# Patient Record
Sex: FEMALE | Race: WHITE | NOT HISPANIC OR LATINO | Employment: OTHER | URBAN - METROPOLITAN AREA
[De-identification: names, ages, dates, MRNs, and addresses within clinical notes are randomized per-mention and may not be internally consistent; named-entity substitution may affect disease eponyms.]

---

## 2018-02-07 ENCOUNTER — HOSPITAL ENCOUNTER (INPATIENT)
Facility: OTHER | Age: 68
LOS: 22 days | Discharge: NURSING, REHAB OR LONG-TERM CARE | DRG: 314 | End: 2018-03-01
Attending: HOSPITALIST | Admitting: INTERNAL MEDICINE
Payer: COMMERCIAL

## 2018-02-07 DIAGNOSIS — I48.20 CHRONIC ATRIAL FIBRILLATION: ICD-10-CM

## 2018-02-07 DIAGNOSIS — E11.22 TYPE 2 DIABETES MELLITUS WITH CHRONIC KIDNEY DISEASE ON CHRONIC DIALYSIS, WITHOUT LONG-TERM CURRENT USE OF INSULIN: ICD-10-CM

## 2018-02-07 DIAGNOSIS — I82.412 DVT OF DEEP FEMORAL VEIN, LEFT: ICD-10-CM

## 2018-02-07 DIAGNOSIS — B95.62 BACTEREMIA DUE TO METHICILLIN RESISTANT STAPHYLOCOCCUS AUREUS: Primary | ICD-10-CM

## 2018-02-07 DIAGNOSIS — R74.8 ABNORMAL LIVER ENZYMES: ICD-10-CM

## 2018-02-07 DIAGNOSIS — I38 ENDOCARDITIS: ICD-10-CM

## 2018-02-07 DIAGNOSIS — N18.6 ESRD (END STAGE RENAL DISEASE): ICD-10-CM

## 2018-02-07 DIAGNOSIS — R78.81 MRSA BACTEREMIA: ICD-10-CM

## 2018-02-07 DIAGNOSIS — B95.62 MRSA BACTEREMIA: ICD-10-CM

## 2018-02-07 DIAGNOSIS — N18.6 TYPE 2 DIABETES MELLITUS WITH CHRONIC KIDNEY DISEASE ON CHRONIC DIALYSIS, WITHOUT LONG-TERM CURRENT USE OF INSULIN: ICD-10-CM

## 2018-02-07 DIAGNOSIS — Z22.322 MRSA (METHICILLIN RESISTANT STAPH AUREUS) CULTURE POSITIVE: ICD-10-CM

## 2018-02-07 DIAGNOSIS — Z99.2 TYPE 2 DIABETES MELLITUS WITH CHRONIC KIDNEY DISEASE ON CHRONIC DIALYSIS, WITHOUT LONG-TERM CURRENT USE OF INSULIN: ICD-10-CM

## 2018-02-07 DIAGNOSIS — R78.81 BACTEREMIA DUE TO METHICILLIN RESISTANT STAPHYLOCOCCUS AUREUS: Primary | ICD-10-CM

## 2018-02-07 PROBLEM — E11.9 TYPE 2 DIABETES MELLITUS: Status: ACTIVE | Noted: 2018-02-07

## 2018-02-07 PROBLEM — I48.91 ATRIAL FIBRILLATION: Status: ACTIVE | Noted: 2018-02-07

## 2018-02-07 LAB — POCT GLUCOSE: 111 MG/DL (ref 70–110)

## 2018-02-07 PROCEDURE — 63600175 PHARM REV CODE 636 W HCPCS: Performed by: NURSE PRACTITIONER

## 2018-02-07 PROCEDURE — 11000001 HC ACUTE MED/SURG PRIVATE ROOM

## 2018-02-07 RX ORDER — ONDANSETRON 2 MG/ML
4 INJECTION INTRAMUSCULAR; INTRAVENOUS EVERY 8 HOURS PRN
Status: DISCONTINUED | OUTPATIENT
Start: 2018-02-07 | End: 2018-03-01 | Stop reason: HOSPADM

## 2018-02-07 RX ORDER — IBUPROFEN 200 MG
24 TABLET ORAL
Status: DISCONTINUED | OUTPATIENT
Start: 2018-02-07 | End: 2018-03-01 | Stop reason: HOSPADM

## 2018-02-07 RX ORDER — CIPROFLOXACIN 2 MG/ML
400 INJECTION, SOLUTION INTRAVENOUS
Status: DISCONTINUED | OUTPATIENT
Start: 2018-02-08 | End: 2018-02-09

## 2018-02-07 RX ORDER — AMIODARONE HYDROCHLORIDE 200 MG/1
200 TABLET ORAL DAILY
Status: DISCONTINUED | OUTPATIENT
Start: 2018-02-08 | End: 2018-02-13

## 2018-02-07 RX ORDER — INSULIN ASPART 100 [IU]/ML
1-10 INJECTION, SOLUTION INTRAVENOUS; SUBCUTANEOUS
Status: DISCONTINUED | OUTPATIENT
Start: 2018-02-08 | End: 2018-03-01 | Stop reason: HOSPADM

## 2018-02-07 RX ORDER — CEFEPIME HYDROCHLORIDE 1 G/50ML
1 INJECTION, SOLUTION INTRAVENOUS
Status: DISCONTINUED | OUTPATIENT
Start: 2018-02-08 | End: 2018-02-09

## 2018-02-07 RX ORDER — ACETAMINOPHEN 325 MG/1
650 TABLET ORAL EVERY 4 HOURS PRN
Status: DISCONTINUED | OUTPATIENT
Start: 2018-02-07 | End: 2018-03-01 | Stop reason: HOSPADM

## 2018-02-07 RX ORDER — IBUPROFEN 200 MG
16 TABLET ORAL
Status: DISCONTINUED | OUTPATIENT
Start: 2018-02-07 | End: 2018-03-01 | Stop reason: HOSPADM

## 2018-02-07 RX ORDER — CARVEDILOL 12.5 MG/1
25 TABLET ORAL 2 TIMES DAILY
Status: DISCONTINUED | OUTPATIENT
Start: 2018-02-08 | End: 2018-03-01 | Stop reason: HOSPADM

## 2018-02-07 RX ORDER — GLUCAGON 1 MG
1 KIT INJECTION
Status: DISCONTINUED | OUTPATIENT
Start: 2018-02-07 | End: 2018-03-01 | Stop reason: HOSPADM

## 2018-02-07 RX ORDER — SODIUM CHLORIDE 0.9 % (FLUSH) 0.9 %
5 SYRINGE (ML) INJECTION
Status: DISCONTINUED | OUTPATIENT
Start: 2018-02-07 | End: 2018-02-15

## 2018-02-07 RX ORDER — HEPARIN SODIUM 5000 [USP'U]/ML
5000 INJECTION, SOLUTION INTRAVENOUS; SUBCUTANEOUS EVERY 8 HOURS
Status: DISCONTINUED | OUTPATIENT
Start: 2018-02-08 | End: 2018-02-08

## 2018-02-07 RX ORDER — OXYCODONE AND ACETAMINOPHEN 5; 325 MG/1; MG/1
1 TABLET ORAL EVERY 4 HOURS PRN
Status: DISCONTINUED | OUTPATIENT
Start: 2018-02-08 | End: 2018-03-01 | Stop reason: HOSPADM

## 2018-02-07 RX ADMIN — ONDANSETRON 4 MG: 2 INJECTION INTRAMUSCULAR; INTRAVENOUS at 11:02

## 2018-02-08 PROBLEM — I48.91 ATRIAL FIBRILLATION: Status: ACTIVE | Noted: 2018-02-08

## 2018-02-08 PROBLEM — E11.9 TYPE 2 DIABETES MELLITUS: Status: ACTIVE | Noted: 2018-02-08

## 2018-02-08 PROBLEM — N18.6 ESRD (END STAGE RENAL DISEASE): Status: ACTIVE | Noted: 2018-02-08

## 2018-02-08 LAB
25(OH)D3+25(OH)D2 SERPL-MCNC: 14 NG/ML
ALBUMIN SERPL BCP-MCNC: 1.9 G/DL
ALBUMIN SERPL BCP-MCNC: 2 G/DL
ALP SERPL-CCNC: 70 U/L
ALP SERPL-CCNC: 76 U/L
ALT SERPL W/O P-5'-P-CCNC: 8 U/L
ALT SERPL W/O P-5'-P-CCNC: 9 U/L
ANION GAP SERPL CALC-SCNC: 11 MMOL/L
ANION GAP SERPL CALC-SCNC: 12 MMOL/L
AST SERPL-CCNC: 15 U/L
AST SERPL-CCNC: 15 U/L
BASOPHILS # BLD AUTO: 0.01 K/UL
BASOPHILS NFR BLD: 0.1 %
BILIRUB SERPL-MCNC: 0.3 MG/DL
BILIRUB SERPL-MCNC: 0.3 MG/DL
BUN SERPL-MCNC: 17 MG/DL
BUN SERPL-MCNC: 20 MG/DL
CALCIUM SERPL-MCNC: 8.3 MG/DL
CALCIUM SERPL-MCNC: 8.5 MG/DL
CHLORIDE SERPL-SCNC: 99 MMOL/L
CHLORIDE SERPL-SCNC: 99 MMOL/L
CO2 SERPL-SCNC: 22 MMOL/L
CO2 SERPL-SCNC: 22 MMOL/L
CREAT SERPL-MCNC: 4.6 MG/DL
CREAT SERPL-MCNC: 4.8 MG/DL
DIFFERENTIAL METHOD: ABNORMAL
EOSINOPHIL # BLD AUTO: 0.1 K/UL
EOSINOPHIL NFR BLD: 1.3 %
ERYTHROCYTE [DISTWIDTH] IN BLOOD BY AUTOMATED COUNT: 15.8 %
EST. GFR  (AFRICAN AMERICAN): 10 ML/MIN/1.73 M^2
EST. GFR  (AFRICAN AMERICAN): 11 ML/MIN/1.73 M^2
EST. GFR  (NON AFRICAN AMERICAN): 9 ML/MIN/1.73 M^2
EST. GFR  (NON AFRICAN AMERICAN): 9 ML/MIN/1.73 M^2
ESTIMATED AVG GLUCOSE: 140 MG/DL
FERRITIN SERPL-MCNC: 987 NG/ML
FOLATE SERPL-MCNC: 8.7 NG/ML
GLUCOSE SERPL-MCNC: 90 MG/DL
GLUCOSE SERPL-MCNC: 98 MG/DL
HBA1C MFR BLD HPLC: 6.5 %
HCT VFR BLD AUTO: 31.3 %
HGB BLD-MCNC: 10.3 G/DL
IRON SERPL-MCNC: 25 UG/DL
LACTATE SERPL-SCNC: 0.9 MMOL/L
LYMPHOCYTES # BLD AUTO: 1.4 K/UL
LYMPHOCYTES NFR BLD: 19.2 %
MAGNESIUM SERPL-MCNC: 1.7 MG/DL
MAGNESIUM SERPL-MCNC: 1.8 MG/DL
MCH RBC QN AUTO: 28.5 PG
MCHC RBC AUTO-ENTMCNC: 32.9 G/DL
MCV RBC AUTO: 87 FL
MONOCYTES # BLD AUTO: 0.7 K/UL
MONOCYTES NFR BLD: 9.6 %
NEUTROPHILS # BLD AUTO: 5.1 K/UL
NEUTROPHILS NFR BLD: 68.5 %
PHOSPHATE SERPL-MCNC: 4.7 MG/DL
PHOSPHATE SERPL-MCNC: 4.7 MG/DL
PLATELET # BLD AUTO: 269 K/UL
PMV BLD AUTO: 10.5 FL
POCT GLUCOSE: 100 MG/DL (ref 70–110)
POCT GLUCOSE: 113 MG/DL (ref 70–110)
POCT GLUCOSE: 123 MG/DL (ref 70–110)
POTASSIUM SERPL-SCNC: 4.1 MMOL/L
POTASSIUM SERPL-SCNC: 4.4 MMOL/L
PROT SERPL-MCNC: 6.8 G/DL
PROT SERPL-MCNC: 7.2 G/DL
PTH-INTACT SERPL-MCNC: 149.8 PG/ML
RBC # BLD AUTO: 3.62 M/UL
SATURATED IRON: 17 %
SODIUM SERPL-SCNC: 132 MMOL/L
SODIUM SERPL-SCNC: 133 MMOL/L
T4 FREE SERPL-MCNC: 0.82 NG/DL
TOTAL IRON BINDING CAPACITY: 149 UG/DL
TRANSFERRIN SERPL-MCNC: 101 MG/DL
VIT B12 SERPL-MCNC: 1341 PG/ML
WBC # BLD AUTO: 7.43 K/UL

## 2018-02-08 PROCEDURE — 82746 ASSAY OF FOLIC ACID SERUM: CPT

## 2018-02-08 PROCEDURE — 85025 COMPLETE CBC W/AUTO DIFF WBC: CPT

## 2018-02-08 PROCEDURE — 25000003 PHARM REV CODE 250: Performed by: NURSE PRACTITIONER

## 2018-02-08 PROCEDURE — 63600175 PHARM REV CODE 636 W HCPCS: Performed by: INTERNAL MEDICINE

## 2018-02-08 PROCEDURE — 80053 COMPREHEN METABOLIC PANEL: CPT | Mod: 91

## 2018-02-08 PROCEDURE — 84100 ASSAY OF PHOSPHORUS: CPT | Mod: 91

## 2018-02-08 PROCEDURE — 63600175 PHARM REV CODE 636 W HCPCS: Performed by: PHYSICIAN ASSISTANT

## 2018-02-08 PROCEDURE — 80053 COMPREHEN METABOLIC PANEL: CPT

## 2018-02-08 PROCEDURE — 82306 VITAMIN D 25 HYDROXY: CPT

## 2018-02-08 PROCEDURE — 82728 ASSAY OF FERRITIN: CPT

## 2018-02-08 PROCEDURE — 87040 BLOOD CULTURE FOR BACTERIA: CPT | Mod: 59

## 2018-02-08 PROCEDURE — 83970 ASSAY OF PARATHORMONE: CPT

## 2018-02-08 PROCEDURE — 83540 ASSAY OF IRON: CPT

## 2018-02-08 PROCEDURE — 87077 CULTURE AEROBIC IDENTIFY: CPT

## 2018-02-08 PROCEDURE — 84439 ASSAY OF FREE THYROXINE: CPT

## 2018-02-08 PROCEDURE — 99223 1ST HOSP IP/OBS HIGH 75: CPT | Mod: ,,, | Performed by: NURSE PRACTITIONER

## 2018-02-08 PROCEDURE — 87186 SC STD MICRODIL/AGAR DIL: CPT

## 2018-02-08 PROCEDURE — 83735 ASSAY OF MAGNESIUM: CPT | Mod: 91

## 2018-02-08 PROCEDURE — 63600175 PHARM REV CODE 636 W HCPCS: Performed by: NURSE PRACTITIONER

## 2018-02-08 PROCEDURE — 11000001 HC ACUTE MED/SURG PRIVATE ROOM

## 2018-02-08 PROCEDURE — 83605 ASSAY OF LACTIC ACID: CPT

## 2018-02-08 PROCEDURE — 83036 HEMOGLOBIN GLYCOSYLATED A1C: CPT

## 2018-02-08 PROCEDURE — 82607 VITAMIN B-12: CPT

## 2018-02-08 PROCEDURE — 83735 ASSAY OF MAGNESIUM: CPT

## 2018-02-08 PROCEDURE — 84100 ASSAY OF PHOSPHORUS: CPT

## 2018-02-08 PROCEDURE — 36415 COLL VENOUS BLD VENIPUNCTURE: CPT

## 2018-02-08 PROCEDURE — 97802 MEDICAL NUTRITION INDIV IN: CPT

## 2018-02-08 RX ORDER — AMIODARONE HYDROCHLORIDE 100 MG/1
200 TABLET ORAL DAILY
Status: ON HOLD | COMMUNITY
End: 2018-05-02 | Stop reason: HOSPADM

## 2018-02-08 RX ORDER — HEPARIN SODIUM 5000 [USP'U]/ML
5000 INJECTION, SOLUTION INTRAVENOUS; SUBCUTANEOUS EVERY 12 HOURS
Status: DISCONTINUED | OUTPATIENT
Start: 2018-02-08 | End: 2018-02-21

## 2018-02-08 RX ORDER — CARVEDILOL 25 MG/1
25 TABLET ORAL 2 TIMES DAILY
COMMUNITY
End: 2019-10-21

## 2018-02-08 RX ORDER — TRAMADOL HYDROCHLORIDE 50 MG/1
50 TABLET ORAL EVERY 8 HOURS PRN
Status: ON HOLD | COMMUNITY
End: 2018-02-27 | Stop reason: HOSPADM

## 2018-02-08 RX ORDER — ONDANSETRON 2 MG/ML
8 INJECTION INTRAMUSCULAR; INTRAVENOUS ONCE
Status: COMPLETED | OUTPATIENT
Start: 2018-02-08 | End: 2018-02-08

## 2018-02-08 RX ORDER — SODIUM CHLORIDE 9 MG/ML
INJECTION, SOLUTION INTRAVENOUS
Status: DISCONTINUED | OUTPATIENT
Start: 2018-02-08 | End: 2018-03-01 | Stop reason: HOSPADM

## 2018-02-08 RX ORDER — SODIUM CHLORIDE 9 MG/ML
INJECTION, SOLUTION INTRAVENOUS ONCE
Status: DISCONTINUED | OUTPATIENT
Start: 2018-02-08 | End: 2018-02-10

## 2018-02-08 RX ORDER — HYDRALAZINE HYDROCHLORIDE 100 MG/1
100 TABLET, FILM COATED ORAL 3 TIMES DAILY
COMMUNITY
End: 2019-10-21

## 2018-02-08 RX ADMIN — CEFEPIME 1 G: 1 INJECTION, POWDER, FOR SOLUTION INTRAMUSCULAR; INTRAVENOUS at 01:02

## 2018-02-08 RX ADMIN — CARVEDILOL 25 MG: 12.5 TABLET, FILM COATED ORAL at 10:02

## 2018-02-08 RX ADMIN — ONDANSETRON 4 MG: 2 INJECTION INTRAMUSCULAR; INTRAVENOUS at 12:02

## 2018-02-08 RX ADMIN — ONDANSETRON 8 MG: 2 INJECTION INTRAMUSCULAR; INTRAVENOUS at 06:02

## 2018-02-08 RX ADMIN — CIPROFLOXACIN 400 MG: 2 INJECTION, SOLUTION INTRAVENOUS at 12:02

## 2018-02-08 RX ADMIN — OXYCODONE HYDROCHLORIDE AND ACETAMINOPHEN 1 TABLET: 5; 325 TABLET ORAL at 01:02

## 2018-02-08 RX ADMIN — DAPTOMYCIN 1000 MG: 500 INJECTION, POWDER, LYOPHILIZED, FOR SOLUTION INTRAVENOUS at 01:02

## 2018-02-08 RX ADMIN — ONDANSETRON 4 MG: 2 INJECTION INTRAMUSCULAR; INTRAVENOUS at 09:02

## 2018-02-08 RX ADMIN — HEPARIN SODIUM 5000 UNITS: 5000 INJECTION, SOLUTION INTRAVENOUS; SUBCUTANEOUS at 05:02

## 2018-02-08 RX ADMIN — CIPROFLOXACIN 400 MG: 2 INJECTION, SOLUTION INTRAVENOUS at 11:02

## 2018-02-08 RX ADMIN — HEPARIN SODIUM 5000 UNITS: 5000 INJECTION, SOLUTION INTRAVENOUS; SUBCUTANEOUS at 09:02

## 2018-02-08 RX ADMIN — OXYCODONE HYDROCHLORIDE AND ACETAMINOPHEN 1 TABLET: 5; 325 TABLET ORAL at 06:02

## 2018-02-08 NOTE — NURSING
Patient received to room 306 via EMS. A&Ox4. Vitals stable on room air. Reports no pain. Nauseous, with one episode of emesis on trip per EMS. Family at the bedside. Jeff Fuentes NP made aware of patient arrival. Will complete admission and continue to monitor.    Tosha Goldstein RN  2/7/2018  10:44 PM

## 2018-02-08 NOTE — CONSULTS
"REASON FOR CONSULTATION:     HPI:67 y.o. white female with known MRSA bacteremia who was transferred here for ID and Neurosurgery services.  She has a history of ESRD, Afib, cardiomyopathy, and DM.  She has been hospitalized with bacteremia from a Right SC Malvin which was removed at outside facility.  She had a left femoral Jaspreet placed 2/5/2018.  Blood cultures remained positive; RANDA  normal, MRI of thoracic and lumbar spine showed abnormal foci thought  "probably a hemangioma or metastasis".  She normally dialyses MWF in [place], [place].  She is a very poor historian and says she does not know why her kidneys failed requiring she start HD in November 2017.  "You need to ask my daughter all these questions.  I just don't know."  No other vascular access ie AVF or grafts noted.    REVIEW OF SYSTEMS:   Notable positives fatigue, generalized weakness and muscle cramping with dialysis.  + nausea and vomiting on and off for past several days.  She used to be ambulatory before this hospital stay.    Past Medical History:   Diagnosis Date    A-fib     Cardiomyopathy     Diabetes mellitus     ESRD (end stage renal disease)        History reviewed. No pertinent surgical history.    Review of patient's allergies indicates:   Allergen Reactions    Sulfa (sulfonamide antibiotics) Anaphylaxis    Albuterol sulfate Palpitations       No prescriptions prior to admission.       Current Facility-Administered Medications   Medication Dose Route Frequency Provider Last Rate Last Dose    acetaminophen tablet 650 mg  650 mg Oral Q4H PRN Jeff Fuentes NP        amiodarone tablet 200 mg  200 mg Oral Daily Jeff Fuentes NP        carvedilol tablet 25 mg  25 mg Oral BID Jeff Fuentes NP        cefepime in dextrose 5 % 1 gram/50 mL IVPB 1 g  1 g Intravenous Q12H Jeff Fuentes  mL/hr at 02/08/18 0131 1 g at 02/08/18 0131    ciprofloxacin (CIPRO)400mg/200ml D5W IVPB 400 mg  400 mg Intravenous Q24H Jeff " MAC Fuentes  mL/hr at 02/08/18 0026 400 mg at 02/08/18 0026    DAPTOmycin (CUBICIN) 1,000 mg in sodium chloride 0.9% IVPB  1,000 mg Intravenous Q48H Jeff Fuentes  mL/hr at 02/08/18 0131 1,000 mg at 02/08/18 0131    dextrose 50% injection 12.5 g  12.5 g Intravenous PRN Jeff Fuentes NP        dextrose 50% injection 25 g  25 g Intravenous PRN Jeff Fuentes NP        glucagon (human recombinant) injection 1 mg  1 mg Intramuscular PRN Jeff Fuentes NP        glucose chewable tablet 16 g  16 g Oral PRN Jeff Fuentes NP        glucose chewable tablet 24 g  24 g Oral PRN Jeff Fuentes NP        heparin (porcine) injection 5,000 Units  5,000 Units Subcutaneous Q8H Jeff Fuentes NP   5,000 Units at 02/08/18 0522    influenza (FLUZONE HIGH-DOSE) vaccine 0.5 mL  0.5 mL Intramuscular vaccine x 1 dose Lila Ordaz MD        insulin aspart pen 1-10 Units  1-10 Units Subcutaneous TIDWM Jeff Fuentes NP        ondansetron injection 4 mg  4 mg Intravenous Q8H PRN Jeff Fuentes NP   4 mg at 02/07/18 2333    oxyCODONE-acetaminophen 5-325 mg per tablet 1 tablet  1 tablet Oral Q4H PRN Jeff Fuentes NP   1 tablet at 02/08/18 0131    pneumoc 13-myron conj-dip cr(PF) 0.5 mL  0.5 mL Intramuscular vaccine x 1 dose Lila Ordaz MD        sodium chloride 0.9% flush 5 mL  5 mL Intravenous PRN Jeff Fuentes NP           Social History     Social History    Marital status:      Spouse name: N/A    Number of children: N/A    Years of education: N/A     Social History Main Topics    Smoking status: Never Smoker    Smokeless tobacco: Never Used    Alcohol use No    Drug use: No    Sexual activity: Not Currently     Other Topics Concern    None     Social History Narrative    None       History reviewed. No pertinent family history.    Temp:  [98.4 °F (36.9 °C)-98.8 °F (37.1 °C)] 98.8 °F (37.1 °C)  Pulse:  [48-68]  66  Resp:  [18-20] 20  SpO2:  [95 %-98 %] 95 %  BP: (143-154)/(65-81) 154/81      PHYSICAL EXAM:  Constitutional: She is oriented to person, place, and time. Morbidly obese nd well-nourished.   Head: Normocephalic.   Eyes: Conjunctivae are normal. Right eye exhibits no discharge. Left eye exhibits no discharge.   Neck: Normal range of motion. Neck supple.   Cardiovascular: Regular rhythm, normal heart sounds. Bradycardia  Pulmonary/Chest: Effort normal and breath sounds normal. No respiratory distress. Right CW former Malvin site CDI, dressed  Abdominal: Soft, obese. Bowel sounds are normal. She exhibits no distension. There is no tenderness.   Ext:  No CCE.  + left femoral Jaspreet cath CDI  Neurological: NF  Skin: Skin is warm and dry + pallor  Psychiatric: She has a normal mood and affect. Her speech is normal and behavior is normal. Thought content normal, very pleasant.  Poor historian.     Labs, chart, transfer paperwork reviewed, radiology reviewed.    ASSESSMENT AND PLAN:    68 YO WF with ESRD and catheter associated MRSA bacteremia and suspected lumbar foci presents from [facility] for ID and NS services.  1. ESRD:  Continue MWF schedule.  Avoid IV iron given active infection.  She is going to need a permanent access at some point once current infection clears.  Renally dose all meds and antimicrobials. On Cubicin, Cipro, and Cefepime.  2. Anemia:  As above, holding IV iron given active infection.  Epo to maintain hbg 10-11.  Currently at goal.  3. Hyperphos:  No Renvela for now given nausea and vomiting.  Phos at goal.  4. MBD/2HPT/Vit D deficiency:  Check these.  5. DM:  Currently on SSI.  Agree with this given she has intermittant nausea and poor intake.  No long acting insulin.  6. Afib:  On Amio and Carvedilol.  Unclear why she is not on full anticoagulation.  Defer to cards. Would monitor carefully given concurrent Cipro/Amio.    Thanks for consult will follow with you.  HD orders for MWF  placed.

## 2018-02-08 NOTE — PLAN OF CARE
Met with patient & daughter at bedside to complete discharge planning assessment. Patient is a transfer from [facility] in [facility]. Patient lives at home with her granddaughter. Patient's daughter visits frequently. Both provide minimal assist needed with ADLs. Patient is current at [facility] dialysis center [facility] in [facility] & attends dialysis MWF. Patient has necessary DME available at home for use. Will follow up on any DC needs      02/08/18 1203   Discharge Assessment   Assessment Type Discharge Planning Assessment   Confirmed/corrected address and phone number on facesheet? Yes   Assessment information obtained from? Patient;Caregiver   Communicated expected length of stay with patient/caregiver no   Prior to hospitilization cognitive status: Alert/Oriented   Prior to hospitalization functional status: Needs Assistance;Assistive Equipment   Current cognitive status: Alert/Oriented   Current Functional Status: Needs Assistance;Assistive Equipment   Facility Arrived From[place] [facility] [place]   Lives With grandchild(juan)   Able to Return to Prior Arrangements yes   Is patient able to care for self after discharge? Yes   Who are your caregiver(s) and their phone number(s)? Keira Proctor [phone]   Patient's perception of discharge disposition home health   Readmission Within The Last 30 Days no previous admission in last 30 days   Patient currently being followed by outpatient case management? No   Patient currently receives any other outside agency services? No   Equipment Currently Used at Home cane, straight;rollator;wheelchair;bedside commode;shower chair   Do you have any problems affording any of your prescribed medications? No   Is the patient taking medications as prescribed? yes   Does the patient have transportation home? Yes   Transportation Available family or friend will provide   Dialysis Name and Scheduled days [facility]  MWF   Does  the patient receive services at the [facility]? No   Discharge Plan A Home with family   Discharge Plan B Home Health   Patient/Family In Agreement With Plan yes

## 2018-02-08 NOTE — PROGRESS NOTES
Patient seen and examined.  The patient is acutely ill appearing with some nausea today and back discomfort.  Patient transferred from [facility] in [place], [place].  Presented there with a MRSA bacteremia 1/30/2018.  The patient had a right IJ vascular catheter and it was removed on around 2/1/2017.  The catheter was not sent for culture and the patient was given a dialysis holiday for several days.  L femoral catheter placed and the patient has been receiving dialysis treatments from it.  RANDA done and negative.  The patient developed acute back pain, MRI of the thoracic and lumbar spine revealed a T9 enhancement of unknown significance.  This was followed by a bone scan which was negative.  Blood cultures have remained positive for MRSA.  Suspect original source was the right IJ vascular catheter, but there is concern for the presence of septic emboli.  ID consulted to assist with evaluation and continued treatment.

## 2018-02-08 NOTE — PLAN OF CARE
Problem: Patient Care Overview  Goal: Plan of Care Review  Outcome: Ongoing (interventions implemented as appropriate)   02/08/18 0455   Coping/Psychosocial   Plan Of Care Reviewed With patient       Problem: Fall Risk (Adult)  Goal: Absence of Falls  Patient will demonstrate the desired outcomes by discharge/transition of care.   Outcome: Ongoing (interventions implemented as appropriate)   02/08/18 0455   Fall Risk (Adult)   Absence of Falls making progress toward outcome       Problem: Pressure Ulcer Risk (Ridge Scale) (Adult,Obstetrics,Pediatric)  Goal: Skin Integrity  Patient will demonstrate the desired outcomes by discharge/transition of care.   Outcome: Ongoing (interventions implemented as appropriate)   02/08/18 0455   Pressure Ulcer Risk (Ridge Scale) (Adult,Obstetrics,Pediatric)   Skin Integrity making progress toward outcome       Problem: Infection, Risk/Actual (Adult)  Goal: Infection Prevention/Resolution  Patient will demonstrate the desired outcomes by discharge/transition of care.   Outcome: Ongoing (interventions implemented as appropriate)   02/08/18 0455   Infection, Risk/Actual (Adult)   Infection Prevention/Resolution making progress toward outcome       Comments: Plan of care reviewed with the patient and her daughter at the bedside. A&Ox4 with periods of confusion. Easily re-oriented with verbal re-direction. Pain well-controlled with PRN Percocet. Rested peacefully through the night with one episode of nausea and emesis. Treated with Zofran for moderate relief. Educated on antibiotic therapy and disease process. Educated on fall risk. Bed alarm on and call bell within reach. Instructed to call for assistance. Patient remains free from injury. Will continue to monitor.    Tosha Goldstein RN  2/8/2018  4:58 AM

## 2018-02-08 NOTE — PLAN OF CARE
Problem: Patient Care Overview  Goal: Plan of Care Review  Outcome: Ongoing (interventions implemented as appropriate)  Recommendation/Intervention:   1. Recommend 2000kcal DM + renal diet   2. Recommend Novasource Renal 1x/d if PO intake declines     Goals:   1. Meet >85% EEN and EPN   2. Prevent dry wt loss >2%/week   3. Promote nutrition related labs wnl

## 2018-02-08 NOTE — ASSESSMENT & PLAN NOTE
Nutrition Diagnosis:  Increased nutrient needs (kcal, protein)    Related to (etiology):   HD    Signs and Symptoms (as evidenced by):   Pt with ESRD on HD     Interventions/Recommendations (treatment strategy):  2000kcal DM + renal diet + recommend Novasource Renal 1x/d if PO intake declines    Nutrition Diagnosis Status:   New

## 2018-02-08 NOTE — H&P
[facility] Hospital Medicine  History & Physical    Patient Name: Yumiko Proctor  MRN: [id number]  Admission Date: 2/7/2018  Attending Physician: Lila Ordaz*   Primary Care Provider: Primary Doctor No         Patient information was obtained from patient and relative(s).     Subjective:     Principal Problem:MRSA bacteremia    Chief Complaint: No chief complaint on file.       HPI: The patient is a 68 yo female with known MRSA bacteremia who was transferred here for ID and Neurosurgery services.  She has a history of ESRD, Afib, cardiomyopathy, and DM.  She has been hospitalized with bacteremia from a vascular access which was taken out and replaced today with a lt femoral vas cath.  Blood cultures remained positive. She had a RANDA which was normal, MRI of thoracic and lumbar spine showed a abnormal foci in T( that was probably a hemangioma or metastasis.    History reviewed. No pertinent past medical history.    History reviewed. No pertinent surgical history.    Review of patient's allergies indicates:   Allergen Reactions    Sulfa (sulfonamide antibiotics) Anaphylaxis    Albuterol sulfate Palpitations       No current facility-administered medications on file prior to encounter.      No current outpatient prescriptions on file prior to encounter.     Family History     None        Social History Main Topics    Smoking status: Not on file    Smokeless tobacco: Not on file    Alcohol use Not on file    Drug use: Unknown    Sexual activity: Not on file     Review of Systems   Constitutional: Positive for activity change and fatigue. Negative for appetite change and fever.   HENT: Negative for congestion, ear pain, rhinorrhea and sinus pressure.    Eyes: Negative for pain and discharge.   Respiratory: Negative for cough, chest tightness, shortness of breath and wheezing.    Cardiovascular: Negative for chest pain and leg swelling.   Gastrointestinal: Negative for abdominal  distention, abdominal pain, diarrhea, nausea and vomiting.   Endocrine: Negative for cold intolerance and heat intolerance.   Genitourinary: Negative for difficulty urinating, flank pain, frequency, hematuria and urgency.   Musculoskeletal: Positive for arthralgias, back pain and myalgias. Negative for joint swelling.   Allergic/Immunologic: Negative for environmental allergies and food allergies.   Neurological: Negative for dizziness, weakness, light-headedness and headaches.   Hematological: Does not bruise/bleed easily.   Psychiatric/Behavioral: Negative for agitation, behavioral problems and decreased concentration.     Objective:     Vital Signs (Most Recent):  Temp: 98.4 °F (36.9 °C) (02/07/18 2239)  Pulse: (!) 57 (02/07/18 2239)  Resp: 18 (02/07/18 2239)  BP: (!) 143/65 (02/07/18 2239)  SpO2: 98 % (02/07/18 2239) Vital Signs (24h Range):  Temp:  [98.4 °F (36.9 °C)] 98.4 °F (36.9 °C)  Pulse:  [57] 57  Resp:  [18] 18  SpO2:  [98 %] 98 %  BP: (143)/(65) 143/65     Weight: 108 kg (238 lb 3.2 oz)  Body mass index is 42.2 kg/m².    Physical Exam   Constitutional: She is oriented to person, place, and time. She appears well-developed and well-nourished.   HENT:   Head: Normocephalic.   Eyes: Conjunctivae are normal. Right eye exhibits no discharge. Left eye exhibits no discharge.   Neck: Normal range of motion. Neck supple.   Cardiovascular: Regular rhythm, normal heart sounds and intact distal pulses.  Bradycardia present.    Pulses:       Radial pulses are 1+ on the right side, and 1+ on the left side.        Dorsalis pedis pulses are 1+ on the right side, and 1+ on the left side.   Pulmonary/Chest: Effort normal and breath sounds normal. No respiratory distress.   Abdominal: Soft. Bowel sounds are normal. She exhibits no distension. There is no tenderness.   Musculoskeletal: Normal range of motion.   Neurological: She is alert and oriented to person, place, and time. She has normal strength. GCS eye subscore is  4. GCS verbal subscore is 5. GCS motor subscore is 6.   Skin: Skin is warm and dry. There is pallor.   Psychiatric: She has a normal mood and affect. Her speech is normal and behavior is normal. Thought content normal.           Significant Labs: All pertinent labs within the past 24 hours have been reviewed.    Significant Imaging: I have reviewed all pertinent imaging results/findings within the past 24 hours.    Assessment/Plan:     * MRSA bacteremia    Blood Cultures pending  Lactic acid pending  Daptomycin/Cefepime/Cipro  ID consult           Type 2 diabetes mellitus    A1c pending  Moderate dose SSI          ESRD (end stage renal disease)    Consult Nephrology          Atrial fibrillation    Continue Coreg              VTE Risk Mitigation         Ordered     heparin (porcine) injection 5,000 Units  Every 8 hours     Route:  Subcutaneous        02/07/18 2255     Medium Risk of VTE  Once      02/07/18 2255     Place sequential compression device  Until discontinued      02/07/18 2255     Place ANGELI hose  Until discontinued      02/07/18 2255             Jeff Fuentes NP  Department of Hospital Medicine   [facility]

## 2018-02-08 NOTE — SUBJECTIVE & OBJECTIVE
History reviewed. No pertinent past medical history.    History reviewed. No pertinent surgical history.    Review of patient's allergies indicates:   Allergen Reactions    Sulfa (sulfonamide antibiotics) Anaphylaxis    Albuterol sulfate Palpitations       No current facility-administered medications on file prior to encounter.      No current outpatient prescriptions on file prior to encounter.     Family History     None        Social History Main Topics    Smoking status: Not on file    Smokeless tobacco: Not on file    Alcohol use Not on file    Drug use: Unknown    Sexual activity: Not on file     Review of Systems   Constitutional: Positive for activity change and fatigue. Negative for appetite change and fever.   HENT: Negative for congestion, ear pain, rhinorrhea and sinus pressure.    Eyes: Negative for pain and discharge.   Respiratory: Negative for cough, chest tightness, shortness of breath and wheezing.    Cardiovascular: Negative for chest pain and leg swelling.   Gastrointestinal: Negative for abdominal distention, abdominal pain, diarrhea, nausea and vomiting.   Endocrine: Negative for cold intolerance and heat intolerance.   Genitourinary: Negative for difficulty urinating, flank pain, frequency, hematuria and urgency.   Musculoskeletal: Positive for arthralgias, back pain and myalgias. Negative for joint swelling.   Allergic/Immunologic: Negative for environmental allergies and food allergies.   Neurological: Negative for dizziness, weakness, light-headedness and headaches.   Hematological: Does not bruise/bleed easily.   Psychiatric/Behavioral: Negative for agitation, behavioral problems and decreased concentration.     Objective:     Vital Signs (Most Recent):  Temp: 98.4 °F (36.9 °C) (02/07/18 2239)  Pulse: (!) 57 (02/07/18 2239)  Resp: 18 (02/07/18 2239)  BP: (!) 143/65 (02/07/18 2239)  SpO2: 98 % (02/07/18 2239) Vital Signs (24h Range):  Temp:  [98.4 °F (36.9 °C)] 98.4 °F (36.9  °C)  Pulse:  [57] 57  Resp:  [18] 18  SpO2:  [98 %] 98 %  BP: (143)/(65) 143/65     Weight: 108 kg (238 lb 3.2 oz)  Body mass index is 42.2 kg/m².    Physical Exam   Constitutional: She is oriented to person, place, and time. She appears well-developed and well-nourished.   HENT:   Head: Normocephalic.   Eyes: Conjunctivae are normal. Right eye exhibits no discharge. Left eye exhibits no discharge.   Neck: Normal range of motion. Neck supple.   Cardiovascular: Regular rhythm, normal heart sounds and intact distal pulses.  Bradycardia present.    Pulses:       Radial pulses are 1+ on the right side, and 1+ on the left side.        Dorsalis pedis pulses are 1+ on the right side, and 1+ on the left side.   Pulmonary/Chest: Effort normal and breath sounds normal. No respiratory distress.   Abdominal: Soft. Bowel sounds are normal. She exhibits no distension. There is no tenderness.   Musculoskeletal: Normal range of motion.   Neurological: She is alert and oriented to person, place, and time. She has normal strength. GCS eye subscore is 4. GCS verbal subscore is 5. GCS motor subscore is 6.   Skin: Skin is warm and dry. There is pallor.   Psychiatric: She has a normal mood and affect. Her speech is normal and behavior is normal. Thought content normal.           Significant Labs: All pertinent labs within the past 24 hours have been reviewed.    Significant Imaging: I have reviewed all pertinent imaging results/findings within the past 24 hours.

## 2018-02-08 NOTE — PROGRESS NOTES
[facility]  Adult Nutrition  Progress Note    SUMMARY     Recommendations    Recommendation/Intervention:   1. Recommend 2000kcal DM + renal diet   2. Recommend Novasource Renal 1x/d if PO intake declines    Goals:   1. Meet >85% EEN and EPN   2. Prevent dry wt loss >2%/week   3. Promote nutrition related labs wnl    Nutrition Goal Status: new  Communication of RD Recs: discussed on rounds    Reason for Assessment    Reason for Assessment: identified at risk by screening criteria ([facility])  Diagnosis:  1. Bacteremia due to methicillin resistant Staphylococcus aureus    2. MRSA bacteremia      Past Medical History:   Diagnosis Date    A-fib     Cardiomyopathy     Diabetes mellitus     ESRD (end stage renal disease)         Interdisciplinary Rounds: attended     General Information Comments: Pt is a poor historian, answers limited. Pt endorses fair appetite. Noted with nausea/vomiting for several days PTA. Nutrition focused physical assessment performed, pt has no overt signs of muscle or fat depletion.    Nutrition Discharge Planning: d/c on DM renal diet    Nutrition Prescription Ordered    Current Diet Order: 2800kcal DM cardiac    Evaluation of Received Nutrients/Fluid Intake    % Intake of Estimated Energy Needs: 50 - 75 %  % Meal Intake: 50%     Nutrition Risk Screen     Nutrition Risk Screen: no indicators present    Nutrition/Diet History    Food Preferences: No cultural/spiritual prefs noted    Labs/Tests/Procedures/Meds    Pertinent Labs Reviewed: reviewed  Lab Results   Component Value Date     (L) 02/08/2018    K 4.1 02/08/2018    CL 99 02/08/2018    CO2 22 (L) 02/08/2018    BUN 20 02/08/2018    CREATININE 4.8 (H) 02/08/2018    CALCIUM 8.3 (L) 02/08/2018    PHOS 4.7 (H) 02/08/2018    MG 1.8 02/08/2018    ESTGFRAFRICA 10 (A) 02/08/2018    EGFRNONAA 9 (A) 02/08/2018    ALBUMIN 1.9 (L) 02/08/2018     POCT Glucose   Date Value Ref Range Status   02/08/2018 113 (H) 70 - 110 mg/dL Final  "  02/07/2018 111 (H) 70 - 110 mg/dL Final     Lab Results   Component Value Date    HGBA1C 6.5 (H) 02/08/2018     Pertinent Medications Reviewed: reviewed  Scheduled Meds:   sodium chloride 0.9%   Intravenous Once    amiodarone  200 mg Oral Daily    carvedilol  25 mg Oral BID    ceFEPime (MAXIPIME) IVPB  1 g Intravenous Q12H    ciprofloxacin  400 mg Intravenous Q24H    DAPTOmycin (CUBICIN)  IV  1,000 mg Intravenous Q48H    [START ON 2/9/2018] epoetin davion (PROCRIT) injection  50 Units/kg Subcutaneous Every Mon, Wed, Fri    heparin (porcine)  5,000 Units Subcutaneous Q12H    insulin aspart  1-10 Units Subcutaneous TIDWM     Physical Findings    Overall Physical Appearance: nourished, obese  Oral/Mouth Cavity: tooth/teeth missing  Skin: pressure ulcer(s) (stage I)    Anthropometrics    Temp: 98.7 °F (37.1 °C)  Height: 5' 3" (160 cm)  Weight Method: Bed Scale  Weight: 108 kg (238 lb 3.2 oz)  Ideal Body Weight (IBW), Female: 115 lb  % Ideal Body Weight, Female (lb): 207.13 lb  BMI (Calculated): 42.3  BMI Grade: greater than 40 - morbid obesity     Estimated/Assessed Needs    Weight Used For Calorie Calculations: 108 kg (238 lb 1.6 oz)   Energy Calorie Requirements (kcal): 2059  Energy Need Method: [place]-St Jeor (stress factor 1.3)    Weight Used For Protein Calculations: 52.2 kg (115 lb) (IBW)  Protein Requirements:  gm/d (1.8-2 gm/kg IBW)    Fluid Requirements (mL): 1000 (+UOP, or per team)    Assessment and Plan    ESRD (end stage renal disease)    Nutrition Diagnosis:  Increased nutrient needs (kcal, protein)    Related to (etiology):   HD    Signs and Symptoms (as evidenced by):   Pt with ESRD on HD     Interventions/Recommendations (treatment strategy):  2000kcal DM + renal diet + recommend Novasource Renal 1x/d if PO intake declines    Nutrition Diagnosis Status:   New          Monitor and Evaluation    Food and Nutrient Intake: energy intake, food and beverage intake  Food and Nutrient " Adminstration: diet order  Physical Activity and Function: nutrition-related ADLs and IADLs  Anthropometric Measurements: weight, weight change  Biochemical Data, Medical Tests and Procedures: electrolyte and renal panel, gastrointestinal profile, glucose/endocrine profile, inflammatory profile, lipid profile  Nutrition-Focused Physical Findings: overall appearance, extremities, muscles and bones, skin    Nutrition Risk    Level of Risk: other (see comments) (f/u 1x/wk)    Nutrition Follow-Up    RD Follow-up?: Yes    Naina Middleton, MS, RD, LDN   Dietitian, [facility]  [phone]

## 2018-02-09 LAB
ALBUMIN SERPL BCP-MCNC: 1.9 G/DL
ALP SERPL-CCNC: 73 U/L
ALT SERPL W/O P-5'-P-CCNC: 10 U/L
ANION GAP SERPL CALC-SCNC: 11 MMOL/L
AST SERPL-CCNC: 19 U/L
BASOPHILS # BLD AUTO: 0.01 K/UL
BASOPHILS NFR BLD: 0.1 %
BILIRUB SERPL-MCNC: 0.3 MG/DL
BUN SERPL-MCNC: 28 MG/DL
CALCIUM SERPL-MCNC: 8.2 MG/DL
CHLORIDE SERPL-SCNC: 98 MMOL/L
CO2 SERPL-SCNC: 22 MMOL/L
CREAT SERPL-MCNC: 6.1 MG/DL
DIASTOLIC DYSFUNCTION: NO
DIFFERENTIAL METHOD: ABNORMAL
EOSINOPHIL # BLD AUTO: 0.1 K/UL
EOSINOPHIL NFR BLD: 1.3 %
ERYTHROCYTE [DISTWIDTH] IN BLOOD BY AUTOMATED COUNT: 15.7 %
EST. GFR  (AFRICAN AMERICAN): 8 ML/MIN/1.73 M^2
EST. GFR  (NON AFRICAN AMERICAN): 7 ML/MIN/1.73 M^2
ESTIMATED PA SYSTOLIC PRESSURE: 23.98
GLOBAL PERICARDIAL EFFUSION: NORMAL
GLUCOSE SERPL-MCNC: 82 MG/DL
HCT VFR BLD AUTO: 30.5 %
HGB BLD-MCNC: 10 G/DL
LYMPHOCYTES # BLD AUTO: 1.7 K/UL
LYMPHOCYTES NFR BLD: 20.6 %
MAGNESIUM SERPL-MCNC: 1.8 MG/DL
MCH RBC QN AUTO: 28.3 PG
MCHC RBC AUTO-ENTMCNC: 32.8 G/DL
MCV RBC AUTO: 86 FL
MITRAL VALVE REGURGITATION: NORMAL
MONOCYTES # BLD AUTO: 0.8 K/UL
MONOCYTES NFR BLD: 9.3 %
NEUTROPHILS # BLD AUTO: 5.5 K/UL
NEUTROPHILS NFR BLD: 67.7 %
PHOSPHATE SERPL-MCNC: 5.9 MG/DL
PLATELET # BLD AUTO: 280 K/UL
PMV BLD AUTO: 10.3 FL
POCT GLUCOSE: 105 MG/DL (ref 70–110)
POCT GLUCOSE: 115 MG/DL (ref 70–110)
POCT GLUCOSE: 132 MG/DL (ref 70–110)
POCT GLUCOSE: 147 MG/DL (ref 70–110)
POTASSIUM SERPL-SCNC: 4.2 MMOL/L
PROT SERPL-MCNC: 6.6 G/DL
RBC # BLD AUTO: 3.53 M/UL
RETIRED EF AND QEF - SEE NOTES: 50 (ref 55–65)
SODIUM SERPL-SCNC: 131 MMOL/L
TRICUSPID VALVE REGURGITATION: NORMAL
WBC # BLD AUTO: 8.16 K/UL

## 2018-02-09 PROCEDURE — 90935 HEMODIALYSIS ONE EVALUATION: CPT

## 2018-02-09 PROCEDURE — 25000003 PHARM REV CODE 250: Performed by: NURSE PRACTITIONER

## 2018-02-09 PROCEDURE — 85025 COMPLETE CBC W/AUTO DIFF WBC: CPT

## 2018-02-09 PROCEDURE — 80053 COMPREHEN METABOLIC PANEL: CPT

## 2018-02-09 PROCEDURE — 93306 TTE W/DOPPLER COMPLETE: CPT

## 2018-02-09 PROCEDURE — 63600175 PHARM REV CODE 636 W HCPCS: Performed by: NURSE PRACTITIONER

## 2018-02-09 PROCEDURE — 11000001 HC ACUTE MED/SURG PRIVATE ROOM

## 2018-02-09 PROCEDURE — 99233 SBSQ HOSP IP/OBS HIGH 50: CPT | Mod: ,,, | Performed by: INTERNAL MEDICINE

## 2018-02-09 PROCEDURE — 83735 ASSAY OF MAGNESIUM: CPT

## 2018-02-09 PROCEDURE — 99223 1ST HOSP IP/OBS HIGH 75: CPT | Mod: ,,, | Performed by: INTERNAL MEDICINE

## 2018-02-09 PROCEDURE — 36415 COLL VENOUS BLD VENIPUNCTURE: CPT

## 2018-02-09 PROCEDURE — 63600175 PHARM REV CODE 636 W HCPCS: Performed by: INTERNAL MEDICINE

## 2018-02-09 PROCEDURE — 84100 ASSAY OF PHOSPHORUS: CPT

## 2018-02-09 PROCEDURE — 25000003 PHARM REV CODE 250: Performed by: INTERNAL MEDICINE

## 2018-02-09 RX ORDER — HEPARIN SODIUM 1000 [USP'U]/ML
5000 INJECTION, SOLUTION INTRAVENOUS; SUBCUTANEOUS
Status: DISCONTINUED | OUTPATIENT
Start: 2018-02-09 | End: 2018-02-12

## 2018-02-09 RX ORDER — CALCITRIOL 0.25 UG/1
0.25 CAPSULE ORAL DAILY
Status: DISCONTINUED | OUTPATIENT
Start: 2018-02-09 | End: 2018-03-01 | Stop reason: HOSPADM

## 2018-02-09 RX ORDER — CHOLECALCIFEROL (VITAMIN D3) 25 MCG
2000 TABLET ORAL DAILY
Status: DISCONTINUED | OUTPATIENT
Start: 2018-02-09 | End: 2018-03-01 | Stop reason: HOSPADM

## 2018-02-09 RX ORDER — SEVELAMER CARBONATE 800 MG/1
800 TABLET, FILM COATED ORAL
Status: DISCONTINUED | OUTPATIENT
Start: 2018-02-09 | End: 2018-03-01 | Stop reason: HOSPADM

## 2018-02-09 RX ADMIN — Medication 1000 MG: at 02:02

## 2018-02-09 RX ADMIN — OXYCODONE HYDROCHLORIDE AND ACETAMINOPHEN 1 TABLET: 5; 325 TABLET ORAL at 06:02

## 2018-02-09 RX ADMIN — ONDANSETRON 4 MG: 2 INJECTION INTRAMUSCULAR; INTRAVENOUS at 07:02

## 2018-02-09 RX ADMIN — CARVEDILOL 25 MG: 12.5 TABLET, FILM COATED ORAL at 08:02

## 2018-02-09 RX ADMIN — HEPARIN SODIUM 5000 UNITS: 1000 INJECTION, SOLUTION INTRAVENOUS; SUBCUTANEOUS at 02:02

## 2018-02-09 RX ADMIN — CALCITRIOL 0.25 MCG: 0.25 CAPSULE, LIQUID FILLED ORAL at 12:02

## 2018-02-09 RX ADMIN — OXYCODONE HYDROCHLORIDE AND ACETAMINOPHEN 1 TABLET: 5; 325 TABLET ORAL at 08:02

## 2018-02-09 RX ADMIN — ONDANSETRON 4 MG: 2 INJECTION INTRAMUSCULAR; INTRAVENOUS at 05:02

## 2018-02-09 RX ADMIN — AMIODARONE HYDROCHLORIDE 200 MG: 200 TABLET ORAL at 08:02

## 2018-02-09 RX ADMIN — SEVELAMER CARBONATE 800 MG: 800 TABLET, FILM COATED ORAL at 12:02

## 2018-02-09 RX ADMIN — CEFEPIME 1 G: 1 INJECTION, POWDER, FOR SOLUTION INTRAMUSCULAR; INTRAVENOUS at 01:02

## 2018-02-09 RX ADMIN — VITAMIN D, TAB 1000IU (100/BT) 2000 UNITS: 25 TAB at 12:02

## 2018-02-09 RX ADMIN — ERYTHROPOIETIN 5400 UNITS: 10000 INJECTION, SOLUTION INTRAVENOUS; SUBCUTANEOUS at 02:02

## 2018-02-09 RX ADMIN — SITAGLIPTIN 50 MG: 25 TABLET, FILM COATED ORAL at 12:02

## 2018-02-09 NOTE — PROGRESS NOTES
"Renal Progress Note    Admit Date: 2/7/2018   LOS: 2 days      67 y.o. white female with known MRSA bacteremia who was transferred here for ID and Neurosurgery services.  She has a history of ESRD, Afib, cardiomyopathy, and DM.  She has been hospitalized with bacteremia from a Right SC Malvin which was removed at outside facility.  She had a left femoral Jaspreet placed 2/5/2018.  Blood cultures remained positive; RANDA  normal, MRI of thoracic and lumbar spine showed abnormal foci thought  "probably a hemangioma or metastasis".  She normally dialyses MWF in [place], [place].  She is a very poor historian and says she does not know why her kidneys failed requiring she start HD in November 2017.  "You need to ask my daughter all these questions.  I just don't know."  No other vascular access ie AVF or grafts noted.    SUBJECTIVE:     Patient is without new complaint.  Seen on HD.  Held am dose of Heparin due to oozing around femoral jaspreet site.    Scheduled Meds:   sodium chloride 0.9%   Intravenous Once    amiodarone  200 mg Oral Daily    carvedilol  25 mg Oral BID    ceFEPime (MAXIPIME) IVPB  1 g Intravenous Q12H    ciprofloxacin  400 mg Intravenous Q24H    DAPTOmycin (CUBICIN)  IV  1,000 mg Intravenous Q48H    epoetin davion (PROCRIT) injection  50 Units/kg Subcutaneous Every Mon, Wed, Fri    heparin (porcine)  5,000 Units Subcutaneous Q12H    insulin aspart  1-10 Units Subcutaneous TIDWM       OBJECTIVE:     Vital Signs Range (Last 24H):  Temp:  [96.7 °F (35.9 °C)-98.9 °F (37.2 °C)]   Pulse:  [50-65]   Resp:  [17-22]   BP: (101-183)/(53-76)   SpO2:  [94 %-99 %]     I & O (Last 24H):  Intake/Output Summary (Last 24 hours) at 02/09/18 0928  Last data filed at 02/09/18 0600   Gross per 24 hour   Intake              940 ml   Output              450 ml   Net              490 ml       Physical Exam:  Constitutional: She is oriented to person, place, and time. Morbidly obese, well-nourished.   Head: Normocephalic.   Eyes: " Conjunctivae are normal. Right eye exhibits no discharge. Left eye exhibits no discharge.   Neck: Normal range of motion. Neck supple.   Cardiovascular: Regular rhythm, normal heart sounds. Bradycardia  Pulmonary/Chest: Effort normal and breath sounds normal. No respiratory distress. Right CW former Malvin site CDI, dressed  Abdominal: Soft, obese. Bowel sounds are normal. She exhibits no distension. There is no tenderness.   Ext:  No CCE.  + left femoral Prince cath oozing blood but appears intact.  Neurological: NF  Skin: Skin is warm and dry + pallor  Psychiatric: She has a normal mood and affect. Her speech is normal and behavior is normal. Thought content normal, very pleasant.  Poor historian.       Laboratory:  CBC:   Recent Labs  Lab 02/09/18  0425   WBC 8.16   RBC 3.53*   HGB 10.0*   HCT 30.5*      MCV 86   MCH 28.3   MCHC 32.8     BMP:   Recent Labs  Lab 02/09/18  0425   GLU 82   *   K 4.2   CL 98   CO2 22*   BUN 28*   CREATININE 6.1*   CALCIUM 8.2*   MG 1.8       ASSESSMENT/PLAN:     66 YO WF with ESRD and catheter associated MRSA bacteremia and suspected lumbar foci presents from [facility] for ID and NS services.  1. ESRD:  Continue MWF schedule. Seen on HD this am.  Avoid IV iron given active infection.  She is going to need a permanent access at some point once current infection clears.  Renally dose all meds and antimicrobials. On Cubicin, Cipro, and Cefepime.  Holding heparin with HD given her actively oozing femoral prince.  2. Anemia:  As above, holding IV iron given active infection.  Epo to maintain hbg 10-11.  Currently at goal.  Folate and B12 normal.  3. Hyperphos:  Restart Renvela now that she is tolerating PO.  4. MBD/2HPT/Vit D deficiency:  Started Vit D3 and calcitriol.  5. DM:  Currently on SSI.  Agree with this given she has intermittant nausea and poor intake.  No long acting insulin given  HgA1c 6.5 and she reports some low blood sugars at home prior to  admission.  Wonder if she needs insulin at all.  Start renally dosed Januvia.  6. Afib:  On Amio and Carvedilol.  Defer to cards. Would monitor carefully given concurrent Cipro/Amio.  Defer anticoagulation to Cards.  Holding intradialytic heparin given current oozing of prince.

## 2018-02-09 NOTE — NURSING
900 a.m, late entry: Upon assessing the patient, I noticed the patient had been bleeding at her cath site at left femoral. I  Notified ernesto ballesteros, rn. She came and assess with me. The old dressing was removed, pressure applied. lesli notified  And was instructed to hold heparin. A new dressing was applied, no bleeding noted.

## 2018-02-09 NOTE — NURSING
Patient still c/o n/v.  Patient reported previous dose given was only mild relief.  KELLEY Johns notified.  One time order for Zofran 8mg IV given.  Read back order and verified.

## 2018-02-09 NOTE — PHYSICIAN QUERY
"PT Name: Yumiko Proctor  MR #: [id number]    Physician Query Form - Hematology Clarification      CDS: Nat Degroot RN, CCDS         Contact information :ext [phone])  [email or web address]       This form is a permanent document in the medical record.      Query Date: February 9, 2018    By submitting this query, we are merely seeking further clarification of documentation. Please utilize your independent clinical judgment when addressing the question(s) below.    The Medical record contains the following:   Indicators  Supporting Clinical Findings Location in Medical Record   x "Anemia" documented "anemia" Nephrology consult 2/8/18   x H & H = H/H 10.3/31.3  H/H 10.0/30.5 Lab 2/8/18  Lab 2/9/18    BP =                     HR=      "GI bleeding" documented      Acute bleeding (Non GI site)      Transfusion(s)     x Treatment: "holding IV iron given active infection.  Epo to maintain hbg 10-11.  Currently at goal."    epoetin davion injection 5,400 Units Subcutaneous Every Mon, Wed, Fri   nephrology consult 2/8/18          MAR    x Other:  "ESRD"  "Type 2 diabetes mellitus" H&P 2/8/18     Doctor, please specify diagnosis or diagnoses associated with above clinical findings.  Please document type of anemia      [  ] Iron deficiency anemia    [x  ] Anemia of chronic disease ( Specify chronic disease)      [ x ] CKD      [  ] Other (Specify) _______________________________     [  ] Clinically Undetermined     [  ] Other Hematological Diagnosis (please specify): _________________________________    [  ] Clinically Undetermined       Please document in your progress notes daily for the duration of treatment, until resolved, and include in your discharge summary.                                                                                                      "

## 2018-02-09 NOTE — HPI
"This is a 66 yo woman with ESRD transferred to [facility] for persistent bacteremia due to MRSA. She reportedly had multiple studies performed at [facility] in [place], including MR imaging, CT imaging, a bone scan, and a RANDA, all of which were "negative." An indwelling HD catheter was discontinued and a left groin HD catheter was placed. The patient feels sick but denies any rigors or irving fever. The patient was admitted last night and placed on cefepime and Cipro in addition to the daptomycin. I am consulted for ID evaluation.    "

## 2018-02-09 NOTE — CONSULTS
"[facility]  Infectious Disease  Consult Note    Patient Name: Yumiko Proctor  MRN: [id number]  Admission Date: 2/7/2018  Hospital Length of Stay: 2 days  Attending Physician: Cherelle Echevarria, *  Primary Care Provider: Primary Doctor No     Isolation Status: No active isolations    Patient information was obtained from patient, past medical records and ER records.      Inpatient consult to Infectious Diseases  Consult performed by: ANDREW SOTO  Consult ordered by: RIK GARCIA        Assessment/Plan:     * MRSA bacteremia    - persistent bacteremia suggesting an endovascular source/undrained focus  - would remove HD catheter in groin and give her a IV catheter holiday over the weekend  - need to repeat echocardiography given mitral murmur and persistent bacteremia to r/o IE  - will ask radiology to review outside images  - will give a one-time dose of vancomycin in addition to continuing daptomycin  - repeat blood cultures in the am              Thank you for your consult. I will follow-up with patient. Please contact us if you have any additional questions.    Andrew Soto MD  Infectious Disease  [facility]    Subjective:     Principal Problem: MRSA bacteremia    HPI: This is a 66 yo woman with ESRD transferred to [facility] for persistent bacteremia due to MRSA. She reportedly had multiple studies performed at [facility] in [place], including MR imaging, CT imaging, a bone scan, and a RANDA, all of which were "negative." An indwelling HD catheter was discontinued and a left groin HD catheter was placed. The patient feels sick but denies any rigors or irving fever. The patient was admitted last night and placed on cefepime and Cipro in addition to the daptomycin. I am consulted for ID evaluation.    Today, the patient only complains of right-sided back pain.    Past Medical History:   Diagnosis Date    A-fib     Cardiomyopathy     Diabetes mellitus     ESRD " (end stage renal disease)        History reviewed. No pertinent surgical history.    Review of patient's allergies indicates:   Allergen Reactions    Sulfa (sulfonamide antibiotics) Anaphylaxis    Albuterol sulfate Palpitations       Medications:  Prescriptions Prior to Admission   Medication Sig    amiodarone (PACERONE) 100 MG Tab Take 200 mg by mouth once daily.    carvedilol (COREG) 25 MG tablet Take 25 mg by mouth 2 (two) times daily.    ferric citrate 210 mg iron Tab Take 210 mg by mouth 3 (three) times daily.    hydrALAZINE (APRESOLINE) 100 MG tablet Take 100 mg by mouth 3 (three) times daily.    ISOSORBIDE MONONITRATE ORAL Take 30 mg by mouth Daily.    rivaroxaban (XARELTO) 10 mg Tab Take 10 mg by mouth daily with dinner or evening meal.    traMADol (ULTRAM) 50 mg tablet Take 50 mg by mouth every 8 (eight) hours as needed for Pain.     Antibiotics     Start     Stop Route Frequency Ordered    02/09/18 1200  vancomycin 1 g in dextrose 5 % 250 mL IVPB (ready to mix system)  (Vancomycin IVPB with levels panel)      -- IV Once in dialysis 02/09/18 1053    02/08/18 0100  DAPTOmycin (CUBICIN) 1,000 mg in sodium chloride 0.9% IVPB      -- IV Every 48 hours (non-standard times) 02/08/18 0017        Antifungals     None        Antivirals     None           There is no immunization history for the selected administration types on file for this patient.    Family History     None        Social History     Social History    Marital status:      Spouse name: N/A    Number of children: N/A    Years of education: N/A     Social History Main Topics    Smoking status: Never Smoker    Smokeless tobacco: Never Used    Alcohol use No    Drug use: No    Sexual activity: Not Currently     Other Topics Concern    None     Social History Narrative    None     Review of Systems   Constitutional: Negative for chills and fever.   Musculoskeletal: Positive for back pain.   All other systems reviewed and are  negative.    Objective:     Vital Signs (Most Recent):  Temp: 98.9 °F (37.2 °C) (02/09/18 0344)  Pulse: 61 (02/09/18 0826)  Resp: 17 (02/09/18 0826)  BP: (!) 183/74 (02/09/18 0826)  SpO2: (!) 94 % (02/09/18 0826) Vital Signs (24h Range):  Temp:  [96.7 °F (35.9 °C)-98.9 °F (37.2 °C)] 98.9 °F (37.2 °C)  Pulse:  [50-65] 61  Resp:  [17-22] 17  SpO2:  [94 %-99 %] 94 %  BP: (101-183)/(53-76) 183/74     Weight: 108 kg (238 lb 3.2 oz)  Body mass index is 42.2 kg/m².    Estimated Creatinine Clearance: 10.5 mL/min (based on SCr of 6.1 mg/dL (H)).    Physical Exam   Constitutional: She is oriented to person, place, and time. She appears well-developed and well-nourished. No distress.   HENT:   Head: Normocephalic and atraumatic.   Eyes: EOM are normal. Pupils are equal, round, and reactive to light.   Neck: Normal range of motion. Neck supple.   Cardiovascular: Normal rate and regular rhythm.    Murmur heard.  Pulmonary/Chest: Effort normal and breath sounds normal.   Abdominal: Soft. Bowel sounds are normal. She exhibits no distension. There is no tenderness.   Slight tenderness, right flank posteriorly. No bony point tenderness.   Musculoskeletal: Normal range of motion. She exhibits no edema, tenderness or deformity.   Neurological: She is alert and oriented to person, place, and time.   Skin: Skin is warm and dry. No erythema.   Psychiatric: She has a normal mood and affect. Her behavior is normal. Judgment and thought content normal.   Nursing note and vitals reviewed.      Significant Labs:   Blood Culture:   Recent Labs  Lab 02/08/18  0028   LABBLOO No Growth to date  No Growth to date  Gram stain vicente bottle: Gram positive cocci in clusters resembling Staph   Results called to and read back by: Anurag Jones RN  02/09/2018    01:38     BMP:   Recent Labs  Lab 02/09/18  0425   GLU 82   *   K 4.2   CL 98   CO2 22*   BUN 28*   CREATININE 6.1*   CALCIUM 8.2*   MG 1.8     CBC:   Recent Labs  Lab 02/08/18  9905  02/09/18  0425   WBC 7.43 8.16   HGB 10.3* 10.0*   HCT 31.3* 30.5*    280       Significant Imaging: I have reviewed all pertinent imaging results/findings within the past 24 hours.

## 2018-02-09 NOTE — PLAN OF CARE
Problem: Patient Care Overview  Goal: Plan of Care Review  Outcome: Ongoing (interventions implemented as appropriate)  Plan of care reviewed with patient and daughter.  Antibiotic education presented.  Repositioning schedule explained.    Problem: Diabetes, Type 2 (Adult)  Goal: Signs and Symptoms of Listed Potential Problems Will be Absent, Minimized or Managed (Diabetes, Type 2)  Signs and symptoms of listed potential problems will be absent, minimized or managed by discharge/transition of care (reference Diabetes, Type 2 (Adult) CPG).   Outcome: Ongoing (interventions implemented as appropriate)  Hs cbg at 100.  No coverage required.    Problem: Fall Risk (Adult)  Goal: Absence of Falls  Patient will demonstrate the desired outcomes by discharge/transition of care.   Outcome: Ongoing (interventions implemented as appropriate)  Patient is a fall risk.  Yellow bracelet and yellow socks in place.  Bed alarms noted in use.    Problem: Pressure Ulcer Risk (Ridge Scale) (Adult,Obstetrics,Pediatric)  Goal: Skin Integrity  Patient will demonstrate the desired outcomes by discharge/transition of care.   Outcome: Ongoing (interventions implemented as appropriate)  Stage I to gluteal cleft.  Mepilex noted in place and patient turned/repositioned q 2 hours (if she will allow us to turn her).    Problem: Infection, Risk/Actual (Adult)  Goal: Identify Related Risk Factors and Signs and Symptoms  Related risk factors and signs and symptoms are identified upon initiation of Human Response Clinical Practice Guideline (CPG)   Outcome: Ongoing (interventions implemented as appropriate)  Patient admitted with MRSA bacteremia.  Anaerobic blood culture returns with Gram Positive Cocci in clusters resembling Staph.  Patient's current antibiotic therapy is Ciprofloxacin and Cefepime.    Problem: Hemodialysis (Adult)  Goal: Signs and Symptoms of Listed Potential Problems Will be Absent, Minimized or Managed (Hemodialysis)  Signs and  symptoms of listed potential problems will be absent, minimized or managed by discharge/transition of care (reference Hemodialysis (Adult) CPG).   Outcome: Ongoing (interventions implemented as appropriate)  Left femoral hemodialysis catheter noted clamped and with an intact dressing. Scheduled HD on MWF.

## 2018-02-09 NOTE — ASSESSMENT & PLAN NOTE
- persistent bacteremia suggesting an endovascular source/undrained focus  - would remove HD catheter in groin and give her a IV catheter holiday over the weekend  - need to repeat echocardiography given mitral murmur and persistent bacteremia to r/o IE  - will ask radiology to review outside images  - will give a one-time dose of vancomycin in addition to continuing daptomycin  - repeat blood cultures in the am

## 2018-02-09 NOTE — SUBJECTIVE & OBJECTIVE
Past Medical History:   Diagnosis Date    A-fib     Cardiomyopathy     Diabetes mellitus     ESRD (end stage renal disease)        History reviewed. No pertinent surgical history.    Review of patient's allergies indicates:   Allergen Reactions    Sulfa (sulfonamide antibiotics) Anaphylaxis    Albuterol sulfate Palpitations       Medications:  Prescriptions Prior to Admission   Medication Sig    amiodarone (PACERONE) 100 MG Tab Take 200 mg by mouth once daily.    carvedilol (COREG) 25 MG tablet Take 25 mg by mouth 2 (two) times daily.    ferric citrate 210 mg iron Tab Take 210 mg by mouth 3 (three) times daily.    hydrALAZINE (APRESOLINE) 100 MG tablet Take 100 mg by mouth 3 (three) times daily.    ISOSORBIDE MONONITRATE ORAL Take 30 mg by mouth Daily.    rivaroxaban (XARELTO) 10 mg Tab Take 10 mg by mouth daily with dinner or evening meal.    traMADol (ULTRAM) 50 mg tablet Take 50 mg by mouth every 8 (eight) hours as needed for Pain.     Antibiotics     Start     Stop Route Frequency Ordered    02/09/18 1200  vancomycin 1 g in dextrose 5 % 250 mL IVPB (ready to mix system)  (Vancomycin IVPB with levels panel)      -- IV Once in dialysis 02/09/18 1053    02/08/18 0100  DAPTOmycin (CUBICIN) 1,000 mg in sodium chloride 0.9% IVPB      -- IV Every 48 hours (non-standard times) 02/08/18 0017        Antifungals     None        Antivirals     None           There is no immunization history for the selected administration types on file for this patient.    Family History     None        Social History     Social History    Marital status:      Spouse name: N/A    Number of children: N/A    Years of education: N/A     Social History Main Topics    Smoking status: Never Smoker    Smokeless tobacco: Never Used    Alcohol use No    Drug use: No    Sexual activity: Not Currently     Other Topics Concern    None     Social History Narrative    None     Review of Systems   Constitutional: Negative for  chills and fever.   Musculoskeletal: Positive for back pain.   All other systems reviewed and are negative.    Objective:     Vital Signs (Most Recent):  Temp: 98.9 °F (37.2 °C) (02/09/18 0344)  Pulse: 61 (02/09/18 0826)  Resp: 17 (02/09/18 0826)  BP: (!) 183/74 (02/09/18 0826)  SpO2: (!) 94 % (02/09/18 0826) Vital Signs (24h Range):  Temp:  [96.7 °F (35.9 °C)-98.9 °F (37.2 °C)] 98.9 °F (37.2 °C)  Pulse:  [50-65] 61  Resp:  [17-22] 17  SpO2:  [94 %-99 %] 94 %  BP: (101-183)/(53-76) 183/74     Weight: 108 kg (238 lb 3.2 oz)  Body mass index is 42.2 kg/m².    Estimated Creatinine Clearance: 10.5 mL/min (based on SCr of 6.1 mg/dL (H)).    Physical Exam   Constitutional: She is oriented to person, place, and time. She appears well-developed and well-nourished. No distress.   HENT:   Head: Normocephalic and atraumatic.   Eyes: EOM are normal. Pupils are equal, round, and reactive to light.   Neck: Normal range of motion. Neck supple.   Cardiovascular: Normal rate and regular rhythm.    Murmur heard.  Pulmonary/Chest: Effort normal and breath sounds normal.   Abdominal: Soft. Bowel sounds are normal. She exhibits no distension. There is no tenderness.   Slight tenderness, right flank posteriorly. No bony point tenderness.   Musculoskeletal: Normal range of motion. She exhibits no edema, tenderness or deformity.   Neurological: She is alert and oriented to person, place, and time.   Skin: Skin is warm and dry. No erythema.   Psychiatric: She has a normal mood and affect. Her behavior is normal. Judgment and thought content normal.   Nursing note and vitals reviewed.      Significant Labs:   Blood Culture:   Recent Labs  Lab 02/08/18  0028   LABBLOO No Growth to date  No Growth to date  Gram stain vicente bottle: Gram positive cocci in clusters resembling Staph   Results called to and read back by: Anurag Jones RN  02/09/2018    01:38     BMP:   Recent Labs  Lab 02/09/18  0425   GLU 82   *   K 4.2   CL 98   CO2 22*    BUN 28*   CREATININE 6.1*   CALCIUM 8.2*   MG 1.8     CBC:   Recent Labs  Lab 02/08/18  0509 02/09/18  0425   WBC 7.43 8.16   HGB 10.3* 10.0*   HCT 31.3* 30.5*    280       Significant Imaging: I have reviewed all pertinent imaging results/findings within the past 24 hours.

## 2018-02-09 NOTE — PHYSICIAN QUERY
"PT Name: Yumiko Proctor  MR #: [id number]    Physician Query Form - Atrial Fibrillation Specificity     CDS: Nat Degroot RN, CCDS         Contact information :[phone]  [email or web address]        This form is a permanent document in the medical record.     Query Date: February 9, 2018    By submitting this query, we are merely seeking further clarification of documentation. Please utilize your independent clinical judgment when addressing the question(s) below.    The medical record contains the following:   Indicators     Supporting Clinical Findings Location in Medical Record   x Atrial Fibrillation "Atrial fibrillation" H&P 2/8/18    EKG results      Medication     x Treatment "continue Coreg" H&P 2/8/18   x Other "Afib:  On Amio and Carvedilol.  Unclear why she is not on full anticoagulation.  Defer to cards. Would monitor carefully given concurrent Cipro/Amio" Nephrology consult 2/8/18       Provider, please further specify the Atrial Fibrillation diagnosis.    [  ] Chronic  [ x ] Paroxysmal  [  ] Permanent  [  ] Persistent  [  ] Other (please specify): ____________________________  [  ] Clinically Undetermined    Please document in your progress notes daily for the duration of treatment until resolved, and include in your discharge summary.    "

## 2018-02-10 LAB
ALBUMIN SERPL BCP-MCNC: 2 G/DL
ALP SERPL-CCNC: 69 U/L
ALT SERPL W/O P-5'-P-CCNC: 21 U/L
ANION GAP SERPL CALC-SCNC: 12 MMOL/L
AST SERPL-CCNC: 90 U/L
BASOPHILS # BLD AUTO: 0.03 K/UL
BASOPHILS NFR BLD: 0.3 %
BILIRUB SERPL-MCNC: 0.5 MG/DL
BUN SERPL-MCNC: 23 MG/DL
CALCIUM SERPL-MCNC: 8.6 MG/DL
CHLORIDE SERPL-SCNC: 98 MMOL/L
CK SERPL-CCNC: 1477 U/L
CO2 SERPL-SCNC: 22 MMOL/L
CREAT SERPL-MCNC: 5.4 MG/DL
CRP SERPL-MCNC: 104 MG/L
DIFFERENTIAL METHOD: ABNORMAL
EOSINOPHIL # BLD AUTO: 0.1 K/UL
EOSINOPHIL NFR BLD: 0.7 %
ERYTHROCYTE [DISTWIDTH] IN BLOOD BY AUTOMATED COUNT: 15.5 %
ERYTHROCYTE [SEDIMENTATION RATE] IN BLOOD BY WESTERGREN METHOD: 95 MM/HR
EST. GFR  (AFRICAN AMERICAN): 9 ML/MIN/1.73 M^2
EST. GFR  (NON AFRICAN AMERICAN): 8 ML/MIN/1.73 M^2
GLUCOSE SERPL-MCNC: 91 MG/DL
HCT VFR BLD AUTO: 31.5 %
HGB BLD-MCNC: 10.2 G/DL
LYMPHOCYTES # BLD AUTO: 1.9 K/UL
LYMPHOCYTES NFR BLD: 16.3 %
MAGNESIUM SERPL-MCNC: 1.7 MG/DL
MCH RBC QN AUTO: 28.1 PG
MCHC RBC AUTO-ENTMCNC: 32.4 G/DL
MCV RBC AUTO: 87 FL
MONOCYTES # BLD AUTO: 0.9 K/UL
MONOCYTES NFR BLD: 7.7 %
NEUTROPHILS # BLD AUTO: 8.4 K/UL
NEUTROPHILS NFR BLD: 74.1 %
PHOSPHATE SERPL-MCNC: 5.2 MG/DL
PLATELET # BLD AUTO: 249 K/UL
PMV BLD AUTO: 10.2 FL
POCT GLUCOSE: 132 MG/DL (ref 70–110)
POCT GLUCOSE: 140 MG/DL (ref 70–110)
POCT GLUCOSE: 145 MG/DL (ref 70–110)
POTASSIUM SERPL-SCNC: 4.8 MMOL/L
PROT SERPL-MCNC: 7.1 G/DL
RBC # BLD AUTO: 3.63 M/UL
SODIUM SERPL-SCNC: 132 MMOL/L
WBC # BLD AUTO: 11.32 K/UL

## 2018-02-10 PROCEDURE — 85651 RBC SED RATE NONAUTOMATED: CPT

## 2018-02-10 PROCEDURE — 25000003 PHARM REV CODE 250: Performed by: INTERNAL MEDICINE

## 2018-02-10 PROCEDURE — 84100 ASSAY OF PHOSPHORUS: CPT

## 2018-02-10 PROCEDURE — 85025 COMPLETE CBC W/AUTO DIFF WBC: CPT

## 2018-02-10 PROCEDURE — 86140 C-REACTIVE PROTEIN: CPT

## 2018-02-10 PROCEDURE — 82550 ASSAY OF CK (CPK): CPT

## 2018-02-10 PROCEDURE — 63600175 PHARM REV CODE 636 W HCPCS: Performed by: NURSE PRACTITIONER

## 2018-02-10 PROCEDURE — 25000003 PHARM REV CODE 250: Performed by: NURSE PRACTITIONER

## 2018-02-10 PROCEDURE — 80053 COMPREHEN METABOLIC PANEL: CPT

## 2018-02-10 PROCEDURE — 11000001 HC ACUTE MED/SURG PRIVATE ROOM

## 2018-02-10 PROCEDURE — 83735 ASSAY OF MAGNESIUM: CPT

## 2018-02-10 PROCEDURE — 99233 SBSQ HOSP IP/OBS HIGH 50: CPT | Mod: ,,, | Performed by: INTERNAL MEDICINE

## 2018-02-10 PROCEDURE — 36415 COLL VENOUS BLD VENIPUNCTURE: CPT

## 2018-02-10 PROCEDURE — 63600175 PHARM REV CODE 636 W HCPCS: Performed by: INTERNAL MEDICINE

## 2018-02-10 PROCEDURE — 87040 BLOOD CULTURE FOR BACTERIA: CPT

## 2018-02-10 RX ADMIN — OXYCODONE HYDROCHLORIDE AND ACETAMINOPHEN 1 TABLET: 5; 325 TABLET ORAL at 10:02

## 2018-02-10 RX ADMIN — SEVELAMER CARBONATE 800 MG: 800 TABLET, FILM COATED ORAL at 10:02

## 2018-02-10 RX ADMIN — ONDANSETRON 4 MG: 2 INJECTION INTRAMUSCULAR; INTRAVENOUS at 10:02

## 2018-02-10 RX ADMIN — SITAGLIPTIN 50 MG: 25 TABLET, FILM COATED ORAL at 10:02

## 2018-02-10 RX ADMIN — CALCITRIOL 0.25 MCG: 0.25 CAPSULE, LIQUID FILLED ORAL at 10:02

## 2018-02-10 RX ADMIN — HEPARIN SODIUM 5000 UNITS: 5000 INJECTION, SOLUTION INTRAVENOUS; SUBCUTANEOUS at 10:02

## 2018-02-10 RX ADMIN — CARVEDILOL 25 MG: 12.5 TABLET, FILM COATED ORAL at 10:02

## 2018-02-10 RX ADMIN — SEVELAMER CARBONATE 800 MG: 800 TABLET, FILM COATED ORAL at 06:02

## 2018-02-10 RX ADMIN — AMIODARONE HYDROCHLORIDE 200 MG: 200 TABLET ORAL at 10:02

## 2018-02-10 RX ADMIN — DAPTOMYCIN 1000 MG: 500 INJECTION, POWDER, LYOPHILIZED, FOR SOLUTION INTRAVENOUS at 01:02

## 2018-02-10 RX ADMIN — ONDANSETRON 4 MG: 2 INJECTION INTRAMUSCULAR; INTRAVENOUS at 01:02

## 2018-02-10 RX ADMIN — VITAMIN D, TAB 1000IU (100/BT) 2000 UNITS: 25 TAB at 10:02

## 2018-02-10 RX ADMIN — OXYCODONE HYDROCHLORIDE AND ACETAMINOPHEN 1 TABLET: 5; 325 TABLET ORAL at 01:02

## 2018-02-10 NOTE — PLAN OF CARE
Problem: Patient Care Overview  Goal: Individualization & Mutuality  Outcome: Ongoing (interventions implemented as appropriate)  Bed in low and locked position and able to reposition with min assist.  Remains free of injury during shift.  Family at bedside.

## 2018-02-10 NOTE — ASSESSMENT & PLAN NOTE
Blood Cultures gram positive cocci in clusters in 1 set, 2nd set NGTD  Repeat Blood Cultures in AM  Lactic acid pending  Daptomycin/Cefepime/Cipro  ID consult

## 2018-02-10 NOTE — SUBJECTIVE & OBJECTIVE
Interval History: Patient feels very weak this morning, daughter at bedside.  No other complaints.  Appetite improving.     Review of Systems   Constitutional: Positive for activity change and fatigue. Negative for appetite change and fever.   HENT: Negative for congestion, ear pain, rhinorrhea and sinus pressure.    Eyes: Negative for pain and discharge.   Respiratory: Negative for cough, chest tightness, shortness of breath and wheezing.    Cardiovascular: Negative for chest pain and leg swelling.   Gastrointestinal: Negative for abdominal distention, abdominal pain, diarrhea, nausea and vomiting.   Endocrine: Negative for cold intolerance and heat intolerance.   Genitourinary: Negative for difficulty urinating, flank pain, frequency, hematuria and urgency.   Musculoskeletal: Positive for arthralgias, back pain and myalgias. Negative for joint swelling.   Allergic/Immunologic: Negative for environmental allergies and food allergies.   Neurological: Negative for dizziness, weakness, light-headedness and headaches.   Hematological: Does not bruise/bleed easily.   Psychiatric/Behavioral: Negative for agitation, behavioral problems and decreased concentration.     Objective:     Vital Signs (Most Recent):  Temp: 97.7 °F (36.5 °C) (02/09/18 1729)  Pulse: (!) 58 (02/09/18 1800)  Resp: 18 (02/09/18 1630)  BP: (!) 141/64 (02/09/18 1729)  SpO2: 97 % (02/09/18 1729) Vital Signs (24h Range):  Temp:  [96.7 °F (35.9 °C)-98.9 °F (37.2 °C)] 97.7 °F (36.5 °C)  Pulse:  [50-63] 58  Resp:  [17-22] 18  SpO2:  [94 %-97 %] 97 %  BP: ()/(49-74) 141/64     Weight: 108 kg (238 lb 3.2 oz)  Body mass index is 42.2 kg/m².    Intake/Output Summary (Last 24 hours) at 02/09/18 1848  Last data filed at 02/09/18 1630   Gross per 24 hour   Intake              840 ml   Output             2725 ml   Net            -1885 ml      Physical Exam   Constitutional: She is oriented to person, place, and time. She appears well-developed and  well-nourished.   HENT:   Head: Normocephalic.   Eyes: Conjunctivae are normal. Right eye exhibits no discharge. Left eye exhibits no discharge.   Neck: Normal range of motion. Neck supple.   Cardiovascular: Regular rhythm, normal heart sounds and intact distal pulses.  Bradycardia present.    Pulses:       Radial pulses are 1+ on the right side, and 1+ on the left side.        Dorsalis pedis pulses are 1+ on the right side, and 1+ on the left side.   Pulmonary/Chest: Effort normal and breath sounds normal. No respiratory distress.   Abdominal: Soft. Bowel sounds are normal. She exhibits no distension. There is no tenderness.   Musculoskeletal: Normal range of motion.   Neurological: She is alert and oriented to person, place, and time. She has normal strength. GCS eye subscore is 4. GCS verbal subscore is 5. GCS motor subscore is 6.   Skin: Skin is warm and dry. There is pallor.   Psychiatric: She has a normal mood and affect. Her speech is normal and behavior is normal. Thought content normal.       Significant Labs:   CBC:   Recent Labs  Lab 02/08/18  0509 02/09/18  0425   WBC 7.43 8.16   HGB 10.3* 10.0*   HCT 31.3* 30.5*    280     CMP:   Recent Labs  Lab 02/08/18  0029 02/08/18  0509 02/09/18  0425   * 132* 131*   K 4.4 4.1 4.2   CL 99 99 98   CO2 22* 22* 22*   GLU 98 90 82   BUN 17 20 28*   CREATININE 4.6* 4.8* 6.1*   CALCIUM 8.5* 8.3* 8.2*   PROT 7.2 6.8 6.6   ALBUMIN 2.0* 1.9* 1.9*   BILITOT 0.3 0.3 0.3   ALKPHOS 76 70 73   AST 15 15 19   ALT 9* 8* 10   ANIONGAP 12 11 11   EGFRNONAA 9* 9* 7*     All pertinent labs within the past 24 hours have been reviewed.    Significant Imaging: I have reviewed all pertinent imaging results/findings within the past 24 hours.

## 2018-02-10 NOTE — PROGRESS NOTES
"Renal Progress Note    Admit Date: 2/7/2018   LOS: 3 days      67 y.o. white female with known MRSA bacteremia who was transferred here for ID and Neurosurgery services.  She has a history of ESRD, Afib, cardiomyopathy, and DM.  She has been hospitalized with bacteremia from a Right SC Malvin which was removed at outside facility.  She had a left femoral Jaspreet placed 2/5/2018.  Blood cultures remained positive; RANDA  normal, MRI of thoracic and lumbar spine showed abnormal foci thought  "probably a hemangioma or metastasis".  She normally dialyses MWF in [place], [place].  She is a very poor historian and says she does not know why her kidneys failed requiring she start HD in November 2017.  "You need to ask my daughter all these questions.  I just don't know."  No other vascular access ie AVF or grafts noted.    SUBJECTIVE:     Patient is without new complaint.      Scheduled Meds:   sodium chloride 0.9%   Intravenous Once    amiodarone  200 mg Oral Daily    calcitRIOL  0.25 mcg Oral Daily    carvedilol  25 mg Oral BID    DAPTOmycin (CUBICIN)  IV  1,000 mg Intravenous Q48H    epoetin davion (PROCRIT) injection  50 Units/kg Subcutaneous Every Mon, Wed, Fri    heparin (porcine)  5,000 Units Subcutaneous Q12H    insulin aspart  1-10 Units Subcutaneous TIDWM    sevelamer carbonate  800 mg Oral TID WM    SITagliptin  50 mg Oral Daily    vitamin D  2,000 Units Oral Daily       OBJECTIVE:     Vital Signs Range (Last 24H):  Temp:  [97.5 °F (36.4 °C)-98.3 °F (36.8 °C)]   Pulse:  [50-75]   Resp:  [16-18]   BP: ()/(49-72)   SpO2:  [93 %-98 %]     I & O (Last 24H):    Intake/Output Summary (Last 24 hours) at 02/10/18 0934  Last data filed at 02/09/18 1630   Gross per 24 hour   Intake              350 ml   Output             2300 ml   Net            -1950 ml       Physical Exam:  Constitutional: She is oriented to person, place, and time. Morbidly obese, well-nourished.   Head: Normocephalic.   Eyes: Conjunctivae are " normal. Right eye exhibits no discharge. Left eye exhibits no discharge.   Neck: Normal range of motion. Neck supple.   Cardiovascular: Regular rhythm, normal heart sounds. Bradycardia  Pulmonary/Chest: Effort normal and breath sounds normal. No respiratory distress. Right CW former Malvin site CDI, dressed  Abdominal: Soft, obese. Bowel sounds are normal. She exhibits no distension. There is no tenderness.   Ext:  No CCE.  + left femoral Jaspreet cath oozing blood but appears intact.  Neurological: NF  Skin: Skin is warm and dry + pallor  Psychiatric: She has a normal mood and affect. Her speech is normal and behavior is normal. Thought content normal, very pleasant.  Poor historian.       Laboratory:  CBC:     Recent Labs  Lab 02/10/18  0525   WBC 11.32   RBC 3.63*   HGB 10.2*   HCT 31.5*      MCV 87   MCH 28.1   MCHC 32.4     BMP:     Recent Labs  Lab 02/10/18  0524   GLU 91   *   K 4.8   CL 98   CO2 22*   BUN 23   CREATININE 5.4*   CALCIUM 8.6*   MG 1.7       ASSESSMENT/PLAN:     68 YO WF with ESRD and catheter associated MRSA bacteremia and suspected lumbar foci presents from [facility] for ID and NS services.  1. ESRD:  Continue MWF schedule.  ID recommends line holiday. Will ask Dr. Nichols to pull line. Renally dose all meds and antimicrobials. On Cubicin, Cipro, and Cefepime.   2. Anemia:  As above, holding IV iron given active infection.  Epo to maintain hbg 10-11.  Currently at goal.  Folate and B12 normal.  3. Hyperphos:  on Renvela now will follow.  4. MBD/2HPT/Vit D deficiency:  Started Vit D3 and calcitriol.  5. DM:  Currently on SSI.  Agree with this given she has intermittant nausea and poor intake.  No long acting insulin given  HgA1c 6.5 and she reports some low blood sugars at home prior to admission.  Wonder if she needs insulin at all.  Start renally dosed Januvia.  6. Afib:  On Amio and Carvedilol.  Defer to cards. Would monitor carefully given concurrent Cipro/Amio.  Defer  anticoagulation to Cards.  Holding intradialytic heparin given current oozing of prince.

## 2018-02-10 NOTE — PROGRESS NOTES
[facility] Hospital Medicine  Progress Note    Patient Name: Yumiko Proctor  MRN: [id number]  Patient Class: IP- Inpatient   Admission Date: 2/7/2018  Length of Stay: 2 days  Attending Physician: Cherelle Echevarria, *  Primary Care Provider: Primary Doctor No        Subjective:     Principal Problem:MRSA bacteremia    HPI:  The patient is a 68 yo female with known MRSA bacteremia who was transferred here for ID and Neurosurgery services.  She has a history of ESRD, Afib, cardiomyopathy, and DM.  She has been hospitalized with bacteremia from a vascular access which was taken out and replaced today with a lt femoral vas cath.  Blood cultures remained positive. She had a RANDA which was normal, MRI of thoracic and lumbar spine showed a abnormal foci in T( that was probably a hemangioma or metastasis.    Hospital Course:  No notes on file    Interval History: Patient feels very weak this morning, daughter at bedside.  No other complaints.  Appetite improving.     Review of Systems   Constitutional: Positive for activity change and fatigue. Negative for appetite change and fever.   HENT: Negative for congestion, ear pain, rhinorrhea and sinus pressure.    Eyes: Negative for pain and discharge.   Respiratory: Negative for cough, chest tightness, shortness of breath and wheezing.    Cardiovascular: Negative for chest pain and leg swelling.   Gastrointestinal: Negative for abdominal distention, abdominal pain, diarrhea, nausea and vomiting.   Endocrine: Negative for cold intolerance and heat intolerance.   Genitourinary: Negative for difficulty urinating, flank pain, frequency, hematuria and urgency.   Musculoskeletal: Positive for arthralgias, back pain and myalgias. Negative for joint swelling.   Allergic/Immunologic: Negative for environmental allergies and food allergies.   Neurological: Negative for dizziness, weakness, light-headedness and headaches.   Hematological: Does not bruise/bleed easily.    Psychiatric/Behavioral: Negative for agitation, behavioral problems and decreased concentration.     Objective:     Vital Signs (Most Recent):  Temp: 97.7 °F (36.5 °C) (02/09/18 1729)  Pulse: (!) 58 (02/09/18 1800)  Resp: 18 (02/09/18 1630)  BP: (!) 141/64 (02/09/18 1729)  SpO2: 97 % (02/09/18 1729) Vital Signs (24h Range):  Temp:  [96.7 °F (35.9 °C)-98.9 °F (37.2 °C)] 97.7 °F (36.5 °C)  Pulse:  [50-63] 58  Resp:  [17-22] 18  SpO2:  [94 %-97 %] 97 %  BP: ()/(49-74) 141/64     Weight: 108 kg (238 lb 3.2 oz)  Body mass index is 42.2 kg/m².    Intake/Output Summary (Last 24 hours) at 02/09/18 1848  Last data filed at 02/09/18 1630   Gross per 24 hour   Intake              840 ml   Output             2725 ml   Net            -1885 ml      Physical Exam   Constitutional: She is oriented to person, place, and time. She appears well-developed and well-nourished.   HENT:   Head: Normocephalic.   Eyes: Conjunctivae are normal. Right eye exhibits no discharge. Left eye exhibits no discharge.   Neck: Normal range of motion. Neck supple.   Cardiovascular: Regular rhythm, normal heart sounds and intact distal pulses.  Bradycardia present.    Pulses:       Radial pulses are 1+ on the right side, and 1+ on the left side.        Dorsalis pedis pulses are 1+ on the right side, and 1+ on the left side.   Pulmonary/Chest: Effort normal and breath sounds normal. No respiratory distress.   Abdominal: Soft. Bowel sounds are normal. She exhibits no distension. There is no tenderness.   Musculoskeletal: Normal range of motion.   Neurological: She is alert and oriented to person, place, and time. She has normal strength. GCS eye subscore is 4. GCS verbal subscore is 5. GCS motor subscore is 6.   Skin: Skin is warm and dry. There is pallor.   Psychiatric: She has a normal mood and affect. Her speech is normal and behavior is normal. Thought content normal.       Significant Labs:   CBC:   Recent Labs  Lab 02/08/18  050  02/09/18  0425   WBC 7.43 8.16   HGB 10.3* 10.0*   HCT 31.3* 30.5*    280     CMP:   Recent Labs  Lab 02/08/18  0029 02/08/18  0509 02/09/18  0425   * 132* 131*   K 4.4 4.1 4.2   CL 99 99 98   CO2 22* 22* 22*   GLU 98 90 82   BUN 17 20 28*   CREATININE 4.6* 4.8* 6.1*   CALCIUM 8.5* 8.3* 8.2*   PROT 7.2 6.8 6.6   ALBUMIN 2.0* 1.9* 1.9*   BILITOT 0.3 0.3 0.3   ALKPHOS 76 70 73   AST 15 15 19   ALT 9* 8* 10   ANIONGAP 12 11 11   EGFRNONAA 9* 9* 7*     All pertinent labs within the past 24 hours have been reviewed.    Significant Imaging: I have reviewed all pertinent imaging results/findings within the past 24 hours.    Assessment/Plan:      * MRSA bacteremia    Blood Cultures gram positive cocci in clusters in 1 set, 2nd set NGTD  Repeat Blood Cultures in AM  Lactic acid pending  Daptomycin/Cefepime/Cipro  ID consult           Type 2 diabetes mellitus    A1c  6.5  Moderate dose SSI          ESRD (end stage renal disease)    Consult Nephrology          Atrial fibrillation    Continue Coreg              VTE Risk Mitigation         Ordered     heparin (porcine) injection 5,000 Units  As needed (PRN)     Route:  Intravenous        02/09/18 1413     heparin (porcine) injection 5,000 Units  Every 12 hours     Route:  Subcutaneous        02/08/18 0837     Medium Risk of VTE  Once      02/07/18 2255     Place sequential compression device  Until discontinued      02/07/18 2255     Place ANGELI hose  Until discontinued      02/07/18 2255              Cherelle Echevarria MD  Department of Hospital Medicine   [facility]

## 2018-02-10 NOTE — PLAN OF CARE
Problem: Patient Care Overview  Goal: Plan of Care Review  Outcome: Ongoing (interventions implemented as appropriate)  Pts VS stabke throughout this shift. No acute distress noted. Pt updated on POC and verbalzed understanding. hourly rounding done. call light within reach. WCTM

## 2018-02-11 LAB
ALBUMIN SERPL BCP-MCNC: 1.8 G/DL
ALP SERPL-CCNC: 64 U/L
ALT SERPL W/O P-5'-P-CCNC: 61 U/L
ANION GAP SERPL CALC-SCNC: 11 MMOL/L
AST SERPL-CCNC: 326 U/L
BACTERIA BLD CULT: NORMAL
BASOPHILS # BLD AUTO: 0.02 K/UL
BASOPHILS NFR BLD: 0.2 %
BILIRUB SERPL-MCNC: 0.3 MG/DL
BUN SERPL-MCNC: 31 MG/DL
CALCIUM SERPL-MCNC: 8.6 MG/DL
CHLORIDE SERPL-SCNC: 95 MMOL/L
CO2 SERPL-SCNC: 22 MMOL/L
CREAT SERPL-MCNC: 6.7 MG/DL
DIFFERENTIAL METHOD: ABNORMAL
EOSINOPHIL # BLD AUTO: 0.2 K/UL
EOSINOPHIL NFR BLD: 1.4 %
ERYTHROCYTE [DISTWIDTH] IN BLOOD BY AUTOMATED COUNT: 15.8 %
EST. GFR  (AFRICAN AMERICAN): 7 ML/MIN/1.73 M^2
EST. GFR  (NON AFRICAN AMERICAN): 6 ML/MIN/1.73 M^2
GLUCOSE SERPL-MCNC: 93 MG/DL
HCT VFR BLD AUTO: 30.4 %
HGB BLD-MCNC: 9.8 G/DL
LYMPHOCYTES # BLD AUTO: 2.3 K/UL
LYMPHOCYTES NFR BLD: 20.1 %
MAGNESIUM SERPL-MCNC: 1.9 MG/DL
MCH RBC QN AUTO: 27.8 PG
MCHC RBC AUTO-ENTMCNC: 32.2 G/DL
MCV RBC AUTO: 86 FL
MONOCYTES # BLD AUTO: 1 K/UL
MONOCYTES NFR BLD: 8.9 %
NEUTROPHILS # BLD AUTO: 7.7 K/UL
NEUTROPHILS NFR BLD: 68.7 %
PHOSPHATE SERPL-MCNC: 5.6 MG/DL
PLATELET # BLD AUTO: 225 K/UL
PMV BLD AUTO: 10.2 FL
POCT GLUCOSE: 127 MG/DL (ref 70–110)
POCT GLUCOSE: 130 MG/DL (ref 70–110)
POCT GLUCOSE: 160 MG/DL (ref 70–110)
POCT GLUCOSE: 180 MG/DL (ref 70–110)
POTASSIUM SERPL-SCNC: 4.9 MMOL/L
PROT SERPL-MCNC: 6.5 G/DL
RBC # BLD AUTO: 3.53 M/UL
SODIUM SERPL-SCNC: 128 MMOL/L
WBC # BLD AUTO: 11.24 K/UL

## 2018-02-11 PROCEDURE — 11000001 HC ACUTE MED/SURG PRIVATE ROOM

## 2018-02-11 PROCEDURE — 99233 SBSQ HOSP IP/OBS HIGH 50: CPT | Mod: ,,, | Performed by: INTERNAL MEDICINE

## 2018-02-11 PROCEDURE — 25000003 PHARM REV CODE 250: Performed by: NURSE PRACTITIONER

## 2018-02-11 PROCEDURE — 85025 COMPLETE CBC W/AUTO DIFF WBC: CPT

## 2018-02-11 PROCEDURE — 83735 ASSAY OF MAGNESIUM: CPT

## 2018-02-11 PROCEDURE — 80053 COMPREHEN METABOLIC PANEL: CPT

## 2018-02-11 PROCEDURE — 84100 ASSAY OF PHOSPHORUS: CPT

## 2018-02-11 PROCEDURE — 63600175 PHARM REV CODE 636 W HCPCS: Performed by: NURSE PRACTITIONER

## 2018-02-11 PROCEDURE — 36415 COLL VENOUS BLD VENIPUNCTURE: CPT

## 2018-02-11 PROCEDURE — 63600175 PHARM REV CODE 636 W HCPCS: Performed by: INTERNAL MEDICINE

## 2018-02-11 PROCEDURE — 25000003 PHARM REV CODE 250: Performed by: INTERNAL MEDICINE

## 2018-02-11 RX ORDER — TRAMADOL HYDROCHLORIDE 50 MG/1
50 TABLET ORAL EVERY 8 HOURS PRN
Status: DISCONTINUED | OUTPATIENT
Start: 2018-02-12 | End: 2018-03-01 | Stop reason: HOSPADM

## 2018-02-11 RX ADMIN — ACETAMINOPHEN 650 MG: 325 TABLET, FILM COATED ORAL at 09:02

## 2018-02-11 RX ADMIN — SEVELAMER CARBONATE 800 MG: 800 TABLET, FILM COATED ORAL at 05:02

## 2018-02-11 RX ADMIN — SEVELAMER CARBONATE 800 MG: 800 TABLET, FILM COATED ORAL at 12:02

## 2018-02-11 RX ADMIN — CARVEDILOL 25 MG: 12.5 TABLET, FILM COATED ORAL at 08:02

## 2018-02-11 RX ADMIN — CALCITRIOL 0.25 MCG: 0.25 CAPSULE, LIQUID FILLED ORAL at 08:02

## 2018-02-11 RX ADMIN — INSULIN ASPART 2 UNITS: 100 INJECTION, SOLUTION INTRAVENOUS; SUBCUTANEOUS at 12:02

## 2018-02-11 RX ADMIN — SITAGLIPTIN 50 MG: 25 TABLET, FILM COATED ORAL at 08:02

## 2018-02-11 RX ADMIN — HEPARIN SODIUM 5000 UNITS: 5000 INJECTION, SOLUTION INTRAVENOUS; SUBCUTANEOUS at 08:02

## 2018-02-11 RX ADMIN — AMIODARONE HYDROCHLORIDE 200 MG: 200 TABLET ORAL at 08:02

## 2018-02-11 RX ADMIN — INSULIN ASPART 2 UNITS: 100 INJECTION, SOLUTION INTRAVENOUS; SUBCUTANEOUS at 05:02

## 2018-02-11 RX ADMIN — HEPARIN SODIUM 5000 UNITS: 5000 INJECTION, SOLUTION INTRAVENOUS; SUBCUTANEOUS at 09:02

## 2018-02-11 RX ADMIN — SEVELAMER CARBONATE 800 MG: 800 TABLET, FILM COATED ORAL at 08:02

## 2018-02-11 RX ADMIN — CARVEDILOL 25 MG: 12.5 TABLET, FILM COATED ORAL at 09:02

## 2018-02-11 RX ADMIN — VITAMIN D, TAB 1000IU (100/BT) 2000 UNITS: 25 TAB at 08:02

## 2018-02-11 NOTE — PROGRESS NOTES
"Renal Progress Note    Admit Date: 2/7/2018   LOS: 4 days      67 y.o. white female with known MRSA bacteremia who was transferred here for ID and Neurosurgery services.  She has a history of ESRD, Afib, cardiomyopathy, and DM.  She has been hospitalized with bacteremia from a Right SC Malvin which was removed at outside facility.  She had a left femoral Jaspreet placed 2/5/2018.  Blood cultures remained positive; RANDA  normal, MRI of thoracic and lumbar spine showed abnormal foci thought  "probably a hemangioma or metastasis".  She normally dialyses MWF in [place], [place].  She is a very poor historian and says she does not know why her kidneys failed requiring she start HD in November 2017.  "You need to ask my daughter all these questions.  I just don't know."  No other vascular access ie AVF or grafts noted.    SUBJECTIVE:     Patient is without new complaint.      Scheduled Meds:   amiodarone  200 mg Oral Daily    calcitRIOL  0.25 mcg Oral Daily    carvedilol  25 mg Oral BID    DAPTOmycin (CUBICIN)  IV  1,000 mg Intravenous Q48H    epoetin davion (PROCRIT) injection  50 Units/kg Subcutaneous Every Mon, Wed, Fri    heparin (porcine)  5,000 Units Subcutaneous Q12H    insulin aspart  1-10 Units Subcutaneous TIDWM    sevelamer carbonate  800 mg Oral TID WM    SITagliptin  50 mg Oral Daily    vitamin D  2,000 Units Oral Daily       OBJECTIVE:     Vital Signs Range (Last 24H):  Temp:  [97.6 °F (36.4 °C)-98.9 °F (37.2 °C)]   Pulse:  [53-71]   Resp:  [18-20]   BP: (130-156)/(61-70)   SpO2:  [95 %-99 %]     I & O (Last 24H):    Intake/Output Summary (Last 24 hours) at 02/11/18 0941  Last data filed at 02/10/18 1900   Gross per 24 hour   Intake                0 ml   Output              100 ml   Net             -100 ml       Physical Exam:  Constitutional: She is oriented to person, place, and time. Morbidly obese, well-nourished.   Head: Normocephalic.   Eyes: Conjunctivae are normal. Right eye exhibits no discharge. Left " eye exhibits no discharge.   Neck: Normal range of motion. Neck supple.   Cardiovascular: Regular rhythm, normal heart sounds. Bradycardia  Pulmonary/Chest: Effort normal and breath sounds normal. No respiratory distress. Right CW former Malvin site CDI, dressed  Abdominal: Soft, obese. Bowel sounds are normal. She exhibits no distension. There is no tenderness.   Ext:  No CCE.  + left femoral Jaspreet cath oozing blood but appears intact.  Neurological: NF  Skin: Skin is warm and dry + pallor  Psychiatric: She has a normal mood and affect. Her speech is normal and behavior is normal. Thought content normal, very pleasant.  Poor historian.       Laboratory:  CBC:     Recent Labs  Lab 02/11/18  0431   WBC 11.24   RBC 3.53*   HGB 9.8*   HCT 30.4*      MCV 86   MCH 27.8   MCHC 32.2     BMP:     Recent Labs  Lab 02/11/18  0431   GLU 93   *   K 4.9   CL 95   CO2 22*   BUN 31*   CREATININE 6.7*   CALCIUM 8.6*   MG 1.9       ASSESSMENT/PLAN:     66 YO WF with ESRD and catheter associated MRSA bacteremia and suspected lumbar foci presents from [facility] for ID and NS services.  1. ESRD:   ID recommends line holiday.  Dr. Nichols to pull line 2/10. Hold HD as long as possible. Renally dose all meds and antimicrobials. On Cubicin, Cipro, and Cefepime.   2. Anemia:   holding IV iron given active infection.  Epo to maintain hbg 10-11.  Currently at goal.  Folate and B12 normal.  3. Hyperphos:  on Renvela now will follow.  4. Secondary hyperpara-at goal  5. Hyponatremia-nothing to do at this time. Will correct once on HD. Will restrict water to 1liter per day  6. MBD/2HPT/Vit D deficiency:  Started Vit D3 and calcitriol.  7. DM:  Currently on SSI.  Agree with this given she has intermittant nausea and poor intake.  No long acting insulin given  HgA1c 6.5 and she reports some low blood sugars at home prior to admission.  Wonder if she needs insulin at all.  Start renally dosed Januvia.  8. Afib:  On Amio and  Carvedilol.  Defer to cards. Would monitor carefully given concurrent Cipro/Amio.  Defer anticoagulation to Cards.

## 2018-02-11 NOTE — PROGRESS NOTES
[facility] Hospital Medicine  Progress Note    Patient Name: Yumiko Proctor  MRN: [id number]  Patient Class: IP- Inpatient   Admission Date: 2/7/2018  Length of Stay: 3 days  Attending Physician: Cherelle Echevarria, *  Primary Care Provider: Primary Doctor No        Subjective:     Principal Problem:MRSA bacteremia    HPI:  The patient is a 68 yo female with known MRSA bacteremia who was transferred here for ID and Neurosurgery services.  She has a history of ESRD, Afib, cardiomyopathy, and DM.  She has been hospitalized with bacteremia from a vascular access which was taken out and replaced today with a lt femoral vas cath.  Blood cultures remained positive. She had a RANDA which was normal, MRI of thoracic and lumbar spine showed a abnormal foci in T( that was probably a hemangioma or metastasis.    Hospital Course:  No notes on file    Interval History: Still weak, states unchanged.  Daughter at bedside, patient ate more for breakfast this morning. No other complaints.    Review of Systems   Constitutional: Positive for activity change and fatigue. Negative for appetite change and fever.   HENT: Negative for congestion, ear pain, rhinorrhea and sinus pressure.    Eyes: Negative for pain and discharge.   Respiratory: Negative for cough, chest tightness, shortness of breath and wheezing.    Cardiovascular: Negative for chest pain and leg swelling.   Gastrointestinal: Negative for abdominal distention, abdominal pain, diarrhea, nausea and vomiting.   Endocrine: Negative for cold intolerance and heat intolerance.   Genitourinary: Negative for difficulty urinating, flank pain, frequency, hematuria and urgency.   Musculoskeletal: Positive for arthralgias, back pain and myalgias. Negative for joint swelling.   Allergic/Immunologic: Negative for environmental allergies and food allergies.   Neurological: Positive for weakness. Negative for dizziness, light-headedness and headaches.   Hematological:  Does not bruise/bleed easily.   Psychiatric/Behavioral: Negative for agitation, behavioral problems and decreased concentration.     Objective:     Vital Signs (Most Recent):  Temp: 98.5 °F (36.9 °C) (02/10/18 1529)  Pulse: (!) 58 (02/10/18 2200)  Resp: 18 (02/10/18 1529)  BP: (!) 156/70 (02/10/18 1529)  SpO2: 99 % (02/10/18 1529) Vital Signs (24h Range):  Temp:  [98 °F (36.7 °C)-98.5 °F (36.9 °C)] 98.5 °F (36.9 °C)  Pulse:  [55-75] 58  Resp:  [18] 18  SpO2:  [98 %-99 %] 99 %  BP: (156-169)/(70-72) 156/70     Weight: 108 kg (238 lb 3.2 oz)  Body mass index is 42.2 kg/m².    Intake/Output Summary (Last 24 hours) at 02/10/18 2208  Last data filed at 02/10/18 1900   Gross per 24 hour   Intake                0 ml   Output              100 ml   Net             -100 ml      Physical Exam   Constitutional: She is oriented to person, place, and time. She appears well-developed and well-nourished.   HENT:   Head: Normocephalic.   Eyes: Conjunctivae are normal. Right eye exhibits no discharge. Left eye exhibits no discharge.   Neck: Normal range of motion. Neck supple.   Cardiovascular: Regular rhythm and normal heart sounds.  Bradycardia present.    Pulmonary/Chest: Effort normal and breath sounds normal. No respiratory distress.   Abdominal: Soft. Bowel sounds are normal. She exhibits no distension. There is no tenderness.   Musculoskeletal: Normal range of motion.   Neurological: She is alert and oriented to person, place, and time.   Skin: Skin is warm and dry.   Psychiatric: She has a normal mood and affect. Her speech is normal and behavior is normal. Thought content normal.       Significant Labs:   CBC:   Recent Labs  Lab 02/09/18  0425 02/10/18  0525   WBC 8.16 11.32   HGB 10.0* 10.2*   HCT 30.5* 31.5*    249     CMP:   Recent Labs  Lab 02/09/18  0425 02/10/18  0524   * 132*   K 4.2 4.8   CL 98 98   CO2 22* 22*   GLU 82 91   BUN 28* 23   CREATININE 6.1* 5.4*   CALCIUM 8.2* 8.6*   PROT 6.6 7.1   ALBUMIN  1.9* 2.0*   BILITOT 0.3 0.5   ALKPHOS 73 69   AST 19 90*   ALT 10 21   ANIONGAP 11 12   EGFRNONAA 7* 8*     All pertinent labs within the past 24 hours have been reviewed.   Microbiology Results (last 7 days)     Procedure Component Value Units Date/Time    Blood culture [[id number]] Collected:  02/10/18 0525    Order Status:  Completed Specimen:  Blood Updated:  02/10/18 1745     Blood Culture, Routine No Growth to date    Blood culture [[id number]] Collected:  02/08/18 0028    Order Status:  Completed Specimen:  Blood Updated:  02/10/18 0747     Blood Culture, Routine Gram stain vicente bottle: Gram positive cocci in clusters resembling Staph      Blood Culture, Routine Results called to and read back by: Anurag Jones RN  02/09/2018       Blood Culture, Routine 01:38     Blood Culture, Routine --     STAPHYLOCOCCUS AUREUS  Susceptibility pending  ID consult required at [facility],[place] and [facility] locations.      Blood culture [[id number]] Collected:  02/08/18 0028    Order Status:  Completed Specimen:  Blood Updated:  02/10/18 0613     Blood Culture, Routine No Growth to date     Blood Culture, Routine No Growth to date     Blood Culture, Routine No Growth to date            Significant Imaging: I have reviewed all pertinent imaging results/findings within the past 24 hours.    Assessment/Plan:      * MRSA bacteremia    Blood Cultures 02/08/18 gram positive cocci in clusters in 1 set, 2nd set NGTD  Repeat Blood Cultures today 02/10/18  Lactic acid 0.9 02/08/10  Continue Daptomycin/Cefepime/Cipro  ID consult, appreciate recs          Type 2 diabetes mellitus    A1c  6.5  Moderate dose SSI          ESRD (end stage renal disease)    Consult Nephrology  Continue Dialysis MWF  Left Femoral Jaspreet cath oozing, Gen Surg consulted to pull line        Atrial fibrillation    Continue Coreg              VTE Risk Mitigation         Ordered     heparin (porcine) injection 5,000 Units  As needed (PRN)     Route:  Intravenous         02/09/18 1413     heparin (porcine) injection 5,000 Units  Every 12 hours     Route:  Subcutaneous        02/08/18 0837     Medium Risk of VTE  Once      02/07/18 2255     Place sequential compression device  Until discontinued      02/07/18 2255     Place ANGELI hose  Until discontinued      02/07/18 2255              Cherelle Echevarria MD  Department of Hospital Medicine   [facility]

## 2018-02-11 NOTE — ASSESSMENT & PLAN NOTE
Consult Nephrology  Continue Dialysis MWF  Left Femoral Jaspreet cath oozing, Gen Surg consulted to pull line

## 2018-02-11 NOTE — ASSESSMENT & PLAN NOTE
Blood Cultures 02/08/18 gram positive cocci in clusters in 1 set, 2nd set NGTD  Repeat Blood Cultures today 02/10/18  Lactic acid 0.9 02/08/10  Continue Daptomycin/Cefepime/Cipro  ID consult, appreciate recs

## 2018-02-11 NOTE — SUBJECTIVE & OBJECTIVE
Interval History: Still weak, states unchanged.  Daughter at bedside, patient ate more for breakfast this morning. No other complaints.    Review of Systems   Constitutional: Positive for activity change and fatigue. Negative for appetite change and fever.   HENT: Negative for congestion, ear pain, rhinorrhea and sinus pressure.    Eyes: Negative for pain and discharge.   Respiratory: Negative for cough, chest tightness, shortness of breath and wheezing.    Cardiovascular: Negative for chest pain and leg swelling.   Gastrointestinal: Negative for abdominal distention, abdominal pain, diarrhea, nausea and vomiting.   Endocrine: Negative for cold intolerance and heat intolerance.   Genitourinary: Negative for difficulty urinating, flank pain, frequency, hematuria and urgency.   Musculoskeletal: Positive for arthralgias, back pain and myalgias. Negative for joint swelling.   Allergic/Immunologic: Negative for environmental allergies and food allergies.   Neurological: Positive for weakness. Negative for dizziness, light-headedness and headaches.   Hematological: Does not bruise/bleed easily.   Psychiatric/Behavioral: Negative for agitation, behavioral problems and decreased concentration.     Objective:     Vital Signs (Most Recent):  Temp: 98.5 °F (36.9 °C) (02/10/18 1529)  Pulse: (!) 58 (02/10/18 2200)  Resp: 18 (02/10/18 1529)  BP: (!) 156/70 (02/10/18 1529)  SpO2: 99 % (02/10/18 1529) Vital Signs (24h Range):  Temp:  [98 °F (36.7 °C)-98.5 °F (36.9 °C)] 98.5 °F (36.9 °C)  Pulse:  [55-75] 58  Resp:  [18] 18  SpO2:  [98 %-99 %] 99 %  BP: (156-169)/(70-72) 156/70     Weight: 108 kg (238 lb 3.2 oz)  Body mass index is 42.2 kg/m².    Intake/Output Summary (Last 24 hours) at 02/10/18 2208  Last data filed at 02/10/18 1900   Gross per 24 hour   Intake                0 ml   Output              100 ml   Net             -100 ml      Physical Exam   Constitutional: She is oriented to person, place, and time. She appears  well-developed and well-nourished.   HENT:   Head: Normocephalic.   Eyes: Conjunctivae are normal. Right eye exhibits no discharge. Left eye exhibits no discharge.   Neck: Normal range of motion. Neck supple.   Cardiovascular: Regular rhythm and normal heart sounds.  Bradycardia present.    Pulmonary/Chest: Effort normal and breath sounds normal. No respiratory distress.   Abdominal: Soft. Bowel sounds are normal. She exhibits no distension. There is no tenderness.   Musculoskeletal: Normal range of motion.   Neurological: She is alert and oriented to person, place, and time.   Skin: Skin is warm and dry.   Psychiatric: She has a normal mood and affect. Her speech is normal and behavior is normal. Thought content normal.       Significant Labs:   CBC:   Recent Labs  Lab 02/09/18  0425 02/10/18  0525   WBC 8.16 11.32   HGB 10.0* 10.2*   HCT 30.5* 31.5*    249     CMP:   Recent Labs  Lab 02/09/18  0425 02/10/18  0524   * 132*   K 4.2 4.8   CL 98 98   CO2 22* 22*   GLU 82 91   BUN 28* 23   CREATININE 6.1* 5.4*   CALCIUM 8.2* 8.6*   PROT 6.6 7.1   ALBUMIN 1.9* 2.0*   BILITOT 0.3 0.5   ALKPHOS 73 69   AST 19 90*   ALT 10 21   ANIONGAP 11 12   EGFRNONAA 7* 8*     All pertinent labs within the past 24 hours have been reviewed.   Microbiology Results (last 7 days)     Procedure Component Value Units Date/Time    Blood culture [[id number]] Collected:  02/10/18 0525    Order Status:  Completed Specimen:  Blood Updated:  02/10/18 1745     Blood Culture, Routine No Growth to date    Blood culture [[id number]] Collected:  02/08/18 0028    Order Status:  Completed Specimen:  Blood Updated:  02/10/18 0747     Blood Culture, Routine Gram stain vicente bottle: Gram positive cocci in clusters resembling Staph      Blood Culture, Routine Results called to and read back by: Anurag Jones RN  02/09/2018       Blood Culture, Routine 01:38     Blood Culture, Routine --     STAPHYLOCOCCUS AUREUS  Susceptibility pending  ID consult  required at [facility].[place] and [place] locations.      Blood culture [[id number]] Collected:  02/08/18 0028    Order Status:  Completed Specimen:  Blood Updated:  02/10/18 0613     Blood Culture, Routine No Growth to date     Blood Culture, Routine No Growth to date     Blood Culture, Routine No Growth to date            Significant Imaging: I have reviewed all pertinent imaging results/findings within the past 24 hours.

## 2018-02-11 NOTE — PLAN OF CARE
Problem: Patient Care Overview  Goal: Plan of Care Review  Outcome: Ongoing (interventions implemented as appropriate)  pt vs stable throughout this shift. No acute distress noted. Pt updated on POC, verbalized understanding. Hourly rounding done. Call light within reach. WCTM

## 2018-02-12 ENCOUNTER — ANESTHESIA (OUTPATIENT)
Dept: SURGERY | Facility: OTHER | Age: 68
DRG: 314 | End: 2018-02-12
Payer: COMMERCIAL

## 2018-02-12 ENCOUNTER — ANESTHESIA EVENT (OUTPATIENT)
Dept: SURGERY | Facility: OTHER | Age: 68
DRG: 314 | End: 2018-02-12
Payer: COMMERCIAL

## 2018-02-12 PROBLEM — R74.8 ABNORMAL LIVER ENZYMES: Status: ACTIVE | Noted: 2018-02-12

## 2018-02-12 LAB
ALBUMIN SERPL BCP-MCNC: 1.9 G/DL
ALP SERPL-CCNC: 69 U/L
ALT SERPL W/O P-5'-P-CCNC: 136 U/L
ANION GAP SERPL CALC-SCNC: 11 MMOL/L
AST SERPL-CCNC: 693 U/L
BASOPHILS # BLD AUTO: 0.03 K/UL
BASOPHILS NFR BLD: 0.3 %
BILIRUB SERPL-MCNC: 0.3 MG/DL
BUN SERPL-MCNC: 40 MG/DL
CALCIUM SERPL-MCNC: 8.3 MG/DL
CHLORIDE SERPL-SCNC: 92 MMOL/L
CO2 SERPL-SCNC: 20 MMOL/L
CREAT SERPL-MCNC: 7.2 MG/DL
DIFFERENTIAL METHOD: ABNORMAL
EOSINOPHIL # BLD AUTO: 0.1 K/UL
EOSINOPHIL NFR BLD: 1.2 %
ERYTHROCYTE [DISTWIDTH] IN BLOOD BY AUTOMATED COUNT: 15.4 %
EST. GFR  (AFRICAN AMERICAN): 6 ML/MIN/1.73 M^2
EST. GFR  (NON AFRICAN AMERICAN): 5 ML/MIN/1.73 M^2
GLUCOSE SERPL-MCNC: 79 MG/DL
HCT VFR BLD AUTO: 30 %
HGB BLD-MCNC: 10.3 G/DL
LYMPHOCYTES # BLD AUTO: 2 K/UL
LYMPHOCYTES NFR BLD: 16.9 %
MAGNESIUM SERPL-MCNC: 1.6 MG/DL
MCH RBC QN AUTO: 28.1 PG
MCHC RBC AUTO-ENTMCNC: 34.3 G/DL
MCV RBC AUTO: 82 FL
MONOCYTES # BLD AUTO: 1 K/UL
MONOCYTES NFR BLD: 8.1 %
NEUTROPHILS # BLD AUTO: 8.7 K/UL
NEUTROPHILS NFR BLD: 72.7 %
PHOSPHATE SERPL-MCNC: 5.9 MG/DL
PLATELET # BLD AUTO: 215 K/UL
PMV BLD AUTO: 10.2 FL
POCT GLUCOSE: 107 MG/DL (ref 70–110)
POCT GLUCOSE: 85 MG/DL (ref 70–110)
POCT GLUCOSE: 87 MG/DL (ref 70–110)
POTASSIUM SERPL-SCNC: 5.7 MMOL/L
PROT SERPL-MCNC: 6.6 G/DL
RBC # BLD AUTO: 3.67 M/UL
SODIUM SERPL-SCNC: 123 MMOL/L
WBC # BLD AUTO: 11.99 K/UL

## 2018-02-12 PROCEDURE — 25000003 PHARM REV CODE 250: Performed by: INTERNAL MEDICINE

## 2018-02-12 PROCEDURE — 71000033 HC RECOVERY, INTIAL HOUR: Performed by: SPECIALIST

## 2018-02-12 PROCEDURE — 25000003 PHARM REV CODE 250: Performed by: ANESTHESIOLOGY

## 2018-02-12 PROCEDURE — 36000706: Performed by: SPECIALIST

## 2018-02-12 PROCEDURE — 63600175 PHARM REV CODE 636 W HCPCS: Performed by: ANESTHESIOLOGY

## 2018-02-12 PROCEDURE — 63600175 PHARM REV CODE 636 W HCPCS: Performed by: INTERNAL MEDICINE

## 2018-02-12 PROCEDURE — 63600175 PHARM REV CODE 636 W HCPCS: Performed by: NURSE PRACTITIONER

## 2018-02-12 PROCEDURE — 63600175 PHARM REV CODE 636 W HCPCS: Performed by: NURSE ANESTHETIST, CERTIFIED REGISTERED

## 2018-02-12 PROCEDURE — 36000707: Performed by: SPECIALIST

## 2018-02-12 PROCEDURE — 90935 HEMODIALYSIS ONE EVALUATION: CPT

## 2018-02-12 PROCEDURE — 27000221 HC OXYGEN, UP TO 24 HOURS

## 2018-02-12 PROCEDURE — 85025 COMPLETE CBC W/AUTO DIFF WBC: CPT

## 2018-02-12 PROCEDURE — 37000009 HC ANESTHESIA EA ADD 15 MINS: Performed by: SPECIALIST

## 2018-02-12 PROCEDURE — 02H633Z INSERTION OF INFUSION DEVICE INTO RIGHT ATRIUM, PERCUTANEOUS APPROACH: ICD-10-PCS | Performed by: SPECIALIST

## 2018-02-12 PROCEDURE — 25000003 PHARM REV CODE 250: Performed by: SPECIALIST

## 2018-02-12 PROCEDURE — 84100 ASSAY OF PHOSPHORUS: CPT

## 2018-02-12 PROCEDURE — 37000008 HC ANESTHESIA 1ST 15 MINUTES: Performed by: SPECIALIST

## 2018-02-12 PROCEDURE — 83735 ASSAY OF MAGNESIUM: CPT

## 2018-02-12 PROCEDURE — 25000003 PHARM REV CODE 250: Performed by: NURSE PRACTITIONER

## 2018-02-12 PROCEDURE — 36415 COLL VENOUS BLD VENIPUNCTURE: CPT

## 2018-02-12 PROCEDURE — 99233 SBSQ HOSP IP/OBS HIGH 50: CPT | Mod: ,,, | Performed by: INTERNAL MEDICINE

## 2018-02-12 PROCEDURE — 25000003 PHARM REV CODE 250: Performed by: PHYSICIAN ASSISTANT

## 2018-02-12 PROCEDURE — C1750 CATH, HEMODIALYSIS,LONG-TERM: HCPCS | Performed by: SPECIALIST

## 2018-02-12 PROCEDURE — 80053 COMPREHEN METABOLIC PANEL: CPT

## 2018-02-12 PROCEDURE — 11000001 HC ACUTE MED/SURG PRIVATE ROOM

## 2018-02-12 DEVICE — CATH EQUISTREAM 19CM: Type: IMPLANTABLE DEVICE | Site: NECK | Status: FUNCTIONAL

## 2018-02-12 RX ORDER — FENTANYL CITRATE 50 UG/ML
25 INJECTION, SOLUTION INTRAMUSCULAR; INTRAVENOUS EVERY 5 MIN PRN
Status: DISCONTINUED | OUTPATIENT
Start: 2018-02-12 | End: 2018-02-12 | Stop reason: HOSPADM

## 2018-02-12 RX ORDER — FENTANYL CITRATE 50 UG/ML
INJECTION, SOLUTION INTRAMUSCULAR; INTRAVENOUS
Status: DISCONTINUED | OUTPATIENT
Start: 2018-02-12 | End: 2018-02-12

## 2018-02-12 RX ORDER — POLYETHYLENE GLYCOL 3350 17 G/17G
17 POWDER, FOR SOLUTION ORAL DAILY
Status: DISCONTINUED | OUTPATIENT
Start: 2018-02-12 | End: 2018-03-01 | Stop reason: HOSPADM

## 2018-02-12 RX ORDER — BUPIVACAINE HYDROCHLORIDE 2.5 MG/ML
INJECTION, SOLUTION EPIDURAL; INFILTRATION; INTRACAUDAL
Status: DISCONTINUED | OUTPATIENT
Start: 2018-02-12 | End: 2018-02-12 | Stop reason: HOSPADM

## 2018-02-12 RX ORDER — PROPOFOL 10 MG/ML
VIAL (ML) INTRAVENOUS CONTINUOUS PRN
Status: DISCONTINUED | OUTPATIENT
Start: 2018-02-12 | End: 2018-02-12

## 2018-02-12 RX ORDER — MIDAZOLAM HYDROCHLORIDE 1 MG/ML
INJECTION INTRAMUSCULAR; INTRAVENOUS
Status: DISCONTINUED | OUTPATIENT
Start: 2018-02-12 | End: 2018-02-12

## 2018-02-12 RX ORDER — MEPERIDINE HYDROCHLORIDE 50 MG/ML
12.5 INJECTION INTRAMUSCULAR; INTRAVENOUS; SUBCUTANEOUS ONCE AS NEEDED
Status: DISCONTINUED | OUTPATIENT
Start: 2018-02-12 | End: 2018-02-12 | Stop reason: HOSPADM

## 2018-02-12 RX ORDER — SODIUM CHLORIDE 0.9 % (FLUSH) 0.9 %
3 SYRINGE (ML) INJECTION
Status: DISCONTINUED | OUTPATIENT
Start: 2018-02-12 | End: 2018-03-01 | Stop reason: HOSPADM

## 2018-02-12 RX ORDER — OXYCODONE HYDROCHLORIDE 5 MG/1
5 TABLET ORAL
Status: DISCONTINUED | OUTPATIENT
Start: 2018-02-12 | End: 2018-02-12 | Stop reason: HOSPADM

## 2018-02-12 RX ORDER — ONDANSETRON 2 MG/ML
4 INJECTION INTRAMUSCULAR; INTRAVENOUS DAILY PRN
Status: DISCONTINUED | OUTPATIENT
Start: 2018-02-12 | End: 2018-02-12 | Stop reason: HOSPADM

## 2018-02-12 RX ORDER — SODIUM CHLORIDE 9 MG/ML
INJECTION, SOLUTION INTRAVENOUS ONCE
Status: DISCONTINUED | OUTPATIENT
Start: 2018-02-12 | End: 2018-02-13

## 2018-02-12 RX ORDER — HEPARIN SODIUM 5000 [USP'U]/ML
5000 INJECTION, SOLUTION INTRAVENOUS; SUBCUTANEOUS
Status: DISCONTINUED | OUTPATIENT
Start: 2018-02-12 | End: 2018-03-01 | Stop reason: HOSPADM

## 2018-02-12 RX ADMIN — FENTANYL CITRATE 25 MCG: 50 INJECTION, SOLUTION INTRAMUSCULAR; INTRAVENOUS at 01:02

## 2018-02-12 RX ADMIN — HEPARIN SODIUM 3000 UNITS: 1000 INJECTION, SOLUTION INTRAVENOUS; SUBCUTANEOUS at 07:02

## 2018-02-12 RX ADMIN — OXYCODONE HYDROCHLORIDE AND ACETAMINOPHEN 1 TABLET: 5; 325 TABLET ORAL at 07:02

## 2018-02-12 RX ADMIN — PROPOFOL 25 MCG/KG/MIN: 10 INJECTION, EMULSION INTRAVENOUS at 01:02

## 2018-02-12 RX ADMIN — HEPARIN SODIUM 5000 UNITS: 5000 INJECTION, SOLUTION INTRAVENOUS; SUBCUTANEOUS at 09:02

## 2018-02-12 RX ADMIN — FENTANYL CITRATE 25 MCG: 50 INJECTION, SOLUTION INTRAMUSCULAR; INTRAVENOUS at 02:02

## 2018-02-12 RX ADMIN — MIDAZOLAM HYDROCHLORIDE 1 MG: 1 INJECTION, SOLUTION INTRAMUSCULAR; INTRAVENOUS at 01:02

## 2018-02-12 RX ADMIN — SEVELAMER CARBONATE 800 MG: 800 TABLET, FILM COATED ORAL at 05:02

## 2018-02-12 RX ADMIN — ACETAMINOPHEN 650 MG: 325 TABLET, FILM COATED ORAL at 05:02

## 2018-02-12 RX ADMIN — POLYETHYLENE GLYCOL 3350 17 G: 17 POWDER, FOR SOLUTION ORAL at 05:02

## 2018-02-12 RX ADMIN — TRAMADOL HYDROCHLORIDE 50 MG: 50 TABLET, COATED ORAL at 12:02

## 2018-02-12 RX ADMIN — OXYCODONE HYDROCHLORIDE 5 MG: 5 TABLET ORAL at 02:02

## 2018-02-12 RX ADMIN — VITAMIN D, TAB 1000IU (100/BT) 2000 UNITS: 25 TAB at 05:02

## 2018-02-12 RX ADMIN — ONDANSETRON 4 MG: 2 INJECTION INTRAMUSCULAR; INTRAVENOUS at 08:02

## 2018-02-12 RX ADMIN — SODIUM CHLORIDE: 0.9 INJECTION, SOLUTION INTRAVENOUS at 01:02

## 2018-02-12 RX ADMIN — DAPTOMYCIN 1000 MG: 500 INJECTION, POWDER, LYOPHILIZED, FOR SOLUTION INTRAVENOUS at 12:02

## 2018-02-12 RX ADMIN — FENTANYL CITRATE 50 MCG: 50 INJECTION, SOLUTION INTRAMUSCULAR; INTRAVENOUS at 01:02

## 2018-02-12 RX ADMIN — ERYTHROPOIETIN 5400 UNITS: 10000 INJECTION, SOLUTION INTRAVENOUS; SUBCUTANEOUS at 04:02

## 2018-02-12 RX ADMIN — CARVEDILOL 25 MG: 12.5 TABLET, FILM COATED ORAL at 09:02

## 2018-02-12 RX ADMIN — TRAMADOL HYDROCHLORIDE 50 MG: 50 TABLET, COATED ORAL at 04:02

## 2018-02-12 NOTE — SUBJECTIVE & OBJECTIVE
Interval History: No complaints this afternoon, hasn't eaten lunch, never delivered. Nurse notified.  Patient states that she doesn't feel better, but doesn't feel worse either.      Review of Systems   Constitutional: Positive for activity change and fatigue. Negative for appetite change and fever.   HENT: Negative for congestion, ear pain, rhinorrhea and sinus pressure.    Eyes: Negative for pain and discharge.   Respiratory: Negative for cough, chest tightness, shortness of breath and wheezing.    Cardiovascular: Negative for chest pain and leg swelling.   Gastrointestinal: Negative for abdominal distention, abdominal pain, diarrhea, nausea and vomiting.   Endocrine: Negative for cold intolerance and heat intolerance.   Genitourinary: Negative for difficulty urinating, flank pain, frequency, hematuria and urgency.   Musculoskeletal: Positive for arthralgias, back pain and myalgias. Negative for joint swelling.   Allergic/Immunologic: Negative for environmental allergies and food allergies.   Neurological: Positive for weakness. Negative for dizziness, light-headedness and headaches.   Hematological: Does not bruise/bleed easily.   Psychiatric/Behavioral: Negative for agitation, behavioral problems and decreased concentration.     Objective:     Vital Signs (Most Recent):  Temp: 97.6 °F (36.4 °C) (02/11/18 1952)  Pulse: 60 (02/11/18 1952)  Resp: 16 (02/11/18 1952)  BP: 136/64 (02/11/18 1952)  SpO2: 99 % (02/11/18 1952) Vital Signs (24h Range):  Temp:  [97.6 °F (36.4 °C)-98.9 °F (37.2 °C)] 97.6 °F (36.4 °C)  Pulse:  [53-62] 60  Resp:  [16-20] 16  SpO2:  [95 %-99 %] 99 %  BP: (130-140)/(60-64) 136/64     Weight: 108 kg (238 lb 3.2 oz)  Body mass index is 42.2 kg/m².  No intake or output data in the 24 hours ending 02/11/18 2155   Physical Exam   Constitutional: She is oriented to person, place, and time. She appears well-developed and well-nourished.   HENT:   Head: Normocephalic.   Eyes: Conjunctivae are normal.  Right eye exhibits no discharge. Left eye exhibits no discharge.   Neck: Normal range of motion. Neck supple.   Cardiovascular: Regular rhythm and normal heart sounds.  Bradycardia present.    Pulmonary/Chest: Effort normal and breath sounds normal. No respiratory distress.   Abdominal: Soft. Bowel sounds are normal. She exhibits no distension. There is no tenderness.   Musculoskeletal: Normal range of motion.   Neurological: She is alert and oriented to person, place, and time.   Skin: Skin is warm and dry.   Psychiatric: She has a normal mood and affect. Her speech is normal and behavior is normal. Thought content normal.       Significant Labs:   CBC:   Recent Labs  Lab 02/10/18  0525 02/11/18  0431   WBC 11.32 11.24   HGB 10.2* 9.8*   HCT 31.5* 30.4*    225     CMP:   Recent Labs  Lab 02/10/18  0524 02/11/18  0431   * 128*   K 4.8 4.9   CL 98 95   CO2 22* 22*   GLU 91 93   BUN 23 31*   CREATININE 5.4* 6.7*   CALCIUM 8.6* 8.6*   PROT 7.1 6.5   ALBUMIN 2.0* 1.8*   BILITOT 0.5 0.3   ALKPHOS 69 64   AST 90* 326*   ALT 21 61*   ANIONGAP 12 11   EGFRNONAA 8* 6*     All pertinent labs within the past 24 hours have been reviewed.    Microbiology Results (last 7 days)     Procedure Component Value Units Date/Time    Blood culture [[id number]] Collected:  02/10/18 0525    Order Status:  Completed Specimen:  Blood Updated:  02/11/18 1212     Blood Culture, Routine No Growth to date     Blood Culture, Routine No Growth to date    Blood culture [[id number]]  (Susceptibility) Collected:  02/08/18 0028    Order Status:  Completed Specimen:  Blood Updated:  02/11/18 0959     Blood Culture, Routine Gram stain vicente bottle: Gram positive cocci in clusters resembling Staph      Blood Culture, Routine Results called to and read back by: Anurag Jones RN  02/09/2018       Blood Culture, Routine 01:38     Blood Culture, Routine --     METHICILLIN RESISTANT STAPHYLOCOCCUS AUREUS  ID consult required at [facility] [place]  and [facility].      Blood culture [[id number]] Collected:  02/08/18 0028    Order Status:  Completed Specimen:  Blood Updated:  02/11/18 0612     Blood Culture, Routine No Growth to date     Blood Culture, Routine No Growth to date     Blood Culture, Routine No Growth to date     Blood Culture, Routine No Growth to date          Significant Imaging: I have reviewed all pertinent imaging results/findings within the past 24 hours.

## 2018-02-12 NOTE — PLAN OF CARE
Patient scheduled for dialysis access placement today at 1pm  - will continue to follow for DC needs

## 2018-02-12 NOTE — SUBJECTIVE & OBJECTIVE
Interval History: States that dialysis catheter was removed Saturday night. Has not had a bowel movement in 2 weeks, she claims.    Review of Systems   Constitutional: Negative for chills and fever.   Respiratory: Negative for chest tightness and shortness of breath.    Gastrointestinal: Positive for constipation.   All other systems reviewed and are negative.    Objective:     Vital Signs (Most Recent):  Temp: 97.8 °F (36.6 °C) (02/12/18 1405)  Pulse: (!) 51 (02/12/18 1430)  Resp: 18 (02/12/18 1430)  BP: (!) 105/44 (02/12/18 1430)  SpO2: 100 % (02/12/18 1430) Vital Signs (24h Range):  Temp:  [97.5 °F (36.4 °C)-98.7 °F (37.1 °C)] 97.8 °F (36.6 °C)  Pulse:  [51-60] 51  Resp:  [16-20] 18  SpO2:  [96 %-100 %] 100 %  BP: (105-155)/(42-78) 105/44     Weight: 108 kg (238 lb 3.2 oz)  Body mass index is 42.2 kg/m².    Estimated Creatinine Clearance: 8.9 mL/min (based on SCr of 7.2 mg/dL (H)).    Physical Exam   Constitutional: She is oriented to person, place, and time. She appears well-developed and well-nourished.   HENT:   Head: Normocephalic.   Cardiovascular: Normal rate, regular rhythm and normal heart sounds.    Pulmonary/Chest: Effort normal and breath sounds normal.   Abdominal: Soft. Bowel sounds are normal.   Neurological: She is alert and oriented to person, place, and time.   Skin: Skin is warm and dry.        Nursing note and vitals reviewed.      Significant Labs:   Blood Culture:   Recent Labs  Lab 02/08/18  0028 02/10/18  0525   LABBLOO No Growth to date  No Growth to date  No Growth to date  No Growth to date  No Growth to date  Gram stain vicente bottle: Gram positive cocci in clusters resembling Staph   Results called to and read back by: Anurag Jones RN  02/09/2018    01:38  METHICILLIN RESISTANT STAPHYLOCOCCUS AUREUSID consult required at [facility],[place] [facility] [place]. Gram stain aer bottle: Gram positive cocci in clusters resembling Staph   Results called to and read back by: Margaux  Amanda MALDONADO  02/12/2018  01:03     CBC:   Recent Labs  Lab 02/11/18  0431 02/12/18  0436   WBC 11.24 11.99   HGB 9.8* 10.3*   HCT 30.4* 30.0*    215     CMP:   Recent Labs  Lab 02/11/18  0431 02/12/18  0436   * 123*   K 4.9 5.7*   CL 95 92*   CO2 22* 20*   GLU 93 79   BUN 31* 40*   CREATININE 6.7* 7.2*   CALCIUM 8.6* 8.3*   PROT 6.5 6.6   ALBUMIN 1.8* 1.9*   BILITOT 0.3 0.3   ALKPHOS 64 69   * 693*   ALT 61* 136*   ANIONGAP 11 11   EGFRNONAA 6* 5*       Significant Imaging: None

## 2018-02-12 NOTE — OP NOTE
[facility]  Brief Operative Note    SUMMARY     Surgery Date: 2/12/2018     Surgeon(s) and Role:     * Gildardo Nichols Jr., MD - Primary    Assisting Surgeon: None    Pre-op Diagnosis:  End stage renal disease [N18.6]    Post-op Diagnosis:  Post-Op Diagnosis Codes:     * End stage renal disease [N18.6]    Procedure(s) (LRB):  INSERTION-CATHETER-DIALYSIS (N/A)    Anesthesia: General    Description of Procedure: Rt IJ cuffed dialysis catheter    Description of the findings of the procedure: rT ij WNL    Estimated Blood Loss MIN         Specimens:   Specimen (12h ago through future)    None

## 2018-02-12 NOTE — ANESTHESIA POSTPROCEDURE EVALUATION
"Anesthesia Post Evaluation    Patient: Yumiko Proctor    Procedure(s) Performed: Procedure(s) (LRB):  INSERTION-CATHETER-DIALYSIS (N/A)    Final Anesthesia Type: MAC  Patient location during evaluation: PACU  Patient participation: Yes- Able to Participate  Level of consciousness: awake and alert and oriented  Post-procedure vital signs: reviewed and stable  Pain management: pain needs to be addressed  Airway patency: patent  PONV status at discharge: No PONV  Anesthetic complications: no      Cardiovascular status: blood pressure returned to baseline and hemodynamically stable  Respiratory status: unassisted  Hydration status: euvolemic  Follow-up not needed.        Visit Vitals  BP (!) 119/42 (BP Location: Right arm, Patient Position: Lying)   Pulse (!) 52   Temp 36.6 °C (97.8 °F) (Oral)   Resp 18   Ht 5' 3" (1.6 m)   Wt 108 kg (238 lb 3.2 oz)   LMP 01/01/1983 (Exact Date)   SpO2 100%   Breastfeeding? No   BMI 42.20 kg/m²       Pain/John Score: Pain Assessment Performed: Yes (2/12/2018  2:05 PM)  Presence of Pain: complains of pain/discomfort (2/12/2018  2:05 PM)  Pain Rating Prior to Med Admin: 10 (bilateral shoulders, arms, legs, right side) (2/12/2018  7:37 AM)  John Score: 8 (2/12/2018  2:05 PM)  Modified John Score: 16 (2/12/2018  2:05 PM)      "

## 2018-02-12 NOTE — PLAN OF CARE
Problem: Patient Care Overview  Goal: Plan of Care Review  Outcome: Ongoing (interventions implemented as appropriate)  Pts VS stable throughout this shift. No acute distress noted. hourly rounding done. call light within reach.WCTM

## 2018-02-12 NOTE — ASSESSMENT & PLAN NOTE
Consult Nephrology  Continue Dialysis MWF  Left Femoral Jaspreet cath removed today for line holiday

## 2018-02-12 NOTE — ASSESSMENT & PLAN NOTE
"- persistent bacteremia suggesting an endovascular source/undrained focus  - HD catheter in groin removed for one day - this was supposed to be over the weekend to allow a longer "holiday"  - repeat echocardiography given mitral murmur and persistent bacteremia to r/o IE  - review outside images  - continue daptomycin  - repeat blood cultures today    "

## 2018-02-12 NOTE — PROGRESS NOTES
[facility] Hospital Medicine  Progress Note    Patient Name: Yumiko Proctor  MRN: [id number]  Patient Class: IP- Inpatient   Admission Date: 2/7/2018  Length of Stay: 4 days  Attending Physician: Cherelle Echevarria, *  Primary Care Provider: Primary Doctor No        Subjective:     Principal Problem:MRSA bacteremia    HPI:  The patient is a 68 yo female with known MRSA bacteremia who was transferred here for ID and Neurosurgery services.  She has a history of ESRD, Afib, cardiomyopathy, and DM.  She has been hospitalized with bacteremia from a vascular access which was taken out and replaced today with a lt femoral vas cath.  Blood cultures remained positive. She had a RANDA which was normal, MRI of thoracic and lumbar spine showed a abnormal foci in T( that was probably a hemangioma or metastasis.    Hospital Course:  No notes on file    Interval History: No complaints this afternoon, hasn't eaten lunch, never delivered. Nurse notified.  Patient states that she doesn't feel better, but doesn't feel worse either.      Review of Systems   Constitutional: Positive for activity change and fatigue. Negative for appetite change and fever.   HENT: Negative for congestion, ear pain, rhinorrhea and sinus pressure.    Eyes: Negative for pain and discharge.   Respiratory: Negative for cough, chest tightness, shortness of breath and wheezing.    Cardiovascular: Negative for chest pain and leg swelling.   Gastrointestinal: Negative for abdominal distention, abdominal pain, diarrhea, nausea and vomiting.   Endocrine: Negative for cold intolerance and heat intolerance.   Genitourinary: Negative for difficulty urinating, flank pain, frequency, hematuria and urgency.   Musculoskeletal: Positive for arthralgias, back pain and myalgias. Negative for joint swelling.   Allergic/Immunologic: Negative for environmental allergies and food allergies.   Neurological: Positive for weakness. Negative for dizziness,  light-headedness and headaches.   Hematological: Does not bruise/bleed easily.   Psychiatric/Behavioral: Negative for agitation, behavioral problems and decreased concentration.     Objective:     Vital Signs (Most Recent):  Temp: 97.6 °F (36.4 °C) (02/11/18 1952)  Pulse: 60 (02/11/18 1952)  Resp: 16 (02/11/18 1952)  BP: 136/64 (02/11/18 1952)  SpO2: 99 % (02/11/18 1952) Vital Signs (24h Range):  Temp:  [97.6 °F (36.4 °C)-98.9 °F (37.2 °C)] 97.6 °F (36.4 °C)  Pulse:  [53-62] 60  Resp:  [16-20] 16  SpO2:  [95 %-99 %] 99 %  BP: (130-140)/(60-64) 136/64     Weight: 108 kg (238 lb 3.2 oz)  Body mass index is 42.2 kg/m².  No intake or output data in the 24 hours ending 02/11/18 2155   Physical Exam   Constitutional: She is oriented to person, place, and time. She appears well-developed and well-nourished.   HENT:   Head: Normocephalic.   Eyes: Conjunctivae are normal. Right eye exhibits no discharge. Left eye exhibits no discharge.   Neck: Normal range of motion. Neck supple.   Cardiovascular: Regular rhythm and normal heart sounds.  Bradycardia present.    Pulmonary/Chest: Effort normal and breath sounds normal. No respiratory distress.   Abdominal: Soft. Bowel sounds are normal. She exhibits no distension. There is no tenderness.   Musculoskeletal: Normal range of motion.   Neurological: She is alert and oriented to person, place, and time.   Skin: Skin is warm and dry.   Psychiatric: She has a normal mood and affect. Her speech is normal and behavior is normal. Thought content normal.       Significant Labs:   CBC:   Recent Labs  Lab 02/10/18  0525 02/11/18  0431   WBC 11.32 11.24   HGB 10.2* 9.8*   HCT 31.5* 30.4*    225     CMP:   Recent Labs  Lab 02/10/18  0524 02/11/18  0431   * 128*   K 4.8 4.9   CL 98 95   CO2 22* 22*   GLU 91 93   BUN 23 31*   CREATININE 5.4* 6.7*   CALCIUM 8.6* 8.6*   PROT 7.1 6.5   ALBUMIN 2.0* 1.8*   BILITOT 0.5 0.3   ALKPHOS 69 64   AST 90* 326*   ALT 21 61*   ANIONGAP 12 11    EGFRNONAA 8* 6*     All pertinent labs within the past 24 hours have been reviewed.    Microbiology Results (last 7 days)     Procedure Component Value Units Date/Time    Blood culture [[id number]] Collected:  02/10/18 0525    Order Status:  Completed Specimen:  Blood Updated:  02/11/18 1212     Blood Culture, Routine No Growth to date     Blood Culture, Routine No Growth to date    Blood culture [[id number]]  (Susceptibility) Collected:  02/08/18 0028    Order Status:  Completed Specimen:  Blood Updated:  02/11/18 0959     Blood Culture, Routine Gram stain vicente bottle: Gram positive cocci in clusters resembling Staph      Blood Culture, Routine Results called to and read back by: Anurag Jones RN  02/09/2018       Blood Culture, Routine 01:38     Blood Culture, Routine --     METHICILLIN RESISTANT STAPHYLOCOCCUS AUREUS  ID consult required at [facility].      Blood culture [[id number]] Collected:  02/08/18 0028    Order Status:  Completed Specimen:  Blood Updated:  02/11/18 0612     Blood Culture, Routine No Growth to date     Blood Culture, Routine No Growth to date     Blood Culture, Routine No Growth to date     Blood Culture, Routine No Growth to date          Significant Imaging: I have reviewed all pertinent imaging results/findings within the past 24 hours.    Assessment/Plan:      * MRSA bacteremia    Blood Cultures 02/08/18 gram positive cocci in clusters in 1 set, 2nd set NGTD  Repeat Blood Cultures 02/10/18 NGTD  Lactic acid 0.9 02/08/10  Continue Daptomycin/Cefepime/Cipro  ID consult, appreciate recs          Type 2 diabetes mellitus    A1c  6.5  Moderate dose SSI          ESRD (end stage renal disease)    Consult Nephrology  Continue Dialysis MWF  Left Femoral Jaspreet cath removed today for line holiday        Atrial fibrillation    Continue Coreg              VTE Risk Mitigation         Ordered     heparin (porcine) injection 5,000 Units  As needed (PRN)     Route:   Intravenous        02/09/18 1413     heparin (porcine) injection 5,000 Units  Every 12 hours     Route:  Subcutaneous        02/08/18 0837     Medium Risk of VTE  Once      02/07/18 2255     Place sequential compression device  Until discontinued      02/07/18 2255     Place ANGELI hose  Until discontinued      02/07/18 2255              Cherelle Echevarria MD  Department of Hospital Medicine   [facility]

## 2018-02-12 NOTE — PROGRESS NOTES
"Renal Progress Note    Admit Date: 2/7/2018   LOS: 5 days      67 y.o. white female with known MRSA bacteremia who was transferred here for ID and Neurosurgery services.  She has a history of ESRD, Afib, cardiomyopathy, and DM.  She has been hospitalized with bacteremia from a Right SC Malvin which was removed at outside facility.  She had a left femoral Jaspreet placed 2/5/2018.  Blood cultures remained positive; RANDA  normal, MRI of thoracic and lumbar spine showed abnormal foci thought  "probably a hemangioma or metastasis".  She normally dialyses MWF in [place], [place].  She is a very poor historian and says she does not know why her kidneys failed requiring she start HD in November 2017.  "You need to ask my daughter all these questions.  I just don't know."  No other vascular access ie AVF or grafts noted.    SUBJECTIVE:     Femoral access removed yesterday.  Electrolytes noted.  Patient feels cold this AM.  NPO for trialysis today by Dr. Nichols.    Scheduled Meds:   amiodarone  200 mg Oral Daily    calcitRIOL  0.25 mcg Oral Daily    carvedilol  25 mg Oral BID    DAPTOmycin (CUBICIN)  IV  1,000 mg Intravenous Q48H    epoetin davion (PROCRIT) injection  50 Units/kg Subcutaneous Every Mon, Wed, Fri    heparin (porcine)  5,000 Units Subcutaneous Q12H    insulin aspart  1-10 Units Subcutaneous TIDWM    polyethylene glycol  17 g Oral Daily    sevelamer carbonate  800 mg Oral TID WM    [START ON 2/13/2018] SITagliptin  25 mg Oral Daily    vitamin D  2,000 Units Oral Daily       OBJECTIVE:     Vital Signs Range (Last 24H):  Temp:  [97.5 °F (36.4 °C)-98.7 °F (37.1 °C)]   Pulse:  [56-60]   Resp:  [16-20]   BP: (132-155)/(60-78)   SpO2:  [96 %-99 %]     I & O (Last 24H):    Intake/Output Summary (Last 24 hours) at 02/12/18 1203  Last data filed at 02/12/18 0500   Gross per 24 hour   Intake              360 ml   Output                0 ml   Net              360 ml       Physical Exam:  Constitutional: She is oriented to " person, place, and time. Morbidly obese, well-nourished.   Head: Normocephalic.   Eyes: Conjunctivae are normal. Right eye exhibits no discharge. Left eye exhibits no discharge.   Neck: Normal range of motion. Neck supple.   Cardiovascular: Regular rhythm, normal heart sounds. Bradycardia  Pulmonary/Chest: Effort normal and breath sounds normal. No respiratory distress. Right CW former Malvin site CDI, dressed  Abdominal: Soft, obese. Bowel sounds are normal. She exhibits no distension. There is no tenderness.   Ext:  No CCE.  + left femoral Jaspreet cath oozing blood but appears intact.  Neurological: NF  Skin: Skin is warm and dry + pallor  Psychiatric: She has a normal mood and affect. Her speech is normal and behavior is normal. Thought content normal, very pleasant.  Poor historian.       Laboratory:  CBC:     Recent Labs  Lab 02/12/18  0436   WBC 11.99   RBC 3.67*   HGB 10.3*   HCT 30.0*      MCV 82   MCH 28.1   MCHC 34.3     BMP:     Recent Labs  Lab 02/12/18  0436   GLU 79   *   K 5.7*   CL 92*   CO2 20*   BUN 40*   CREATININE 7.2*   CALCIUM 8.3*   MG 1.6       ASSESSMENT/PLAN:     66 YO WF with ESRD and catheter associated MRSA bacteremia and suspected lumbar foci presents from [facility] for ID and NS services.  1. ESRD:   ID recommends line holiday.  Dr. Nichols pull line 2/10 for holiday, however electrolytes today warrant HD.  Dr. Nichols to place trialysis today then she will have HD.  Renally dose all meds and antimicrobials. On Cubicin, Cipro, and Cefepime.   2. Anemia of CKD:   holding IV iron given active infection.  Epo to maintain hbg 10-11.  Currently at goal.  Folate and B12 normal.  3. Hyperphos:  on Renvela now.  4. Hyponatremia-nothing to do at this time. Will correct once on HD. Will restrict water to 1liter per day.  IV meds to mixed with NS, rather than D5W.  5. 2HPT/Vit D deficiency:  Started Vit D3 and calcitriol.  6. DM:  Currently on SSI.  Reduced Januvia to ESRD dosing  of 25mg/d.  7. Afib:  On Amio and Carvedilol.  Defer to cards. Would monitor carefully given concurrent Cipro/Amio.  Defer anticoagulation to Cards.    8. MRSA Bacteremia: follow cultures.  Tunneled catheter once cultures are clear.    Ruslan Gomez MD  Nephrology

## 2018-02-12 NOTE — TRANSFER OF CARE
"Anesthesia Transfer of Care Note    Patient: Yumiko Proctor    Procedure(s) Performed: Procedure(s) (LRB):  INSERTION-CATHETER-DIALYSIS (N/A)    Patient location: PACU    Anesthesia Type: MAC    Transport from OR: Transported from OR on 2-3 L/min O2 by NC with adequate spontaneous ventilation    Post pain: adequate analgesia    Post assessment: no apparent anesthetic complications    Post vital signs: stable    Level of consciousness: awake, alert and oriented    Nausea/Vomiting: no nausea/vomiting    Complications: none    Transfer of care protocol was followed      Last vitals:   Visit Vitals  BP (!) 155/68 (BP Location: Right arm, Patient Position: Lying)   Pulse (!) 56   Temp 36.4 °C (97.5 °F) (Oral)   Resp 20   Ht 5' 3" (1.6 m)   Wt 108 kg (238 lb 3.2 oz)   LMP 01/01/1983 (Exact Date)   SpO2 98%   Breastfeeding? No   BMI 42.20 kg/m²     "

## 2018-02-12 NOTE — ANESTHESIA PREPROCEDURE EVALUATION
02/12/2018  Yumiko Proctor is a 67 y.o., female.    Anesthesia Evaluation    I have reviewed the Patient Summary Reports.    I have reviewed the Nursing Notes.   I have reviewed the Medications.     Review of Systems  Anesthesia Hx:  No problems with previous Anesthesia  History of prior surgery of interest to airway management or planning: Denies Family Hx of Anesthesia complications.   Denies Personal Hx of Anesthesia complications.   Social:  Non-Smoker    Hematology/Oncology:     Oncology Normal    -- Anemia:   EENT/Dental:EENT/Dental Normal   Cardiovascular:   Exercise tolerance: poor Dysrhythmias atrial fibrillation hyperlipidemia    Pulmonary:  Pulmonary Normal    Renal/:   Chronic Renal Disease, ARF, Dialysis    Hepatic/GI:  Hepatic/GI Normal    Musculoskeletal:   Arthritis     Neurological:  Neurology Normal    Endocrine:   Diabetes, poorly controlled, using insulin    Dermatological:  Skin Normal    Psych:  Psychiatric Normal           Physical Exam  General:  Morbid Obesity    Airway/Jaw/Neck:  Airway Findings: Tongue: Normal General Airway Assessment: Possible difficult intubation  Mallampati: II      Dental:  Dental Findings: Edentulous        Mental Status:  Mental Status Findings:  Cooperative, Alert and Oriented         Anesthesia Plan  Type of Anesthesia, risks & benefits discussed:  Anesthesia Type:  MAC  Patient's Preference:   Intra-op Monitoring Plan:   Intra-op Monitoring Plan Comments:   Post Op Pain Control Plan:   Post Op Pain Control Plan Comments:   Induction:   IV  Beta Blocker:         Informed Consent: Patient understands risks and agrees with Anesthesia plan.  Questions answered. Anesthesia consent signed with patient.  ASA Score: 4  emergent   Day of Surgery Review of History & Physical:    H&P update referred to the surgeon.     Anesthesia Plan Notes: Hct 30%,Potassium 5.7   Plan MAC        Ready For Surgery From Anesthesia Perspective.

## 2018-02-12 NOTE — PROGRESS NOTES
"[facility]  Infectious Disease  Progress Note    Patient Name: Yumiko Proctor  MRN: [id number]  Admission Date: 2/7/2018  Length of Stay: 5 days  Attending Physician: Cherelle Echevarria, *  Primary Care Provider: Primary Doctor No    Isolation Status: Contact  Assessment/Plan:      * MRSA bacteremia    - persistent bacteremia suggesting an endovascular source/undrained focus  - HD catheter in groin removed for one day - this was supposed to be over the weekend to allow a longer "holiday"  - repeat echocardiography given mitral murmur and persistent bacteremia to r/o IE  - review outside images  - continue daptomycin  - repeat blood cultures today                  Thank you for your consult. I will follow-up with patient. Please contact us if you have any additional questions.    Shahid Bhakta MD  Infectious Disease  [facility]    Subjective:     Principal Problem:MRSA bacteremia    HPI: This is a 68 yo woman with ESRD transferred to [facility] for persistent bacteremia due to MRSA. She reportedly had multiple studies performed at [facility] in [place], including MR imaging, CT imaging, a bone scan, and a RANDA, all of which were "negative." An indwelling HD catheter was discontinued and a left groin HD catheter was placed. The patient feels sick but denies any rigors or irving fever. The patient was admitted last night and placed on cefepime and Cipro in addition to the daptomycin. I am consulted for ID evaluation.    Interval History: States that dialysis catheter was removed Saturday night. Has not had a bowel movement in 2 weeks, she claims.    Review of Systems   Constitutional: Negative for chills and fever.   Respiratory: Negative for chest tightness and shortness of breath.    Gastrointestinal: Positive for constipation.   All other systems reviewed and are negative.    Objective:     Vital Signs (Most Recent):  Temp: 97.8 °F (36.6 °C) (02/12/18 1405)  Pulse: (!) 51 (02/12/18 " 1430)  Resp: 18 (02/12/18 1430)  BP: (!) 105/44 (02/12/18 1430)  SpO2: 100 % (02/12/18 1430) Vital Signs (24h Range):  Temp:  [97.5 °F (36.4 °C)-98.7 °F (37.1 °C)] 97.8 °F (36.6 °C)  Pulse:  [51-60] 51  Resp:  [16-20] 18  SpO2:  [96 %-100 %] 100 %  BP: (105-155)/(42-78) 105/44     Weight: 108 kg (238 lb 3.2 oz)  Body mass index is 42.2 kg/m².    Estimated Creatinine Clearance: 8.9 mL/min (based on SCr of 7.2 mg/dL (H)).    Physical Exam   Constitutional: She is oriented to person, place, and time. She appears well-developed and well-nourished.   HENT:   Head: Normocephalic.   Cardiovascular: Normal rate, regular rhythm and normal heart sounds.    Pulmonary/Chest: Effort normal and breath sounds normal.   Abdominal: Soft. Bowel sounds are normal.   Neurological: She is alert and oriented to person, place, and time.   Skin: Skin is warm and dry.        Nursing note and vitals reviewed.      Significant Labs:   Blood Culture:   Recent Labs  Lab 02/08/18  0028 02/10/18  0525   LABBLOO No Growth to date  No Growth to date  No Growth to date  No Growth to date  No Growth to date  Gram stain vicente bottle: Gram positive cocci in clusters resembling Staph   Results called to and read back by: Anurag Jones RN  02/09/2018    01:38  METHICILLIN RESISTANT STAPHYLOCOCCUS AUREUSID consult required at [facility]. Gram stain aer bottle: Gram positive cocci in clusters resembling Staph   Results called to and read back by: Margaux Patel RN  02/12/2018  01:03     CBC:   Recent Labs  Lab 02/11/18  0431 02/12/18  0436   WBC 11.24 11.99   HGB 9.8* 10.3*   HCT 30.4* 30.0*    215     CMP:   Recent Labs  Lab 02/11/18  0431 02/12/18  0436   * 123*   K 4.9 5.7*   CL 95 92*   CO2 22* 20*   GLU 93 79   BUN 31* 40*   CREATININE 6.7* 7.2*   CALCIUM 8.6* 8.3*   PROT 6.5 6.6   ALBUMIN 1.8* 1.9*   BILITOT 0.3 0.3   ALKPHOS 64 69   * 693*   ALT 61* 136*   ANIONGAP 11 11   EGFRNONAA 6* 5*        Significant Imaging: None

## 2018-02-12 NOTE — NURSING
Pt off floor to via stretcher at this time.  Will continue to monitor when patient returns to 306.

## 2018-02-12 NOTE — ASSESSMENT & PLAN NOTE
Blood Cultures 02/08/18 gram positive cocci in clusters in 1 set, 2nd set NGTD  Repeat Blood Cultures 02/10/18 NGTD  Lactic acid 0.9 02/08/10  Continue Daptomycin/Cefepime/Cipro  ID consult, appreciate recs

## 2018-02-13 PROBLEM — I48.20 CHRONIC ATRIAL FIBRILLATION: Status: ACTIVE | Noted: 2018-02-08

## 2018-02-13 PROBLEM — E11.22 TYPE 2 DIABETES MELLITUS WITH CHRONIC KIDNEY DISEASE ON CHRONIC DIALYSIS, WITHOUT LONG-TERM CURRENT USE OF INSULIN: Status: ACTIVE | Noted: 2018-02-08

## 2018-02-13 PROBLEM — Z99.2 TYPE 2 DIABETES MELLITUS WITH CHRONIC KIDNEY DISEASE ON CHRONIC DIALYSIS, WITHOUT LONG-TERM CURRENT USE OF INSULIN: Status: ACTIVE | Noted: 2018-02-08

## 2018-02-13 PROBLEM — N18.6 TYPE 2 DIABETES MELLITUS WITH CHRONIC KIDNEY DISEASE ON CHRONIC DIALYSIS, WITHOUT LONG-TERM CURRENT USE OF INSULIN: Status: ACTIVE | Noted: 2018-02-08

## 2018-02-13 LAB
ALBUMIN SERPL BCP-MCNC: 1.8 G/DL
ALP SERPL-CCNC: 61 U/L
ALT SERPL W/O P-5'-P-CCNC: 161 U/L
ANION GAP SERPL CALC-SCNC: 10 MMOL/L
AST SERPL-CCNC: 700 U/L
BACTERIA BLD CULT: NORMAL
BASOPHILS # BLD AUTO: 0.02 K/UL
BASOPHILS NFR BLD: 0.2 %
BILIRUB SERPL-MCNC: 0.3 MG/DL
BUN SERPL-MCNC: 30 MG/DL
CALCIUM SERPL-MCNC: 8.1 MG/DL
CHLORIDE SERPL-SCNC: 97 MMOL/L
CO2 SERPL-SCNC: 21 MMOL/L
CREAT SERPL-MCNC: 5.7 MG/DL
DIFFERENTIAL METHOD: ABNORMAL
EOSINOPHIL # BLD AUTO: 0.1 K/UL
EOSINOPHIL NFR BLD: 1 %
ERYTHROCYTE [DISTWIDTH] IN BLOOD BY AUTOMATED COUNT: 15.8 %
EST. GFR  (AFRICAN AMERICAN): 8 ML/MIN/1.73 M^2
EST. GFR  (NON AFRICAN AMERICAN): 7 ML/MIN/1.73 M^2
GLUCOSE SERPL-MCNC: 80 MG/DL
HCT VFR BLD AUTO: 30.2 %
HGB BLD-MCNC: 9.7 G/DL
LYMPHOCYTES # BLD AUTO: 1.8 K/UL
LYMPHOCYTES NFR BLD: 17.7 %
MAGNESIUM SERPL-MCNC: 1.9 MG/DL
MCH RBC QN AUTO: 28 PG
MCHC RBC AUTO-ENTMCNC: 32.1 G/DL
MCV RBC AUTO: 87 FL
MONOCYTES # BLD AUTO: 0.8 K/UL
MONOCYTES NFR BLD: 7.8 %
NEUTROPHILS # BLD AUTO: 7.5 K/UL
NEUTROPHILS NFR BLD: 72.4 %
PHOSPHATE SERPL-MCNC: 5.8 MG/DL
PLATELET # BLD AUTO: 211 K/UL
PMV BLD AUTO: 10.4 FL
POCT GLUCOSE: 113 MG/DL (ref 70–110)
POCT GLUCOSE: 144 MG/DL (ref 70–110)
POCT GLUCOSE: 149 MG/DL (ref 70–110)
POCT GLUCOSE: 153 MG/DL (ref 70–110)
POCT GLUCOSE: 154 MG/DL (ref 70–110)
POTASSIUM SERPL-SCNC: 5.6 MMOL/L
PROT SERPL-MCNC: 6.4 G/DL
RBC # BLD AUTO: 3.47 M/UL
SODIUM SERPL-SCNC: 128 MMOL/L
WBC # BLD AUTO: 10.4 K/UL

## 2018-02-13 PROCEDURE — 99233 SBSQ HOSP IP/OBS HIGH 50: CPT | Mod: ,,, | Performed by: HOSPITALIST

## 2018-02-13 PROCEDURE — 87340 HEPATITIS B SURFACE AG IA: CPT

## 2018-02-13 PROCEDURE — 25000003 PHARM REV CODE 250: Performed by: INTERNAL MEDICINE

## 2018-02-13 PROCEDURE — 85025 COMPLETE CBC W/AUTO DIFF WBC: CPT

## 2018-02-13 PROCEDURE — 11000001 HC ACUTE MED/SURG PRIVATE ROOM

## 2018-02-13 PROCEDURE — 25000003 PHARM REV CODE 250: Performed by: NURSE PRACTITIONER

## 2018-02-13 PROCEDURE — 84100 ASSAY OF PHOSPHORUS: CPT

## 2018-02-13 PROCEDURE — 80053 COMPREHEN METABOLIC PANEL: CPT

## 2018-02-13 PROCEDURE — 63600175 PHARM REV CODE 636 W HCPCS: Performed by: INTERNAL MEDICINE

## 2018-02-13 PROCEDURE — 25000003 PHARM REV CODE 250: Performed by: PHYSICIAN ASSISTANT

## 2018-02-13 PROCEDURE — 87040 BLOOD CULTURE FOR BACTERIA: CPT

## 2018-02-13 PROCEDURE — 94761 N-INVAS EAR/PLS OXIMETRY MLT: CPT

## 2018-02-13 PROCEDURE — 83735 ASSAY OF MAGNESIUM: CPT

## 2018-02-13 PROCEDURE — 36415 COLL VENOUS BLD VENIPUNCTURE: CPT

## 2018-02-13 RX ORDER — HEPARIN SODIUM 1000 [USP'U]/ML
2000 INJECTION, SOLUTION INTRAVENOUS; SUBCUTANEOUS
Status: DISCONTINUED | OUTPATIENT
Start: 2018-02-13 | End: 2018-03-01 | Stop reason: HOSPADM

## 2018-02-13 RX ORDER — SODIUM CHLORIDE 9 MG/ML
INJECTION, SOLUTION INTRAVENOUS ONCE
Status: CANCELLED | OUTPATIENT
Start: 2018-02-13 | End: 2018-02-13

## 2018-02-13 RX ORDER — HEPARIN SODIUM 1000 [USP'U]/ML
2000 INJECTION, SOLUTION INTRAVENOUS; SUBCUTANEOUS
Status: CANCELLED | OUTPATIENT
Start: 2018-02-13

## 2018-02-13 RX ORDER — SODIUM CHLORIDE 9 MG/ML
INJECTION, SOLUTION INTRAVENOUS ONCE
Status: DISCONTINUED | OUTPATIENT
Start: 2018-02-13 | End: 2018-02-19

## 2018-02-13 RX ADMIN — HEPARIN SODIUM 5000 UNITS: 5000 INJECTION, SOLUTION INTRAVENOUS; SUBCUTANEOUS at 08:02

## 2018-02-13 RX ADMIN — POLYETHYLENE GLYCOL 3350 17 G: 17 POWDER, FOR SOLUTION ORAL at 08:02

## 2018-02-13 RX ADMIN — OXYCODONE HYDROCHLORIDE AND ACETAMINOPHEN 1 TABLET: 5; 325 TABLET ORAL at 01:02

## 2018-02-13 RX ADMIN — INSULIN ASPART 2 UNITS: 100 INJECTION, SOLUTION INTRAVENOUS; SUBCUTANEOUS at 08:02

## 2018-02-13 RX ADMIN — TRAMADOL HYDROCHLORIDE 50 MG: 50 TABLET, COATED ORAL at 08:02

## 2018-02-13 RX ADMIN — CARVEDILOL 25 MG: 12.5 TABLET, FILM COATED ORAL at 08:02

## 2018-02-13 RX ADMIN — TRAMADOL HYDROCHLORIDE 50 MG: 50 TABLET, COATED ORAL at 09:02

## 2018-02-13 RX ADMIN — CALCITRIOL 0.25 MCG: 0.25 CAPSULE, LIQUID FILLED ORAL at 08:02

## 2018-02-13 RX ADMIN — SEVELAMER CARBONATE 800 MG: 800 TABLET, FILM COATED ORAL at 05:02

## 2018-02-13 RX ADMIN — SEVELAMER CARBONATE 800 MG: 800 TABLET, FILM COATED ORAL at 08:02

## 2018-02-13 RX ADMIN — AMIODARONE HYDROCHLORIDE 200 MG: 200 TABLET ORAL at 08:02

## 2018-02-13 RX ADMIN — VITAMIN D, TAB 1000IU (100/BT) 2000 UNITS: 25 TAB at 08:02

## 2018-02-13 RX ADMIN — HEPARIN SODIUM 5000 UNITS: 5000 INJECTION, SOLUTION INTRAVENOUS; SUBCUTANEOUS at 09:02

## 2018-02-13 RX ADMIN — SITAGLIPTIN 25 MG: 25 TABLET, FILM COATED ORAL at 08:02

## 2018-02-13 NOTE — PROGRESS NOTES
"Renal Progress Note    Admit Date: 2/7/2018   LOS: 6 days      67 y.o. white female with known MRSA bacteremia who was transferred here for ID and Neurosurgery services.  She has a history of ESRD, Afib, cardiomyopathy, and DM.  She has been hospitalized with bacteremia from a Right SC Malvin which was removed at outside facility.  She had a left femoral Jaspreet placed 2/5/2018.  Blood cultures remained positive; RANDA  normal, MRI of thoracic and lumbar spine showed abnormal foci thought  "probably a hemangioma or metastasis".  She normally dialyses MWF in [place], [place].  She is a very poor historian and says she does not know why her kidneys failed requiring she start HD in November 2017.  "You need to ask my daughter all these questions.  I just don't know."  No other vascular access ie AVF or grafts noted.    SUBJECTIVE:     New R IJ MALVIN placed by Dr. Nichols yesterday.  Had HD for about 2 hours, then lines clotted off and she was rinsed back.  No complaints this AM.  Daughter at the bedside.  Pt has been drinking several cups of liquids and had 2 popsicles this morning.  Electrolytes noted.      Scheduled Meds:   sodium chloride 0.9%   Intravenous Once    amiodarone  200 mg Oral Daily    calcitRIOL  0.25 mcg Oral Daily    carvedilol  25 mg Oral BID    DAPTOmycin (CUBICIN)  IV  1,000 mg Intravenous Q48H    epoetin davion (PROCRIT) injection  50 Units/kg Subcutaneous Every Mon, Wed, Fri    heparin (porcine)  5,000 Units Subcutaneous Q12H    insulin aspart  1-10 Units Subcutaneous TIDWM    polyethylene glycol  17 g Oral Daily    sevelamer carbonate  800 mg Oral TID WM    SITagliptin  25 mg Oral Daily    vitamin D  2,000 Units Oral Daily       OBJECTIVE:     Vital Signs Range (Last 24H):  Temp:  [97.5 °F (36.4 °C)-98.8 °F (37.1 °C)]   Pulse:  [50-62]   Resp:  [17-18]   BP: ()/(42-65)   SpO2:  [93 %-100 %]     I & O (Last 24H):    Intake/Output Summary (Last 24 hours) at 02/13/18 0807  Last data filed at " 02/13/18 0400   Gross per 24 hour   Intake              900 ml   Output              725 ml   Net              175 ml       Physical Exam:  Constitutional: She is oriented to person, place, and time. Morbidly obese, well-nourished.   Head: Normocephalic.   Eyes: Conjunctivae are normal. Right eye exhibits no discharge. Left eye exhibits no discharge.   Neck: Normal range of motion. Neck supple.   Cardiovascular: Regular rhythm, normal heart sounds. Bradycardia  Pulmonary/Chest: Effort normal and breath sounds normal. No respiratory distress. Right CW former Malvin site CDI, dressed  Abdominal: Soft, obese. Bowel sounds are normal. She exhibits no distension. There is no tenderness.   Ext:  No CCE.   Neurological: NF  Skin: Skin is warm and dry + pallor  Psychiatric: She has a normal mood and affect. Her speech is normal and behavior is normal. Thought content normal, very pleasant.  Poor historian.   Access: [facility]    Laboratory:  CBC:     Recent Labs  Lab 02/13/18  0502   WBC 10.40   RBC 3.47*   HGB 9.7*   HCT 30.2*      MCV 87   MCH 28.0   MCHC 32.1     BMP:     Recent Labs  Lab 02/13/18  0502   GLU 80   *   K 5.6*   CL 97   CO2 21*   BUN 30*   CREATININE 5.7*   CALCIUM 8.1*   MG 1.9       ASSESSMENT/PLAN:     68 YO WF with ESRD and catheter associated MRSA bacteremia and suspected lumbar foci presents from [facility] for ID and NS services.  1. ESRD:  Dr. Nichols pull line 2/11 for holiday, however electrolytes warranted new line for HD on 2/12.  R [facility] placed 2/12 and pt had HD but lines clotted after 2 hours.  Will ensure heparin bolus with HD tomorrow. Renally dose all meds and antimicrobials. On Cubicin, Cipro, and Cefepime.   2. Anemia of CKD:   holding IV iron given active infection.  Epo to maintain hbg 10-11.  Currently at goal.  Folate and B12 normal.  3. Hyperphos:  on Renvela now.  4. Hyponatremia- Had shortened HD treatment yesterday, so not as big of a Na correction yesterday.   Will correct with HD tomorrow. Restrict fluid to 1liter per day.  IV meds to mixed with NS, rather than D5W.  5. 2HPT/Vit D deficiency:  Started Vit D3 and calcitriol.  6. DM:  Currently on SSI.  Reduced Januvia to ESRD dosing of 25mg/d.  7. Afib:  On Amio and Carvedilol.  Defer to cards. Would monitor carefully given concurrent Cipro/Amio.  Defer anticoagulation to Cards.    8. MRSA Bacteremia: follow cultures.  Cultures + on 2/10.    Ruslan Gomez MD  Nephrology

## 2018-02-13 NOTE — ASSESSMENT & PLAN NOTE
Etiology unclear  Will repeat in AM  If persistent, may need RUQ US vs CT of ABD/Pelvis for further investigation

## 2018-02-13 NOTE — PLAN OF CARE
Problem: Patient Care Overview  Goal: Plan of Care Review  Outcome: Ongoing (interventions implemented as appropriate)  Pt AAOx4 this shift.  C/o generalized pain rated 10/10.  PRM medications administered as available.  Pt received new dialysis access this shift and is currently receiving dialysis.  Report given to oncoming RN.

## 2018-02-13 NOTE — SUBJECTIVE & OBJECTIVE
Interval History: Currently in surgery to have dialysis catheter replaced. Nurse reports no acute events overnight or this morning.    Review of Systems   Constitutional: Positive for activity change and fatigue. Negative for appetite change and fever.   HENT: Negative for congestion, ear pain, rhinorrhea and sinus pressure.    Eyes: Negative for pain and discharge.   Respiratory: Negative for cough, chest tightness, shortness of breath and wheezing.    Cardiovascular: Negative for chest pain and leg swelling.   Gastrointestinal: Negative for abdominal distention, abdominal pain, diarrhea, nausea and vomiting.   Endocrine: Negative for cold intolerance and heat intolerance.   Genitourinary: Negative for difficulty urinating, flank pain, frequency, hematuria and urgency.   Musculoskeletal: Positive for arthralgias, back pain and myalgias. Negative for joint swelling.   Allergic/Immunologic: Negative for environmental allergies and food allergies.   Neurological: Positive for weakness. Negative for dizziness, light-headedness and headaches.   Hematological: Does not bruise/bleed easily.   Psychiatric/Behavioral: Negative for agitation, behavioral problems and decreased concentration.     Objective:     Vital Signs (Most Recent):  Temp: 97.6 °F (36.4 °C) (02/12/18 1800)  Pulse: (!) 52 (02/12/18 1830)  Resp: 18 (02/12/18 1800)  BP: (!) 95/47 (02/12/18 1830)  SpO2: 99 % (02/12/18 1612) Vital Signs (24h Range):  Temp:  [97.5 °F (36.4 °C)-98.7 °F (37.1 °C)] 97.6 °F (36.4 °C)  Pulse:  [50-60] 52  Resp:  [16-20] 18  SpO2:  [96 %-100 %] 99 %  BP: ()/(42-78) 95/47     Weight: 108 kg (238 lb 3.2 oz)  Body mass index is 42.2 kg/m².    Intake/Output Summary (Last 24 hours) at 02/12/18 1855  Last data filed at 02/12/18 1430   Gross per 24 hour   Intake              560 ml   Output               75 ml   Net              485 ml      Physical Exam   Constitutional: She is oriented to person, place, and time. She appears  well-developed and well-nourished.   HENT:   Head: Normocephalic.   Eyes: Conjunctivae are normal. Right eye exhibits no discharge. Left eye exhibits no discharge.   Neck: Normal range of motion. Neck supple.   Cardiovascular: Regular rhythm and normal heart sounds.  Bradycardia present.    Pulmonary/Chest: Effort normal and breath sounds normal. No respiratory distress.   Abdominal: Soft. Bowel sounds are normal. She exhibits no distension. There is no tenderness.   Musculoskeletal: Normal range of motion.   Neurological: She is alert and oriented to person, place, and time.   Skin: Skin is warm and dry.   Psychiatric: She has a normal mood and affect. Her speech is normal and behavior is normal. Thought content normal.       Significant Labs:   CBC:   Recent Labs  Lab 02/11/18 0431 02/12/18 0436   WBC 11.24 11.99   HGB 9.8* 10.3*   HCT 30.4* 30.0*    215     CMP:   Recent Labs  Lab 02/11/18 0431 02/12/18 0436   * 123*   K 4.9 5.7*   CL 95 92*   CO2 22* 20*   GLU 93 79   BUN 31* 40*   CREATININE 6.7* 7.2*   CALCIUM 8.6* 8.3*   PROT 6.5 6.6   ALBUMIN 1.8* 1.9*   BILITOT 0.3 0.3   ALKPHOS 64 69   * 693*   ALT 61* 136*   ANIONGAP 11 11   EGFRNONAA 6* 5*     All pertinent labs within the past 24 hours have been reviewed.    Significant Imaging: I have reviewed all pertinent imaging results/findings within the past 24 hours.

## 2018-02-13 NOTE — ASSESSMENT & PLAN NOTE
Consult Nephrology  Continue Dialysis MWF  Left Femoral Jaspreet cath removed yesterday for line holiday  R IJ dialysis catheter placed 2/12

## 2018-02-13 NOTE — PROGRESS NOTES
[facility] Hospital Medicine  Progress Note    Patient Name: Yumiko Proctor  MRN: [id number]  Patient Class: IP- Inpatient   Admission Date: 2/7/2018  Length of Stay: 6 days  Attending Physician: Patricia Lu MD  Primary Care Provider: Primary Doctor No        Subjective:     Principal Problem:MRSA bacteremia    HPI:  The patient is a 68 yo female with known MRSA bacteremia who was transferred here for ID and Neurosurgery services.  She has a history of ESRD, Afib, cardiomyopathy, and DM.  She has been hospitalized with bacteremia from a vascular access which was taken out and replaced today with a lt femoral vas cath.  Blood cultures remained positive. She had a RANDA which was normal, MRI of thoracic and lumbar spine showed a abnormal foci in T( that was probably a hemangioma or metastasis.    Hospital Course:  Blood cultures from 02/08/18 2/4 (+) MRSA.  Patient currently receiving Daptomycin.  Cultures repeated again on 02/10/2018 and 2/2 bottles positive for MRSA.  ID on board, concerned there is endovascular source.  Left prince catheter removed yesterday and right IJ tunneled dialysis catheter placed 2/12. Repeat blood cultures 2/13.      Interval History: The patient has no complaints of nausea or back pain this am    Review of Systems  Objective:     Vital Signs (Most Recent):  Temp: 98.3 °F (36.8 °C) (02/13/18 0714)  Pulse: 62 (02/13/18 0714)  Resp: 17 (02/13/18 0714)  BP: (!) 150/65 (02/13/18 0714)  SpO2: 97 % (02/13/18 0714) Vital Signs (24h Range):  Temp:  [97.5 °F (36.4 °C)-98.8 °F (37.1 °C)] 98.3 °F (36.8 °C)  Pulse:  [50-62] 62  Resp:  [17-18] 17  SpO2:  [93 %-100 %] 97 %  BP: ()/(42-65) 150/65     Weight: 108 kg (238 lb 3.2 oz)  Body mass index is 42.2 kg/m².    Intake/Output Summary (Last 24 hours) at 02/13/18 1021  Last data filed at 02/13/18 0400   Gross per 24 hour   Intake              900 ml   Output              725 ml   Net              175 ml      Physical Exam    Constitutional: She is oriented to person, place, and time. She appears well-developed and well-nourished.   HENT:   Head: Normocephalic.   Eyes: Conjunctivae are normal. Right eye exhibits no discharge. Left eye exhibits no discharge.   Neck: Normal range of motion. Neck supple.   Cardiovascular: Regular rhythm and normal heart sounds.  Bradycardia present.    Pulmonary/Chest: Effort normal and breath sounds normal. No respiratory distress.   Abdominal: Soft. Bowel sounds are normal. She exhibits no distension. There is no tenderness.   Musculoskeletal: Normal range of motion.   Neurological: She is alert and oriented to person, place, and time.   Skin: Skin is warm and dry.   Psychiatric: She has a normal mood and affect. Her speech is normal and behavior is normal. Thought content normal.       Significant Labs:   CBC:   Recent Labs  Lab 02/12/18  0436 02/13/18  0502   WBC 11.99 10.40   HGB 10.3* 9.7*   HCT 30.0* 30.2*    211     CMP:   Recent Labs  Lab 02/12/18  0436 02/13/18  0502   * 128*   K 5.7* 5.6*   CL 92* 97   CO2 20* 21*   GLU 79 80   BUN 40* 30*   CREATININE 7.2* 5.7*   CALCIUM 8.3* 8.1*   PROT 6.6 6.4   ALBUMIN 1.9* 1.8*   BILITOT 0.3 0.3   ALKPHOS 69 61   * 700*   * 161*   ANIONGAP 11 10   EGFRNONAA 5* 7*     Magnesium:   Recent Labs  Lab 02/12/18  0436 02/13/18  0502   MG 1.6 1.9       Significant Imaging: I have reviewed and interpreted all pertinent imaging results/findings within the past 24 hours.    Assessment/Plan:      * MRSA bacteremia    Blood Cultures 02/08/18 MRSA in 1 set, 2nd set NGTD  Repeat Blood Cultures 02/10/18 2/2 MRSA  Lactic acid 0.9 02/08/10  Continue Daptomycin  ID consult, appreciate recs  Suspect endovascular source  New IJ catheter placed 2/12, will monitor blood cultures obtained 2/13          ESRD (end stage renal disease)    Consult Nephrology  Continue Dialysis MWF  Left Femoral Jaspreet cath removed yesterday for line holiday  R IJ dialysis  catheter placed 2/12        Chronic atrial fibrillation    Continue Coreg  Rate controlled            Type 2 diabetes mellitus with chronic kidney disease on chronic dialysis, without long-term current use of insulin    A1c  6.5  PRN insulin  Well-controlled          Abnormal liver enzymes    Etiology unclear  Will repeat in AM  Increasing, will obtain RUQ U/S, re-check CPK  Discontinue Amiodarone as it can cause liver toxicity            VTE Risk Mitigation         Ordered     heparin (porcine) injection 2,000 Units  As needed (PRN)     Route:  Intravenous        02/13/18 0940     heparin (porcine) injection 5,000 Units  As needed (PRN)     Route:  Intra-Catheter        02/12/18 1834     heparin (porcine) injection 5,000 Units  Every 12 hours     Route:  Subcutaneous        02/08/18 0837     Medium Risk of VTE  Once      02/07/18 2255     Place sequential compression device  Until discontinued      02/07/18 2255     Place ANGELI hose  Until discontinued      02/07/18 2255              Patricia Lu MD  Department of Hospital Medicine   [facility]

## 2018-02-13 NOTE — HOSPITAL COURSE
Blood cultures from 02/08/18 2/4 (+) MRSA.  Patient was initially started on Daptomycin upon arrival to this facility.  Cultures were repeated again on 02/10/2018 and 2/2 bottles were positive for MRSA.  ID on board, concerned there is endovascular source.  Left prince catheter was removed and a right IJ tunneled dialysis catheter placed 2/12. Repeat blood cultures on 2/13 remained negative, but one bottle obtained 2/16 growing MRSA and 2/17 blood cultures were also positive for MRSA.  Patient had some myositis from Daptomycin with an elevation in CPK to 5175, the medication was held, and CPK levels normalized.  Vancomycin was given after dialysis treatments.  PT/OT was consulted and recommended skilled nursing upon discharge. Blood cultures remained positive for MRSA.  The patient did not have any further line holidays, but blood cultures drawn on 02/22 remained negative. RANDA was done 02/26 and negative for any vegetations.  The patient did have a DVT in the left leg which was present when she arrived to this facility, and ID suspected this was the possible endovascular source of the infection since it was so difficult to clear her blood cultures.   Vascular surgery was consulted and did not recommend thrombectomy, and the patient will continue anticoagulation with Apixaban.  ID resumed Daptomycin and started Ceftaroline.  CPK levels have been monitored and there has not been an increase in levels.  The patient will continue this therapy for 8 weeks starting from 2/22/2018, with an end date on 4/4/2018.  The patient had a tunneled line placement in the neck which placed by IR 3/1/2018, so she can continue her antibiotics.  The patient will transfer to a NH facility for SNF placement in [facility]

## 2018-02-13 NOTE — OP NOTE
DATE OF PROCEDURE:  02/12/2018    PREOPERATIVE DIAGNOSIS:  End-stage renal disease.    POSTOPERATIVE DIAGNOSIS:  End-stage renal disease.    PROCEDURE:  Placement of right internal jugular cuffed dialysis catheter with   ultrasound and fluoroscopy.    PROCEDURE IN DETAIL:  The patient was brought to the Operating Room, placed in   supine position.  Neck and chest prepped and draped in a sterile fashion, 1%   lidocaine used to achieve local anesthesia.  A 20-gauge vascular needle inserted   in the right internal jugular vein under fluoroscopic guidance.  A guidewire   placed through the needle, the needle removed.  Under fluoroscopic guidance, a   Cook dilator placed over the guidewire.  The guidewire removed.  A 19-cm   Equistream catheter brought retrograde through a subcutaneous tissue tunnel to   exit the neck at the site of the exit of the guidewire between the heads of the   sternocleidomastoid on the right.  The catheter placed through the peel-away   dilator, the peel-away dilator removed.  Catheter irrigated and aspirated   without difficulty.  It was then secured with 2-0 silk, heparinized sterilely   dressed.  Skin and subcutaneous was closed with interrupted 3-0 Vicryl.  Chest   x-ray showed the catheter in good position without complication.      GUERA  dd: 02/12/2018 14:08:53 (CST)  td: 02/12/2018 18:51:45 (CST)  Doc ID   #[id number]  Job ID #[id number]    CC:

## 2018-02-13 NOTE — ASSESSMENT & PLAN NOTE
Blood Cultures 02/08/18 MRSA in 1 set, 2nd set NGTD  Repeat Blood Cultures 02/10/18 2/2 MRSA  Lactic acid 0.9 02/08/10  Continue Daptomycin  ID consult, appreciate recs  Suspect endovascular source  New IJ catheter placed 2/12, will monitor blood cultures obtained 2/13

## 2018-02-13 NOTE — PROGRESS NOTES
[facility] Hospital Medicine  Progress Note    Patient Name: Yumiko Proctor  MRN: [id number]  Patient Class: IP- Inpatient   Admission Date: 2/7/2018  Length of Stay: 5 days  Attending Physician: Cherelle Echevarria, *  Primary Care Provider: Primary Doctor No        Subjective:     Principal Problem:MRSA bacteremia    HPI:  The patient is a 66 yo female with known MRSA bacteremia who was transferred here for ID and Neurosurgery services.  She has a history of ESRD, Afib, cardiomyopathy, and DM.  She has been hospitalized with bacteremia from a vascular access which was taken out and replaced today with a lt femoral vas cath.  Blood cultures remained positive. She had a RANDA which was normal, MRI of thoracic and lumbar spine showed a abnormal foci in T( that was probably a hemangioma or metastasis.    Hospital Course:  Blood cultures from 02/08/18 2/4 (+) MRSA.  Patient currently receiving Daptomycin.  Cultures repeated again on 02/10/2018 and 1/2 bottles positive for gram positive cocci in clusters resembling Staph.  ID on board, concerned there is endovascular source.  Left prince catheter removed yesterday and right IJ dialysis catheter placed today. Awaiting definitive ID and susceptibilities.    Interval History: Currently in surgery to have dialysis catheter replaced. Nurse reports no acute events overnight or this morning.    Review of Systems   Constitutional: Positive for activity change and fatigue. Negative for appetite change and fever.   HENT: Negative for congestion, ear pain, rhinorrhea and sinus pressure.    Eyes: Negative for pain and discharge.   Respiratory: Negative for cough, chest tightness, shortness of breath and wheezing.    Cardiovascular: Negative for chest pain and leg swelling.   Gastrointestinal: Negative for abdominal distention, abdominal pain, diarrhea, nausea and vomiting.   Endocrine: Negative for cold intolerance and heat intolerance.   Genitourinary: Negative  for difficulty urinating, flank pain, frequency, hematuria and urgency.   Musculoskeletal: Positive for arthralgias, back pain and myalgias. Negative for joint swelling.   Allergic/Immunologic: Negative for environmental allergies and food allergies.   Neurological: Positive for weakness. Negative for dizziness, light-headedness and headaches.   Hematological: Does not bruise/bleed easily.   Psychiatric/Behavioral: Negative for agitation, behavioral problems and decreased concentration.     Objective:     Vital Signs (Most Recent):  Temp: 97.6 °F (36.4 °C) (02/12/18 1800)  Pulse: (!) 52 (02/12/18 1830)  Resp: 18 (02/12/18 1800)  BP: (!) 95/47 (02/12/18 1830)  SpO2: 99 % (02/12/18 1612) Vital Signs (24h Range):  Temp:  [97.5 °F (36.4 °C)-98.7 °F (37.1 °C)] 97.6 °F (36.4 °C)  Pulse:  [50-60] 52  Resp:  [16-20] 18  SpO2:  [96 %-100 %] 99 %  BP: ()/(42-78) 95/47     Weight: 108 kg (238 lb 3.2 oz)  Body mass index is 42.2 kg/m².    Intake/Output Summary (Last 24 hours) at 02/12/18 1855  Last data filed at 02/12/18 1430   Gross per 24 hour   Intake              560 ml   Output               75 ml   Net              485 ml      Physical Exam   Constitutional: She is oriented to person, place, and time. She appears well-developed and well-nourished.   HENT:   Head: Normocephalic.   Eyes: Conjunctivae are normal. Right eye exhibits no discharge. Left eye exhibits no discharge.   Neck: Normal range of motion. Neck supple.   Cardiovascular: Regular rhythm and normal heart sounds.  Bradycardia present.    Pulmonary/Chest: Effort normal and breath sounds normal. No respiratory distress.   Abdominal: Soft. Bowel sounds are normal. She exhibits no distension. There is no tenderness.   Musculoskeletal: Normal range of motion.   Neurological: She is alert and oriented to person, place, and time.   Skin: Skin is warm and dry.   Psychiatric: She has a normal mood and affect. Her speech is normal and behavior is normal. Thought  content normal.       Significant Labs:   CBC:   Recent Labs  Lab 02/11/18  0431 02/12/18  0436   WBC 11.24 11.99   HGB 9.8* 10.3*   HCT 30.4* 30.0*    215     CMP:   Recent Labs  Lab 02/11/18  0431 02/12/18  0436   * 123*   K 4.9 5.7*   CL 95 92*   CO2 22* 20*   GLU 93 79   BUN 31* 40*   CREATININE 6.7* 7.2*   CALCIUM 8.6* 8.3*   PROT 6.5 6.6   ALBUMIN 1.8* 1.9*   BILITOT 0.3 0.3   ALKPHOS 64 69   * 693*   ALT 61* 136*   ANIONGAP 11 11   EGFRNONAA 6* 5*     All pertinent labs within the past 24 hours have been reviewed.    Significant Imaging: I have reviewed all pertinent imaging results/findings within the past 24 hours.    Assessment/Plan:      * MRSA bacteremia    Blood Cultures 02/08/18 MRSA in 1 set, 2nd set NGTD  Repeat Blood Cultures 02/10/18 1/2 gram positive cocci resembling staph  Lactic acid 0.9 02/08/10  Continue Daptomycin  ID consult, appreciate recs  Suspect endovascular source  Await further recs from ID          Abnormal liver enzymes    Etiology unclear  Will repeat in AM  If persistent, may need RUQ US vs CT of ABD/Pelvis for further investigation        Type 2 diabetes mellitus    A1c  6.5  Moderate dose SSI          ESRD (end stage renal disease)    Consult Nephrology  Continue Dialysis MWF  Left Femoral Jaspreet cath removed yesterday for line holiday  R IJ dialysis catheter placed today        Atrial fibrillation    Continue Coreg  Rate controlled              VTE Risk Mitigation         Ordered     heparin (porcine) injection 5,000 Units  As needed (PRN)     Route:  Intra-Catheter        02/12/18 1834     heparin (porcine) injection 5,000 Units  Every 12 hours     Route:  Subcutaneous        02/08/18 0837     Medium Risk of VTE  Once      02/07/18 2255     Place sequential compression device  Until discontinued      02/07/18 2255     Place ANGELI hose  Until discontinued      02/07/18 2255              Cherelle Echevarria MD  Department of Hospital Medicine   [facility]

## 2018-02-13 NOTE — SUBJECTIVE & OBJECTIVE
Interval History: The patient has no complaints of nausea or back pain this am    Review of Systems  Objective:     Vital Signs (Most Recent):  Temp: 98.3 °F (36.8 °C) (02/13/18 0714)  Pulse: 62 (02/13/18 0714)  Resp: 17 (02/13/18 0714)  BP: (!) 150/65 (02/13/18 0714)  SpO2: 97 % (02/13/18 0714) Vital Signs (24h Range):  Temp:  [97.5 °F (36.4 °C)-98.8 °F (37.1 °C)] 98.3 °F (36.8 °C)  Pulse:  [50-62] 62  Resp:  [17-18] 17  SpO2:  [93 %-100 %] 97 %  BP: ()/(42-65) 150/65     Weight: 108 kg (238 lb 3.2 oz)  Body mass index is 42.2 kg/m².    Intake/Output Summary (Last 24 hours) at 02/13/18 1021  Last data filed at 02/13/18 0400   Gross per 24 hour   Intake              900 ml   Output              725 ml   Net              175 ml      Physical Exam   Constitutional: She is oriented to person, place, and time. She appears well-developed and well-nourished.   HENT:   Head: Normocephalic.   Eyes: Conjunctivae are normal. Right eye exhibits no discharge. Left eye exhibits no discharge.   Neck: Normal range of motion. Neck supple.   Cardiovascular: Regular rhythm and normal heart sounds.  Bradycardia present.    Pulmonary/Chest: Effort normal and breath sounds normal. No respiratory distress.   Abdominal: Soft. Bowel sounds are normal. She exhibits no distension. There is no tenderness.   Musculoskeletal: Normal range of motion.   Neurological: She is alert and oriented to person, place, and time.   Skin: Skin is warm and dry.   Psychiatric: She has a normal mood and affect. Her speech is normal and behavior is normal. Thought content normal.       Significant Labs:   CBC:   Recent Labs  Lab 02/12/18  0436 02/13/18  0502   WBC 11.99 10.40   HGB 10.3* 9.7*   HCT 30.0* 30.2*    211     CMP:   Recent Labs  Lab 02/12/18  0436 02/13/18  0502   * 128*   K 5.7* 5.6*   CL 92* 97   CO2 20* 21*   GLU 79 80   BUN 40* 30*   CREATININE 7.2* 5.7*   CALCIUM 8.3* 8.1*   PROT 6.6 6.4   ALBUMIN 1.9* 1.8*   BILITOT 0.3 0.3    ALKPHOS 69 61   * 700*   * 161*   ANIONGAP 11 10   EGFRNONAA 5* 7*     Magnesium:   Recent Labs  Lab 02/12/18  0436 02/13/18  0502   MG 1.6 1.9       Significant Imaging: I have reviewed and interpreted all pertinent imaging results/findings within the past 24 hours.

## 2018-02-13 NOTE — ASSESSMENT & PLAN NOTE
Consult Nephrology  Continue Dialysis MWF  Left Femoral Jaspreet cath removed yesterday for line holiday  R IJ dialysis catheter placed today

## 2018-02-13 NOTE — ASSESSMENT & PLAN NOTE
Blood Cultures 02/08/18 MRSA in 1 set, 2nd set NGTD  Repeat Blood Cultures 02/10/18 1/2 gram positive cocci resembling staph  Lactic acid 0.9 02/08/10  Continue Daptomycin  ID consult, appreciate recs  Suspect endovascular source  Await further recs from ID

## 2018-02-14 LAB
ALBUMIN SERPL BCP-MCNC: 1.6 G/DL
ALP SERPL-CCNC: 63 U/L
ALT SERPL W/O P-5'-P-CCNC: 178 U/L
ANION GAP SERPL CALC-SCNC: 9 MMOL/L
AST SERPL-CCNC: 619 U/L
BACTERIA BLD CULT: NORMAL
BASOPHILS # BLD AUTO: 0.02 K/UL
BASOPHILS NFR BLD: 0.2 %
BILIRUB SERPL-MCNC: 0.2 MG/DL
BUN SERPL-MCNC: 42 MG/DL
CALCIUM SERPL-MCNC: 8.1 MG/DL
CHLORIDE SERPL-SCNC: 95 MMOL/L
CO2 SERPL-SCNC: 21 MMOL/L
CREAT SERPL-MCNC: 6.8 MG/DL
DIFFERENTIAL METHOD: ABNORMAL
EOSINOPHIL # BLD AUTO: 0.2 K/UL
EOSINOPHIL NFR BLD: 1.7 %
ERYTHROCYTE [DISTWIDTH] IN BLOOD BY AUTOMATED COUNT: 16.1 %
EST. GFR  (AFRICAN AMERICAN): 7 ML/MIN/1.73 M^2
EST. GFR  (NON AFRICAN AMERICAN): 6 ML/MIN/1.73 M^2
GLUCOSE SERPL-MCNC: 102 MG/DL
HBV SURFACE AG SERPL QL IA: NEGATIVE
HCT VFR BLD AUTO: 29.7 %
HGB BLD-MCNC: 9.2 G/DL
LYMPHOCYTES # BLD AUTO: 1.8 K/UL
LYMPHOCYTES NFR BLD: 19 %
MAGNESIUM SERPL-MCNC: 1.8 MG/DL
MCH RBC QN AUTO: 27.6 PG
MCHC RBC AUTO-ENTMCNC: 31 G/DL
MCV RBC AUTO: 89 FL
MONOCYTES # BLD AUTO: 0.9 K/UL
MONOCYTES NFR BLD: 9.1 %
NEUTROPHILS # BLD AUTO: 6.5 K/UL
NEUTROPHILS NFR BLD: 69.4 %
PHOSPHATE SERPL-MCNC: 6.4 MG/DL
PLATELET # BLD AUTO: 216 K/UL
PMV BLD AUTO: 10.5 FL
POCT GLUCOSE: 111 MG/DL (ref 70–110)
POCT GLUCOSE: 142 MG/DL (ref 70–110)
POTASSIUM SERPL-SCNC: 6.3 MMOL/L
PROT SERPL-MCNC: 6.1 G/DL
RBC # BLD AUTO: 3.33 M/UL
SODIUM SERPL-SCNC: 125 MMOL/L
WBC # BLD AUTO: 9.37 K/UL

## 2018-02-14 PROCEDURE — 94761 N-INVAS EAR/PLS OXIMETRY MLT: CPT

## 2018-02-14 PROCEDURE — 25000003 PHARM REV CODE 250: Performed by: NURSE PRACTITIONER

## 2018-02-14 PROCEDURE — 25000003 PHARM REV CODE 250: Performed by: INTERNAL MEDICINE

## 2018-02-14 PROCEDURE — 99900035 HC TECH TIME PER 15 MIN (STAT)

## 2018-02-14 PROCEDURE — 83735 ASSAY OF MAGNESIUM: CPT

## 2018-02-14 PROCEDURE — 63600175 PHARM REV CODE 636 W HCPCS: Performed by: INTERNAL MEDICINE

## 2018-02-14 PROCEDURE — 86706 HEP B SURFACE ANTIBODY: CPT

## 2018-02-14 PROCEDURE — 93005 ELECTROCARDIOGRAM TRACING: CPT

## 2018-02-14 PROCEDURE — 36415 COLL VENOUS BLD VENIPUNCTURE: CPT

## 2018-02-14 PROCEDURE — 11000001 HC ACUTE MED/SURG PRIVATE ROOM

## 2018-02-14 PROCEDURE — 97803 MED NUTRITION INDIV SUBSEQ: CPT

## 2018-02-14 PROCEDURE — 80100016 HC MAINTENANCE HEMODIALYSIS

## 2018-02-14 PROCEDURE — 99233 SBSQ HOSP IP/OBS HIGH 50: CPT | Mod: ,,, | Performed by: HOSPITALIST

## 2018-02-14 PROCEDURE — 85025 COMPLETE CBC W/AUTO DIFF WBC: CPT

## 2018-02-14 PROCEDURE — 93010 ELECTROCARDIOGRAM REPORT: CPT | Mod: ,,, | Performed by: INTERNAL MEDICINE

## 2018-02-14 PROCEDURE — 25000003 PHARM REV CODE 250: Performed by: PHYSICIAN ASSISTANT

## 2018-02-14 PROCEDURE — 87340 HEPATITIS B SURFACE AG IA: CPT

## 2018-02-14 PROCEDURE — 63600175 PHARM REV CODE 636 W HCPCS: Performed by: NURSE PRACTITIONER

## 2018-02-14 PROCEDURE — 84100 ASSAY OF PHOSPHORUS: CPT

## 2018-02-14 PROCEDURE — 80053 COMPREHEN METABOLIC PANEL: CPT

## 2018-02-14 PROCEDURE — 99233 SBSQ HOSP IP/OBS HIGH 50: CPT | Mod: ,,, | Performed by: INTERNAL MEDICINE

## 2018-02-14 RX ADMIN — VITAMIN D, TAB 1000IU (100/BT) 2000 UNITS: 25 TAB at 03:02

## 2018-02-14 RX ADMIN — HEPARIN SODIUM 5000 UNITS: 5000 INJECTION, SOLUTION INTRAVENOUS; SUBCUTANEOUS at 08:02

## 2018-02-14 RX ADMIN — DAPTOMYCIN 1000 MG: 500 INJECTION, POWDER, LYOPHILIZED, FOR SOLUTION INTRAVENOUS at 01:02

## 2018-02-14 RX ADMIN — POLYETHYLENE GLYCOL 3350 17 G: 17 POWDER, FOR SOLUTION ORAL at 03:02

## 2018-02-14 RX ADMIN — SEVELAMER CARBONATE 800 MG: 800 TABLET, FILM COATED ORAL at 03:02

## 2018-02-14 RX ADMIN — TRAMADOL HYDROCHLORIDE 50 MG: 50 TABLET, COATED ORAL at 04:02

## 2018-02-14 RX ADMIN — TRAMADOL HYDROCHLORIDE 50 MG: 50 TABLET, COATED ORAL at 09:02

## 2018-02-14 RX ADMIN — Medication 1 CAPSULE: at 03:02

## 2018-02-14 RX ADMIN — ERYTHROPOIETIN 5400 UNITS: 10000 INJECTION, SOLUTION INTRAVENOUS; SUBCUTANEOUS at 03:02

## 2018-02-14 RX ADMIN — SITAGLIPTIN 25 MG: 25 TABLET, FILM COATED ORAL at 03:02

## 2018-02-14 RX ADMIN — CARVEDILOL 25 MG: 12.5 TABLET, FILM COATED ORAL at 03:02

## 2018-02-14 RX ADMIN — HEPARIN SODIUM 2000 UNITS: 1000 INJECTION, SOLUTION INTRAVENOUS; SUBCUTANEOUS at 12:02

## 2018-02-14 RX ADMIN — CALCITRIOL 0.25 MCG: 0.25 CAPSULE, LIQUID FILLED ORAL at 03:02

## 2018-02-14 NOTE — CONSULTS
[facility]  Adult Nutrition  Consult Note    SUMMARY     Recommendations    Recommendation/Intervention:   1. Continue 2000kcal DM + renal diet   2. Consider Novasource Renal due to poor intake  3. Provided low K diet education    Goals:   1. Meet >85% EEN and EPN   2. Prevent dry wt loss >2%/week   3. Promote nutrition related labs wnl    Nutrition Goal Status: progressing towards goal  Communication of RD Recs: discussed on rounds    Reason for Assessment    Reason for Assessment: identified at risk by screening criteria ([facility])  Diagnosis:   1. Bacteremia due to methicillin resistant Staphylococcus aureus    2. MRSA bacteremia    3. Endocarditis      Past Medical History:   Diagnosis Date    A-fib     Cardiomyopathy     Diabetes mellitus     ESRD (end stage renal disease)         Interdisciplinary Rounds: attended     General Information Comments: Pt endorses poor appetite, finishing ~25% of most meals. Potassium levels have been increasing, provided low K diet education. Handout provided: Potassium Content of Foods.     Nutrition Discharge Planning: d/c on DM renal diet    Nutrition Prescription Ordered    Current Diet Order: 2000kcal DM renal    Evaluation of Received Nutrients/Fluid Intake    % Intake of Estimated Energy Needs: 0 - 25 %  % Meal Intake: 25%     Nutrition Risk Screen     Nutrition Risk Screen: no indicators present    Nutrition/Diet History    Food Preferences: No cultural/spiritual prefs noted    Labs/Tests/Procedures/Meds    Pertinent Labs Reviewed: reviewed  Lab Results   Component Value Date     (L) 02/14/2018    K 6.3 (HH) 02/14/2018    CL 95 02/14/2018    CO2 21 (L) 02/14/2018    BUN 42 (H) 02/14/2018    CREATININE 6.8 (H) 02/14/2018    CALCIUM 8.1 (L) 02/14/2018    PHOS 6.4 (H) 02/14/2018    MG 1.8 02/14/2018    ESTGFRAFRICA 7 (A) 02/14/2018    EGFRNONAA 6 (A) 02/14/2018    ALBUMIN 1.6 (L) 02/14/2018     Lab Results   Component Value Date     (H)  "02/14/2018     (H) 02/14/2018    ALKPHOS 63 02/14/2018     POCT Glucose   Date Value Ref Range Status   02/13/2018 144 (H) 70 - 110 mg/dL Final   02/13/2018 113 (H) 70 - 110 mg/dL Final   02/13/2018 149 (H) 70 - 110 mg/dL Final   02/13/2018 153 (H) 70 - 110 mg/dL Final   02/13/2018 154 (H) 70 - 110 mg/dL Final   02/12/2018 85 70 - 110 mg/dL Final   02/12/2018 87 70 - 110 mg/dL Final   02/12/2018 107 70 - 110 mg/dL Final   02/11/2018 127 (H) 70 - 110 mg/dL Final   02/11/2018 180 (H) 70 - 110 mg/dL Final     Lab Results   Component Value Date    HGBA1C 6.5 (H) 02/08/2018     Lab Results   Component Value Date    HCT 29.7 (L) 02/14/2018     Lab Results   Component Value Date    HGB 9.2 (L) 02/14/2018      Pertinent Medications Reviewed: reviewed     Scheduled Meds:   sodium chloride 0.9%   Intravenous Once    calcitRIOL  0.25 mcg Oral Daily    carvedilol  25 mg Oral BID    DAPTOmycin (CUBICIN)  IV  1,000 mg Intravenous Q48H    epoetin davion (PROCRIT) injection  50 Units/kg Subcutaneous Every Mon, Wed, Fri    heparin (porcine)  5,000 Units Subcutaneous Q12H    insulin aspart  1-10 Units Subcutaneous TIDWM    polyethylene glycol  17 g Oral Daily    sevelamer carbonate  800 mg Oral TID WM    SITagliptin  25 mg Oral Daily    vitamin D  2,000 Units Oral Daily    vitamin renal formula (B-complex-vitamin c-folic acid)  1 capsule Oral Daily     Physical Findings    Overall Physical Appearance: nourished, obese  Oral/Mouth Cavity: tooth/teeth missing  Skin: pressure ulcer(s) (stage I)    Anthropometrics    Temp: 98.2 °F (36.8 °C)  Height: 5' 3" (160 cm)  Weight Method: Bed Scale  Weight: 108 kg (238 lb 3.2 oz)  Ideal Body Weight (IBW), Female: 115 lb  % Ideal Body Weight, Female (lb): 207.13 lb  BMI (Calculated): 42.3  BMI Grade: greater than 40 - morbid obesity    Estimated/Assessed Needs    Weight Used For Calorie Calculations: 108 kg (238 lb 1.6 oz)   Energy Calorie Requirements (kcal): 2059  Energy Need " Method: Ish Pritchard (stress factor 1.3)    Weight Used For Protein Calculations: 52.2 kg (115 lb) (IBW)  Protein Requirements:  gm/d (1.8-2 gm/kg IBW)    Fluid Requirements (mL): 1000 (+UOP, or per team)    Assessment and Plan    ESRD (end stage renal disease)    Nutrition Diagnosis:  Increased nutrient needs (kcal, protein)    Related to (etiology):   HD    Signs and Symptoms (as evidenced by):   Pt with ESRD on HD     Interventions/Recommendations (treatment strategy):  2000kcal DM + renal diet + recommend Novasource Renal 1x/d     Nutrition Diagnosis Status:   Continues          Monitor and Evaluation    Food and Nutrient Intake: energy intake, food and beverage intake  Food and Nutrient Adminstration: diet order  Physical Activity and Function: nutrition-related ADLs and IADLs  Anthropometric Measurements: weight, weight change  Biochemical Data, Medical Tests and Procedures: electrolyte and renal panel, gastrointestinal profile, glucose/endocrine profile, inflammatory profile, lipid profile  Nutrition-Focused Physical Findings: overall appearance, extremities, muscles and bones, skin    Nutrition Risk    Level of Risk: other (see comments) (f/u 2x/wk)    Nutrition Follow-Up    RD Follow-up?: Yes    Naina Middleton, MS, RD, LDN   Dietitian, [facility]  [phone]

## 2018-02-14 NOTE — PLAN OF CARE
Problem: Patient Care Overview  Goal: Plan of Care Review  Outcome: Ongoing (interventions implemented as appropriate)   O2 not in use.

## 2018-02-14 NOTE — PLAN OF CARE
Problem: Patient Care Overview  Goal: Plan of Care Review  Outcome: Ongoing (interventions implemented as appropriate)  Plan of care reviewed with patient and family.  Plan for hemodialysis tomorrow.  IV antibiotics every 48 hours.  New set of blood cultures drawn this shift.  Will continue to monitor and will report to oncoming RN.

## 2018-02-14 NOTE — PROGRESS NOTES
[facility] Hospital Medicine  Progress Note    Patient Name: Yumiko Proctor  MRN: [id number]  Patient Class: IP- Inpatient   Admission Date: 2/7/2018  Length of Stay: 7 days  Attending Physician: Patricia Lu MD  Primary Care Provider: Primary Doctor No        Subjective:     Principal Problem:MRSA bacteremia    HPI:  The patient is a 66 yo female with known MRSA bacteremia who was transferred here for ID and Neurosurgery services.  She has a history of ESRD, Afib, cardiomyopathy, and DM.  She has been hospitalized with bacteremia from a vascular access which was taken out and replaced today with a lt femoral vas cath.  Blood cultures remained positive. She had a RANDA which was normal, MRI of thoracic and lumbar spine showed a abnormal foci in T( that was probably a hemangioma or metastasis.    Hospital Course:  Blood cultures from 02/08/18 2/4 (+) MRSA.  Patient currently receiving Daptomycin.  Cultures repeated again on 02/10/2018 and 2/2 bottles positive for MRSA.  ID on board, concerned there is endovascular source.  Left prince catheter removed yesterday and right IJ tunneled dialysis catheter placed 2/12. Repeat blood cultures 2/13.      Interval History: The patient notes that she has less back pain today    Review of Systems   Musculoskeletal: Positive for back pain.     Objective:     Vital Signs (Most Recent):  Temp: 98.2 °F (36.8 °C) (02/14/18 0930)  Pulse: (!) 51 (02/14/18 1100)  Resp: 18 (02/14/18 0930)  BP: (!) 122/56 (02/14/18 1100)  SpO2: 98 % (02/14/18 0923) Vital Signs (24h Range):  Temp:  [96.2 °F (35.7 °C)-98.2 °F (36.8 °C)] 98.2 °F (36.8 °C)  Pulse:  [51-61] 51  Resp:  [16-20] 18  SpO2:  [97 %-98 %] 98 %  BP: (120-152)/(56-72) 122/56     Weight: 108 kg (238 lb 3.2 oz)  Body mass index is 42.2 kg/m².    Intake/Output Summary (Last 24 hours) at 02/14/18 1124  Last data filed at 02/14/18 0500   Gross per 24 hour   Intake              240 ml   Output                0 ml   Net               240 ml      Physical Exam   Constitutional: She is oriented to person, place, and time. She appears well-developed and well-nourished.   HENT:   Head: Normocephalic.   Eyes: Conjunctivae are normal. Right eye exhibits no discharge. Left eye exhibits no discharge.   Neck: Normal range of motion. Neck supple.   Cardiovascular: Regular rhythm and normal heart sounds.  Bradycardia present.    Pulmonary/Chest: Effort normal and breath sounds normal. No respiratory distress.   Abdominal: Soft. Bowel sounds are normal. She exhibits no distension. There is no tenderness.   Musculoskeletal: Normal range of motion.   Neurological: She is alert and oriented to person, place, and time.   Skin: Skin is warm and dry.   Psychiatric: She has a normal mood and affect. Her speech is normal and behavior is normal. Thought content normal.       Significant Labs:   CBC:   Recent Labs  Lab 02/13/18  0502 02/14/18  0553   WBC 10.40 9.37   HGB 9.7* 9.2*   HCT 30.2* 29.7*    216     CMP:   Recent Labs  Lab 02/13/18  0502 02/14/18  0553   * 125*   K 5.6* 6.3*   CL 97 95   CO2 21* 21*   GLU 80 102   BUN 30* 42*   CREATININE 5.7* 6.8*   CALCIUM 8.1* 8.1*   PROT 6.4 6.1   ALBUMIN 1.8* 1.6*   BILITOT 0.3 0.2   ALKPHOS 61 63   * 619*   * 178*   ANIONGAP 10 9   EGFRNONAA 7* 6*       Significant Imaging: I have reviewed and interpreted all pertinent imaging results/findings within the past 24 hours.    Assessment/Plan:      * MRSA bacteremia    Blood Cultures 02/08/18 MRSA in 1 set, 2nd set NGTD  Repeat Blood Cultures 02/10/18 2/2 MRSA  Lactic acid 0.9 02/08/10  Continue Daptomycin  ID consult, appreciate recs  Suspect endovascular source  New IJ catheter placed 2/12, will monitor blood cultures obtained 2/13          ESRD (end stage renal disease)    Consult Nephrology  Continue Dialysis MWF  Left Femoral Jaspreet cath removed yesterday for line holiday  R IJ dialysis catheter placed 2/12  Increasing potassium  to 6.3, dialysis today 2/14        Chronic atrial fibrillation    Continue Coreg  Rate controlled            Type 2 diabetes mellitus with chronic kidney disease on chronic dialysis, without long-term current use of insulin    A1c  6.5  PRN insulin  Well-controlled          Abnormal liver enzymes    Discontinued Amiodarone 2/13  AST starting to decrease, ALT still increasing, will continue to monitor            VTE Risk Mitigation         Ordered     heparin (porcine) injection 2,000 Units  As needed (PRN)     Route:  Intravenous        02/13/18 0940     heparin (porcine) injection 5,000 Units  As needed (PRN)     Route:  Intra-Catheter        02/12/18 1834     heparin (porcine) injection 5,000 Units  Every 12 hours     Route:  Subcutaneous        02/08/18 0837     Medium Risk of VTE  Once      02/07/18 2255     Place sequential compression device  Until discontinued      02/07/18 2255     Place ANGELI hose  Until discontinued      02/07/18 2255              Patricia Lu MD  Department of Hospital Medicine   [facility]

## 2018-02-14 NOTE — ASSESSMENT & PLAN NOTE
Nutrition Diagnosis:  Increased nutrient needs (kcal, protein)    Related to (etiology):   HD    Signs and Symptoms (as evidenced by):   Pt with ESRD on HD     Interventions/Recommendations (treatment strategy):  2000kcal DM + renal diet + recommend Novasource Renal 1x/d     Nutrition Diagnosis Status:   Continues

## 2018-02-14 NOTE — SUBJECTIVE & OBJECTIVE
Interval History: The patient notes that she has less back pain today    Review of Systems   Musculoskeletal: Positive for back pain.     Objective:     Vital Signs (Most Recent):  Temp: 98.2 °F (36.8 °C) (02/14/18 0930)  Pulse: (!) 51 (02/14/18 1100)  Resp: 18 (02/14/18 0930)  BP: (!) 122/56 (02/14/18 1100)  SpO2: 98 % (02/14/18 0923) Vital Signs (24h Range):  Temp:  [96.2 °F (35.7 °C)-98.2 °F (36.8 °C)] 98.2 °F (36.8 °C)  Pulse:  [51-61] 51  Resp:  [16-20] 18  SpO2:  [97 %-98 %] 98 %  BP: (120-152)/(56-72) 122/56     Weight: 108 kg (238 lb 3.2 oz)  Body mass index is 42.2 kg/m².    Intake/Output Summary (Last 24 hours) at 02/14/18 1124  Last data filed at 02/14/18 0500   Gross per 24 hour   Intake              240 ml   Output                0 ml   Net              240 ml      Physical Exam   Constitutional: She is oriented to person, place, and time. She appears well-developed and well-nourished.   HENT:   Head: Normocephalic.   Eyes: Conjunctivae are normal. Right eye exhibits no discharge. Left eye exhibits no discharge.   Neck: Normal range of motion. Neck supple.   Cardiovascular: Regular rhythm and normal heart sounds.  Bradycardia present.    Pulmonary/Chest: Effort normal and breath sounds normal. No respiratory distress.   Abdominal: Soft. Bowel sounds are normal. She exhibits no distension. There is no tenderness.   Musculoskeletal: Normal range of motion.   Neurological: She is alert and oriented to person, place, and time.   Skin: Skin is warm and dry.   Psychiatric: She has a normal mood and affect. Her speech is normal and behavior is normal. Thought content normal.       Significant Labs:   CBC:   Recent Labs  Lab 02/13/18  0502 02/14/18  0553   WBC 10.40 9.37   HGB 9.7* 9.2*   HCT 30.2* 29.7*    216     CMP:   Recent Labs  Lab 02/13/18  0502 02/14/18  0553   * 125*   K 5.6* 6.3*   CL 97 95   CO2 21* 21*   GLU 80 102   BUN 30* 42*   CREATININE 5.7* 6.8*   CALCIUM 8.1* 8.1*   PROT 6.4 6.1    ALBUMIN 1.8* 1.6*   BILITOT 0.3 0.2   ALKPHOS 61 63   * 619*   * 178*   ANIONGAP 10 9   EGFRNONAA 7* 6*       Significant Imaging: I have reviewed and interpreted all pertinent imaging results/findings within the past 24 hours.

## 2018-02-14 NOTE — PROGRESS NOTES
"Renal Progress Note    Admit Date: 2/7/2018   LOS: 7 days      67 y.o. white female with known MRSA bacteremia who was transferred here for ID and Neurosurgery services.  She has a history of ESRD, Afib, cardiomyopathy, and DM.  She has been hospitalized with bacteremia from a Right SC Malvin which was removed at outside facility.  She had a left femoral Jaspreet placed 2/5/2018.  Blood cultures remained positive; RANDA  normal, MRI of thoracic and lumbar spine showed abnormal foci thought  "probably a hemangioma or metastasis".  She normally dialyses MWF in [place], [place].  She is a very poor historian and says she does not know why her kidneys failed requiring she start HD in November 2017.  "You need to ask my daughter all these questions.  I just don't know."  No other vascular access ie AVF or grafts noted.    SUBJECTIVE:      Uneventful night.  K 6.3 noted this am.  Was unaware of K containing foods/drinks.  Discussed with HD RN.  Seen on HD.  No CP/SOB.     Scheduled Meds:   sodium chloride 0.9%   Intravenous Once    calcitRIOL  0.25 mcg Oral Daily    carvedilol  25 mg Oral BID    DAPTOmycin (CUBICIN)  IV  1,000 mg Intravenous Q48H    epoetin davion (PROCRIT) injection  50 Units/kg Subcutaneous Every Mon, Wed, Fri    heparin (porcine)  5,000 Units Subcutaneous Q12H    insulin aspart  1-10 Units Subcutaneous TIDWM    polyethylene glycol  17 g Oral Daily    sevelamer carbonate  800 mg Oral TID WM    SITagliptin  25 mg Oral Daily    vitamin D  2,000 Units Oral Daily       OBJECTIVE:     Vital Signs Range (Last 24H):  Temp:  [96.2 °F (35.7 °C)-98.2 °F (36.8 °C)]   Pulse:  [57-61]   Resp:  [16-20]   BP: (120-135)/(58-63)   SpO2:  [97 %-98 %]     I & O (Last 24H):    Intake/Output Summary (Last 24 hours) at 02/14/18 0946  Last data filed at 02/14/18 0500   Gross per 24 hour   Intake              240 ml   Output                0 ml   Net              240 ml       Physical Exam:  Constitutional: She is oriented to " person, place, and time. Morbidly obese, well-nourished.   Head: Normocephalic.   Eyes: Conjunctivae are normal.    Neck: Normal range of motion. Neck supple.   Cardiovascular: Regular rhythm, normal heart sounds. Bradycardia  Pulmonary/Chest: Effort normal and breath sounds normal. No respiratory distress.   Abdominal: Soft, obese. Bowel sounds are normal. She exhibits no distension. There is no tenderness.   Ext:  No CCE.   Neurological: NF  Skin: Skin is warm and dry + pallor  Psychiatric: She has a normal mood and affect. Her speech is normal and behavior is normal. Thought content normal, very pleasant.  Poor historian.   Access: R JEREMÍAS Iverson    Laboratory:  CBC:     Recent Labs  Lab 02/14/18  0553   WBC 9.37   RBC 3.33*   HGB 9.2*   HCT 29.7*      MCV 89   MCH 27.6   MCHC 31.0*     BMP:     Recent Labs  Lab 02/14/18  0553      *   K 6.3*   CL 95   CO2 21*   BUN 42*   CREATININE 6.8*   CALCIUM 8.1*   MG 1.8       ASSESSMENT/PLAN:     66 YO WF with ESRD and catheter associated MRSA bacteremia and suspected lumbar foci presents from [facility] for ID and NS services.  1. ESRD:  Dr. Nichols pull line 2/11 for holiday, however electrolytes warranted new line for HD on 2/12.  R IJ EULALIO placed 2/12 and pt had HD but lines clotted after 2 hours.  Will ensure heparin bolus with HD. Renally dose all meds and antimicrobials. On Cubicin, Cipro, and Cefepime. Pt seen and examined on hemodialysis.  Labs noted and dialysate adjusted. UF goal 2-3L as BP tolerates.  2. Hyperkalemia from dietary choices (E87.5):  Changed diet and consulted dietary for counseling.  Low K bath.   3. Anemia of CKD:   holding IV iron given active infection.  Epo to maintain hbg 10-11.  Currently at goal.  Folate and B12 normal.  4. Hyperphos:  on Renvela now.  5. Hyponatremia- Had shortened HD treatment on 2/12, so not as big of a Na correction.  Will correct with HD. Restrict fluid to 1liter per day.  IV meds to mixed  with NS, rather than D5W.  6. 2HPT/Vit D deficiency:  Started Vit D3 and calcitriol.  7. DM:  Currently on SSI.  Reduced Januvia to ESRD dosing of 25mg/d.  8. Afib:  On Amio and Carvedilol.  Defer to cards. Would monitor carefully given concurrent Cipro/Amio.  Defer anticoagulation to Cards.    9. MRSA Bacteremia: follow cultures.  Last negative cultures 2/13.    Ruslan Gomez MD  Nephrology

## 2018-02-14 NOTE — ASSESSMENT & PLAN NOTE
Discontinued Amiodarone 2/13  AST starting to decrease, ALT still increasing, will continue to monitor

## 2018-02-14 NOTE — PLAN OF CARE
Problem: Patient Care Overview  Goal: Plan of Care Review  Outcome: Ongoing (interventions implemented as appropriate)  Plan of care reviewed with patient.  Isolation precautions maintained.  PRN tramadol given for pain, patient reports the percocet makes her feel bad.  Iverson had little to no output this shift.    Purposeful rounding completed.  Patient positioned with pillows for pressure support.  Call bell within reach, no needs at this time, will continue to monitor.

## 2018-02-14 NOTE — ASSESSMENT & PLAN NOTE
Consult Nephrology  Continue Dialysis MWF  Left Femoral Jaspreet cath removed yesterday for line holiday  R IJ dialysis catheter placed 2/12  Increasing potassium to 6.3, dialysis today 2/14

## 2018-02-15 LAB
ALBUMIN SERPL BCP-MCNC: 1.7 G/DL
ALP SERPL-CCNC: 69 U/L
ALT SERPL W/O P-5'-P-CCNC: 174 U/L
ANION GAP SERPL CALC-SCNC: 7 MMOL/L
AST SERPL-CCNC: 443 U/L
BASOPHILS # BLD AUTO: 0.01 K/UL
BASOPHILS NFR BLD: 0.1 %
BILIRUB SERPL-MCNC: 0.2 MG/DL
BUN SERPL-MCNC: 30 MG/DL
CALCIUM SERPL-MCNC: 8.5 MG/DL
CHLORIDE SERPL-SCNC: 98 MMOL/L
CK SERPL-CCNC: 5175 U/L
CO2 SERPL-SCNC: 24 MMOL/L
CREAT SERPL-MCNC: 5.2 MG/DL
DIFFERENTIAL METHOD: ABNORMAL
EOSINOPHIL # BLD AUTO: 0.2 K/UL
EOSINOPHIL NFR BLD: 1.8 %
ERYTHROCYTE [DISTWIDTH] IN BLOOD BY AUTOMATED COUNT: 16.2 %
EST. GFR  (AFRICAN AMERICAN): 9 ML/MIN/1.73 M^2
EST. GFR  (NON AFRICAN AMERICAN): 8 ML/MIN/1.73 M^2
GLUCOSE SERPL-MCNC: 104 MG/DL
HBV SURFACE AB SER-ACNC: ABNORMAL M[IU]/ML
HBV SURFACE AG SERPL QL IA: NEGATIVE
HCT VFR BLD AUTO: 27.3 %
HGB BLD-MCNC: 8.7 G/DL
LYMPHOCYTES # BLD AUTO: 1.4 K/UL
LYMPHOCYTES NFR BLD: 17.2 %
MAGNESIUM SERPL-MCNC: 2 MG/DL
MCH RBC QN AUTO: 27.9 PG
MCHC RBC AUTO-ENTMCNC: 31.9 G/DL
MCV RBC AUTO: 88 FL
MONOCYTES # BLD AUTO: 0.9 K/UL
MONOCYTES NFR BLD: 10.4 %
NEUTROPHILS # BLD AUTO: 5.8 K/UL
NEUTROPHILS NFR BLD: 69.9 %
PHOSPHATE SERPL-MCNC: 4.5 MG/DL
PLATELET # BLD AUTO: 185 K/UL
PMV BLD AUTO: 9.9 FL
POCT GLUCOSE: 125 MG/DL (ref 70–110)
POCT GLUCOSE: 130 MG/DL (ref 70–110)
POCT GLUCOSE: 146 MG/DL (ref 70–110)
POCT GLUCOSE: 90 MG/DL (ref 70–110)
POTASSIUM SERPL-SCNC: 5.3 MMOL/L
PROT SERPL-MCNC: 6.1 G/DL
RBC # BLD AUTO: 3.12 M/UL
SODIUM SERPL-SCNC: 129 MMOL/L
WBC # BLD AUTO: 8.25 K/UL

## 2018-02-15 PROCEDURE — 83735 ASSAY OF MAGNESIUM: CPT

## 2018-02-15 PROCEDURE — 11000001 HC ACUTE MED/SURG PRIVATE ROOM

## 2018-02-15 PROCEDURE — 25000003 PHARM REV CODE 250: Performed by: PHYSICIAN ASSISTANT

## 2018-02-15 PROCEDURE — 80053 COMPREHEN METABOLIC PANEL: CPT

## 2018-02-15 PROCEDURE — 25000003 PHARM REV CODE 250: Performed by: NURSE PRACTITIONER

## 2018-02-15 PROCEDURE — 84100 ASSAY OF PHOSPHORUS: CPT

## 2018-02-15 PROCEDURE — 25000003 PHARM REV CODE 250: Performed by: INTERNAL MEDICINE

## 2018-02-15 PROCEDURE — 82550 ASSAY OF CK (CPK): CPT

## 2018-02-15 PROCEDURE — 36415 COLL VENOUS BLD VENIPUNCTURE: CPT

## 2018-02-15 PROCEDURE — 99232 SBSQ HOSP IP/OBS MODERATE 35: CPT | Mod: ,,, | Performed by: HOSPITALIST

## 2018-02-15 PROCEDURE — 85025 COMPLETE CBC W/AUTO DIFF WBC: CPT

## 2018-02-15 PROCEDURE — 63600175 PHARM REV CODE 636 W HCPCS: Performed by: INTERNAL MEDICINE

## 2018-02-15 RX ADMIN — CARVEDILOL 25 MG: 12.5 TABLET, FILM COATED ORAL at 10:02

## 2018-02-15 RX ADMIN — CARVEDILOL 25 MG: 12.5 TABLET, FILM COATED ORAL at 08:02

## 2018-02-15 RX ADMIN — OXYCODONE HYDROCHLORIDE AND ACETAMINOPHEN 1 TABLET: 5; 325 TABLET ORAL at 10:02

## 2018-02-15 RX ADMIN — OXYCODONE HYDROCHLORIDE AND ACETAMINOPHEN 1 TABLET: 5; 325 TABLET ORAL at 08:02

## 2018-02-15 RX ADMIN — SODIUM POLYSTYRENE SULFONATE 15 G: 15 SUSPENSION ORAL; RECTAL at 10:02

## 2018-02-15 RX ADMIN — SITAGLIPTIN 25 MG: 25 TABLET, FILM COATED ORAL at 10:02

## 2018-02-15 RX ADMIN — HEPARIN SODIUM 5000 UNITS: 5000 INJECTION, SOLUTION INTRAVENOUS; SUBCUTANEOUS at 10:02

## 2018-02-15 RX ADMIN — OXYCODONE HYDROCHLORIDE AND ACETAMINOPHEN 1 TABLET: 5; 325 TABLET ORAL at 01:02

## 2018-02-15 RX ADMIN — HEPARIN SODIUM 5000 UNITS: 5000 INJECTION, SOLUTION INTRAVENOUS; SUBCUTANEOUS at 08:02

## 2018-02-15 RX ADMIN — POLYETHYLENE GLYCOL 3350 17 G: 17 POWDER, FOR SOLUTION ORAL at 10:02

## 2018-02-15 RX ADMIN — TRAMADOL HYDROCHLORIDE 50 MG: 50 TABLET, COATED ORAL at 02:02

## 2018-02-15 RX ADMIN — SEVELAMER CARBONATE 800 MG: 800 TABLET, FILM COATED ORAL at 10:02

## 2018-02-15 RX ADMIN — CALCITRIOL 0.25 MCG: 0.25 CAPSULE, LIQUID FILLED ORAL at 10:02

## 2018-02-15 RX ADMIN — SEVELAMER CARBONATE 800 MG: 800 TABLET, FILM COATED ORAL at 02:02

## 2018-02-15 RX ADMIN — Medication 1 CAPSULE: at 10:02

## 2018-02-15 RX ADMIN — VITAMIN D, TAB 1000IU (100/BT) 2000 UNITS: 25 TAB at 10:02

## 2018-02-15 NOTE — PLAN OF CARE
Discharge date/disposition remains uncertain - new orders for PT/OT to eval & treat to assist with discharge planning    12-Jun-2017

## 2018-02-15 NOTE — ASSESSMENT & PLAN NOTE
Blood Cultures 02/08/18 MRSA in 1 set, 2nd set NGTD  Repeat Blood Cultures 02/10/18 2/2 MRSA  Lactic acid 0.9 02/08/10  Continue Daptomycin  ID consult, appreciate recs  Suspect endovascular source  New IJ catheter placed 2/12, continue to monitor blood cultures obtained 2/13

## 2018-02-15 NOTE — SUBJECTIVE & OBJECTIVE
Interval History: Feels better today, with less pain or malaise overall. Less back pain.    Review of Systems   Constitutional: Negative for chills and fever.   HENT: Negative.    Respiratory: Negative.    Cardiovascular: Negative.    Gastrointestinal: Negative.    Endocrine: Negative.    Genitourinary: Negative.    Musculoskeletal: Negative.    Neurological: Negative.    Psychiatric/Behavioral: Negative.    All other systems reviewed and are negative.    Objective:     Vital Signs (Most Recent):  Temp: 98.2 °F (36.8 °C) (02/14/18 1914)  Pulse: 60 (02/14/18 1914)  Resp: 18 (02/14/18 1914)  BP: 139/63 (02/14/18 1914)  SpO2: 99 % (02/14/18 1914) Vital Signs (24h Range):  Temp:  [96.2 °F (35.7 °C)-98.2 °F (36.8 °C)] 98.2 °F (36.8 °C)  Pulse:  [51-89] 60  Resp:  [17-20] 18  SpO2:  [97 %-99 %] 99 %  BP: (115-152)/(50-72) 139/63     Weight: 108 kg (238 lb 3.2 oz)  Body mass index is 42.2 kg/m².    Estimated Creatinine Clearance: 9.5 mL/min (based on SCr of 6.8 mg/dL (H)).    Physical Exam   Constitutional: She appears well-developed and well-nourished.   HENT:   Head: Normocephalic and atraumatic.   Eyes: EOM are normal. Pupils are equal, round, and reactive to light.   Neck: Neck supple.   Cardiovascular: Normal rate, regular rhythm and normal heart sounds.    Pulmonary/Chest: Effort normal and breath sounds normal.   Abdominal: Soft. Bowel sounds are normal.   Musculoskeletal: Normal range of motion. She exhibits no edema.   Neurological: She is alert.   Skin: Skin is warm and dry.   Nursing note and vitals reviewed.      Significant Labs:   Blood Culture:   Recent Labs  Lab 02/08/18  0028 02/10/18  0525 02/13/18  1132   LABBLOO No growth after 5 days.  Gram stain vicente bottle: Gram positive cocci in clusters resembling Staph   Results called to and read back by: Anurag Jones RN  02/09/2018    01:38  METHICILLIN RESISTANT STAPHYLOCOCCUS AUREUSID consult required at [facility].Jonna Figueroa and Ramon woodson. Gram  stain aer bottle: Gram positive cocci in clusters resembling Staph   Results called to and read back by: Margaux Patel RN  02/12/2018  01:03  METHICILLIN RESISTANT STAPHYLOCOCCUS AUREUSID consult required at [facility].Jonna Figueroa and Ramon woodson.For susceptibility see order #[id number] No Growth to date  No Growth to date     CBC:   Recent Labs  Lab 02/13/18  0502 02/14/18  0553   WBC 10.40 9.37   HGB 9.7* 9.2*   HCT 30.2* 29.7*    216     CMP:   Recent Labs  Lab 02/13/18  0502 02/14/18  0553   * 125*   K 5.6* 6.3*   CL 97 95   CO2 21* 21*   GLU 80 102   BUN 30* 42*   CREATININE 5.7* 6.8*   CALCIUM 8.1* 8.1*   PROT 6.4 6.1   ALBUMIN 1.8* 1.6*   BILITOT 0.3 0.2   ALKPHOS 61 63   * 619*   * 178*   ANIONGAP 10 9   EGFRNONAA 7* 6*       Significant Imaging: I have reviewed all pertinent imaging results/findings within the past 24 hours.

## 2018-02-15 NOTE — PROGRESS NOTES
"[facility]  Infectious Disease  Progress Note    Patient Name: Yumiko Proctor  MRN: [id number]  Admission Date: 2/7/2018  Length of Stay: 7 days  Attending Physician: Patricia Lu MD  Primary Care Provider: Primary Doctor No    Isolation Status: Contact  Assessment/Plan:      * MRSA bacteremia    - blood cultures negative from 2/13  - continue daptomycin  - repeat blood cultures tomorrow           Abnormal liver enzymes    Will follow while on daptomycin. No symptoms to suggest myositis. Check CPK in AM.             Thank you for your consult. I will follow-up with patient. Please contact us if you have any additional questions.    Shahid Bhakta MD  Infectious Disease  [facility]    Subjective:     Principal Problem:MRSA bacteremia    HPI: This is a 68 yo woman with ESRD transferred to [facility] for persistent bacteremia due to MRSA. She reportedly had multiple studies performed at [facility] in [place], including MR imaging, CT imaging, a bone scan, and a RANDA, all of which were "negative." An indwelling HD catheter was discontinued and a left groin HD catheter was placed. The patient feels sick but denies any rigors or irving fever. The patient was admitted last night and placed on cefepime and Cipro in addition to the daptomycin. I am consulted for ID evaluation.    Interval History: Feels better today, with less pain or malaise overall. Less back pain.    Review of Systems   Constitutional: Negative for chills and fever.   HENT: Negative.    Respiratory: Negative.    Cardiovascular: Negative.    Gastrointestinal: Negative.    Endocrine: Negative.    Genitourinary: Negative.    Musculoskeletal: Negative.    Neurological: Negative.    Psychiatric/Behavioral: Negative.    All other systems reviewed and are negative.    Objective:     Vital Signs (Most Recent):  Temp: 98.2 °F (36.8 °C) (02/14/18 1914)  Pulse: 60 (02/14/18 1914)  Resp: 18 (02/14/18 1914)  BP: 139/63 (02/14/18 " 1914)  SpO2: 99 % (02/14/18 1914) Vital Signs (24h Range):  Temp:  [96.2 °F (35.7 °C)-98.2 °F (36.8 °C)] 98.2 °F (36.8 °C)  Pulse:  [51-89] 60  Resp:  [17-20] 18  SpO2:  [97 %-99 %] 99 %  BP: (115-152)/(50-72) 139/63     Weight: 108 kg (238 lb 3.2 oz)  Body mass index is 42.2 kg/m².    Estimated Creatinine Clearance: 9.5 mL/min (based on SCr of 6.8 mg/dL (H)).    Physical Exam   Constitutional: She appears well-developed and well-nourished.   HENT:   Head: Normocephalic and atraumatic.   Eyes: EOM are normal. Pupils are equal, round, and reactive to light.   Neck: Neck supple.   Cardiovascular: Normal rate, regular rhythm and normal heart sounds.    Pulmonary/Chest: Effort normal and breath sounds normal.   Abdominal: Soft. Bowel sounds are normal.   Musculoskeletal: Normal range of motion. She exhibits no edema.   Neurological: She is alert.   Skin: Skin is warm and dry.   Nursing note and vitals reviewed.      Significant Labs:   Blood Culture:   Recent Labs  Lab 02/08/18  0028 02/10/18  0525 02/13/18  1132   LABBLOO No growth after 5 days.  Gram stain vicente bottle: Gram positive cocci in clusters resembling Staph   Results called to and read back by: Anurag Jones RN  02/09/2018    01:38  METHICILLIN RESISTANT STAPHYLOCOCCUS AUREUSID consult required at [facility]. Gram stain aer bottle: Gram positive cocci in clusters resembling Staph   Results called to and read back by: Margaux Patel RN  02/12/2018  01:03  METHICILLIN RESISTANT STAPHYLOCOCCUS AUREUSID consult required at [facility] and [facility].For susceptibility see order #[id number] No Growth to date  No Growth to date     CBC:   Recent Labs  Lab 02/13/18  0502 02/14/18  0553   WBC 10.40 9.37   HGB 9.7* 9.2*   HCT 30.2* 29.7*    216     CMP:   Recent Labs  Lab 02/13/18  0502 02/14/18  0553   * 125*   K 5.6* 6.3*   CL 97 95   CO2 21* 21*   GLU 80 102   BUN 30* 42*   CREATININE 5.7* 6.8*    CALCIUM 8.1* 8.1*   PROT 6.4 6.1   ALBUMIN 1.8* 1.6*   BILITOT 0.3 0.2   ALKPHOS 61 63   * 619*   * 178*   ANIONGAP 10 9   EGFRNONAA 7* 6*       Significant Imaging: I have reviewed all pertinent imaging results/findings within the past 24 hours.

## 2018-02-15 NOTE — PROGRESS NOTES
[facility] Hospital Medicine  Progress Note    Patient Name: Yumiko Proctor  MRN: [id number]  Patient Class: IP- Inpatient   Admission Date: 2/7/2018  Length of Stay: 8 days  Attending Physician: Patricia Lu MD  Primary Care Provider: Primary Doctor No        Subjective:     Principal Problem:MRSA bacteremia    HPI:  The patient is a 68 yo female with known MRSA bacteremia who was transferred here for ID and Neurosurgery services.  She has a history of ESRD, Afib, cardiomyopathy, and DM.  She has been hospitalized with bacteremia from a vascular access which was taken out and replaced today with a lt femoral vas cath.  Blood cultures remained positive. She had a RANDA which was normal, MRI of thoracic and lumbar spine showed a abnormal foci in T( that was probably a hemangioma or metastasis.    Hospital Course:  Blood cultures from 02/08/18 2/4 (+) MRSA.  Patient currently receiving Daptomycin.  Cultures repeated again on 02/10/2018 and 2/2 bottles positive for MRSA.  ID on board, concerned there is endovascular source.  Left prince catheter removed yesterday and right IJ tunneled dialysis catheter placed 2/12. Repeat blood cultures 2/13 are negative to date.    Interval History: The patient complains of mild back pain, not as severe as earlier in hospital course    Review of Systems   Musculoskeletal: Positive for back pain.     Objective:     Vital Signs (Most Recent):  Temp: 98.9 °F (37.2 °C) (02/15/18 1154)  Pulse: (!) 59 (02/15/18 1154)  Resp: 20 (02/15/18 1021)  BP: (!) 140/62 (02/15/18 1154)  SpO2: 98 % (02/15/18 1154) Vital Signs (24h Range):  Temp:  [97.8 °F (36.6 °C)-98.9 °F (37.2 °C)] 98.9 °F (37.2 °C)  Pulse:  [52-89] 59  Resp:  [17-20] 20  SpO2:  [97 %-99 %] 98 %  BP: (111-140)/(50-69) 140/62     Weight: 108 kg (238 lb 3.2 oz)  Body mass index is 42.2 kg/m².    Intake/Output Summary (Last 24 hours) at 02/15/18 1221  Last data filed at 02/15/18 0200   Gross per 24 hour   Intake               690 ml   Output             1941 ml   Net            -1251 ml      Physical Exam   Constitutional: She is oriented to person, place, and time. She appears well-developed and well-nourished.   HENT:   Head: Normocephalic.   Eyes: Conjunctivae are normal. Right eye exhibits no discharge. Left eye exhibits no discharge.   Neck: Normal range of motion. Neck supple.   Cardiovascular: Regular rhythm and normal heart sounds.  Bradycardia present.    Pulmonary/Chest: Effort normal and breath sounds normal. No respiratory distress.   Abdominal: Soft. Bowel sounds are normal. She exhibits no distension. There is no tenderness.   Musculoskeletal: Normal range of motion.   Neurological: She is alert and oriented to person, place, and time.   Skin: Skin is warm and dry.   Psychiatric: She has a normal mood and affect. Her speech is normal and behavior is normal. Thought content normal.       Significant Labs:   CBC:   Recent Labs  Lab 02/14/18  0553 02/15/18  0519   WBC 9.37 8.25   HGB 9.2* 8.7*   HCT 29.7* 27.3*    185     CMP:   Recent Labs  Lab 02/14/18  0553 02/15/18  0519   * 129*   K 6.3* 5.3*   CL 95 98   CO2 21* 24    104   BUN 42* 30*   CREATININE 6.8* 5.2*   CALCIUM 8.1* 8.5*   PROT 6.1 6.1   ALBUMIN 1.6* 1.7*   BILITOT 0.2 0.2   ALKPHOS 63 69   * 443*   * 174*   ANIONGAP 9 7*   EGFRNONAA 6* 8*     Magnesium:   Recent Labs  Lab 02/14/18  0553 02/15/18  0519   MG 1.8 2.0       Significant Imaging: I have reviewed and interpreted all pertinent imaging results/findings within the past 24 hours.    Assessment/Plan:      * MRSA bacteremia    Blood Cultures 02/08/18 MRSA in 1 set, 2nd set NGTD  Repeat Blood Cultures 02/10/18 2/2 MRSA  Lactic acid 0.9 02/08/10  Continue Daptomycin  ID consult, appreciate recs  Suspect endovascular source  New IJ catheter placed 2/12, continue to monitor blood cultures obtained 2/13          ESRD (end stage renal disease)    Consult Nephrology  Continue  Dialysis MWF  Left Femoral Jaspreet cath removed yesterday for line holiday  R IJ dialysis catheter placed 2/12  Increasing potassium to 6.3, dialysis today 2/14        Chronic atrial fibrillation    Continue Coreg  Rate controlled            Type 2 diabetes mellitus with chronic kidney disease on chronic dialysis, without long-term current use of insulin    A1c  6.5  PRN insulin  Well-controlled          Abnormal liver enzymes    Discontinued Amiodarone 2/13  AST starting to decrease, ALT still increasing, will continue to monitor            VTE Risk Mitigation         Ordered     heparin (porcine) injection 2,000 Units  As needed (PRN)     Route:  Intravenous        02/13/18 0940     heparin (porcine) injection 5,000 Units  As needed (PRN)     Route:  Intra-Catheter        02/12/18 1834     heparin (porcine) injection 5,000 Units  Every 12 hours     Route:  Subcutaneous        02/08/18 0837     Medium Risk of VTE  Once      02/07/18 2255     Place sequential compression device  Until discontinued      02/07/18 2255     Place ANGELI hose  Until discontinued      02/07/18 2255              Patricia Lu MD  Department of Hospital Medicine   [facility]

## 2018-02-15 NOTE — SUBJECTIVE & OBJECTIVE
Interval History: The patient complains of mild back pain, not as severe as earlier in hospital course    Review of Systems   Musculoskeletal: Positive for back pain.     Objective:     Vital Signs (Most Recent):  Temp: 98.9 °F (37.2 °C) (02/15/18 1154)  Pulse: (!) 59 (02/15/18 1154)  Resp: 20 (02/15/18 1021)  BP: (!) 140/62 (02/15/18 1154)  SpO2: 98 % (02/15/18 1154) Vital Signs (24h Range):  Temp:  [97.8 °F (36.6 °C)-98.9 °F (37.2 °C)] 98.9 °F (37.2 °C)  Pulse:  [52-89] 59  Resp:  [17-20] 20  SpO2:  [97 %-99 %] 98 %  BP: (111-140)/(50-69) 140/62     Weight: 108 kg (238 lb 3.2 oz)  Body mass index is 42.2 kg/m².    Intake/Output Summary (Last 24 hours) at 02/15/18 1221  Last data filed at 02/15/18 0200   Gross per 24 hour   Intake              690 ml   Output             1941 ml   Net            -1251 ml      Physical Exam   Constitutional: She is oriented to person, place, and time. She appears well-developed and well-nourished.   HENT:   Head: Normocephalic.   Eyes: Conjunctivae are normal. Right eye exhibits no discharge. Left eye exhibits no discharge.   Neck: Normal range of motion. Neck supple.   Cardiovascular: Regular rhythm and normal heart sounds.  Bradycardia present.    Pulmonary/Chest: Effort normal and breath sounds normal. No respiratory distress.   Abdominal: Soft. Bowel sounds are normal. She exhibits no distension. There is no tenderness.   Musculoskeletal: Normal range of motion.   Neurological: She is alert and oriented to person, place, and time.   Skin: Skin is warm and dry.   Psychiatric: She has a normal mood and affect. Her speech is normal and behavior is normal. Thought content normal.       Significant Labs:   CBC:   Recent Labs  Lab 02/14/18  0553 02/15/18  0519   WBC 9.37 8.25   HGB 9.2* 8.7*   HCT 29.7* 27.3*    185     CMP:   Recent Labs  Lab 02/14/18  0553 02/15/18  0519   * 129*   K 6.3* 5.3*   CL 95 98   CO2 21* 24    104   BUN 42* 30*   CREATININE 6.8* 5.2*    CALCIUM 8.1* 8.5*   PROT 6.1 6.1   ALBUMIN 1.6* 1.7*   BILITOT 0.2 0.2   ALKPHOS 63 69   * 443*   * 174*   ANIONGAP 9 7*   EGFRNONAA 6* 8*     Magnesium:   Recent Labs  Lab 02/14/18  0553 02/15/18  0519   MG 1.8 2.0       Significant Imaging: I have reviewed and interpreted all pertinent imaging results/findings within the past 24 hours.

## 2018-02-15 NOTE — NURSING
Patient request assistance to transfer to bed-side commode, with two person assist she was still to week to transfer.  Bed pan was offered as alternative but patient refused.  Patient said maybe she would try the bed pan later but is worried it will be to painful on her back.

## 2018-02-15 NOTE — PROGRESS NOTES
"Renal Progress Note    Admit Date: 2/7/2018   LOS: 8 days      67 y.o. white female with known MRSA bacteremia who was transferred here for ID and Neurosurgery services.  She has a history of ESRD, Afib, cardiomyopathy, and DM.  She has been hospitalized with bacteremia from a Right SC Malvin which was removed at outside facility.  She had a left femoral Jaspreet placed 2/5/2018.  Blood cultures remained positive; RANDA  normal, MRI of thoracic and lumbar spine showed abnormal foci thought  "probably a hemangioma or metastasis".  She normally dialyses MWF in [place], [place].  She is a very poor historian and says she does not know why her kidneys failed requiring she start HD in November 2017.  "You need to ask my daughter all these questions.  I just don't know."  No other vascular access ie AVF or grafts noted.    SUBJECTIVE:      Uneventful night.  Labs noted and now with Rhabdo.  No CP/SOB.  "I'm just weak".    Scheduled Meds:   sodium chloride 0.9%   Intravenous Once    calcitRIOL  0.25 mcg Oral Daily    carvedilol  25 mg Oral BID    DAPTOmycin (CUBICIN)  IV  1,000 mg Intravenous Q48H    epoetin davion (PROCRIT) injection  50 Units/kg Subcutaneous Every Mon, Wed, Fri    heparin (porcine)  5,000 Units Subcutaneous Q12H    insulin aspart  1-10 Units Subcutaneous TIDWM    polyethylene glycol  17 g Oral Daily    sevelamer carbonate  800 mg Oral TID WM    SITagliptin  25 mg Oral Daily    sodium polystyrene  15 g Oral Once    vitamin D  2,000 Units Oral Daily    vitamin renal formula (B-complex-vitamin c-folic acid)  1 capsule Oral Daily       OBJECTIVE:     Vital Signs Range (Last 24H):  Temp:  [97.8 °F (36.6 °C)-98.3 °F (36.8 °C)]   Pulse:  [51-89]   Resp:  [17-20]   BP: (115-139)/(50-69)   SpO2:  [97 %-99 %]     I & O (Last 24H):    Intake/Output Summary (Last 24 hours) at 02/15/18 0939  Last data filed at 02/15/18 0200   Gross per 24 hour   Intake              690 ml   Output             1941 ml   Net            " -1251 ml       Physical Exam:  Constitutional: She is oriented to person, place, and time. Morbidly obese, well-nourished.   Head: Normocephalic.   Eyes: Conjunctivae are normal.    Neck: Normal range of motion. Neck supple.   Cardiovascular: Regular rhythm, normal heart sounds. Bradycardia  Pulmonary/Chest: Effort normal and breath sounds normal. No respiratory distress.   Abdominal: Soft, obese. Bowel sounds are normal. She exhibits no distension. There is no tenderness.   Ext:  No CCE.   Neurological: NF  Skin: Skin is warm and dry + pallor  Psychiatric: She has a normal mood and affect. Her speech is normal and behavior is normal. Thought content normal, very pleasant.  Poor historian.   Access: R IJ EULALIO      Laboratory:  CBC:     Recent Labs  Lab 02/15/18  0519   WBC 8.25   RBC 3.12*   HGB 8.7*   HCT 27.3*      MCV 88   MCH 27.9   MCHC 31.9*     BMP:     Recent Labs  Lab 02/15/18  0519      *   K 5.3*   CL 98   CO2 24   BUN 30*   CREATININE 5.2*   CALCIUM 8.5*   MG 2.0       Recent Labs  Lab 02/15/18  0519   CPK 5175*     ASSESSMENT/PLAN:     68 YO WF with ESRD and catheter associated MRSA bacteremia and suspected lumbar foci presents from [facility] for ID and NS services.    1. ESRD:  Dr. Nichols pull line 2/11 for holiday, however electrolytes warranted new line for HD on 2/12.  R IJ EULALIO placed 2/12 and pt had HD but lines clotted after 2 hours.  Will ensure heparin bolus with each HD. Renally dose all meds and antimicrobials. On Cubicin. Labs noted and dialysate adjusted. UF goal 2-3L as BP tolerates.  2. Hyperkalemia from dietary choices (E87.5):  Changed diet and consulted dietary for counseling.  Low K bath.  Kayexalate X 1 today.   3. Rhabdo from Cubicin (M62.82):  Discussed with ID.  Hold for now.   4. Anemia of CKD:   holding IV iron given active infection.  Epo to maintain hbg 10-11.  Currently at goal.  Folate and B12 normal.  5. Hyperphos:  on Renvela  now.  6. Hyponatremia- Had shortened HD treatment on 2/12, so not as big of a Na correction.  Will correct with HD. Restrict fluid to 1liter per day.  IV meds to mixed with NS, rather than D5W.  7. 2HPT/Vit D deficiency:  Started Vit D3 and calcitriol.  8. DM:  Currently on SSI.  Reduced Januvia to ESRD dosing of 25mg/d.  9. Afib:  On Amio and Carvedilol.  Defer to cards. Defer anticoagulation to Cards.    10. MRSA Bacteremia: follow cultures.  Last negative cultures 2/13.    Ilan Perez, BANDARP  Nephrology

## 2018-02-15 NOTE — PLAN OF CARE
Problem: Patient Care Overview  Goal: Plan of Care Review  Outcome: Ongoing (interventions implemented as appropriate)  Plan of care reviewed with patient.  VSS.  Pain managed with PRN medications.  Patient feels she needs to have a bowel movement but is to week to get to the bedside commode, and refuses the bedpan.  Purposeful rounding completed.  Call bell within reach, will continue to monitor.

## 2018-02-16 PROBLEM — M60.9: Status: ACTIVE | Noted: 2018-02-16

## 2018-02-16 PROBLEM — T50.905A: Status: ACTIVE | Noted: 2018-02-16

## 2018-02-16 LAB
ALBUMIN SERPL BCP-MCNC: 1.8 G/DL
ALP SERPL-CCNC: 65 U/L
ALT SERPL W/O P-5'-P-CCNC: 159 U/L
ANION GAP SERPL CALC-SCNC: 8 MMOL/L
ANISOCYTOSIS BLD QL SMEAR: SLIGHT
AST SERPL-CCNC: 294 U/L
BASO STIPL BLD QL SMEAR: ABNORMAL
BASOPHILS # BLD AUTO: ABNORMAL K/UL
BASOPHILS NFR BLD: 0 %
BILIRUB SERPL-MCNC: 0.2 MG/DL
BUN SERPL-MCNC: 40 MG/DL
CALCIUM SERPL-MCNC: 8.5 MG/DL
CHLORIDE SERPL-SCNC: 97 MMOL/L
CK SERPL-CCNC: 2421 U/L
CK SERPL-CCNC: 2459 U/L
CO2 SERPL-SCNC: 25 MMOL/L
CREAT SERPL-MCNC: 6.3 MG/DL
DIFFERENTIAL METHOD: ABNORMAL
EOSINOPHIL # BLD AUTO: ABNORMAL K/UL
EOSINOPHIL NFR BLD: 2 %
ERYTHROCYTE [DISTWIDTH] IN BLOOD BY AUTOMATED COUNT: 15.8 %
EST. GFR  (AFRICAN AMERICAN): 7 ML/MIN/1.73 M^2
EST. GFR  (NON AFRICAN AMERICAN): 6 ML/MIN/1.73 M^2
GIANT PLATELETS BLD QL SMEAR: PRESENT
GLUCOSE SERPL-MCNC: 87 MG/DL
HCT VFR BLD AUTO: 27.6 %
HGB BLD-MCNC: 8.8 G/DL
HYPOCHROMIA BLD QL SMEAR: ABNORMAL
LYMPHOCYTES # BLD AUTO: ABNORMAL K/UL
LYMPHOCYTES NFR BLD: 22 %
MAGNESIUM SERPL-MCNC: 1.9 MG/DL
MCH RBC QN AUTO: 27.7 PG
MCHC RBC AUTO-ENTMCNC: 31.9 G/DL
MCV RBC AUTO: 87 FL
MONOCYTES # BLD AUTO: ABNORMAL K/UL
MONOCYTES NFR BLD: 9 %
NEUTROPHILS NFR BLD: 67 %
PHOSPHATE SERPL-MCNC: 5 MG/DL
PLATELET # BLD AUTO: 207 K/UL
PLATELET BLD QL SMEAR: ABNORMAL
PMV BLD AUTO: 11.4 FL
POCT GLUCOSE: 107 MG/DL (ref 70–110)
POCT GLUCOSE: 155 MG/DL (ref 70–110)
POCT GLUCOSE: 174 MG/DL (ref 70–110)
POCT GLUCOSE: 92 MG/DL (ref 70–110)
POIKILOCYTOSIS BLD QL SMEAR: SLIGHT
POLYCHROMASIA BLD QL SMEAR: ABNORMAL
POTASSIUM SERPL-SCNC: 5.5 MMOL/L
PROT SERPL-MCNC: 6.1 G/DL
RBC # BLD AUTO: 3.18 M/UL
SODIUM SERPL-SCNC: 130 MMOL/L
WBC # BLD AUTO: 8.29 K/UL

## 2018-02-16 PROCEDURE — 99232 SBSQ HOSP IP/OBS MODERATE 35: CPT | Mod: ,,, | Performed by: HOSPITALIST

## 2018-02-16 PROCEDURE — 80053 COMPREHEN METABOLIC PANEL: CPT

## 2018-02-16 PROCEDURE — 87186 SC STD MICRODIL/AGAR DIL: CPT

## 2018-02-16 PROCEDURE — 82550 ASSAY OF CK (CPK): CPT | Mod: 91

## 2018-02-16 PROCEDURE — 25000003 PHARM REV CODE 250: Performed by: NURSE PRACTITIONER

## 2018-02-16 PROCEDURE — 25000003 PHARM REV CODE 250: Performed by: INTERNAL MEDICINE

## 2018-02-16 PROCEDURE — 25000003 PHARM REV CODE 250: Performed by: PHYSICIAN ASSISTANT

## 2018-02-16 PROCEDURE — G8978 MOBILITY CURRENT STATUS: HCPCS | Mod: CL

## 2018-02-16 PROCEDURE — 84100 ASSAY OF PHOSPHORUS: CPT

## 2018-02-16 PROCEDURE — 85027 COMPLETE CBC AUTOMATED: CPT

## 2018-02-16 PROCEDURE — 36415 COLL VENOUS BLD VENIPUNCTURE: CPT

## 2018-02-16 PROCEDURE — 80100016 HC MAINTENANCE HEMODIALYSIS

## 2018-02-16 PROCEDURE — 63600175 PHARM REV CODE 636 W HCPCS: Performed by: NURSE PRACTITIONER

## 2018-02-16 PROCEDURE — 97166 OT EVAL MOD COMPLEX 45 MIN: CPT

## 2018-02-16 PROCEDURE — 99233 SBSQ HOSP IP/OBS HIGH 50: CPT | Mod: ,,, | Performed by: INTERNAL MEDICINE

## 2018-02-16 PROCEDURE — 11000001 HC ACUTE MED/SURG PRIVATE ROOM

## 2018-02-16 PROCEDURE — 99900035 HC TECH TIME PER 15 MIN (STAT)

## 2018-02-16 PROCEDURE — 86580 TB INTRADERMAL TEST: CPT | Performed by: HOSPITALIST

## 2018-02-16 PROCEDURE — 97530 THERAPEUTIC ACTIVITIES: CPT

## 2018-02-16 PROCEDURE — 97161 PT EVAL LOW COMPLEX 20 MIN: CPT

## 2018-02-16 PROCEDURE — 82550 ASSAY OF CK (CPK): CPT

## 2018-02-16 PROCEDURE — G8979 MOBILITY GOAL STATUS: HCPCS | Mod: CK

## 2018-02-16 PROCEDURE — 87077 CULTURE AEROBIC IDENTIFY: CPT

## 2018-02-16 PROCEDURE — 63600175 PHARM REV CODE 636 W HCPCS: Performed by: HOSPITALIST

## 2018-02-16 PROCEDURE — 63600175 PHARM REV CODE 636 W HCPCS: Performed by: INTERNAL MEDICINE

## 2018-02-16 PROCEDURE — 94761 N-INVAS EAR/PLS OXIMETRY MLT: CPT

## 2018-02-16 PROCEDURE — 87040 BLOOD CULTURE FOR BACTERIA: CPT

## 2018-02-16 PROCEDURE — 85007 BL SMEAR W/DIFF WBC COUNT: CPT

## 2018-02-16 PROCEDURE — 83735 ASSAY OF MAGNESIUM: CPT

## 2018-02-16 RX ORDER — FUROSEMIDE 40 MG/1
80 TABLET ORAL 2 TIMES DAILY
Status: DISCONTINUED | OUTPATIENT
Start: 2018-02-16 | End: 2018-03-01 | Stop reason: HOSPADM

## 2018-02-16 RX ORDER — DOCUSATE SODIUM 100 MG/1
100 CAPSULE, LIQUID FILLED ORAL 2 TIMES DAILY
Status: DISCONTINUED | OUTPATIENT
Start: 2018-02-16 | End: 2018-03-01 | Stop reason: HOSPADM

## 2018-02-16 RX ORDER — SODIUM CHLORIDE 9 MG/ML
INJECTION, SOLUTION INTRAVENOUS ONCE
Status: DISCONTINUED | OUTPATIENT
Start: 2018-02-16 | End: 2018-02-19

## 2018-02-16 RX ADMIN — SITAGLIPTIN 25 MG: 25 TABLET, FILM COATED ORAL at 08:02

## 2018-02-16 RX ADMIN — HEPARIN SODIUM 5000 UNITS: 5000 INJECTION, SOLUTION INTRAVENOUS; SUBCUTANEOUS at 08:02

## 2018-02-16 RX ADMIN — OXYCODONE HYDROCHLORIDE AND ACETAMINOPHEN 1 TABLET: 5; 325 TABLET ORAL at 10:02

## 2018-02-16 RX ADMIN — CALCITRIOL 0.25 MCG: 0.25 CAPSULE, LIQUID FILLED ORAL at 08:02

## 2018-02-16 RX ADMIN — FUROSEMIDE 80 MG: 40 TABLET ORAL at 08:02

## 2018-02-16 RX ADMIN — SEVELAMER CARBONATE 800 MG: 800 TABLET, FILM COATED ORAL at 05:02

## 2018-02-16 RX ADMIN — Medication 1 CAPSULE: at 08:02

## 2018-02-16 RX ADMIN — FUROSEMIDE 80 MG: 40 TABLET ORAL at 05:02

## 2018-02-16 RX ADMIN — SEVELAMER CARBONATE 800 MG: 800 TABLET, FILM COATED ORAL at 08:02

## 2018-02-16 RX ADMIN — TUBERCULIN PURIFIED PROTEIN DERIVATIVE 5 UNITS: 5 INJECTION, SOLUTION INTRADERMAL at 05:02

## 2018-02-16 RX ADMIN — SODIUM POLYSTYRENE SULFONATE 15 G: 15 SUSPENSION ORAL; RECTAL at 08:02

## 2018-02-16 RX ADMIN — Medication 1000 MG: at 02:02

## 2018-02-16 RX ADMIN — CARVEDILOL 25 MG: 12.5 TABLET, FILM COATED ORAL at 08:02

## 2018-02-16 RX ADMIN — OXYCODONE HYDROCHLORIDE AND ACETAMINOPHEN 1 TABLET: 5; 325 TABLET ORAL at 08:02

## 2018-02-16 RX ADMIN — VITAMIN D, TAB 1000IU (100/BT) 2000 UNITS: 25 TAB at 08:02

## 2018-02-16 RX ADMIN — OXYCODONE HYDROCHLORIDE AND ACETAMINOPHEN 1 TABLET: 5; 325 TABLET ORAL at 04:02

## 2018-02-16 RX ADMIN — ERYTHROPOIETIN 20000 UNITS: 20000 INJECTION, SOLUTION INTRAVENOUS; SUBCUTANEOUS at 02:02

## 2018-02-16 RX ADMIN — POLYETHYLENE GLYCOL 3350 17 G: 17 POWDER, FOR SOLUTION ORAL at 08:02

## 2018-02-16 NOTE — PLAN OF CARE
"Spoke with patient at bedside in dialysis regarding therapy's recommendation for SNF placement. Patient voiced her preference as [facility] stating "i've been there before & liked it." Advised patient we would need to send out multiple referrals & she stated "i really don't want to go anywhere else." Informed patient I would send out a few more referrals to SNF's local to her & she replied "i understand." Referrals sent via [facility]. Order for PPD initiated   "

## 2018-02-16 NOTE — PLAN OF CARE
Martina from [facility] called to ask some clinical questions regarding patient's dialysis & IV antibiotics. Martina reports patient is under review for admit but they are strongly considering her as she is a returning patient. SNF will transport patient to & from her current dialysis facility. SNF will apply for auth from patient's insurance Monday once they accept. Will continue to follow

## 2018-02-16 NOTE — PROGRESS NOTES
"Renal Progress Note    Admit Date: 2/7/2018   LOS: 9 days      67 y.o. white female with known MRSA bacteremia who was transferred here for ID and Neurosurgery services.  She has a history of ESRD, Afib, cardiomyopathy, and DM.  She has been hospitalized with bacteremia from a Right SC Malvin which was removed at outside facility.  She had a left femoral Jaspreet placed 2/5/2018.  Blood cultures remained positive; RANDA  normal, MRI of thoracic and lumbar spine showed abnormal foci thought  "probably a hemangioma or metastasis".  She normally dialyses MWF in [place], [place].  She is a very poor historian and says she does not know why her kidneys failed requiring she start HD in November 2017.  "You need to ask my daughter all these questions.  I just don't know."  No other vascular access ie AVF or grafts noted.    SUBJECTIVE:      Uneventful night.  C/o constipation.  No CP/SOB.  Labs noted.  Working with therapy at present. Discussed with ID.     Scheduled Meds:   sodium chloride 0.9%   Intravenous Once    sodium chloride 0.9%   Intravenous Once    calcitRIOL  0.25 mcg Oral Daily    carvedilol  25 mg Oral BID    epoetin davion (PROCRIT) injection  20,000 Units Subcutaneous Every Mon, Wed, Fri    furosemide  80 mg Oral BID    heparin (porcine)  5,000 Units Subcutaneous Q12H    insulin aspart  1-10 Units Subcutaneous TIDWM    polyethylene glycol  17 g Oral Daily    sevelamer carbonate  800 mg Oral TID WM    SITagliptin  25 mg Oral Daily    vitamin D  2,000 Units Oral Daily    vitamin renal formula (B-complex-vitamin c-folic acid)  1 capsule Oral Daily       OBJECTIVE:     Vital Signs Range (Last 24H):  Temp:  [96.5 °F (35.8 °C)-98.9 °F (37.2 °C)]   Pulse:  [56-65]   Resp:  [16-20]   BP: (109-144)/(51-63)   SpO2:  [96 %-98 %]     I & O (Last 24H):    Intake/Output Summary (Last 24 hours) at 02/16/18 1010  Last data filed at 02/16/18 0600   Gross per 24 hour   Intake              150 ml   Output                0 ml "   Net              150 ml       Physical Exam:  Constitutional: She is oriented to person, place, and time. Morbidly obese, well-nourished.   Head: Normocephalic.   Eyes: Conjunctivae are normal.    Neck: Normal range of motion. Neck supple.   Cardiovascular: Regular rhythm, normal heart sounds. Bradycardia  Pulmonary/Chest: Effort normal and breath sounds normal. No respiratory distress.   Abdominal: Soft, obese. Bowel sounds are normal. She exhibits no distension. There is no tenderness.   Ext:  No CCE.   Neurological: NF  Skin: Skin is warm and dry + pallor  Psychiatric: She has a normal mood and affect. Her speech is normal and behavior is normal. Thought content normal, very pleasant.  Poor historian.   Access: R IJ EULALIO      Laboratory:  CBC:     Recent Labs  Lab 02/16/18  0529   WBC 8.29   RBC 3.18*   HGB 8.8*   HCT 27.6*      MCV 87   MCH 27.7   MCHC 31.9*     BMP:     Recent Labs  Lab 02/16/18  0529   GLU 87   *   K 5.5*   CL 97   CO2 25   BUN 40*   CREATININE 6.3*   CALCIUM 8.5*   MG 1.9       Recent Labs  Lab 02/16/18  0529   CPK 2421*     ASSESSMENT/PLAN:     66 YO WF with ESRD and catheter associated MRSA bacteremia and suspected lumbar foci presents from [facility] for ID and NS services.    1. ESRD:  Dr. Nichols pull line 2/11 for holiday, however electrolytes warranted new line for HD on 2/12.  R IJ EULALIO placed 2/12 and pt had HD but lines clotted after 2 hours.  Will ensure heparin bolus with each HD. Renally dose all meds and antimicrobials. Cubicin on hold secondary to rhabdo. Labs noted and dialysate adjusted. ?Recirculation vs ?  UF goal 2-3L as BP tolerates.Renally dose meds, avoid nephrotoxins, and monitor I/O's closely.  2. Hyperkalemia from dietary choices vs  (E87.5):  Changed diet and consulted dietary for counseling.  Low K bath.  Kayexalate X 1 again.  Run on 1K for 1 hour then 2K.  Larger kidney also.   3. Rhabdo from Cubicin (M62.82):  Discussed with ID.  Holding for  now.   4. Anemia of CKD:   holding IV iron given active infection.  Epo.  Folate and B12 normal.  5. Hyperphos:  on Renvela now.  6. Hyponatremia- resolving with HD.  f/u.  7. 2HPT/Vit D deficiency:  Started Vit D3 and calcitriol.  8. DM:  Currently on SSI.  Reduced Januvia to ESRD dosing of 25mg/d.  9. Afib:  On Carvedilol.  Defer to cards. Defer anticoagulation to Cards.    10. MRSA Bacteremia: follow cultures.  Last negative cultures 2/13.  Restarting Vanc today per ID.   11. Constipation (K59.00):  Colace/miralax/kayexalate.     Ilan Perez, BANDARP  Nephrology

## 2018-02-16 NOTE — SUBJECTIVE & OBJECTIVE
Interval History: Still complaining of back pain. Daptomycin stopped due to rising CPK.    Review of Systems   Constitutional: Negative for chills and fever.   HENT: Negative.    Respiratory: Negative.    Gastrointestinal: Negative.    Musculoskeletal: Positive for back pain (right flank).   All other systems reviewed and are negative.    Objective:     Vital Signs (Most Recent):  Temp: 96.5 °F (35.8 °C) (02/16/18 0744)  Pulse: 65 (02/16/18 0839)  Resp: 16 (02/16/18 0839)  BP: (!) 141/63 (02/16/18 0744)  SpO2: 97 % (02/16/18 0839) Vital Signs (24h Range):  Temp:  [96.5 °F (35.8 °C)-98.9 °F (37.2 °C)] 96.5 °F (35.8 °C)  Pulse:  [56-65] 65  Resp:  [16-20] 16  SpO2:  [96 %-98 %] 97 %  BP: (109-144)/(51-63) 141/63     Weight: 108 kg (238 lb 3.2 oz)  Body mass index is 42.2 kg/m².    Estimated Creatinine Clearance: 10.2 mL/min (A) (based on SCr of 6.3 mg/dL (H)).    Physical Exam   Constitutional: She appears well-developed and well-nourished.   HENT:   Head: Normocephalic and atraumatic.   Eyes: EOM are normal. Pupils are equal, round, and reactive to light.   Neck: Neck supple.   Cardiovascular: Normal rate, regular rhythm and normal heart sounds.    Pulmonary/Chest: Effort normal and breath sounds normal.   Abdominal: Soft. Bowel sounds are normal.   Musculoskeletal: Normal range of motion. She exhibits no edema.        Back:    Neurological: She is alert.   Skin: Skin is warm and dry.   Nursing note and vitals reviewed.      Significant Labs:   Blood Culture:   Recent Labs  Lab 02/08/18  0028 02/10/18  0525 02/13/18  1132   LABBLOO No growth after 5 days.  Gram stain vicente bottle: Gram positive cocci in clusters resembling Staph   Results called to and read back by: Anurag Jones RN  02/09/2018    01:38  METHICILLIN RESISTANT STAPHYLOCOCCUS AUREUSID consult [facility] at [facility] [place] [facility] [place] locations. Gram stain aer bottle: Gram positive cocci in clusters resembling Staph   Results called to and read  back by: Margaux Patel RN  02/12/2018  01:03  METHICILLIN RESISTANT STAPHYLOCOCCUS AUREUSID consult required at [facility] [place] and [place].For susceptibility see order #[id number] No Growth to date  No Growth to date  No Growth to date  No Growth to date  No Growth to date  No Growth to date     CBC:   Recent Labs  Lab 02/15/18  0519 02/16/18  0529   WBC 8.25 8.29   HGB 8.7* 8.8*   HCT 27.3* 27.6*    207     CMP:   Recent Labs  Lab 02/15/18  0519 02/16/18  0529   * 130*   K 5.3* 5.5*   CL 98 97   CO2 24 25    87   BUN 30* 40*   CREATININE 5.2* 6.3*   CALCIUM 8.5* 8.5*   PROT 6.1 6.1   ALBUMIN 1.7* 1.8*   BILITOT 0.2 0.2   ALKPHOS 69 65   * 294*   * 159*   ANIONGAP 7* 8   EGFRNONAA 8* 6*       Significant Imaging: None

## 2018-02-16 NOTE — PLAN OF CARE
Problem: Physical Therapy Goal  Goal: Physical Therapy Goal  Outcome: Ongoing (interventions implemented as appropriate)  Goals to be met by: 3/19/18    Patient will increase functional independence with mobility by performin) Rolling bilaterally with Mod I  2) Sup<>sit with independence and HOB flat  3) Sit <>stand with Mod I and RW  4) Bed<>chair with Mod I and RW  5) Pt ambulate 75 feet Mod I with RW  6) Perform BLE therex X10 with independence     Comments: Initial evaluation complete PT to follow. Recommending SNF placement upon hospital discharge 2* to pts decreased functional mobility level and current requires assistance for bed mobility.

## 2018-02-16 NOTE — PLAN OF CARE
Problem: Patient Care Overview  Goal: Plan of Care Review  Outcome: Ongoing (interventions implemented as appropriate)  Pt on RA sat's 97%

## 2018-02-16 NOTE — SUBJECTIVE & OBJECTIVE
Interval History: The patient still has some complaints of back pain    Review of Systems  Objective:     Vital Signs (Most Recent):  Temp: 98 °F (36.7 °C) (02/16/18 1110)  Pulse: (!) 55 (02/16/18 1230)  Resp: 18 (02/16/18 1110)  BP: (!) 109/53 (02/16/18 1230)  SpO2: 97 % (02/16/18 0839) Vital Signs (24h Range):  Temp:  [96.5 °F (35.8 °C)-98.3 °F (36.8 °C)] 98 °F (36.7 °C)  Pulse:  [53-65] 55  Resp:  [16-18] 18  SpO2:  [96 %-98 %] 97 %  BP: (108-152)/(53-70) 109/53     Weight: 108 kg (238 lb 3.2 oz)  Body mass index is 42.2 kg/m².    Intake/Output Summary (Last 24 hours) at 02/16/18 1308  Last data filed at 02/16/18 0600   Gross per 24 hour   Intake              150 ml   Output                0 ml   Net              150 ml      Physical Exam   Constitutional: She is oriented to person, place, and time. She appears well-developed and well-nourished.   HENT:   Head: Normocephalic.   Eyes: Conjunctivae are normal. Right eye exhibits no discharge. Left eye exhibits no discharge.   Neck: Normal range of motion. Neck supple.   Cardiovascular: Regular rhythm and normal heart sounds.  Bradycardia present.    Pulmonary/Chest: Effort normal and breath sounds normal. No respiratory distress.   Abdominal: Soft. Bowel sounds are normal. She exhibits no distension. There is no tenderness.   Musculoskeletal: Normal range of motion.   Neurological: She is alert and oriented to person, place, and time.   Skin: Skin is warm and dry.   Psychiatric: She has a normal mood and affect. Her speech is normal and behavior is normal. Judgment and thought content normal.     Significant Labs:   CBC:   Recent Labs  Lab 02/15/18  0519 02/16/18  0529   WBC 8.25 8.29   HGB 8.7* 8.8*   HCT 27.3* 27.6*    207     CMP:   Recent Labs  Lab 02/15/18  0519 02/16/18  0529   * 130*   K 5.3* 5.5*   CL 98 97   CO2 24 25    87   BUN 30* 40*   CREATININE 5.2* 6.3*   CALCIUM 8.5* 8.5*   PROT 6.1 6.1   ALBUMIN 1.7* 1.8*   BILITOT 0.2 0.2    ALKPHOS 69 65   * 294*   * 159*   ANIONGAP 7* 8   EGFRNONAA 8* 6*     Magnesium:   Recent Labs  Lab 02/15/18  0519 02/16/18  0529   MG 2.0 1.9       Significant Imaging: I have reviewed and interpreted all pertinent imaging results/findings within the past 24 hours.

## 2018-02-16 NOTE — PROGRESS NOTES
"[facility]  Infectious Disease  Progress Note    Patient Name: Yumiko Proctor  MRN: [id number]  Admission Date: 2/7/2018  Length of Stay: 9 days  Attending Physician: Patricia Lu MD  Primary Care Provider: Primary Doctor No    Isolation Status: Contact  Assessment/Plan:      * MRSA bacteremia    - blood cultures negative from 2/13  - holding daptomycin, check CK today  - will resume vancomycin for now and follow cultures.  - repeat blood cultures tomorrow   - consider transfer back to [place] if blood cultures remain negative.        Drug-induced muscle inflammation    Follow CPK - holding daptomycin        Abnormal liver enzymes    Check CPK today.                 Thank you for your consult. I will follow-up with patient. Please contact us if you have any additional questions.    Shahid Bhakta MD  Infectious Disease  [facility]    Subjective:     Principal Problem:MRSA bacteremia    HPI: This is a 66 yo woman with ESRD transferred to [facility] for persistent bacteremia due to MRSA. She reportedly had multiple studies performed at [facility] in [place], including MR imaging, CT imaging, a bone scan, and a RANDA, all of which were "negative." An indwelling HD catheter was discontinued and a left groin HD catheter was placed. The patient feels sick but denies any rigors or irving fever. The patient was admitted last night and placed on cefepime and Cipro in addition to the daptomycin. I am consulted for ID evaluation.    Interval History: Still complaining of back pain. Daptomycin stopped due to rising CPK.    Review of Systems   Constitutional: Negative for chills and fever.   HENT: Negative.    Respiratory: Negative.    Gastrointestinal: Negative.    Musculoskeletal: Positive for back pain (right flank).   All other systems reviewed and are negative.    Objective:     Vital Signs (Most Recent):  Temp: 96.5 °F (35.8 °C) (02/16/18 0744)  Pulse: 65 (02/16/18 0839)  Resp: 16 (02/16/18 " 0839)  BP: (!) 141/63 (02/16/18 0744)  SpO2: 97 % (02/16/18 0839) Vital Signs (24h Range):  Temp:  [96.5 °F (35.8 °C)-98.9 °F (37.2 °C)] 96.5 °F (35.8 °C)  Pulse:  [56-65] 65  Resp:  [16-20] 16  SpO2:  [96 %-98 %] 97 %  BP: (109-144)/(51-63) 141/63     Weight: 108 kg (238 lb 3.2 oz)  Body mass index is 42.2 kg/m².    Estimated Creatinine Clearance: 10.2 mL/min (A) (based on SCr of 6.3 mg/dL (H)).    Physical Exam   Constitutional: She appears well-developed and well-nourished.   HENT:   Head: Normocephalic and atraumatic.   Eyes: EOM are normal. Pupils are equal, round, and reactive to light.   Neck: Neck supple.   Cardiovascular: Normal rate, regular rhythm and normal heart sounds.    Pulmonary/Chest: Effort normal and breath sounds normal.   Abdominal: Soft. Bowel sounds are normal.   Musculoskeletal: Normal range of motion. She exhibits no edema.        Back:    Neurological: She is alert.   Skin: Skin is warm and dry.   Nursing note and vitals reviewed.      Significant Labs:   Blood Culture:   Recent Labs  Lab 02/08/18  0028 02/10/18  0525 02/13/18  1132   LABBLOO No growth after 5 days.  Gram stain vicente bottle: Gram positive cocci in clusters resembling Staph   Results called to and read back by: Anurag Jones RN  02/09/2018    01:38  METHICILLIN RESISTANT STAPHYLOCOCCUS AUREUSID consult required at [facility] and [facility] locations. Gram stain aer bottle: Gram positive cocci in clusters resembling Staph   Results called to and read back by: Margaux Patel RN  02/12/2018  01:03  METHICILLIN RESISTANT STAPHYLOCOCCUS AUREUSID consult required at [facility].For susceptibility see order #[id number] No Growth to date  No Growth to date  No Growth to date  No Growth to date  No Growth to date  No Growth to date     CBC:   Recent Labs  Lab 02/15/18  0519 02/16/18  0529   WBC 8.25 8.29   HGB 8.7* 8.8*   HCT 27.3* 27.6*    207     CMP:   Recent Labs  Lab  02/15/18  0519 02/16/18  0529   * 130*   K 5.3* 5.5*   CL 98 97   CO2 24 25    87   BUN 30* 40*   CREATININE 5.2* 6.3*   CALCIUM 8.5* 8.5*   PROT 6.1 6.1   ALBUMIN 1.7* 1.8*   BILITOT 0.2 0.2   ALKPHOS 69 65   * 294*   * 159*   ANIONGAP 7* 8   EGFRNONAA 8* 6*       Significant Imaging: None

## 2018-02-16 NOTE — ASSESSMENT & PLAN NOTE
- blood cultures negative from 2/13  - holding daptomycin, check CK today  - will resume vancomycin for now and follow cultures.  - repeat blood cultures tomorrow   - consider transfer back to [place] if blood cultures remain negative.

## 2018-02-16 NOTE — PLAN OF CARE
Problem: Occupational Therapy Goal  Goal: Occupational Therapy Goal  Goals to be met by: 02/26/18     Patient will increase functional independence with ADLs by performing:    UE Dressing with Minimal Assistance.  LE Dressing with Maximum Assistance.  Rolling to Bilateral with Contact Guard Assistance.   Supine to sit with Minimal Assistance.  Upper extremity exercise program x10 reps per handout, with assistance as needed.  Assess sit to stand transfers.    Outcome: Ongoing (interventions implemented as appropriate)  Initial OT evaluation completed, with treatment to follow.  Unable to tolerate EOB for any period of time secondary to significant back pain.  Unable, in this session, to assess transfers secondary to pain.  Will continue to benefit from acute OT services to restore PLOF.    Comments: PATTY Alonzo, 2/16/2018

## 2018-02-16 NOTE — PT/OT/SLP EVAL
Physical Therapy Evaluation/Treatment Session    Patient Name:  Yumiko Proctor   MRN:  [id number]    Recommendations:     Discharge Recommendations:  nursing facility, skilled   Discharge Equipment Recommendations:  (Defer to SNF)   Barriers to discharge: None    Assessment:     Yumiko Proctor is a 67 y.o. female admitted with a medical diagnosis of MRSA bacteremia.  She presents with the following impairments/functional limitations:  weakness, impaired endurance, impaired self care skills, impaired functional mobilty, gait instability, impaired balance, decreased lower extremity function, decreased upper extremity function, decreased coordination, decreased safety awareness, pain, decreased ROM, impaired coordination. At this time pt appears to be mainly limited by pain. Pt requires Mod Assistance for bed mobility. Pt was able to briefly clear bed to scoot towards HOB while sitting. Pt tolerated ~7 minutes sitting EOB. Pt requires frequent reassurance. Pt would benefit from continued acute care PT services as well as SNF placement upon hospital discharge to improve current impairments and return safely to her PLOF.      Rehab Prognosis:  fair; patient would benefit from acute skilled PT services to address these deficits and reach maximum level of function.      Recent Surgery: Procedure(s) (LRB):  INSERTION-CATHETER-DIALYSIS (N/A) 4 Days Post-Op    Plan:     During this hospitalization, patient to be seen 6 x/week to address the above listed problems via therapeutic activities, gait training, therapeutic exercises  · Plan of Care Expires:  03/19/18   Plan of Care Reviewed with: patient    Subjective     Communicated with JOEL Reinoso prior to session.  Patient found R SL on wedge with HOB elevated upon PT entry to room, agreeable to evaluation.      Chief Complaint: pain, decreased mobility and strength   Patient comments/goals: improve current impairments and return to PLOF.   Pain/Comfort:  · Pain Rating 1:  9/10  · Location - Side 1: Right  · Location 1: back  · Pain Addressed 1: Reposition, Distraction  · Pain Rating Post-Intervention 1: 8/10    Patients cultural, spiritual, [employer] conflicts given the current situation: None verbalized to PT    Living Environment:  Pt lives in a [facility] with ramp to enter and tub/shower combo with her granddaughter, granddaughter's SO and great grandson. PTA pt reports being Mod I with all ADLs, except bathing. She denies any recent falls, last fall was in September and reports using a Rolator for household ambulation and W/C for community distances.   Prior to admission, patients level of function was Mod I,except she required assistance for seated showers.  Patient has the following equipment: rollator, wheelchair.  DME owned (not currently used): transfer tub bench and 3-in-1.  Upon discharge, patient will have assistance from granddaughter.    Objective:     General Precautions: Standard, anti-coagulation medicine, diabetic, fall, contact   Orthopedic Precautions:N/A   Braces: N/A     Exams:  · Cognitive Exam:  Patient is oriented to Person, Place, Time and Situation and follows 100% of simple commands   · Gross Motor Coordination:  impaired  · Postural Exam:  Patient presented with the following abnormalities:    · -       Rounded shoulders  · -       Forward head  · -       Kyphosis  · Sensation:  Intact  · RLE ROM: WFL  · RLE Strength: Unable to formally assess 2* to increased pain, based on observation pt demonstrated 3/5. Pt unable to clear bed when performing hip flexion.   · LLE ROM: WFL  · LLE Strength: Unable to formally assess 2* to increased pain, based on observation pt demonstrated 3/5. Pt unable to clear bed when performing hip flexion.     Functional Mobility:  · Bed Mobility:     · Rolling Left:  moderate assistance and use of bed rail  · Rolling Right: moderate assistance and use of bed rail  · Scooting: Min A towards EOB in sitting, Max A with bed in  "trendelenburg towards HOB in supine.    · Bridging: Unable to clear bed.   · Supine to Sit: moderate assistance  · Sit to Supine: moderate assistance  · Balance: Sitting Balance: Good. Standing balance: not able to assess during evaluation.     AM-PAC 6 CLICK MOBILITY  Total Score:10       Therapeutic Activities and Exercises:  -Pt sat EOB ~7 minutes.   -Pt refused to stand during evaluation pt stating "I just can't do it"  -While sitting EOB pt attempted to scoot towards HOB. PT educated pt on proper hand placement on the bed, pt stated "I can't do that" and placed hand on Rw. Pt was able to clear bed on first attempt and scoot towards HOB. On second attempt pt was only briefly able to clear bed.   -Once in supine with HOB in trendelenburg pt was able to bed B knees and assist with scooting towards of HOB, required Max A.  -Extensive motivation, encouragement and education on importance of increased mobility and repositioning to assist with back pain.     Patient left HOB elevated with call button in reach.    GOALS:    Physical Therapy Goals        Problem: Physical Therapy Goal    Goal Priority Disciplines Outcome Goal Variances Interventions   Physical Therapy Goal     PT/OT, PT Ongoing (interventions implemented as appropriate)                 1) Rolling bilaterally with Mod I  2) Sup<>sit with independence and HOB flat  3) Sit <>stand with Mod I and RW  4) Bed<>chair with Mod I and RW  5) Pt ambulate 75 feet Mod I with RW  6) Perform BLE therex X10 with independence     History:     Past Medical History:   Diagnosis Date    A-fib     Cardiomyopathy     Diabetes mellitus     ESRD (end stage renal disease)        History reviewed. No pertinent surgical history.    Clinical Decision Making:     History  Co-morbidities and personal factors that may impact the plan of care Examination  Body Structures and Functions, activity limitations and participation restrictions that may impact the plan of care Clinical " Presentation   Decision Making/ Complexity Score   Co-morbidities:   [x] Time since onset of injury / illness / exacerbation  [] Status of current condition  []Patient's cognitive status and safety concerns    [] Multiple Medical Problems (see med hx)  Personal Factors:   [] Patient's age  [] Prior Level of function   [] Patient's home situation (environment and family support)  [x] Patient's level of motivation  [] Expected progression of patient      HISTORY:(criteria)    [] 92014 - no personal factors/history    [x] 41897 - has 1-2 personal factor/comorbidity     [] 05242 - has >3 personal factor/comorbidity     Body Regions:  [x] Objective examination findings  [] Head     []  Neck  [x] Trunk   [x] Upper Extremity  [x] Lower Extremity    Body Systems:  [] For communication ability, affect, cognition, language, and learning style: the assessment of the ability to make needs known, consciousness, orientation (person, place, and time), expected emotional /behavioral responses, and learning preferences (eg, learning barriers, education  needs)  [] For the neuromuscular system: a general assessment of gross coordinated movement (eg, balance, gait, locomotion, transfers, and transitions) and motor function  (motor control and motor learning)  [x] For the musculoskeletal system: the assessment of gross symmetry, gross range of motion, gross strength, height, and weight  [] For the integumentary system: the assessment of pliability(texture), presence of scar formation, skin color, and skin integrity  [] For cardiovascular/pulmonary system: the assessment of heart rate, respiratory rate, blood pressure, and edema     Activity limitations:    [] Patient's cognitive status and saf ety concerns          [] Status of current condition      [] Weight bearing restriction  [] Cardiopulmunary Restriction    Participation Restrictions:   [] Goals and goal agreement with the patient     [] Rehab potential (prognosis) and probable  outcome      Examination of Body System: (criteria)    [] 51517 - addressing 1-2 elements    [x] 14591 - addressing a total of 3 or more elements     [] 59640 -  Addressing a total of 4 or more elements         Clinical Presentation: (criteria)  Stable - 52872     On examination of body system using standardized tests and measures patient presents with 4 or more elements from any of the following: body structures and functions, activity limitations, and/or participation restrictions.  Leading to a clinical presentation that is considered stable and/or uncomplicated                              Clinical Decision Making  (Eval Complexity):  Low- 64202     Time Tracking:     PT Received On: 02/16/18  PT Start Time: 0917     PT Stop Time: 0947  PT Total Time (min): 30 min     Billable Minutes: Evaluation 20 and Therapeutic Activity 10      Rael Oliva, PT, DPT  02/16/2018     Addendum includes insertion of PT of record's goals from Care Plan into Evaluation documentation. No other changes made to documentation, goals, or plan of care. Pt is appropriate for follow up with PTA. Yudi Collier PT, DPT  2/20/2018

## 2018-02-16 NOTE — PT/OT/SLP EVAL
Occupational Therapy   Evaluation    Name: Yumiko Proctor  MRN: [id number]  Admitting Diagnosis:  MRSA bacteremia 4 Days Post-Op   B306/B306 A    Recommendations:     Discharge Recommendations: nursing facility, skilled  Discharge Equipment Recommendations:   (TBD)  Barriers to discharge:  None    History:     Occupational Profile:  Living Environment: Patient resides with her bravo, bravo's S.O. And deirdre in a 1 story home with ramp entrance.  She has a tub/shower combination.  Previous level of function: Occasional Min assist for seated showers, otherwise reports Modified independent; family transports patient  Roles and Routines: Mother, grandmother, great grandmother; enjoys television  Equipment Owned:  rollator, wheelchair, other (see comments) (TTB; owns, not using, 3-in-1)  Assistance upon Discharge: Bravo is available to assist    Past Medical History:   Diagnosis Date    A-fib     Cardiomyopathy     Diabetes mellitus     ESRD (end stage renal disease)        History reviewed. No pertinent surgical history.    Subjective     Chief Complaint: Back pain  Patient/Family stated goals: Less back pain  Communicated with: RN prior to session.  Pain/Comfort:  · Pain Rating 1: 8/10  · Location - Side 1: Right  · Location - Orientation 1: lateral  · Location 1: back  · Pain Addressed 1: Pre-medicate for activity, Reposition, Distraction, Nurse notified  · Pain Rating Post-Intervention 1: 8/10    Patients cultural, spiritual, Rastafarian conflicts given the current situation: No    Objective:     Patient found with:      General Precautions: Standard, anti-coagulation medicine, diabetic, fall, contact (fluid restriction, elevate HOB, (R) IJ HD cath 02/12/18)   Orthopedic Precautions:N/A   Braces: N/A     Occupational Performance:    Bed Mobility:    · Patient completed Rolling/Turning to Left with  moderate assistance and with side rail  · Patient completed Rolling/Turning to Right with moderate  assistance and with side rail  · Patient completed Scooting/Bridging with contact guard assistance  · Patient completed Supine to Sit with moderate assistance  · Patient completed Sit to Supine with moderate assistance    Functional Mobility/Transfers:  · NT  · Functional Mobility: NT    Activities of Daily Living:  · Grooming: stand by assistance seated at EOB  · UB Dressing: moderate assistance seated ay EOB  · LB Dressing: total assistance bed level  · Toileting: total assistance bed level after BM    Cognitive/Visual Perceptual:  Cognitive/Psychosocial Skills:     -       Oriented to: Person, Place, Time and Situation   -       Follows Commands/attention:Follows two-step commands  -       Communication: clear/fluent  -       Memory: No Deficits noted  -       Safety awareness/insight to disability: intact   -       Mood/Affect/Coping skills/emotional control: Tearful  Visual/Perceptual:      -reports macular degeneration    Physical Exam:  Postural examination/scapula alignment:    -       Rounded shoulders  -       Forward head  Skin integrity: Thin, Dry and brusing UEs  Sensation:    -       Intact  light/touch (B) UEs  Upper Extremity Range of Motion:     -       Right Upper Extremity: Deficits: shoulder significantly limited  -       Left Upper Extremity: Deficits: shoulder significantly limited  Upper Extremity Strength:    -       Right Upper Extremity: grossly 3- to 3+/5  -       Left Upper Extremity: grossly 3- to 3+/5   Strength:    -       Right Upper Extremity: WFL  -       Left Upper Extremity: WFL    Patient left right sidelying with all lines intact, call button in reach and RN notified    AMPA 6 Click:  AMPA Total Score: 12    Treatment & Education:  Educated to role of OT and POC.  Patient verbalized understanding.  Education:    Assessment:     Yumiko Proctor is a 67 y.o. female with a medical diagnosis of MRSA bacteremia.  She presents with with the following performance deficits affecting  "function: weakness, impaired endurance, impaired self care skills, impaired functional mobilty, gait instability, impaired balance, decreased lower extremity function, decreased upper extremity function, decreased safety awareness, pain, decreased ROM, impaired skin.  Initial OT evaluation completed, with treatment to follow.  Unable to tolerate EOB for any period of time secondary to significant back pain.  Unable, in this session, to assess transfers secondary to pain.  Will continue to benefit from acute OT services to restore PLOF.    Rehab Prognosis:  Good; patient would benefit from acute skilled OT services to address these deficits and reach maximum level of function.         Clinical Decision Makin.  OT Mod:  "Pt evaluation falls under moderate complexity for evaluation coding due to identification of 3-5 performance deficits noted as stated above. Eval required Min/Mod assistance to complete on this date and detailed assessment(s) were utilized. Moreover, an expanded review of history and occupational profile obtained with additional review of cognitive, physical and psychosocial hx."     Plan:     Patient to be seen 5 x/week to address the above listed problems via self-care/home management, therapeutic activities, therapeutic exercises  · Plan of Care Expires: 18  · Plan of Care Reviewed with: patient    This Plan of care has been discussed with the patient who was involved in its development and understands and is in agreement with the identified goals and treatment plan    GOALS:    Occupational Therapy Goals        Problem: Occupational Therapy Goal    Goal Priority Disciplines Outcome Interventions   Occupational Therapy Goal     OT, PT/OT Ongoing (interventions implemented as appropriate)    Description:  Goals to be met by: 18     Patient will increase functional independence with ADLs by performing:    UE Dressing with Minimal Assistance.  LE Dressing with Maximum " Assistance.  Rolling to Bilateral with Contact Guard Assistance.   Supine to sit with Minimal Assistance.  Upper extremity exercise program x10 reps per handout, with assistance as needed.  Assess sit to stand transfers.                      Time Tracking:     OT Date of Treatment: 02/16/18  OT Start Time: 0800  OT Stop Time: 0822  OT Total Time (min): 22 min    Billable Minutes:Evaluation 22    PATTY Alonzo  2/16/2018

## 2018-02-17 PROBLEM — R78.81 BACTEREMIA DUE TO METHICILLIN RESISTANT STAPHYLOCOCCUS AUREUS: Status: ACTIVE | Noted: 2018-02-17

## 2018-02-17 PROBLEM — B95.62 BACTEREMIA DUE TO METHICILLIN RESISTANT STAPHYLOCOCCUS AUREUS: Status: ACTIVE | Noted: 2018-02-17

## 2018-02-17 LAB
ALBUMIN SERPL BCP-MCNC: 1.7 G/DL
ALP SERPL-CCNC: 70 U/L
ALT SERPL W/O P-5'-P-CCNC: 134 U/L
ANION GAP SERPL CALC-SCNC: 8 MMOL/L
AST SERPL-CCNC: 177 U/L
BASOPHILS # BLD AUTO: 0.03 K/UL
BASOPHILS NFR BLD: 0.5 %
BILIRUB SERPL-MCNC: 0.2 MG/DL
BUN SERPL-MCNC: 20 MG/DL
CALCIUM SERPL-MCNC: 8.5 MG/DL
CHLORIDE SERPL-SCNC: 100 MMOL/L
CK SERPL-CCNC: 1297 U/L
CO2 SERPL-SCNC: 27 MMOL/L
CREAT SERPL-MCNC: 4 MG/DL
DIFFERENTIAL METHOD: ABNORMAL
EOSINOPHIL # BLD AUTO: 0.2 K/UL
EOSINOPHIL NFR BLD: 2.4 %
ERYTHROCYTE [DISTWIDTH] IN BLOOD BY AUTOMATED COUNT: 16 %
EST. GFR  (AFRICAN AMERICAN): 13 ML/MIN/1.73 M^2
EST. GFR  (NON AFRICAN AMERICAN): 11 ML/MIN/1.73 M^2
GLUCOSE SERPL-MCNC: 84 MG/DL
HCT VFR BLD AUTO: 26.3 %
HGB BLD-MCNC: 8.4 G/DL
LYMPHOCYTES # BLD AUTO: 1.6 K/UL
LYMPHOCYTES NFR BLD: 25.6 %
MAGNESIUM SERPL-MCNC: 1.9 MG/DL
MCH RBC QN AUTO: 28.1 PG
MCHC RBC AUTO-ENTMCNC: 31.9 G/DL
MCV RBC AUTO: 88 FL
MONOCYTES # BLD AUTO: 0.8 K/UL
MONOCYTES NFR BLD: 12.4 %
NEUTROPHILS # BLD AUTO: 3.7 K/UL
NEUTROPHILS NFR BLD: 58.6 %
PHOSPHATE SERPL-MCNC: 3.2 MG/DL
PLATELET # BLD AUTO: 189 K/UL
PMV BLD AUTO: 9.8 FL
POCT GLUCOSE: 108 MG/DL (ref 70–110)
POCT GLUCOSE: 122 MG/DL (ref 70–110)
POCT GLUCOSE: 150 MG/DL (ref 70–110)
POCT GLUCOSE: 161 MG/DL (ref 70–110)
POTASSIUM SERPL-SCNC: 3.9 MMOL/L
PROT SERPL-MCNC: 6 G/DL
RBC # BLD AUTO: 2.99 M/UL
SODIUM SERPL-SCNC: 135 MMOL/L
WBC # BLD AUTO: 6.29 K/UL

## 2018-02-17 PROCEDURE — 94761 N-INVAS EAR/PLS OXIMETRY MLT: CPT

## 2018-02-17 PROCEDURE — 80053 COMPREHEN METABOLIC PANEL: CPT

## 2018-02-17 PROCEDURE — 25000003 PHARM REV CODE 250: Performed by: NURSE PRACTITIONER

## 2018-02-17 PROCEDURE — 25000003 PHARM REV CODE 250: Performed by: INTERNAL MEDICINE

## 2018-02-17 PROCEDURE — 11000001 HC ACUTE MED/SURG PRIVATE ROOM

## 2018-02-17 PROCEDURE — 25000003 PHARM REV CODE 250: Performed by: PHYSICIAN ASSISTANT

## 2018-02-17 PROCEDURE — 63600175 PHARM REV CODE 636 W HCPCS: Performed by: INTERNAL MEDICINE

## 2018-02-17 PROCEDURE — 82550 ASSAY OF CK (CPK): CPT

## 2018-02-17 PROCEDURE — 36415 COLL VENOUS BLD VENIPUNCTURE: CPT

## 2018-02-17 PROCEDURE — 84100 ASSAY OF PHOSPHORUS: CPT

## 2018-02-17 PROCEDURE — 87040 BLOOD CULTURE FOR BACTERIA: CPT | Mod: 59

## 2018-02-17 PROCEDURE — 83735 ASSAY OF MAGNESIUM: CPT

## 2018-02-17 PROCEDURE — 85025 COMPLETE CBC W/AUTO DIFF WBC: CPT

## 2018-02-17 PROCEDURE — 99233 SBSQ HOSP IP/OBS HIGH 50: CPT | Mod: ,,, | Performed by: HOSPITALIST

## 2018-02-17 PROCEDURE — 99900035 HC TECH TIME PER 15 MIN (STAT)

## 2018-02-17 RX ADMIN — CARVEDILOL 25 MG: 12.5 TABLET, FILM COATED ORAL at 08:02

## 2018-02-17 RX ADMIN — HEPARIN SODIUM 5000 UNITS: 5000 INJECTION, SOLUTION INTRAVENOUS; SUBCUTANEOUS at 10:02

## 2018-02-17 RX ADMIN — OXYCODONE HYDROCHLORIDE AND ACETAMINOPHEN 1 TABLET: 5; 325 TABLET ORAL at 02:02

## 2018-02-17 RX ADMIN — HEPARIN SODIUM 5000 UNITS: 5000 INJECTION, SOLUTION INTRAVENOUS; SUBCUTANEOUS at 08:02

## 2018-02-17 RX ADMIN — OXYCODONE HYDROCHLORIDE AND ACETAMINOPHEN 1 TABLET: 5; 325 TABLET ORAL at 08:02

## 2018-02-17 RX ADMIN — CARVEDILOL 25 MG: 12.5 TABLET, FILM COATED ORAL at 10:02

## 2018-02-17 RX ADMIN — FUROSEMIDE 80 MG: 40 TABLET ORAL at 06:02

## 2018-02-17 RX ADMIN — SEVELAMER CARBONATE 800 MG: 800 TABLET, FILM COATED ORAL at 10:02

## 2018-02-17 RX ADMIN — FUROSEMIDE 80 MG: 40 TABLET ORAL at 10:02

## 2018-02-17 RX ADMIN — VITAMIN D, TAB 1000IU (100/BT) 2000 UNITS: 25 TAB at 10:02

## 2018-02-17 RX ADMIN — SITAGLIPTIN 25 MG: 25 TABLET, FILM COATED ORAL at 10:02

## 2018-02-17 RX ADMIN — Medication 1 CAPSULE: at 10:02

## 2018-02-17 RX ADMIN — SEVELAMER CARBONATE 800 MG: 800 TABLET, FILM COATED ORAL at 04:02

## 2018-02-17 RX ADMIN — CALCITRIOL 0.25 MCG: 0.25 CAPSULE, LIQUID FILLED ORAL at 10:02

## 2018-02-17 RX ADMIN — OXYCODONE HYDROCHLORIDE AND ACETAMINOPHEN 1 TABLET: 5; 325 TABLET ORAL at 10:02

## 2018-02-17 NOTE — PLAN OF CARE
Problem: Patient Care Overview  Goal: Individualization & Mutuality  Outcome: Ongoing (interventions implemented as appropriate)  Bed in low and locked position and repositioned during shift with assist.  Remains free of injury during shift.

## 2018-02-17 NOTE — ASSESSMENT & PLAN NOTE
Blood Cultures 02/08/18 MRSA in 1 set, 2nd set NGTD  Repeat Blood Cultures 02/10/18 2/2 MRSA  Lactic acid 0.9 02/08/10  Discontinued Daptomycin secondary to elevated CPK  ID consult, appreciate recs  Suspect endovascular source  New IJ catheter placed 2/12, blood cultures 2/13 are negative to date, but 2/16 culture is positive  Repeat blood cultures x2 2/17

## 2018-02-17 NOTE — PLAN OF CARE
Problem: Patient Care Overview  Goal: Plan of Care Review  Outcome: Ongoing (interventions implemented as appropriate)  Pt in no distress on Ra, no changes at this time. Will continue to monitor.

## 2018-02-17 NOTE — PLAN OF CARE
Problem: Patient Care Overview  Goal: Plan of Care Review  Outcome: Ongoing (interventions implemented as appropriate)  Patient resting in bed at this time, night light on, all important items within reach, instructed to call if needed, no injuries reported, bed in lowest and locked position, bed alarm used as needed     Saline lock   Room air  Oliguric   Lower back pain relieved with prn pain meds  Turned as needed     No complaints, questions, or concerns at this time, will cont to monitor

## 2018-02-17 NOTE — PROGRESS NOTES
[facility] Hospital Medicine  Progress Note    Patient Name: Yumiko Proctor  MRN: [id number]  Patient Class: IP- Inpatient   Admission Date: 2/7/2018  Length of Stay: 10 days  Attending Physician: Patricia Lu MD  Primary Care Provider: Primary Doctor No        Subjective:     Principal Problem:MRSA bacteremia    HPI:  The patient is a 66 yo female with known MRSA bacteremia who was transferred here for ID and Neurosurgery services.  She has a history of ESRD, Afib, cardiomyopathy, and DM.  She has been hospitalized with bacteremia from a vascular access which was taken out and replaced today with a lt femoral vas cath.  Blood cultures remained positive. She had a RANDA which was normal, MRI of thoracic and lumbar spine showed a abnormal foci in T( that was probably a hemangioma or metastasis.    Hospital Course:  Blood cultures from 02/08/18 2/4 (+) MRSA.  Patient currently receiving Daptomycin.  Cultures repeated again on 02/10/2018 and 2/2 bottles positive for MRSA.  ID on board, concerned there is endovascular source.  Left prince catheter removed yesterday and right IJ tunneled dialysis catheter placed 2/12. Repeat blood cultures 2/13 remain negative to date, but one bottle obtained 2/16 is positive for gram positive cocci in clusters resembling Staph.  Patient may have some myositis from Daptomycin with elevation in CPK to 5175 which is now decreasing and this was discontinued.  Vancomycin given after dialysis treatments.  PT/OT consulted and recommend skilled nursing upon discharge.    Interval History: The patient still has some complaints of back pain    Review of Systems  Objective:     Vital Signs (Most Recent):  Temp: 98 °F (36.7 °C) (02/16/18 1110)  Pulse: (!) 55 (02/16/18 1230)  Resp: 18 (02/16/18 1110)  BP: (!) 109/53 (02/16/18 1230)  SpO2: 97 % (02/16/18 0839) Vital Signs (24h Range):  Temp:  [96.5 °F (35.8 °C)-98.3 °F (36.8 °C)] 98 °F (36.7 °C)  Pulse:  [53-65] 55  Resp:  [16-18]  18  SpO2:  [96 %-98 %] 97 %  BP: (108-152)/(53-70) 109/53     Weight: 108 kg (238 lb 3.2 oz)  Body mass index is 42.2 kg/m².    Intake/Output Summary (Last 24 hours) at 02/16/18 1308  Last data filed at 02/16/18 0600   Gross per 24 hour   Intake              150 ml   Output                0 ml   Net              150 ml      Physical Exam   Constitutional: She is oriented to person, place, and time. She appears well-developed and well-nourished.   HENT:   Head: Normocephalic.   Eyes: Conjunctivae are normal. Right eye exhibits no discharge. Left eye exhibits no discharge.   Neck: Normal range of motion. Neck supple.   Cardiovascular: Regular rhythm and normal heart sounds.  Bradycardia present.    Pulmonary/Chest: Effort normal and breath sounds normal. No respiratory distress.   Abdominal: Soft. Bowel sounds are normal. She exhibits no distension. There is no tenderness.   Musculoskeletal: Normal range of motion.   Neurological: She is alert and oriented to person, place, and time.   Skin: Skin is warm and dry.   Psychiatric: She has a normal mood and affect. Her speech is normal and behavior is normal. Judgment and thought content normal.     Significant Labs:   CBC:   Recent Labs  Lab 02/15/18  0519 02/16/18  0529   WBC 8.25 8.29   HGB 8.7* 8.8*   HCT 27.3* 27.6*    207     CMP:   Recent Labs  Lab 02/15/18  0519 02/16/18  0529   * 130*   K 5.3* 5.5*   CL 98 97   CO2 24 25    87   BUN 30* 40*   CREATININE 5.2* 6.3*   CALCIUM 8.5* 8.5*   PROT 6.1 6.1   ALBUMIN 1.7* 1.8*   BILITOT 0.2 0.2   ALKPHOS 69 65   * 294*   * 159*   ANIONGAP 7* 8   EGFRNONAA 8* 6*     Magnesium:   Recent Labs  Lab 02/15/18  0519 02/16/18  0529   MG 2.0 1.9       Significant Imaging: I have reviewed and interpreted all pertinent imaging results/findings within the past 24 hours.    Assessment/Plan:      * MRSA bacteremia    Blood Cultures 02/08/18 MRSA in 1 set, 2nd set NGTD  Repeat Blood Cultures 02/10/18 2/2  MRSA  Lactic acid 0.9 02/08/10  Discontinued Daptomycin secondary to elevated CPK  ID consult, appreciate recs  Suspect endovascular source  New IJ catheter placed 2/12, blood cultures 2/13 are negative to date, but 2/16 culture is positive  Repeat blood cultures x2 2/17          ESRD (end stage renal disease)    Consult Nephrology  Continue Dialysis MWF  Left Femoral Jaspreet cath removed yesterday for line holiday  R IJ dialysis catheter placed 2/12  Increasing potassium to 6.3, dialysis today 2/14        Chronic atrial fibrillation    Continue Coreg  Rate controlled            Type 2 diabetes mellitus with chronic kidney disease on chronic dialysis, without long-term current use of insulin    A1c  6.5  PRN insulin  Well-controlled          Abnormal liver enzymes    Discontinued Amiodarone 2/13  AST starting to decrease, ALT still increasing, will continue to monitor            VTE Risk Mitigation         Ordered     heparin (porcine) injection 2,000 Units  As needed (PRN)     Route:  Intravenous        02/13/18 0940     heparin (porcine) injection 5,000 Units  As needed (PRN)     Route:  Intra-Catheter        02/12/18 1834     heparin (porcine) injection 5,000 Units  Every 12 hours     Route:  Subcutaneous        02/08/18 0837     Medium Risk of VTE  Once      02/07/18 2255     Place sequential compression device  Until discontinued      02/07/18 2255     Place ANGELI hose  Until discontinued      02/07/18 2255              Patricia Lu MD  Department of Hospital Medicine   [facility]

## 2018-02-17 NOTE — PLAN OF CARE
Problem: Patient Care Overview  Goal: Plan of Care Review  Outcome: Outcome(s) achieved Date Met: 02/17/18  Pt remains on RA. No distress noted.

## 2018-02-17 NOTE — PROGRESS NOTES
"Renal Progress Note    Admit Date: 2/7/2018   LOS: 10 days      67 y.o. white female with known MRSA bacteremia who was transferred here for ID and Neurosurgery services.  She has a history of ESRD, Afib, cardiomyopathy, and DM.  She has been hospitalized with bacteremia from a Right SC Malvin which was removed at outside facility.  She had a left femoral Jaspreet placed 2/5/2018.  Blood cultures remained positive; RANDA  normal, MRI of thoracic and lumbar spine showed abnormal foci thought  "probably a hemangioma or metastasis".  She normally dialyses MWF in [place], [place].  She is a very poor historian and says she does not know why her kidneys failed requiring she start HD in November 2017.  "You need to ask my daughter all these questions.  I just don't know."  No other vascular access ie AVF or grafts noted.    SUBJECTIVE:     BCx from 2/16 show GPC clusters.  CPK improving.  Stable BPs.    Scheduled Meds:   sodium chloride 0.9%   Intravenous Once    sodium chloride 0.9%   Intravenous Once    calcitRIOL  0.25 mcg Oral Daily    carvedilol  25 mg Oral BID    docusate sodium  100 mg Oral BID    epoetin davion (PROCRIT) injection  20,000 Units Subcutaneous Every Mon, Wed, Fri    furosemide  80 mg Oral BID    heparin (porcine)  5,000 Units Subcutaneous Q12H    insulin aspart  1-10 Units Subcutaneous TIDWM    polyethylene glycol  17 g Oral Daily    sevelamer carbonate  800 mg Oral TID WM    SITagliptin  25 mg Oral Daily    vancomycin (VANCOCIN) IVPB  1,000 mg Intravenous Every Mon, Wed, Fri    vitamin D  2,000 Units Oral Daily    vitamin renal formula (B-complex-vitamin c-folic acid)  1 capsule Oral Daily       OBJECTIVE:     Vital Signs Range (Last 24H):  Temp:  [97.8 °F (36.6 °C)-98.5 °F (36.9 °C)]   Pulse:  [56-71]   Resp:  [16-20]   BP: ()/(35-88)   SpO2:  [95 %-98 %]     I & O (Last 24H):    Intake/Output Summary (Last 24 hours) at 02/17/18 1343  Last data filed at 02/16/18 2200   Gross per 24 hour "   Intake              740 ml   Output             2401 ml   Net            -1661 ml       Physical Exam:  Constitutional: She is oriented to person, place, and time. Morbidly obese, well-nourished.   Head: Normocephalic.   Eyes: Conjunctivae are normal.    Neck: Normal range of motion. Neck supple.   Cardiovascular: Regular rhythm, normal heart sounds. Bradycardia  Pulmonary/Chest: Effort normal and breath sounds normal. No respiratory distress.   Abdominal: Soft, obese. Bowel sounds are normal. She exhibits no distension. There is no tenderness.   Ext:  No CCE.   Neurological: NF  Skin: Skin is warm and dry + pallor  Psychiatric: She has a normal mood and affect. Her speech is normal and behavior is normal. Thought content normal, very pleasant.  Poor historian.   Access: R JEREMÍAS EULALIO      Laboratory:  CBC:     Recent Labs  Lab 02/17/18  0450   WBC 6.29   RBC 2.99*   HGB 8.4*   HCT 26.3*      MCV 88   MCH 28.1   MCHC 31.9*     BMP:     Recent Labs  Lab 02/17/18  0450   GLU 84   *   K 3.9      CO2 27   BUN 20   CREATININE 4.0*   CALCIUM 8.5*   MG 1.9       Recent Labs  Lab 02/17/18  0450   CPK 1297*     ASSESSMENT/PLAN:     66 YO WF with ESRD and catheter associated MRSA bacteremia and suspected lumbar foci presents from [facility] for ID and NS services.    1. ESRD:  Dr. Nichols pull line 2/11 for holiday, however electrolytes warranted new line for HD on 2/12.  R IJ EULALIO placed 2/12 and pt had HD but lines clotted after 2 hours.  Will ensure heparin bolus with each HD. Renally dose all meds and antimicrobials. Cubicin on hold secondary to rhabdo. Better electrolyte clearance with longer treatment yesterday.  Next HD Monday.  Renally dose meds, avoid nephrotoxins, and monitor I/O's closely.  2. Hyperkalemia from dietary choices vs  (E87.5):  Changed diet and consulted dietary for counseling.  Low K bath.    3. Rhabdo from Cubicin (M62.82):  Discussed with ID.  Holding for now.  Resolving.  4. Anemia of CKD:   holding IV iron given active infection.  Epo.  Folate and B12 normal.  5. Hyperphos:  on Renvela now.  6. Hyponatremia- resolving with HD.    7. 2HPT/Vit D deficiency:  Started Vit D3 and calcitriol.  8. DM:  Currently on SSI.  Reduced Januvia to ESRD dosing of 25mg/d.  9. Afib:  On Carvedilol.  Defer to cards. Defer anticoagulation to Cards.    10. MRSA Bacteremia:Repeat BCx from 2/16 are positive.  Does she need new HD catheter removed again?  Consider RANDA?  Vanc per ID. Check BCx from HD line on Monday.    Ruslan Gomez MD  Nephrology

## 2018-02-18 LAB
ALBUMIN SERPL BCP-MCNC: 1.8 G/DL
ALP SERPL-CCNC: 62 U/L
ALT SERPL W/O P-5'-P-CCNC: 109 U/L
ANION GAP SERPL CALC-SCNC: 7 MMOL/L
AST SERPL-CCNC: 99 U/L
BACTERIA BLD CULT: NORMAL
BACTERIA BLD CULT: NORMAL
BASOPHILS # BLD AUTO: 0.02 K/UL
BASOPHILS NFR BLD: 0.3 %
BILIRUB SERPL-MCNC: 0.2 MG/DL
BUN SERPL-MCNC: 26 MG/DL
CALCIUM SERPL-MCNC: 8.6 MG/DL
CHLORIDE SERPL-SCNC: 99 MMOL/L
CK SERPL-CCNC: 564 U/L
CO2 SERPL-SCNC: 28 MMOL/L
CREAT SERPL-MCNC: 5.3 MG/DL
DIFFERENTIAL METHOD: ABNORMAL
EOSINOPHIL # BLD AUTO: 0.3 K/UL
EOSINOPHIL NFR BLD: 4.4 %
ERYTHROCYTE [DISTWIDTH] IN BLOOD BY AUTOMATED COUNT: 16.1 %
EST. GFR  (AFRICAN AMERICAN): 9 ML/MIN/1.73 M^2
EST. GFR  (NON AFRICAN AMERICAN): 8 ML/MIN/1.73 M^2
GLUCOSE SERPL-MCNC: 91 MG/DL
HCT VFR BLD AUTO: 27 %
HGB BLD-MCNC: 8.5 G/DL
LYMPHOCYTES # BLD AUTO: 1.9 K/UL
LYMPHOCYTES NFR BLD: 29.4 %
MAGNESIUM SERPL-MCNC: 1.8 MG/DL
MCH RBC QN AUTO: 27.8 PG
MCHC RBC AUTO-ENTMCNC: 31.5 G/DL
MCV RBC AUTO: 88 FL
MONOCYTES # BLD AUTO: 0.7 K/UL
MONOCYTES NFR BLD: 10.4 %
NEUTROPHILS # BLD AUTO: 3.5 K/UL
NEUTROPHILS NFR BLD: 54.9 %
PHOSPHATE SERPL-MCNC: 4.1 MG/DL
PLATELET # BLD AUTO: 211 K/UL
PMV BLD AUTO: 9.6 FL
POCT GLUCOSE: 137 MG/DL (ref 70–110)
POCT GLUCOSE: 138 MG/DL (ref 70–110)
POCT GLUCOSE: 139 MG/DL (ref 70–110)
POCT GLUCOSE: 94 MG/DL (ref 70–110)
POTASSIUM SERPL-SCNC: 3.8 MMOL/L
PROT SERPL-MCNC: 6 G/DL
RBC # BLD AUTO: 3.06 M/UL
SODIUM SERPL-SCNC: 134 MMOL/L
WBC # BLD AUTO: 6.43 K/UL

## 2018-02-18 PROCEDURE — 25000003 PHARM REV CODE 250: Performed by: INTERNAL MEDICINE

## 2018-02-18 PROCEDURE — 85025 COMPLETE CBC W/AUTO DIFF WBC: CPT

## 2018-02-18 PROCEDURE — 36415 COLL VENOUS BLD VENIPUNCTURE: CPT

## 2018-02-18 PROCEDURE — 84100 ASSAY OF PHOSPHORUS: CPT

## 2018-02-18 PROCEDURE — 63600175 PHARM REV CODE 636 W HCPCS: Performed by: INTERNAL MEDICINE

## 2018-02-18 PROCEDURE — 25000003 PHARM REV CODE 250: Performed by: NURSE PRACTITIONER

## 2018-02-18 PROCEDURE — 80053 COMPREHEN METABOLIC PANEL: CPT

## 2018-02-18 PROCEDURE — 99233 SBSQ HOSP IP/OBS HIGH 50: CPT | Mod: ,,, | Performed by: HOSPITALIST

## 2018-02-18 PROCEDURE — 83735 ASSAY OF MAGNESIUM: CPT

## 2018-02-18 PROCEDURE — 11000001 HC ACUTE MED/SURG PRIVATE ROOM

## 2018-02-18 PROCEDURE — 94761 N-INVAS EAR/PLS OXIMETRY MLT: CPT

## 2018-02-18 PROCEDURE — 82550 ASSAY OF CK (CPK): CPT

## 2018-02-18 PROCEDURE — 25000003 PHARM REV CODE 250: Performed by: PHYSICIAN ASSISTANT

## 2018-02-18 RX ADMIN — Medication 1 CAPSULE: at 09:02

## 2018-02-18 RX ADMIN — DOCUSATE SODIUM 100 MG: 100 CAPSULE, LIQUID FILLED ORAL at 09:02

## 2018-02-18 RX ADMIN — VITAMIN D, TAB 1000IU (100/BT) 2000 UNITS: 25 TAB at 09:02

## 2018-02-18 RX ADMIN — HEPARIN SODIUM 5000 UNITS: 5000 INJECTION, SOLUTION INTRAVENOUS; SUBCUTANEOUS at 08:02

## 2018-02-18 RX ADMIN — SEVELAMER CARBONATE 800 MG: 800 TABLET, FILM COATED ORAL at 02:02

## 2018-02-18 RX ADMIN — DOCUSATE SODIUM 100 MG: 100 CAPSULE, LIQUID FILLED ORAL at 08:02

## 2018-02-18 RX ADMIN — HEPARIN SODIUM 5000 UNITS: 5000 INJECTION, SOLUTION INTRAVENOUS; SUBCUTANEOUS at 09:02

## 2018-02-18 RX ADMIN — FUROSEMIDE 80 MG: 40 TABLET ORAL at 05:02

## 2018-02-18 RX ADMIN — CALCITRIOL 0.25 MCG: 0.25 CAPSULE, LIQUID FILLED ORAL at 09:02

## 2018-02-18 RX ADMIN — OXYCODONE HYDROCHLORIDE AND ACETAMINOPHEN 1 TABLET: 5; 325 TABLET ORAL at 09:02

## 2018-02-18 RX ADMIN — FUROSEMIDE 80 MG: 40 TABLET ORAL at 09:02

## 2018-02-18 RX ADMIN — SEVELAMER CARBONATE 800 MG: 800 TABLET, FILM COATED ORAL at 09:02

## 2018-02-18 RX ADMIN — OXYCODONE HYDROCHLORIDE AND ACETAMINOPHEN 1 TABLET: 5; 325 TABLET ORAL at 05:02

## 2018-02-18 RX ADMIN — SEVELAMER CARBONATE 800 MG: 800 TABLET, FILM COATED ORAL at 05:02

## 2018-02-18 RX ADMIN — SITAGLIPTIN 25 MG: 25 TABLET, FILM COATED ORAL at 09:02

## 2018-02-18 RX ADMIN — CARVEDILOL 25 MG: 12.5 TABLET, FILM COATED ORAL at 08:02

## 2018-02-18 NOTE — PLAN OF CARE
Problem: Patient Care Overview  Goal: Plan of Care Review  Outcome: Outcome(s) achieved Date Met: 02/18/18  Pt remains on RA. No distress noted.

## 2018-02-18 NOTE — PROGRESS NOTES
"Renal Progress Note    Admit Date: 2/7/2018   LOS: 11 days      67 y.o. white female with known MRSA bacteremia who was transferred here for ID and Neurosurgery services.  She has a history of ESRD, Afib, cardiomyopathy, and DM.  She has been hospitalized with bacteremia from a Right SC Malivn which was removed at outside facility.  She had a left femoral Jaspreet placed 2/5/2018.  Blood cultures remained positive; RANDA  normal, MRI of thoracic and lumbar spine showed abnormal foci thought  "probably a hemangioma or metastasis".  She normally dialyses MWF in [place], [place].  She is a very poor historian and says she does not know why her kidneys failed requiring she start HD in November 2017.  "You need to ask my daughter all these questions.  I just don't know."  No other vascular access ie AVF or grafts noted.    SUBJECTIVE:     BCx from 2/16 show GPC clusters resembling Staph.  CPK improving.  Stable BPs.    Scheduled Meds:   sodium chloride 0.9%   Intravenous Once    sodium chloride 0.9%   Intravenous Once    calcitRIOL  0.25 mcg Oral Daily    carvedilol  25 mg Oral BID    docusate sodium  100 mg Oral BID    epoetin davion (PROCRIT) injection  20,000 Units Subcutaneous Every Mon, Wed, Fri    furosemide  80 mg Oral BID    heparin (porcine)  5,000 Units Subcutaneous Q12H    insulin aspart U-100  1-10 Units Subcutaneous TIDWM    polyethylene glycol  17 g Oral Daily    sevelamer carbonate  800 mg Oral TID WM    SITagliptin  25 mg Oral Daily    vancomycin (VANCOCIN) IVPB  1,000 mg Intravenous Every Mon, Wed, Fri    vitamin D  2,000 Units Oral Daily    vitamin renal formula (B-complex-vitamin c-folic acid)  1 capsule Oral Daily       OBJECTIVE:     Vital Signs Range (Last 24H):  Temp:  [97.4 °F (36.3 °C)-98.6 °F (37 °C)]   Pulse:  [59-66]   Resp:  [16-18]   BP: (110-175)/(53-72)   SpO2:  [96 %-100 %]     I & O (Last 24H):    Intake/Output Summary (Last 24 hours) at 02/18/18 6948  Last data filed at 02/18/18 " 0600   Gross per 24 hour   Intake              200 ml   Output                0 ml   Net              200 ml       Physical Exam:  Constitutional: She is oriented to person, place, and time. Morbidly obese, well-nourished.   Head: Normocephalic.   Eyes: Conjunctivae are normal.    Neck: Normal range of motion. Neck supple.   Cardiovascular: Regular rhythm, normal heart sounds. Bradycardia  Pulmonary/Chest: Effort normal and breath sounds normal. No respiratory distress.   Abdominal: Soft, obese. Bowel sounds are normal. She exhibits no distension. There is no tenderness.   Ext:  No CCE.   Neurological: NF  Skin: Skin is warm and dry + pallor  Psychiatric: She has a normal mood and affect. Her speech is normal and behavior is normal. Thought content normal, very pleasant.  Poor historian.   Access: DAYTON TSAI      Laboratory:  CBC:     Recent Labs  Lab 02/18/18  0503   WBC 6.43   RBC 3.06*   HGB 8.5*   HCT 27.0*      MCV 88   MCH 27.8   MCHC 31.5*     BMP:     Recent Labs  Lab 02/18/18  0503   GLU 91   *   K 3.8   CL 99   CO2 28   BUN 26*   CREATININE 5.3*   CALCIUM 8.6*   MG 1.8       Recent Labs  Lab 02/18/18  0503   *     ASSESSMENT/PLAN:     66 YO WF with ESRD and catheter associated MRSA bacteremia and suspected lumbar foci presents from [facility] for ID and NS services.    1. ESRD:  Dr. Nichols pull line 2/11 for holiday, however electrolytes warranted new line for HD on 2/12.  R IJ EULALIO placed 2/12 and pt had HD but lines clotted after 2 hours.  Will ensure heparin bolus with each HD. Renally dose all meds and antimicrobials. Cubicin on hold secondary to rhabdo.  Next HD Monday.  Renally dose meds, avoid nephrotoxins, and monitor I/O's closely.  2. Hyperkalemia from dietary choices vs  (E87.5):  Changed diet and consulted dietary for counseling.  Low K bath.    3. Rhabdo from Cubicin (M62.82):  Discussed with ID.  Off Cubicin.  Resolving.  4. Anemia of CKD:   holding IV iron given  active infection.  Epo.  Folate and B12 normal.  5. Hyperphos:  on Renvela now.  6. Hyponatremia- resolving with HD.    7. 2HPT/Vit D deficiency:  Started Vit D3 and calcitriol.  8. DM:  Currently on SSI.  Reduced Januvia to ESRD dosing of 25mg/d.  9. Afib:  On Carvedilol.  Defer to cards. Defer anticoagulation to Cards.    10. MRSA Bacteremia:Repeat BCx from 2/16 are positive.  Does she need new HD catheter removed again?  Consider RANDA?  Vanc per ID. Check BCx from HD line on Monday.    Ruslan Gomez MD  Nephrology

## 2018-02-19 LAB
ALBUMIN SERPL BCP-MCNC: 2 G/DL
ALP SERPL-CCNC: 74 U/L
ALT SERPL W/O P-5'-P-CCNC: 92 U/L
ANION GAP SERPL CALC-SCNC: 8 MMOL/L
AST SERPL-CCNC: 65 U/L
BASOPHILS # BLD AUTO: 0.02 K/UL
BASOPHILS NFR BLD: 0.2 %
BILIRUB SERPL-MCNC: 0.3 MG/DL
BUN SERPL-MCNC: 33 MG/DL
CALCIUM SERPL-MCNC: 9.1 MG/DL
CHLORIDE SERPL-SCNC: 96 MMOL/L
CO2 SERPL-SCNC: 29 MMOL/L
CREAT SERPL-MCNC: 6.5 MG/DL
DIFFERENTIAL METHOD: ABNORMAL
EOSINOPHIL # BLD AUTO: 0.3 K/UL
EOSINOPHIL NFR BLD: 3.7 %
ERYTHROCYTE [DISTWIDTH] IN BLOOD BY AUTOMATED COUNT: 15.9 %
EST. GFR  (AFRICAN AMERICAN): 7 ML/MIN/1.73 M^2
EST. GFR  (NON AFRICAN AMERICAN): 6 ML/MIN/1.73 M^2
GLUCOSE SERPL-MCNC: 114 MG/DL
HCT VFR BLD AUTO: 27.7 %
HGB BLD-MCNC: 8.7 G/DL
LYMPHOCYTES # BLD AUTO: 2 K/UL
LYMPHOCYTES NFR BLD: 24 %
MAGNESIUM SERPL-MCNC: 1.8 MG/DL
MCH RBC QN AUTO: 27.4 PG
MCHC RBC AUTO-ENTMCNC: 31.4 G/DL
MCV RBC AUTO: 87 FL
MONOCYTES # BLD AUTO: 0.8 K/UL
MONOCYTES NFR BLD: 10 %
NEUTROPHILS # BLD AUTO: 5 K/UL
NEUTROPHILS NFR BLD: 61.6 %
PHOSPHATE SERPL-MCNC: 3.8 MG/DL
PLATELET # BLD AUTO: 218 K/UL
PMV BLD AUTO: 9.4 FL
POCT GLUCOSE: 106 MG/DL (ref 70–110)
POCT GLUCOSE: 143 MG/DL (ref 70–110)
POCT GLUCOSE: 145 MG/DL (ref 70–110)
POCT GLUCOSE: 160 MG/DL (ref 70–110)
POTASSIUM SERPL-SCNC: 4.1 MMOL/L
PROT SERPL-MCNC: 6.4 G/DL
RBC # BLD AUTO: 3.17 M/UL
SODIUM SERPL-SCNC: 133 MMOL/L
WBC # BLD AUTO: 8.14 K/UL

## 2018-02-19 PROCEDURE — 63600175 PHARM REV CODE 636 W HCPCS: Performed by: NURSE PRACTITIONER

## 2018-02-19 PROCEDURE — 63600175 PHARM REV CODE 636 W HCPCS: Performed by: INTERNAL MEDICINE

## 2018-02-19 PROCEDURE — 87040 BLOOD CULTURE FOR BACTERIA: CPT

## 2018-02-19 PROCEDURE — 84100 ASSAY OF PHOSPHORUS: CPT

## 2018-02-19 PROCEDURE — 80053 COMPREHEN METABOLIC PANEL: CPT

## 2018-02-19 PROCEDURE — 25000003 PHARM REV CODE 250: Performed by: NURSE PRACTITIONER

## 2018-02-19 PROCEDURE — 36415 COLL VENOUS BLD VENIPUNCTURE: CPT

## 2018-02-19 PROCEDURE — 25000003 PHARM REV CODE 250: Performed by: PHYSICIAN ASSISTANT

## 2018-02-19 PROCEDURE — 99900035 HC TECH TIME PER 15 MIN (STAT)

## 2018-02-19 PROCEDURE — 83735 ASSAY OF MAGNESIUM: CPT

## 2018-02-19 PROCEDURE — 87077 CULTURE AEROBIC IDENTIFY: CPT

## 2018-02-19 PROCEDURE — 99233 SBSQ HOSP IP/OBS HIGH 50: CPT | Mod: ,,, | Performed by: INTERNAL MEDICINE

## 2018-02-19 PROCEDURE — 25000003 PHARM REV CODE 250: Performed by: INTERNAL MEDICINE

## 2018-02-19 PROCEDURE — 97803 MED NUTRITION INDIV SUBSEQ: CPT

## 2018-02-19 PROCEDURE — 94761 N-INVAS EAR/PLS OXIMETRY MLT: CPT

## 2018-02-19 PROCEDURE — 99233 SBSQ HOSP IP/OBS HIGH 50: CPT | Mod: ,,, | Performed by: HOSPITALIST

## 2018-02-19 PROCEDURE — 90935 HEMODIALYSIS ONE EVALUATION: CPT

## 2018-02-19 PROCEDURE — 11000001 HC ACUTE MED/SURG PRIVATE ROOM

## 2018-02-19 PROCEDURE — 87186 SC STD MICRODIL/AGAR DIL: CPT

## 2018-02-19 PROCEDURE — 85025 COMPLETE CBC W/AUTO DIFF WBC: CPT

## 2018-02-19 RX ADMIN — HEPARIN SODIUM 5000 UNITS: 5000 INJECTION, SOLUTION INTRAVENOUS; SUBCUTANEOUS at 10:02

## 2018-02-19 RX ADMIN — FUROSEMIDE 80 MG: 40 TABLET ORAL at 12:02

## 2018-02-19 RX ADMIN — CARVEDILOL 25 MG: 12.5 TABLET, FILM COATED ORAL at 09:02

## 2018-02-19 RX ADMIN — DOCUSATE SODIUM 100 MG: 100 CAPSULE, LIQUID FILLED ORAL at 09:02

## 2018-02-19 RX ADMIN — OXYCODONE HYDROCHLORIDE AND ACETAMINOPHEN 1 TABLET: 5; 325 TABLET ORAL at 02:02

## 2018-02-19 RX ADMIN — SITAGLIPTIN 25 MG: 25 TABLET, FILM COATED ORAL at 12:02

## 2018-02-19 RX ADMIN — CARVEDILOL 25 MG: 12.5 TABLET, FILM COATED ORAL at 12:02

## 2018-02-19 RX ADMIN — SEVELAMER CARBONATE 800 MG: 800 TABLET, FILM COATED ORAL at 05:02

## 2018-02-19 RX ADMIN — VITAMIN D, TAB 1000IU (100/BT) 2000 UNITS: 25 TAB at 12:02

## 2018-02-19 RX ADMIN — HEPARIN SODIUM 5000 UNITS: 5000 INJECTION, SOLUTION INTRAVENOUS; SUBCUTANEOUS at 09:02

## 2018-02-19 RX ADMIN — SEVELAMER CARBONATE 800 MG: 800 TABLET, FILM COATED ORAL at 12:02

## 2018-02-19 RX ADMIN — DAPTOMYCIN 865 MG: 500 INJECTION, POWDER, LYOPHILIZED, FOR SOLUTION INTRAVENOUS at 05:02

## 2018-02-19 RX ADMIN — OXYCODONE HYDROCHLORIDE AND ACETAMINOPHEN 1 TABLET: 5; 325 TABLET ORAL at 09:02

## 2018-02-19 RX ADMIN — CALCITRIOL 0.25 MCG: 0.25 CAPSULE, LIQUID FILLED ORAL at 12:02

## 2018-02-19 RX ADMIN — POLYETHYLENE GLYCOL 3350 17 G: 17 POWDER, FOR SOLUTION ORAL at 12:02

## 2018-02-19 RX ADMIN — DOCUSATE SODIUM 100 MG: 100 CAPSULE, LIQUID FILLED ORAL at 12:02

## 2018-02-19 RX ADMIN — Medication 1 CAPSULE: at 12:02

## 2018-02-19 RX ADMIN — FUROSEMIDE 80 MG: 40 TABLET ORAL at 05:02

## 2018-02-19 RX ADMIN — Medication 1000 MG: at 10:02

## 2018-02-19 RX ADMIN — OXYCODONE HYDROCHLORIDE AND ACETAMINOPHEN 1 TABLET: 5; 325 TABLET ORAL at 12:02

## 2018-02-19 RX ADMIN — ERYTHROPOIETIN 20000 UNITS: 20000 INJECTION, SOLUTION INTRAVENOUS; SUBCUTANEOUS at 10:02

## 2018-02-19 NOTE — PT/OT/SLP PROGRESS
Occupational Therapy      Patient Name:  Yumiko Proctor   MRN:  [id number]    Patient not seen today secondary to Dialysis. Will follow-up when appropriate.    Tucker Knight OT  2/19/2018

## 2018-02-19 NOTE — PT/OT/SLP PROGRESS
Physical Therapy      Patient Name:  Yumiko Proctor   MRN:  [id number]    Patient away at dialysis. Will follow up.     Yudi Collier, PT

## 2018-02-19 NOTE — PROGRESS NOTES
[facility] Hospital Medicine  Progress Note    Patient Name: Yumiko Proctor  MRN: [id number]  Patient Class: IP- Inpatient   Admission Date: 2/7/2018  Length of Stay: 12 days  Attending Physician: Patricia Lu MD  Primary Care Provider: Primary Doctor No        Subjective:     Principal Problem:MRSA bacteremia    HPI:  The patient is a 68 yo female with known MRSA bacteremia who was transferred here for ID and Neurosurgery services.  She has a history of ESRD, Afib, cardiomyopathy, and DM.  She has been hospitalized with bacteremia from a vascular access which was taken out and replaced today with a lt femoral vas cath.  Blood cultures remained positive. She had a RANDA which was normal, MRI of thoracic and lumbar spine showed a abnormal foci in T( that was probably a hemangioma or metastasis.    Hospital Course:  Blood cultures from 02/08/18 2/4 (+) MRSA.  Patient currently receiving Daptomycin.  Cultures repeated again on 02/10/2018 and 2/2 bottles positive for MRSA.  ID on board, concerned there is endovascular source.  Left prince catheter removed yesterday and right IJ tunneled dialysis catheter placed 2/12. Repeat blood cultures 2/13 remain negative to date, but one bottle obtained 2/16 growing MRSA and 2/17 blood cultures are positive for gram positive cocci in clusters resembling Staph.  Patient may have some myositis from Daptomycin with elevation in CPK to 5175 which is now decreasing and this was discontinued.  Vancomycin given after dialysis treatments.  PT/OT consulted and recommend skilled nursing upon discharge.  Catheter, IV, removal after dialysis 2/19/2018.  Consult Cardiology for RANDA.    Interval History: The patient has some complaints of back pain    Review of Systems   Musculoskeletal: Positive for back pain.     Objective:     Vital Signs (Most Recent):  Temp: 98.1 °F (36.7 °C) (02/19/18 0740)  Pulse: 61 (02/19/18 1100)  Resp: 16 (02/19/18 0740)  BP: (!) 127/52 (02/19/18  1100)  SpO2: 96 % (02/19/18 0436) Vital Signs (24h Range):  Temp:  [96.9 °F (36.1 °C)-98.6 °F (37 °C)] 98.1 °F (36.7 °C)  Pulse:  [56-68] 61  Resp:  [16-18] 16  SpO2:  [96 %-99 %] 96 %  BP: ()/(43-71) 127/52     Weight: 108 kg (238 lb 3.2 oz)  Body mass index is 42.2 kg/m².  No intake or output data in the 24 hours ending 02/19/18 1110   Physical Exam   Constitutional: She is oriented to person, place, and time. She appears well-developed and well-nourished.   HENT:   Head: Normocephalic.   Eyes: Conjunctivae are normal. Right eye exhibits no discharge. Left eye exhibits no discharge.   Neck: Normal range of motion. Neck supple.   Cardiovascular: Regular rhythm and normal heart sounds.  Bradycardia present.    Pulmonary/Chest: Effort normal and breath sounds normal. No respiratory distress.   Abdominal: Soft. Bowel sounds are normal. She exhibits no distension. There is no tenderness.   Musculoskeletal: Normal range of motion.   Neurological: She is alert and oriented to person, place, and time.   Skin: Skin is warm and dry.   Psychiatric: She has a normal mood and affect. Her speech is normal and behavior is normal. Judgment and thought content normal.     Significant Labs:   CBC:   Recent Labs  Lab 02/18/18  0503 02/19/18  0537   WBC 6.43 8.14   HGB 8.5* 8.7*   HCT 27.0* 27.7*    218     CMP:   Recent Labs  Lab 02/18/18  0503 02/19/18  0536   * 133*   K 3.8 4.1   CL 99 96   CO2 28 29   GLU 91 114*   BUN 26* 33*   CREATININE 5.3* 6.5*   CALCIUM 8.6* 9.1   PROT 6.0 6.4   ALBUMIN 1.8* 2.0*   BILITOT 0.2 0.3   ALKPHOS 62 74   AST 99* 65*   * 92*   ANIONGAP 7* 8   EGFRNONAA 8* 6*     Magnesium:   Recent Labs  Lab 02/18/18  0503 02/19/18  0536   MG 1.8 1.8         Assessment/Plan:      * MRSA bacteremia    Blood Cultures 02/08/18 MRSA in 1 set, 2nd set NGTD  Repeat Blood Cultures 02/10/18 2/2 MRSA  Lactic acid 0.9 02/08/10  Discontinued Daptomycin secondary to elevated CPK  Suspect endovascular  source  New IJ catheter placed 2/12, blood cultures 2/13 are negative to date, but 2/16 culture is positive  Repeat blood cultures x2 2/17 are now positive  Will remove all lines after dialysis today for a line holiday for several days  Consult Cardiology for repeat RANDA  Check Vancomycin levels are maintained in 15-20 range          ESRD (end stage renal disease)    Continue Dialysis MWF per Nephrology  Left Femoral Jaspreet cath removed   R IJ dialysis catheter placed 2/12  Remove R IJ and IVs after dialysis today for holiday          Chronic atrial fibrillation    Continue Coreg  Rate controlled            Type 2 diabetes mellitus with chronic kidney disease on chronic dialysis, without long-term current use of insulin    A1c  6.5  PRN insulin  Well-controlled          Abnormal liver enzymes    Discontinued Amiodarone 2/13  AST/ALT decreasing            VTE Risk Mitigation         Ordered     heparin (porcine) injection 2,000 Units  As needed (PRN)     Route:  Intravenous        02/13/18 0940     heparin (porcine) injection 5,000 Units  As needed (PRN)     Route:  Intra-Catheter        02/12/18 1834     heparin (porcine) injection 5,000 Units  Every 12 hours     Route:  Subcutaneous        02/08/18 0837     Medium Risk of VTE  Once      02/07/18 2255     Place sequential compression device  Until discontinued      02/07/18 2255     Place ANGELI hose  Until discontinued      02/07/18 2255              Patricia Lu MD  Department of Hospital Medicine   [facility]

## 2018-02-19 NOTE — PROGRESS NOTES
"Renal Progress Note    Admit Date: 2/7/2018   LOS: 12 days      67 y.o. white female with known MRSA bacteremia who was transferred here for ID and Neurosurgery services.  She has a history of ESRD, Afib, cardiomyopathy, and DM.  She has been hospitalized with bacteremia from a Right SC Malvin which was removed at outside facility.  She had a left femoral Jaspreet placed 2/5/2018.  Blood cultures remained positive; RANDA  normal, MRI of thoracic and lumbar spine showed abnormal foci thought  "probably a hemangioma or metastasis".  She normally dialyses MWF in [place], [place].  She is a very poor historian and says she does not know why her kidneys failed requiring she start HD in November 2017.  "You need to ask my daughter all these questions.  I just don't know."  No other vascular access ie AVF or grafts noted.    SUBJECTIVE:     Seen on HD.  Discussed with Dr. Fernandez about +culture.  No CP/SOB.     Scheduled Meds:   calcitRIOL  0.25 mcg Oral Daily    carvedilol  25 mg Oral BID    docusate sodium  100 mg Oral BID    epoetin davion (PROCRIT) injection  20,000 Units Subcutaneous Every Mon, Wed, Fri    furosemide  80 mg Oral BID    heparin (porcine)  5,000 Units Subcutaneous Q12H    insulin aspart U-100  1-10 Units Subcutaneous TIDWM    polyethylene glycol  17 g Oral Daily    sevelamer carbonate  800 mg Oral TID WM    SITagliptin  25 mg Oral Daily    vancomycin (VANCOCIN) IVPB  1,000 mg Intravenous Every Mon, Wed, Fri    vitamin D  2,000 Units Oral Daily    vitamin renal formula (B-complex-vitamin c-folic acid)  1 capsule Oral Daily       OBJECTIVE:     Vital Signs Range (Last 24H):  Temp:  [96.9 °F (36.1 °C)-98.6 °F (37 °C)]   Pulse:  [60-68]   Resp:  [16-18]   BP: (102-172)/(51-71)   SpO2:  [96 %-99 %]     I & O (Last 24H):  No intake or output data in the 24 hours ending 02/19/18 0950    Physical Exam:  Constitutional: She is oriented to person, place, and time. Morbidly obese, well-nourished.   Head: " Normocephalic.   Eyes: Conjunctivae are normal.    Neck: Normal range of motion. Neck supple.   Cardiovascular: Regular rhythm, normal heart sounds. Bradycardia  Pulmonary/Chest: Effort normal and breath sounds normal. No respiratory distress.   Abdominal: Soft, obese. Bowel sounds are normal. She exhibits no distension. There is no tenderness.   Ext:  No CCE.   Neurological: NF  Skin: Skin is warm and dry + pallor  Psychiatric: She has a normal mood and affect. Her speech is normal and behavior is normal. Thought content normal, very pleasant.  Poor historian.   Access: DAYTON VERONICA EULALIO      Laboratory:  CBC:     Recent Labs  Lab 02/19/18  0537   WBC 8.14   RBC 3.17*   HGB 8.7*   HCT 27.7*      MCV 87   MCH 27.4   MCHC 31.4*     BMP:     Recent Labs  Lab 02/19/18  0536   *   *   K 4.1   CL 96   CO2 29   BUN 33*   CREATININE 6.5*   CALCIUM 9.1   MG 1.8     No results for input(s): CPK, CPKMB, TROPONINI, MB in the last 24 hours.  ASSESSMENT/PLAN:     68 YO WF with ESRD and catheter associated MRSA bacteremia and suspected lumbar foci presents from [facility] for ID and NS services.    1. ESRD:  Dr. Nichols pull line 2/11 for holiday, however electrolytes warranted new line for HD on 2/12.  R JEREMÍAS EULALIO placed 2/12 and pt had HD but lines clotted after 2 hours.  Will ensure heparin bolus with each HD. Renally dose all meds and antimicrobials. Renally dose meds, avoid nephrotoxins, and monitor I/O's closely.  2. Hyperkalemia from dietary choices vs  (E87.5):  Changed diet and consulted dietary for counseling.  Low K bath.  Improved.   3. Rhabdo from Cubicin (M62.82):  Discussed with ID.    4. Anemia of CKD:   holding IV iron given active infection.  Epo.  Folate and B12 normal.  5. Hyperphos:  on Renvela now.  6. Hyponatremia- resolving with HD.    7. 2HPT/Vit D deficiency:  Started Vit D3 and calcitriol.  8. DM:  Currently on SSI.  Reduced Januvia to ESRD dosing of 25mg/d.  9. Afib:  On Carvedilol.   Defer to cards. Defer anticoagulation to Cards.    10. MRSA Bacteremia: Repeat BCx from 2/16 are positive.  Discussed with Dr. Fernandez.  Needs RANDA and image review.  Restarting Dapto. Leave EULALIO in for now but suspect will need another holiday.        See above.

## 2018-02-19 NOTE — ASSESSMENT & PLAN NOTE
Blood Cultures 02/08/18 MRSA in 1 set, 2nd set NGTD  Repeat Blood Cultures 02/10/18 2/2 MRSA  Lactic acid 0.9 02/08/10  Discontinued Daptomycin secondary to elevated CPK  Suspect endovascular source  New IJ catheter placed 2/12, blood cultures 2/13 are negative to date, but 2/16 culture is positive  Repeat blood cultures x2 2/17 are now positive  Will remove all lines after dialysis today for a line holiday for several days  Consult Cardiology for repeat RANDA  Check Vancomycin levels are maintained in 15-20 range

## 2018-02-19 NOTE — PLAN OF CARE
Patient not ready for discharge due to continued positive blood cultures. Plan to remove all lines today after dialysis & consult cardiology for a repeat RANDA. SNF's updated. Patient aware

## 2018-02-19 NOTE — ASSESSMENT & PLAN NOTE
- blood cultures negative from 2/13  - held daptomycin, repeat blood cultures positive  - will restart dapto and follow CPK  - lab to check for susceptibility to ceftaroline  - would consider RANDA

## 2018-02-19 NOTE — PLAN OF CARE
Problem: Patient Care Overview  Goal: Plan of Care Review  Outcome: Ongoing (interventions implemented as appropriate)  Patient free from fall and injuries. Due medications given, safety maintained. Purposeful rounding done. Able to sleep comfortably, advised patient to notify nurse if assistance is neeeded.

## 2018-02-19 NOTE — ASSESSMENT & PLAN NOTE
Continue Dialysis MWF per Nephrology  Left Femoral Jaspreet cath removed   R IJ dialysis catheter placed 2/12  Remove R IJ and IVs after dialysis today for holiday

## 2018-02-19 NOTE — ASSESSMENT & PLAN NOTE
Blood Cultures 02/08/18 MRSA in 1 set, 2nd set NGTD  Repeat Blood Cultures 02/10/18 2/2 MRSA  Lactic acid 0.9 02/08/10  Discontinued Daptomycin secondary to elevated CPK  Suspect endovascular source  New IJ catheter placed 2/12, blood cultures 2/13 are negative to date, but 2/16 culture is positive  Repeat blood cultures x2 2/17 are now positive  Need to remove all lines after dialysis tomorrow for a line holiday for several days  Consult Cardiology for repeat RANDA  Check Vancomycin levels are maintained in 15-20 range

## 2018-02-19 NOTE — PROGRESS NOTES
[facility] Hospital Medicine  Progress Note    Patient Name: Yumiko Proctor  MRN: [id number]  Patient Class: IP- Inpatient   Admission Date: 2/7/2018  Length of Stay: 11 days  Attending Physician: Patricia Lu MD  Primary Care Provider: Primary Doctor No        Subjective:     Principal Problem:MRSA bacteremia    HPI:  The patient is a 66 yo female with known MRSA bacteremia who was transferred here for ID and Neurosurgery services.  She has a history of ESRD, Afib, cardiomyopathy, and DM.  She has been hospitalized with bacteremia from a vascular access which was taken out and replaced today with a lt femoral vas cath.  Blood cultures remained positive. She had a RANDA which was normal, MRI of thoracic and lumbar spine showed a abnormal foci in T( that was probably a hemangioma or metastasis.    Hospital Course:  Blood cultures from 02/08/18 2/4 (+) MRSA.  Patient currently receiving Daptomycin.  Cultures repeated again on 02/10/2018 and 2/2 bottles positive for MRSA.  ID on board, concerned there is endovascular source.  Left prince catheter removed yesterday and right IJ tunneled dialysis catheter placed 2/12. Repeat blood cultures 2/13 remain negative to date, but one bottle obtained 2/16 growing MRSA and 2/17 blood cultures are positive for gram positive cocci in clusters resembling Staph.  Patient may have some myositis from Daptomycin with elevation in CPK to 5175 which is now decreasing and this was discontinued.  Vancomycin given after dialysis treatments.  PT/OT consulted and recommend skilled nursing upon discharge.    Interval History: The patient is resting comfortably this am    Review of Systems  Objective:     Vital Signs (Most Recent):  Temp: 98.5 °F (36.9 °C) (02/18/18 2000)  Pulse: 60 (02/18/18 2000)  Resp: 16 (02/18/18 2000)  BP: (!) 118/56 (02/18/18 2000)  SpO2: 99 % (02/18/18 2000) Vital Signs (24h Range):  Temp:  [96.9 °F (36.1 °C)-98.6 °F (37 °C)] 98.5 °F (36.9 °C)  Pulse:   [59-68] 60  Resp:  [16-18] 16  SpO2:  [96 %-100 %] 99 %  BP: (102-175)/(51-72) 118/56     Weight: 108 kg (238 lb 3.2 oz)  Body mass index is 42.2 kg/m².    Intake/Output Summary (Last 24 hours) at 02/18/18 2002  Last data filed at 02/18/18 0600   Gross per 24 hour   Intake              200 ml   Output                0 ml   Net              200 ml      Physical Exam   Constitutional: She is oriented to person, place, and time. She appears well-developed and well-nourished.   HENT:   Head: Normocephalic.   Eyes: Conjunctivae are normal. Right eye exhibits no discharge. Left eye exhibits no discharge.   Neck: Normal range of motion. Neck supple.   Cardiovascular: Regular rhythm and normal heart sounds.  Bradycardia present.    Pulmonary/Chest: Effort normal and breath sounds normal. No respiratory distress.   Abdominal: Soft. Bowel sounds are normal. She exhibits no distension. There is no tenderness.   Musculoskeletal: Normal range of motion.   Neurological: She is alert and oriented to person, place, and time.   Skin: Skin is warm and dry.   Psychiatric: She has a normal mood and affect. Her speech is normal and behavior is normal. Judgment and thought content normal.       Significant Labs:   Blood Culture:   Recent Labs  Lab 02/17/18  1148 02/17/18  1200   LABBLOO Gram stain aer bottle: Gram positive cocci in clusters resembling Staph   Results called to and read back by: Ynes Herrera RN  02/18/2018  18:31 Gram stain vicente bottle: Gram positive cocci in clusters resembling Staph   Results called to and read back by:Ynes Steiner RN 02/18/2018  14:40  Gram stain aer bottle: Gram positive cocci in clusters resembling Staph   Positive results previously called 02/18/2018  18:19     CBC:   Recent Labs  Lab 02/17/18  0450 02/18/18  0503   WBC 6.29 6.43   HGB 8.4* 8.5*   HCT 26.3* 27.0*    211     CMP:   Recent Labs  Lab 02/17/18  0450 02/18/18  0503   * 134*   K 3.9 3.8    99   CO2 27 28   GLU 84 91    BUN 20 26*   CREATININE 4.0* 5.3*   CALCIUM 8.5* 8.6*   PROT 6.0 6.0   ALBUMIN 1.7* 1.8*   BILITOT 0.2 0.2   ALKPHOS 70 62   * 99*   * 109*   ANIONGAP 8 7*   EGFRNONAA 11* 8*     Magnesium:   Recent Labs  Lab 02/17/18  0450 02/18/18  0503   MG 1.9 1.8       Significant Imaging: I have reviewed and interpreted all pertinent imaging results/findings within the past 24 hours.    Assessment/Plan:      * MRSA bacteremia    Blood Cultures 02/08/18 MRSA in 1 set, 2nd set NGTD  Repeat Blood Cultures 02/10/18 2/2 MRSA  Lactic acid 0.9 02/08/10  Discontinued Daptomycin secondary to elevated CPK  Suspect endovascular source  New IJ catheter placed 2/12, blood cultures 2/13 are negative to date, but 2/16 culture is positive  Repeat blood cultures x2 2/17 are now positive  Need to remove all lines after dialysis tomorrow for a line holiday for several days  Consult Cardiology for repeat RANDA  Check Vancomycin levels are maintained in 15-20 range          ESRD (end stage renal disease)    Consult Nephrology  Continue Dialysis MWF  Left Femoral Jaspreet cath removed yesterday for line holiday  R IJ dialysis catheter placed 2/12  Increasing potassium to 6.3, dialysis today 2/14        Chronic atrial fibrillation    Continue Coreg  Rate controlled            Type 2 diabetes mellitus with chronic kidney disease on chronic dialysis, without long-term current use of insulin    A1c  6.5  PRN insulin  Well-controlled          Abnormal liver enzymes    Discontinued Amiodarone 2/13  AST starting to decrease, ALT still increasing, will continue to monitor            VTE Risk Mitigation         Ordered     heparin (porcine) injection 2,000 Units  As needed (PRN)     Route:  Intravenous        02/13/18 0940     heparin (porcine) injection 5,000 Units  As needed (PRN)     Route:  Intra-Catheter        02/12/18 1834     heparin (porcine) injection 5,000 Units  Every 12 hours     Route:  Subcutaneous        02/08/18 0837     Medium  Risk of VTE  Once      02/07/18 2255     Place sequential compression device  Until discontinued      02/07/18 2255     Place ANGELI hose  Until discontinued      02/07/18 2255              Patricia Lu MD  Department of Hospital Medicine   [facility]

## 2018-02-19 NOTE — PROGRESS NOTES
"[facility]  Infectious Disease  Progress Note    Patient Name: Yumiko Proctor  MRN: [id number]  Admission Date: 2/7/2018  Length of Stay: 12 days  Attending Physician: Patricia Lu MD  Primary Care Provider: Primary Doctor No    Isolation Status: Contact  Assessment/Plan:      * MRSA bacteremia    - blood cultures negative from 2/13  - held daptomycin, repeat blood cultures positive  - will restart dapto and follow CPK  - lab to check for susceptibility to ceftaroline  - would consider RANDA          . I will follow-up with patient. Please contact us if you have any additional questions.    Andrew Fernandez MD  Infectious Disease  [facility]    Subjective:     Principal Problem:MRSA bacteremia    HPI: This is a 68 yo woman with ESRD [place] [facility] [place] bacteremia due to MRSA. She reportedly had multiple studies performed at [facility] in [place], including MR imaging, CT imaging, a bone scan, and a RANDA, all of which were "negative." An indwelling HD catheter was discontinued and a left groin HD catheter was placed. The patient feels sick but denies any rigors or irving fever. The patient was admitted last night and placed on cefepime and Cipro in addition to the daptomycin. I am consulted for ID evaluation.    Interval History: Events noted. Dapto held due to elevated CPK. Repeat blood cultures cleared but now positive again. Patient seen in HD.    Review of Systems   Constitutional: Positive for fatigue. Negative for chills and fever.   Musculoskeletal: Positive for back pain.   All other systems reviewed and are negative.    Objective:     Vital Signs (Most Recent):  Temp: 98.4 °F (36.9 °C) (02/19/18 1211)  Pulse: 63 (02/19/18 1211)  Resp: 16 (02/19/18 1211)  BP: 133/64 (02/19/18 1211)  SpO2: 98 % (02/19/18 1211) Vital Signs (24h Range):  Temp:  [96.9 °F (36.1 °C)-98.6 °F (37 °C)] 98.4 °F (36.9 °C)  Pulse:  [56-68] 63  Resp:  [16-18] 16  SpO2:  [96 %-99 %] 98 " %  BP: ()/(43-71) 133/64     Weight: 108 kg (238 lb 3.2 oz)  Body mass index is 42.2 kg/m².    Estimated Creatinine Clearance: 9.9 mL/min (A) (based on SCr of 6.5 mg/dL (H)).    Physical Exam   Constitutional: She is oriented to person, place, and time. She appears well-developed and well-nourished. No distress.   HENT:   Head: Atraumatic.   Eyes: EOM are normal. Pupils are equal, round, and reactive to light.   Neck: Normal range of motion. Neck supple.   Cardiovascular: Normal rate and regular rhythm.    Murmur heard.  HD cath right chest   Pulmonary/Chest: Effort normal and breath sounds normal.   Abdominal: Soft. Bowel sounds are normal.   Musculoskeletal: Normal range of motion.   Neurological: She is alert and oriented to person, place, and time.   Skin: She is not diaphoretic.   Psychiatric: She has a normal mood and affect. Her behavior is normal.   Nursing note and vitals reviewed.      Significant Labs:   Blood Culture:   Recent Labs  Lab 02/10/18  0525 02/13/18  1132 02/16/18  1037 02/17/18  1148 02/17/18  1200   LABBLOO Gram stain aer bottle: Gram positive cocci in clusters resembling Staph   Results called to and read back by: Margaux Patel RN  02/12/2018  01:03  METHICILLIN RESISTANT STAPHYLOCOCCUS AUREUSID consult required at [facility].For susceptibility see order #[id number] No growth after 5 days.  No growth after 5 days. Gram stain aer bottle: Gram positive cocci in clusters resembling Staph   Results called to and read back by:Ynes Steiner RN 02/17/2018  10:07  Gram stain vicente bottle: Gram positive cocci in clusters resembling Staph   Positive results previously called 02/17/2018  16:42  METHICILLIN RESISTANT STAPHYLOCOCCUS AUREUSID consult required at [facility]. Gram stain aer bottle: Gram positive cocci in clusters resembling Staph   Results called to and read back by: Ynes Herrera RN  02/18/2018  18:31  STAPHYLOCOCCUS  AUREUSID consult required at [facility].For susceptibility see order #[id number] Gram stain vicente bottle: Gram positive cocci in clusters resembling Staph   Results called to and read back by:Ynes Steiner RN 02/18/2018  14:40  Gram stain aer bottle: Gram positive cocci in clusters resembling Staph   Positive results previously called 02/18/2018  18:19  STAPHYLOCOCCUS AUREUSID consult required at [facility].For susceptibility see order #[id number]       Significant Imaging: I have reviewed all pertinent imaging results/findings within the past 24 hours.

## 2018-02-19 NOTE — SUBJECTIVE & OBJECTIVE
Interval History: The patient is resting comfortably this am    Review of Systems  Objective:     Vital Signs (Most Recent):  Temp: 98.5 °F (36.9 °C) (02/18/18 2000)  Pulse: 60 (02/18/18 2000)  Resp: 16 (02/18/18 2000)  BP: (!) 118/56 (02/18/18 2000)  SpO2: 99 % (02/18/18 2000) Vital Signs (24h Range):  Temp:  [96.9 °F (36.1 °C)-98.6 °F (37 °C)] 98.5 °F (36.9 °C)  Pulse:  [59-68] 60  Resp:  [16-18] 16  SpO2:  [96 %-100 %] 99 %  BP: (102-175)/(51-72) 118/56     Weight: 108 kg (238 lb 3.2 oz)  Body mass index is 42.2 kg/m².    Intake/Output Summary (Last 24 hours) at 02/18/18 2002  Last data filed at 02/18/18 0600   Gross per 24 hour   Intake              200 ml   Output                0 ml   Net              200 ml      Physical Exam   Constitutional: She is oriented to person, place, and time. She appears well-developed and well-nourished.   HENT:   Head: Normocephalic.   Eyes: Conjunctivae are normal. Right eye exhibits no discharge. Left eye exhibits no discharge.   Neck: Normal range of motion. Neck supple.   Cardiovascular: Regular rhythm and normal heart sounds.  Bradycardia present.    Pulmonary/Chest: Effort normal and breath sounds normal. No respiratory distress.   Abdominal: Soft. Bowel sounds are normal. She exhibits no distension. There is no tenderness.   Musculoskeletal: Normal range of motion.   Neurological: She is alert and oriented to person, place, and time.   Skin: Skin is warm and dry.   Psychiatric: She has a normal mood and affect. Her speech is normal and behavior is normal. Judgment and thought content normal.       Significant Labs:   Blood Culture:   Recent Labs  Lab 02/17/18  1148 02/17/18  1200   LABBLOO Gram stain aer bottle: Gram positive cocci in clusters resembling Staph   Results called to and read back by: Ynes Herrera RN  02/18/2018  18:31 Gram stain vicente bottle: Gram positive cocci in clusters resembling Staph   Results called to and read back by:Ynes Steiner RN 02/18/2018  14:40   Gram stain aer bottle: Gram positive cocci in clusters resembling Staph   Positive results previously called 02/18/2018  18:19     CBC:   Recent Labs  Lab 02/17/18  0450 02/18/18  0503   WBC 6.29 6.43   HGB 8.4* 8.5*   HCT 26.3* 27.0*    211     CMP:   Recent Labs  Lab 02/17/18  0450 02/18/18  0503   * 134*   K 3.9 3.8    99   CO2 27 28   GLU 84 91   BUN 20 26*   CREATININE 4.0* 5.3*   CALCIUM 8.5* 8.6*   PROT 6.0 6.0   ALBUMIN 1.7* 1.8*   BILITOT 0.2 0.2   ALKPHOS 70 62   * 99*   * 109*   ANIONGAP 8 7*   EGFRNONAA 11* 8*     Magnesium:   Recent Labs  Lab 02/17/18  0450 02/18/18  0503   MG 1.9 1.8       Significant Imaging: I have reviewed and interpreted all pertinent imaging results/findings within the past 24 hours.

## 2018-02-19 NOTE — SUBJECTIVE & OBJECTIVE
Interval History: Events noted. Dapto held due to elevated CPK. Repeat blood cultures cleared but now positive again. Patient seen in HD.    Review of Systems   Constitutional: Positive for fatigue. Negative for chills and fever.   Musculoskeletal: Positive for back pain.   All other systems reviewed and are negative.    Objective:     Vital Signs (Most Recent):  Temp: 98.4 °F (36.9 °C) (02/19/18 1211)  Pulse: 63 (02/19/18 1211)  Resp: 16 (02/19/18 1211)  BP: 133/64 (02/19/18 1211)  SpO2: 98 % (02/19/18 1211) Vital Signs (24h Range):  Temp:  [96.9 °F (36.1 °C)-98.6 °F (37 °C)] 98.4 °F (36.9 °C)  Pulse:  [56-68] 63  Resp:  [16-18] 16  SpO2:  [96 %-99 %] 98 %  BP: ()/(43-71) 133/64     Weight: 108 kg (238 lb 3.2 oz)  Body mass index is 42.2 kg/m².    Estimated Creatinine Clearance: 9.9 mL/min (A) (based on SCr of 6.5 mg/dL (H)).    Physical Exam   Constitutional: She is oriented to person, place, and time. She appears well-developed and well-nourished. No distress.   HENT:   Head: Atraumatic.   Eyes: EOM are normal. Pupils are equal, round, and reactive to light.   Neck: Normal range of motion. Neck supple.   Cardiovascular: Normal rate and regular rhythm.    Murmur heard.  HD cath right chest   Pulmonary/Chest: Effort normal and breath sounds normal.   Abdominal: Soft. Bowel sounds are normal.   Musculoskeletal: Normal range of motion.   Neurological: She is alert and oriented to person, place, and time.   Skin: She is not diaphoretic.   Psychiatric: She has a normal mood and affect. Her behavior is normal.   Nursing note and vitals reviewed.      Significant Labs:   Blood Culture:   Recent Labs  Lab 02/10/18  0525 02/13/18  1132 02/16/18  1037 02/17/18  1148 02/17/18  1200   LABBLOO Gram stain aer bottle: Gram positive cocci in clusters resembling Staph   Results called to and read back by: Margaux Patel RN  02/12/2018  01:03  METHICILLIN RESISTANT STAPHYLOCOCCUS AUREUSID consult required at [facility]  [facility].For susceptibility see order #[id number] No growth after 5 days.  No growth after 5 days. Gram stain aer bottle: Gram positive cocci in clusters resembling Staph   Results called to and read back by:Ynes Steiner RN 02/17/2018  10:07  Gram stain vicente bottle: Gram positive cocci in clusters resembling Staph   Positive results previously called 02/17/2018  16:42  METHICILLIN RESISTANT STAPHYLOCOCCUS AUREUSID consult required at [facility]. Gram stain aer bottle: Gram positive cocci in clusters resembling Staph   Results called to and read back by: Ynes Herrera RN  02/18/2018  18:31  STAPHYLOCOCCUS AUREUSID consult required at [facility].For susceptibility see order #[id number] Gram stain vicente bottle: Gram positive cocci in clusters resembling Staph   Results called to and read back by:Ynes Steiner RN 02/18/2018  14:40  Gram stain aer bottle: Gram positive cocci in clusters resembling Staph   Positive results previously called 02/18/2018  18:19  STAPHYLOCOCCUS AUREUSID consult required at [facility].For susceptibility see order #[id number]       Significant Imaging: I have reviewed all pertinent imaging results/findings within the past 24 hours.

## 2018-02-19 NOTE — PROGRESS NOTES
[facility]  Adult Nutrition  Progress Note    SUMMARY     Recommendations    Recommendation/Intervention:   1. Continue 2000kcal DM + renal diet   2. Consider Novasource Renal due to poor intake    Goals:   1. Meet >85% EEN and EPN   2. Prevent dry wt loss >2%/week   3. Promote nutrition related labs wnl    Nutrition Goal Status: progressing towards goal  Communication of RD Recs: discussed on rounds    Reason for Assessment    Reason for Assessment: RD follow-up  Diagnosis:  1. Bacteremia due to methicillin resistant Staphylococcus aureus    2. MRSA bacteremia    3. Endocarditis    4. ESRD (end stage renal disease)    5. Chronic atrial fibrillation    6. Type 2 diabetes mellitus with chronic kidney disease on chronic dialysis, without long-term current use of insulin    7. Abnormal liver enzymes      Past Medical History:   Diagnosis Date    A-fib     Cardiomyopathy     Diabetes mellitus     ESRD (end stage renal disease)         Interdisciplinary Rounds: attended     General Information Comments: Pt continues to endorse poor appetite. Finished ~25% of lunch    Nutrition Discharge Planning: d/c on DM renal diet    Nutrition Prescription Ordered    Current Diet Order: 2000kcal DM renal    Evaluation of Received Nutrients/Fluid Intake     % Intake of Estimated Energy Needs: 0 - 25 %  % Meal Intake: 25%     Nutrition Risk Screen     Nutrition Risk Screen: no indicators present    Nutrition/Diet History    Food Preferences: No cultural/spiritual prefs noted    Labs/Tests/Procedures/Meds     Pertinent Labs Reviewed: reviewed  Lab Results   Component Value Date     (L) 02/19/2018    K 4.1 02/19/2018    CL 96 02/19/2018    CO2 29 02/19/2018    BUN 33 (H) 02/19/2018    CREATININE 6.5 (H) 02/19/2018    CALCIUM 9.1 02/19/2018    PHOS 3.8 02/19/2018    MG 1.8 02/19/2018    ESTGFRAFRICA 7 (A) 02/19/2018    EGFRNONAA 6 (A) 02/19/2018    ALBUMIN 2.0 (L) 02/19/2018     Lab Results   Component Value  "Date    ALT 92 (H) 02/19/2018    AST 65 (H) 02/19/2018    ALKPHOS 74 02/19/2018     POCT Glucose   Date Value Ref Range Status   02/19/2018 143 (H) 70 - 110 mg/dL Final   02/19/2018 106 70 - 110 mg/dL Final   02/18/2018 138 (H) 70 - 110 mg/dL Final   02/18/2018 139 (H) 70 - 110 mg/dL Final   02/18/2018 137 (H) 70 - 110 mg/dL Final   02/18/2018 94 70 - 110 mg/dL Final   02/17/2018 150 (H) 70 - 110 mg/dL Final   02/17/2018 161 (H) 70 - 110 mg/dL Final   02/17/2018 122 (H) 70 - 110 mg/dL Final   02/17/2018 108 70 - 110 mg/dL Final   02/16/2018 155 (H) 70 - 110 mg/dL Final   02/16/2018 107 70 - 110 mg/dL Final     Lab Results   Component Value Date    HGBA1C 6.5 (H) 02/08/2018     Lab Results   Component Value Date    HCT 27.7 (L) 02/19/2018     Lab Results   Component Value Date    HGB 8.7 (L) 02/19/2018      Pertinent Medications Reviewed: reviewed  Scheduled Meds:   calcitRIOL  0.25 mcg Oral Daily    carvedilol  25 mg Oral BID    DAPTOmycin (CUBICIN)  IV  8 mg/kg Intravenous Every Mon, Wed, Fri    docusate sodium  100 mg Oral BID    epoetin davion (PROCRIT) injection  20,000 Units Subcutaneous Every Mon, Wed, Fri    furosemide  80 mg Oral BID    heparin (porcine)  5,000 Units Subcutaneous Q12H    insulin aspart U-100  1-10 Units Subcutaneous TIDWM    polyethylene glycol  17 g Oral Daily    sevelamer carbonate  800 mg Oral TID WM    SITagliptin  25 mg Oral Daily    vitamin D  2,000 Units Oral Daily    vitamin renal formula (B-complex-vitamin c-folic acid)  1 capsule Oral Daily     Physical Findings    Overall Physical Appearance: nourished, obese  Oral/Mouth Cavity: tooth/teeth missing  Skin: pressure ulcer(s) (stage I)    Anthropometrics    Temp: 98.4 °F (36.9 °C)  Height: 5' 3" (160 cm)  Weight Method: Bed Scale  Weight: 108 kg (238 lb 3.2 oz)  Ideal Body Weight (IBW), Female: 115 lb  % Ideal Body Weight, Female (lb): 207.13 lb  BMI (Calculated): 42.3  BMI Grade: greater than 40 - morbid obesity   "   Estimated/Assessed Needs    Weight Used For Calorie Calculations: 108 kg (238 lb 1.6 oz)   Energy Calorie Requirements (kcal): 2059  Energy Need Method: [place]-St Jeor (stress factor 1.3)     Weight Used For Protein Calculations: 52.2 kg (115 lb) (IBW)  Protein Requirements:  gm/d (1.8-2 gm/kg IBW)    Fluid Requirements (mL): 1000 (+UOP, or per team)    Assessment and Plan    ESRD (end stage renal disease)    Nutrition Diagnosis:  Increased nutrient needs (kcal, protein)    Related to (etiology):   HD    Signs and Symptoms (as evidenced by):   Pt with ESRD on HD     Interventions/Recommendations (treatment strategy):  2000kcal DM + renal diet + recommend Novasource Renal 1x/d     Nutrition Diagnosis Status:   Continues          Monitor and Evaluation    Food and Nutrient Intake: energy intake, food and beverage intake  Food and Nutrient Adminstration: diet order  Physical Activity and Function: nutrition-related ADLs and IADLs  Anthropometric Measurements: weight, weight change  Biochemical Data, Medical Tests and Procedures: electrolyte and renal panel, gastrointestinal profile, glucose/endocrine profile, inflammatory profile, lipid profile  Nutrition-Focused Physical Findings: overall appearance, extremities, muscles and bones, skin    Nutrition Risk    Level of Risk: other (see comments) (f/u 2x/wk)    Nutrition Follow-Up    RD Follow-up?: Yes

## 2018-02-19 NOTE — PLAN OF CARE
Problem: Patient Care Overview  Goal: Plan of Care Review  Outcome: Ongoing (interventions implemented as appropriate)  Patient receiving dialysis on RA sat's 98%o2 not needed.

## 2018-02-19 NOTE — SUBJECTIVE & OBJECTIVE
Interval History: The patient has some complaints of back pain    Review of Systems   Musculoskeletal: Positive for back pain.     Objective:     Vital Signs (Most Recent):  Temp: 98.1 °F (36.7 °C) (02/19/18 0740)  Pulse: 61 (02/19/18 1100)  Resp: 16 (02/19/18 0740)  BP: (!) 127/52 (02/19/18 1100)  SpO2: 96 % (02/19/18 0436) Vital Signs (24h Range):  Temp:  [96.9 °F (36.1 °C)-98.6 °F (37 °C)] 98.1 °F (36.7 °C)  Pulse:  [56-68] 61  Resp:  [16-18] 16  SpO2:  [96 %-99 %] 96 %  BP: ()/(43-71) 127/52     Weight: 108 kg (238 lb 3.2 oz)  Body mass index is 42.2 kg/m².  No intake or output data in the 24 hours ending 02/19/18 1110   Physical Exam   Constitutional: She is oriented to person, place, and time. She appears well-developed and well-nourished.   HENT:   Head: Normocephalic.   Eyes: Conjunctivae are normal. Right eye exhibits no discharge. Left eye exhibits no discharge.   Neck: Normal range of motion. Neck supple.   Cardiovascular: Regular rhythm and normal heart sounds.  Bradycardia present.    Pulmonary/Chest: Effort normal and breath sounds normal. No respiratory distress.   Abdominal: Soft. Bowel sounds are normal. She exhibits no distension. There is no tenderness.   Musculoskeletal: Normal range of motion.   Neurological: She is alert and oriented to person, place, and time.   Skin: Skin is warm and dry.   Psychiatric: She has a normal mood and affect. Her speech is normal and behavior is normal. Judgment and thought content normal.     Significant Labs:   CBC:   Recent Labs  Lab 02/18/18  0503 02/19/18  0537   WBC 6.43 8.14   HGB 8.5* 8.7*   HCT 27.0* 27.7*    218     CMP:   Recent Labs  Lab 02/18/18  0503 02/19/18  0536   * 133*   K 3.8 4.1   CL 99 96   CO2 28 29   GLU 91 114*   BUN 26* 33*   CREATININE 5.3* 6.5*   CALCIUM 8.6* 9.1   PROT 6.0 6.4   ALBUMIN 1.8* 2.0*   BILITOT 0.2 0.3   ALKPHOS 62 74   AST 99* 65*   * 92*   ANIONGAP 7* 8   EGFRNONAA 8* 6*     Magnesium:   Recent  Labs  Lab 02/18/18  0503 02/19/18  0536   MG 1.8 1.8

## 2018-02-20 LAB
ALBUMIN SERPL BCP-MCNC: 1.7 G/DL
ALP SERPL-CCNC: 66 U/L
ALT SERPL W/O P-5'-P-CCNC: 66 U/L
ANION GAP SERPL CALC-SCNC: 7 MMOL/L
AST SERPL-CCNC: 40 U/L
BACTERIA BLD CULT: NORMAL
BASOPHILS # BLD AUTO: 0.02 K/UL
BASOPHILS NFR BLD: 0.3 %
BILIRUB SERPL-MCNC: 0.2 MG/DL
BUN SERPL-MCNC: 19 MG/DL
CALCIUM SERPL-MCNC: 8.4 MG/DL
CHLORIDE SERPL-SCNC: 101 MMOL/L
CK SERPL-CCNC: 131 U/L
CO2 SERPL-SCNC: 27 MMOL/L
CREAT SERPL-MCNC: 4.6 MG/DL
DIFFERENTIAL METHOD: ABNORMAL
EOSINOPHIL # BLD AUTO: 0.4 K/UL
EOSINOPHIL NFR BLD: 6 %
ERYTHROCYTE [DISTWIDTH] IN BLOOD BY AUTOMATED COUNT: 16.4 %
EST. GFR  (AFRICAN AMERICAN): 11 ML/MIN/1.73 M^2
EST. GFR  (NON AFRICAN AMERICAN): 9 ML/MIN/1.73 M^2
GLUCOSE SERPL-MCNC: 86 MG/DL
HCT VFR BLD AUTO: 26.2 %
HGB BLD-MCNC: 8.2 G/DL
LYMPHOCYTES # BLD AUTO: 1.9 K/UL
LYMPHOCYTES NFR BLD: 27.9 %
MAGNESIUM SERPL-MCNC: 1.6 MG/DL
MCH RBC QN AUTO: 27.7 PG
MCHC RBC AUTO-ENTMCNC: 31.3 G/DL
MCV RBC AUTO: 89 FL
MONOCYTES # BLD AUTO: 0.8 K/UL
MONOCYTES NFR BLD: 12.2 %
NEUTROPHILS # BLD AUTO: 3.5 K/UL
NEUTROPHILS NFR BLD: 53.1 %
PHOSPHATE SERPL-MCNC: 2.8 MG/DL
PLATELET # BLD AUTO: 191 K/UL
PMV BLD AUTO: 9.9 FL
POCT GLUCOSE: 120 MG/DL (ref 70–110)
POCT GLUCOSE: 125 MG/DL (ref 70–110)
POCT GLUCOSE: 175 MG/DL (ref 70–110)
POCT GLUCOSE: 98 MG/DL (ref 70–110)
POTASSIUM SERPL-SCNC: 3.6 MMOL/L
PROT SERPL-MCNC: 5.8 G/DL
RBC # BLD AUTO: 2.96 M/UL
SODIUM SERPL-SCNC: 135 MMOL/L
WBC # BLD AUTO: 6.62 K/UL

## 2018-02-20 PROCEDURE — 25000003 PHARM REV CODE 250: Performed by: PHYSICIAN ASSISTANT

## 2018-02-20 PROCEDURE — 97530 THERAPEUTIC ACTIVITIES: CPT

## 2018-02-20 PROCEDURE — 11000001 HC ACUTE MED/SURG PRIVATE ROOM

## 2018-02-20 PROCEDURE — 36415 COLL VENOUS BLD VENIPUNCTURE: CPT

## 2018-02-20 PROCEDURE — 80053 COMPREHEN METABOLIC PANEL: CPT

## 2018-02-20 PROCEDURE — 25000003 PHARM REV CODE 250: Performed by: INTERNAL MEDICINE

## 2018-02-20 PROCEDURE — 94761 N-INVAS EAR/PLS OXIMETRY MLT: CPT

## 2018-02-20 PROCEDURE — 63600175 PHARM REV CODE 636 W HCPCS: Performed by: INTERNAL MEDICINE

## 2018-02-20 PROCEDURE — 99233 SBSQ HOSP IP/OBS HIGH 50: CPT | Mod: ,,, | Performed by: INTERNAL MEDICINE

## 2018-02-20 PROCEDURE — 97535 SELF CARE MNGMENT TRAINING: CPT

## 2018-02-20 PROCEDURE — 84100 ASSAY OF PHOSPHORUS: CPT

## 2018-02-20 PROCEDURE — 82550 ASSAY OF CK (CPK): CPT

## 2018-02-20 PROCEDURE — 85025 COMPLETE CBC W/AUTO DIFF WBC: CPT

## 2018-02-20 PROCEDURE — 83735 ASSAY OF MAGNESIUM: CPT

## 2018-02-20 PROCEDURE — 25000003 PHARM REV CODE 250: Performed by: NURSE PRACTITIONER

## 2018-02-20 RX ADMIN — HEPARIN SODIUM 5000 UNITS: 5000 INJECTION, SOLUTION INTRAVENOUS; SUBCUTANEOUS at 09:02

## 2018-02-20 RX ADMIN — CARVEDILOL 25 MG: 12.5 TABLET, FILM COATED ORAL at 09:02

## 2018-02-20 RX ADMIN — Medication 1 CAPSULE: at 09:02

## 2018-02-20 RX ADMIN — OXYCODONE HYDROCHLORIDE AND ACETAMINOPHEN 1 TABLET: 5; 325 TABLET ORAL at 09:02

## 2018-02-20 RX ADMIN — CEFTAROLINE FOSAMIL 200 MG: 600 POWDER, FOR SOLUTION INTRAVENOUS at 12:02

## 2018-02-20 RX ADMIN — DOCUSATE SODIUM 100 MG: 100 CAPSULE, LIQUID FILLED ORAL at 09:02

## 2018-02-20 RX ADMIN — SEVELAMER CARBONATE 800 MG: 800 TABLET, FILM COATED ORAL at 06:02

## 2018-02-20 RX ADMIN — OXYCODONE HYDROCHLORIDE AND ACETAMINOPHEN 1 TABLET: 5; 325 TABLET ORAL at 06:02

## 2018-02-20 RX ADMIN — FUROSEMIDE 80 MG: 40 TABLET ORAL at 09:02

## 2018-02-20 RX ADMIN — SEVELAMER CARBONATE 800 MG: 800 TABLET, FILM COATED ORAL at 09:02

## 2018-02-20 RX ADMIN — FUROSEMIDE 80 MG: 40 TABLET ORAL at 06:02

## 2018-02-20 RX ADMIN — VITAMIN D, TAB 1000IU (100/BT) 2000 UNITS: 25 TAB at 09:02

## 2018-02-20 RX ADMIN — CALCITRIOL 0.25 MCG: 0.25 CAPSULE, LIQUID FILLED ORAL at 09:02

## 2018-02-20 RX ADMIN — SITAGLIPTIN 25 MG: 25 TABLET, FILM COATED ORAL at 09:02

## 2018-02-20 RX ADMIN — POLYETHYLENE GLYCOL 3350 17 G: 17 POWDER, FOR SOLUTION ORAL at 09:02

## 2018-02-20 NOTE — PLAN OF CARE
Problem: Patient Care Overview  Goal: Plan of Care Review  Outcome: Ongoing (interventions implemented as appropriate)  Plan of care reviewed with patient.  VSS.  Pain managed with PRN medication.  Patient assisted with frequent weight changes.  Purposeful rounding completed.  Call bell within reach, no needs at this time, will continue to monitor.

## 2018-02-20 NOTE — PLAN OF CARE
Problem: Patient Care Overview  Goal: Plan of Care Review  Outcome: Ongoing (interventions implemented as appropriate)  Patient on RA sat's 97%

## 2018-02-20 NOTE — PT/OT/SLP PROGRESS
Occupational Therapy   Treatment    Name: Yumiko Proctor  MRN: [id number]  Admitting Diagnosis:  MRSA bacteremia  8 Days Post-Op    Recommendations:     Discharge Recommendations: nursing facility, skilled  Discharge Equipment Recommendations:   (TBD, pending progress)  Barriers to discharge:  None    Subjective     Communicated with: RN prior to session.  Pain/Comfort:  · Pain Rating 1: 9/10  · Location - Side 1: Bilateral  · Location - Orientation 1: generalized  · Location 1: abdomen  · Pain Addressed 1: Reposition, Distraction  · Pain Rating Post-Intervention 1: 7/10 (upon return to supine with HOB elevated)    Patients cultural, spiritual, Oriental orthodox conflicts given the current situation: No    Objective:     Patient found with: peripheral IV (HD cath)    General Precautions: Standard, anti-coagulation medicine, diabetic, fall (fluid restriction, elevate HOB)   Orthopedic Precautions:N/A   Braces: N/A     Occupational Performance:    Bed Mobility:    · Patient completed Rolling/Turning to Right with stand by assistance and with side rail  · Patient completed Scooting/Bridging with stand by assistance and with side rail  · Patient completed Supine to Sit with moderate assistance  · Patient completed Sit to Supine with moderate assistance     Functional Mobility/Transfers:  · Patient completed Sit <> Stand Transfer with moderate assistance  with  rolling walker x 2 trials from EOB  · Functional Mobility: NT    Activities of Daily Living:  · Grooming: stand by assistance seated at EOB  · LB Dressing: total assistance at bed level  · Toileting: total assistance bed level    Patient left HOB elevated with all lines intact, call button in reach and RN notified    [facility] 6 Click:  [facility] Total Score: 12    Treatment & Education:  Educated patient on importance of OOB and increasing tasks performed in sessions.  Patient verbalized understanding but reported pain was only limited factor.  Education:    Assessment:     Yumiko  Hitesh is a 67 y.o. female with a medical diagnosis of MRSA bacteremia.  She presents with the following performance deficits affecting function: weakness, impaired endurance, impaired self care skills, impaired functional mobilty, gait instability, impaired balance, decreased upper extremity function, decreased lower extremity function, pain, decreased safety awareness, decreased ROM  Patient continues with c/o pain with all bed mobility, sitting EOB and functional sit to/from stand.  Declined SPT on this date secondary to pain but able to tolerate sit to/from stand x 2 trials with RW and Mod assist but unable to tolerate standing for more than a few seconds.  Continue OT services to restore patient functioning.    Rehab Prognosis:  Good; patient would benefit from acute skilled OT services to address these deficits and reach maximum level of function.       Plan:     Patient to be seen 5 x/week to address the above listed problems via self-care/home management, therapeutic activities, therapeutic exercises  · Plan of Care Expires: 03/16/18  · Plan of Care Reviewed with: patient    This Plan of care has been discussed with the patient who was involved in its development and understands and is in agreement with the identified goals and treatment plan    GOALS:    Occupational Therapy Goals        Problem: Occupational Therapy Goal    Goal Priority Disciplines Outcome Interventions   Occupational Therapy Goal     OT, PT/OT Ongoing (interventions implemented as appropriate)    Description:  Goals to be met by: 02/26/18     Patient will increase functional independence with ADLs by performing:    UE Dressing with Minimal Assistance.  LE Dressing with Maximum Assistance.  Rolling to Bilateral with Contact Guard Assistance. (MET 02/20/18)  Supine to sit with Minimal Assistance.  Upper extremity exercise program x10 reps per handout, with assistance as needed.  Assess sit to stand transfers. (MET 02/20/18)  NEW 02/20/18  Sit to stand with RW and Min assist  NEW 02/20/28 SPT to bedside chair/BSC with RW and min assist                       Time Tracking:     OT Date of Treatment: 02/20/18  OT Start Time: 0729  OT Stop Time: 0758  OT Total Time (min): 29 min    Billable Minutes:Self Care/Home Management 19  Therapeutic Exercise 10    PATTY Alonzo  2/20/2018

## 2018-02-20 NOTE — PLAN OF CARE
Problem: Occupational Therapy Goal  Goal: Occupational Therapy Goal  Goals to be met by: 02/26/18     Patient will increase functional independence with ADLs by performing:    UE Dressing with Minimal Assistance.  LE Dressing with Maximum Assistance.  Rolling to Bilateral with Contact Guard Assistance. (MET 02/20/18)  Supine to sit with Minimal Assistance.  Upper extremity exercise program x10 reps per handout, with assistance as needed.  Assess sit to stand transfers. (MET 02/20/18)  NEW 02/20/18 Sit to stand with RW and Min assist  NEW 02/20/28 SPT to bedside chair/BSC with RW and min assist     Outcome: Ongoing (interventions implemented as appropriate)  Patient continues with c/o pain with all bed mobility, sitting EOB and functional sit to/from stand.  Declined SPT on this date secondary to pain but able to tolerate sit to/from stand x 2 trials with RW and Mod assist but unable to tolerate standing for more than a few seconds.  Continue OT services to restore patient functioning.    Comments: PATTY Alonzo, 2/20/2018

## 2018-02-20 NOTE — PLAN OF CARE
Problem: Patient Care Overview  Goal: Plan of Care Review  Patient free of trauma, falls and injury. Pt VSS and afebrile throughout shift.  Patient pain has been moderately controlled by PO pain meds and tolerated well. Pt has been eating well and voiding adequately throughout shift without difficulty. Pt has call light in reach, bed alarm on, bed brakes on, side rails up x3, bed in low position, TEDs/SCDs on, IS at bedside, and nonskid socks on. Pt lying in bed in no distress. Will continue to monitor until handoff.

## 2018-02-20 NOTE — PLAN OF CARE
Problem: Patient Care Overview  Goal: Plan of Care Review  Patient free of trauma, falls and injury. Pt VSS and afebrile throughout shift.Pt dressing clean, dry and intact. Patient pain has been moderately controlled by PO pain meds and tolerated well. Pt has been eating well and voiding adequately throughout shift without difficulty. Pt has call light in reach, bed alarm on, bed brakes on, side rails up x3, bed in low position, TEDs/SCDs on, IS at bedside, and nonskid socks on. Pt lying in bed in no distress. Will continue to monitor until handoff.

## 2018-02-20 NOTE — PLAN OF CARE
Problem: Physical Therapy Goal  Goal: Physical Therapy Goal    Patient required Min A for transfers on this day

## 2018-02-21 PROBLEM — I82.412 DVT OF DEEP FEMORAL VEIN, LEFT: Status: ACTIVE | Noted: 2018-02-21

## 2018-02-21 LAB
ALBUMIN SERPL BCP-MCNC: 1.8 G/DL
ALP SERPL-CCNC: 72 U/L
ALT SERPL W/O P-5'-P-CCNC: 54 U/L
ANION GAP SERPL CALC-SCNC: 8 MMOL/L
APTT BLDCRRT: 53.4 SEC
AST SERPL-CCNC: 35 U/L
BASOPHILS # BLD AUTO: 0.01 K/UL
BASOPHILS # BLD AUTO: 0.02 K/UL
BASOPHILS NFR BLD: 0.2 %
BASOPHILS NFR BLD: 0.3 %
BILIRUB SERPL-MCNC: 0.2 MG/DL
BUN SERPL-MCNC: 24 MG/DL
CALCIUM SERPL-MCNC: 8.6 MG/DL
CHLORIDE SERPL-SCNC: 98 MMOL/L
CK SERPL-CCNC: 121 U/L
CO2 SERPL-SCNC: 28 MMOL/L
CREAT SERPL-MCNC: 6 MG/DL
DIFFERENTIAL METHOD: ABNORMAL
DIFFERENTIAL METHOD: ABNORMAL
EOSINOPHIL # BLD AUTO: 0.2 K/UL
EOSINOPHIL # BLD AUTO: 0.5 K/UL
EOSINOPHIL NFR BLD: 4.7 %
EOSINOPHIL NFR BLD: 7.1 %
ERYTHROCYTE [DISTWIDTH] IN BLOOD BY AUTOMATED COUNT: 16.6 %
ERYTHROCYTE [DISTWIDTH] IN BLOOD BY AUTOMATED COUNT: 16.7 %
EST. GFR  (AFRICAN AMERICAN): 8 ML/MIN/1.73 M^2
EST. GFR  (NON AFRICAN AMERICAN): 7 ML/MIN/1.73 M^2
GLUCOSE SERPL-MCNC: 81 MG/DL
HCT VFR BLD AUTO: 24.2 %
HCT VFR BLD AUTO: 25.8 %
HGB BLD-MCNC: 7.5 G/DL
HGB BLD-MCNC: 8 G/DL
INR PPP: 1
LYMPHOCYTES # BLD AUTO: 1.1 K/UL
LYMPHOCYTES # BLD AUTO: 2.2 K/UL
LYMPHOCYTES NFR BLD: 23.3 %
LYMPHOCYTES NFR BLD: 33.4 %
MAGNESIUM SERPL-MCNC: 1.7 MG/DL
MCH RBC QN AUTO: 27.5 PG
MCH RBC QN AUTO: 27.5 PG
MCHC RBC AUTO-ENTMCNC: 31 G/DL
MCHC RBC AUTO-ENTMCNC: 31 G/DL
MCV RBC AUTO: 89 FL
MCV RBC AUTO: 89 FL
MONOCYTES # BLD AUTO: 0.4 K/UL
MONOCYTES # BLD AUTO: 0.7 K/UL
MONOCYTES NFR BLD: 11.1 %
MONOCYTES NFR BLD: 8.6 %
NEUTROPHILS # BLD AUTO: 3.1 K/UL
NEUTROPHILS # BLD AUTO: 3.1 K/UL
NEUTROPHILS NFR BLD: 47.5 %
NEUTROPHILS NFR BLD: 62.8 %
PHOSPHATE SERPL-MCNC: 3.3 MG/DL
PLATELET # BLD AUTO: 168 K/UL
PLATELET # BLD AUTO: 168 K/UL
PLATELET # BLD AUTO: 197 K/UL
PMV BLD AUTO: 9.3 FL
PMV BLD AUTO: 9.5 FL
PMV BLD AUTO: 9.5 FL
POCT GLUCOSE: 100 MG/DL (ref 70–110)
POCT GLUCOSE: 118 MG/DL (ref 70–110)
POCT GLUCOSE: 159 MG/DL (ref 70–110)
POTASSIUM SERPL-SCNC: 3.7 MMOL/L
PROT SERPL-MCNC: 5.9 G/DL
PROTHROMBIN TIME: 11.2 SEC
RBC # BLD AUTO: 2.73 M/UL
RBC # BLD AUTO: 2.91 M/UL
SODIUM SERPL-SCNC: 134 MMOL/L
VANCOMYCIN SERPL-MCNC: 9.6 UG/ML
WBC # BLD AUTO: 4.9 K/UL
WBC # BLD AUTO: 6.59 K/UL

## 2018-02-21 PROCEDURE — 25000003 PHARM REV CODE 250: Performed by: NURSE PRACTITIONER

## 2018-02-21 PROCEDURE — 99233 SBSQ HOSP IP/OBS HIGH 50: CPT | Mod: ,,, | Performed by: INTERNAL MEDICINE

## 2018-02-21 PROCEDURE — 63600175 PHARM REV CODE 636 W HCPCS: Performed by: INTERNAL MEDICINE

## 2018-02-21 PROCEDURE — 99900035 HC TECH TIME PER 15 MIN (STAT)

## 2018-02-21 PROCEDURE — 84100 ASSAY OF PHOSPHORUS: CPT

## 2018-02-21 PROCEDURE — 97530 THERAPEUTIC ACTIVITIES: CPT

## 2018-02-21 PROCEDURE — 82550 ASSAY OF CK (CPK): CPT

## 2018-02-21 PROCEDURE — 25000003 PHARM REV CODE 250: Performed by: INTERNAL MEDICINE

## 2018-02-21 PROCEDURE — 85730 THROMBOPLASTIN TIME PARTIAL: CPT

## 2018-02-21 PROCEDURE — 11000001 HC ACUTE MED/SURG PRIVATE ROOM

## 2018-02-21 PROCEDURE — 80053 COMPREHEN METABOLIC PANEL: CPT

## 2018-02-21 PROCEDURE — 85610 PROTHROMBIN TIME: CPT

## 2018-02-21 PROCEDURE — 85025 COMPLETE CBC W/AUTO DIFF WBC: CPT

## 2018-02-21 PROCEDURE — 80202 ASSAY OF VANCOMYCIN: CPT

## 2018-02-21 PROCEDURE — 63600175 PHARM REV CODE 636 W HCPCS: Performed by: NURSE PRACTITIONER

## 2018-02-21 PROCEDURE — 25000003 PHARM REV CODE 250: Performed by: PHYSICIAN ASSISTANT

## 2018-02-21 PROCEDURE — 80100016 HC MAINTENANCE HEMODIALYSIS

## 2018-02-21 PROCEDURE — 36415 COLL VENOUS BLD VENIPUNCTURE: CPT

## 2018-02-21 PROCEDURE — 85730 THROMBOPLASTIN TIME PARTIAL: CPT | Mod: 91

## 2018-02-21 PROCEDURE — 83735 ASSAY OF MAGNESIUM: CPT

## 2018-02-21 RX ORDER — HEPARIN SODIUM,PORCINE/D5W 25000/250
16 INTRAVENOUS SOLUTION INTRAVENOUS CONTINUOUS
Status: DISCONTINUED | OUTPATIENT
Start: 2018-02-21 | End: 2018-02-27

## 2018-02-21 RX ADMIN — Medication 1 CAPSULE: at 01:02

## 2018-02-21 RX ADMIN — DAPTOMYCIN 865 MG: 500 INJECTION, POWDER, LYOPHILIZED, FOR SOLUTION INTRAVENOUS at 09:02

## 2018-02-21 RX ADMIN — CEFTAROLINE FOSAMIL 200 MG: 600 POWDER, FOR SOLUTION INTRAVENOUS at 12:02

## 2018-02-21 RX ADMIN — SODIUM CHLORIDE 1000 ML: 0.9 INJECTION, SOLUTION INTRAVENOUS at 09:02

## 2018-02-21 RX ADMIN — VITAMIN D, TAB 1000IU (100/BT) 2000 UNITS: 25 TAB at 01:02

## 2018-02-21 RX ADMIN — SEVELAMER CARBONATE 800 MG: 800 TABLET, FILM COATED ORAL at 06:02

## 2018-02-21 RX ADMIN — HEPARIN SODIUM 5000 UNITS: 5000 INJECTION, SOLUTION INTRAVENOUS; SUBCUTANEOUS at 09:02

## 2018-02-21 RX ADMIN — ERYTHROPOIETIN 20000 UNITS: 20000 INJECTION, SOLUTION INTRAVENOUS; SUBCUTANEOUS at 09:02

## 2018-02-21 RX ADMIN — FUROSEMIDE 80 MG: 40 TABLET ORAL at 06:02

## 2018-02-21 RX ADMIN — CEFTAROLINE FOSAMIL 200 MG: 600 POWDER, FOR SOLUTION INTRAVENOUS at 01:02

## 2018-02-21 RX ADMIN — OXYCODONE HYDROCHLORIDE AND ACETAMINOPHEN 1 TABLET: 5; 325 TABLET ORAL at 02:02

## 2018-02-21 RX ADMIN — HEPARIN SODIUM 5000 UNITS: 5000 INJECTION, SOLUTION INTRAVENOUS; SUBCUTANEOUS at 01:02

## 2018-02-21 RX ADMIN — CALCITRIOL 0.25 MCG: 0.25 CAPSULE, LIQUID FILLED ORAL at 01:02

## 2018-02-21 RX ADMIN — DOCUSATE SODIUM 100 MG: 100 CAPSULE, LIQUID FILLED ORAL at 09:02

## 2018-02-21 RX ADMIN — CARVEDILOL 25 MG: 12.5 TABLET, FILM COATED ORAL at 09:02

## 2018-02-21 RX ADMIN — SEVELAMER CARBONATE 800 MG: 800 TABLET, FILM COATED ORAL at 01:02

## 2018-02-21 RX ADMIN — DOCUSATE SODIUM 100 MG: 100 CAPSULE, LIQUID FILLED ORAL at 01:02

## 2018-02-21 RX ADMIN — SITAGLIPTIN 25 MG: 25 TABLET, FILM COATED ORAL at 01:02

## 2018-02-21 RX ADMIN — HEPARIN SODIUM AND DEXTROSE 16 UNITS/KG/HR: 10000; 5 INJECTION INTRAVENOUS at 05:02

## 2018-02-21 RX ADMIN — INSULIN ASPART 2 UNITS: 100 INJECTION, SOLUTION INTRAVENOUS; SUBCUTANEOUS at 06:02

## 2018-02-21 RX ADMIN — OXYCODONE HYDROCHLORIDE AND ACETAMINOPHEN 1 TABLET: 5; 325 TABLET ORAL at 09:02

## 2018-02-21 RX ADMIN — POLYETHYLENE GLYCOL 3350 17 G: 17 POWDER, FOR SOLUTION ORAL at 01:02

## 2018-02-21 NOTE — SUBJECTIVE & OBJECTIVE
Interval History: Feeling better. Denies fever or chills. Tolerating abx. No muscle pain.    Review of Systems   Constitutional: Negative for chills and fever.   All other systems reviewed and are negative.    Objective:     Vital Signs (Most Recent):  Temp: 98 °F (36.7 °C) (02/21/18 0915)  Pulse: (!) 55 (02/21/18 1030)  Resp: 18 (02/21/18 0915)  BP: (!) 124/53 (02/21/18 1030)  SpO2: 97 % (02/21/18 0811) Vital Signs (24h Range):  Temp:  [96.8 °F (36 °C)-98.9 °F (37.2 °C)] 98 °F (36.7 °C)  Pulse:  [55-67] 55  Resp:  [14-18] 18  SpO2:  [96 %-99 %] 97 %  BP: (104-152)/(46-65) 124/53     Weight: 108 kg (238 lb 3.2 oz)  Body mass index is 42.2 kg/m².    Estimated Creatinine Clearance: 10.7 mL/min (A) (based on SCr of 6 mg/dL (H)).    Physical Exam   Constitutional: She is oriented to person, place, and time. She appears well-developed and well-nourished. No distress.   HENT:   Head: Normocephalic and atraumatic.   Eyes: EOM are normal. Pupils are equal, round, and reactive to light.   Cardiovascular: Normal rate and regular rhythm.    Murmur heard.  Pulmonary/Chest: Effort normal and breath sounds normal. She has no rales.   Abdominal: Soft. Bowel sounds are normal.   Musculoskeletal: Normal range of motion. She exhibits no edema, tenderness or deformity.   Neurological: She is alert and oriented to person, place, and time.   Skin: She is not diaphoretic.   Psychiatric: She has a normal mood and affect. Her behavior is normal. Judgment and thought content normal.   Nursing note and vitals reviewed.      Significant Labs:   Blood Culture:   Recent Labs  Lab 02/13/18  1132 02/16/18  1037 02/17/18  1148 02/17/18  1200 02/19/18  0537   LABBLOO No growth after 5 days.  No growth after 5 days. Gram stain aer bottle: Gram positive cocci in clusters resembling Staph   Results called to and read back by:Ynse Steiner RN 02/17/2018  10:07  Gram stain vicente bottle: Gram positive cocci in clusters resembling Staph   Positive results  previously called 02/17/2018  16:42  METHICILLIN RESISTANT STAPHYLOCOCCUS AUREUSID consult required at [facility]. Gram stain aer bottle: Gram positive cocci in clusters resembling Staph   Results called to and read back by: Ynes Herrera RN  02/18/2018  18:31  STAPHYLOCOCCUS AUREUSID consult required at [facility].For susceptibility see order #[id number] Gram stain vicente bottle: Gram positive cocci in clusters resembling Staph   Results called to and read back by:Ynes Steiner RN 02/18/2018  14:40  Gram stain aer bottle: Gram positive cocci in clusters resembling Staph   Positive results previously called 02/18/2018  18:19  STAPHYLOCOCCUS AUREUSID consult required at [facility].For susceptibility see order #[id number] Gram stain aer bottle: Gram positive cocci in clusters resembling Staph   Gram stain vicente bottle: Gram positive cocci in clusters resembling Staph   Results called to and read back by:Alison Oswald RN 02/20/2018    03:01     BMP:   Recent Labs  Lab 02/21/18  0429   GLU 81   *   K 3.7   CL 98   CO2 28   BUN 24*   CREATININE 6.0*   CALCIUM 8.6*   MG 1.7     CBC:   Recent Labs  Lab 02/20/18  0433 02/21/18  0429   WBC 6.62 6.59   HGB 8.2* 8.0*   HCT 26.2* 25.8*    197       Significant Imaging: I have reviewed all pertinent imaging results/findings within the past 24 hours.

## 2018-02-21 NOTE — SUBJECTIVE & OBJECTIVE
Interval History: Feels good today, resting in bed comfortably.  Discussed RANDA on Thursday and patient in agreement.     Review of Systems   Musculoskeletal: Positive for back pain.     Objective:     Vital Signs (Most Recent):  Temp: 98.9 °F (37.2 °C) (02/20/18 1939)  Pulse: 63 (02/20/18 1939)  Resp: 18 (02/20/18 1939)  BP: (!) 128/59 (02/20/18 1939)  SpO2: 99 % (02/20/18 1939) Vital Signs (24h Range):  Temp:  [97.4 °F (36.3 °C)-98.9 °F (37.2 °C)] 98.9 °F (37.2 °C)  Pulse:  [49-74] 63  Resp:  [18-20] 18  SpO2:  [96 %-99 %] 99 %  BP: (114-136)/(54-63) 128/59     Weight: 108 kg (238 lb 3.2 oz)  Body mass index is 42.2 kg/m².  No intake or output data in the 24 hours ending 02/20/18 2120   Physical Exam   Constitutional: She is oriented to person, place, and time. She appears well-developed and well-nourished.   HENT:   Head: Normocephalic.   Eyes: Conjunctivae are normal. Right eye exhibits no discharge. Left eye exhibits no discharge.   Neck: Normal range of motion. Neck supple.   Cardiovascular: Regular rhythm and normal heart sounds.  Bradycardia present.    Pulmonary/Chest: Effort normal and breath sounds normal. No respiratory distress.   Abdominal: Soft. Bowel sounds are normal. She exhibits no distension. There is no tenderness.   Musculoskeletal: Normal range of motion.   Neurological: She is alert and oriented to person, place, and time.   Skin: Skin is warm and dry.   Psychiatric: She has a normal mood and affect. Her speech is normal and behavior is normal. Judgment and thought content normal.       Significant Labs:   CBC:   Recent Labs  Lab 02/19/18  0537 02/20/18  0433   WBC 8.14 6.62   HGB 8.7* 8.2*   HCT 27.7* 26.2*    191     CMP:   Recent Labs  Lab 02/19/18  0536 02/20/18  0433   * 135*   K 4.1 3.6   CL 96 101   CO2 29 27   * 86   BUN 33* 19   CREATININE 6.5* 4.6*   CALCIUM 9.1 8.4*   PROT 6.4 5.8*   ALBUMIN 2.0* 1.7*   BILITOT 0.3 0.2   ALKPHOS 74 66   AST 65* 40   ALT 92* 66*    ANIONGAP 8 7*   EGFRNONAA 6* 9*     All pertinent labs within the past 24 hours have been reviewed.    Significant Imaging: I have reviewed all pertinent imaging results/findings within the past 24 hours.

## 2018-02-21 NOTE — PROGRESS NOTES
[facility] Hospital Medicine  Progress Note    Patient Name: Yumiko Proctor  MRN: [id number]  Patient Class: IP- Inpatient   Admission Date: 2/7/2018  Length of Stay: 13 days  Attending Physician: Cherelle Echevarria, *  Primary Care Provider: Primary Doctor No        Subjective:     Principal Problem:MRSA bacteremia    HPI:  The patient is a 66 yo female with known MRSA bacteremia who was transferred here for ID and Neurosurgery services.  She has a history of ESRD, Afib, cardiomyopathy, and DM.  She has been hospitalized with bacteremia from a vascular access which was taken out and replaced today with a lt femoral vas cath.  Blood cultures remained positive. She had a RANDA which was normal, MRI of thoracic and lumbar spine showed a abnormal foci in T( that was probably a hemangioma or metastasis.    Hospital Course:  Blood cultures from 02/08/18 2/4 (+) MRSA.  Patient currently receiving Daptomycin.  Cultures repeated again on 02/10/2018 and 2/2 bottles positive for MRSA.  ID on board, concerned there is endovascular source.  Left prince catheter removed yesterday and right IJ tunneled dialysis catheter placed 2/12. Repeat blood cultures 2/13 remain negative to date, but one bottle obtained 2/16 growing MRSA and 2/17 blood cultures are positive for gram positive cocci in clusters resembling Staph.  Patient may have some myositis from Daptomycin with elevation in CPK to 5175 which is now decreasing and this was discontinued.  Vancomycin given after dialysis treatments.  PT/OT consulted and recommend skilled nursing upon discharge.  Catheter, IV, removal after dialysis 2/19/2018.  Consult Cardiology for RANDA.    Interval History: Feels good today, resting in bed comfortably.  Discussed RANDA on Thursday and patient in agreement.     Review of Systems   Musculoskeletal: Positive for back pain.     Objective:     Vital Signs (Most Recent):  Temp: 98.9 °F (37.2 °C) (02/20/18 1939)  Pulse: 63  (02/20/18 1939)  Resp: 18 (02/20/18 1939)  BP: (!) 128/59 (02/20/18 1939)  SpO2: 99 % (02/20/18 1939) Vital Signs (24h Range):  Temp:  [97.4 °F (36.3 °C)-98.9 °F (37.2 °C)] 98.9 °F (37.2 °C)  Pulse:  [49-74] 63  Resp:  [18-20] 18  SpO2:  [96 %-99 %] 99 %  BP: (114-136)/(54-63) 128/59     Weight: 108 kg (238 lb 3.2 oz)  Body mass index is 42.2 kg/m².  No intake or output data in the 24 hours ending 02/20/18 2120   Physical Exam   Constitutional: She is oriented to person, place, and time. She appears well-developed and well-nourished.   HENT:   Head: Normocephalic.   Eyes: Conjunctivae are normal. Right eye exhibits no discharge. Left eye exhibits no discharge.   Neck: Normal range of motion. Neck supple.   Cardiovascular: Regular rhythm and normal heart sounds.  Bradycardia present.    Pulmonary/Chest: Effort normal and breath sounds normal. No respiratory distress.   Abdominal: Soft. Bowel sounds are normal. She exhibits no distension. There is no tenderness.   Musculoskeletal: Normal range of motion.   Neurological: She is alert and oriented to person, place, and time.   Skin: Skin is warm and dry.   Psychiatric: She has a normal mood and affect. Her speech is normal and behavior is normal. Judgment and thought content normal.       Significant Labs:   CBC:   Recent Labs  Lab 02/19/18  0537 02/20/18  0433   WBC 8.14 6.62   HGB 8.7* 8.2*   HCT 27.7* 26.2*    191     CMP:   Recent Labs  Lab 02/19/18  0536 02/20/18  0433   * 135*   K 4.1 3.6   CL 96 101   CO2 29 27   * 86   BUN 33* 19   CREATININE 6.5* 4.6*   CALCIUM 9.1 8.4*   PROT 6.4 5.8*   ALBUMIN 2.0* 1.7*   BILITOT 0.3 0.2   ALKPHOS 74 66   AST 65* 40   ALT 92* 66*   ANIONGAP 8 7*   EGFRNONAA 6* 9*     All pertinent labs within the past 24 hours have been reviewed.    Significant Imaging: I have reviewed all pertinent imaging results/findings within the past 24 hours.    Assessment/Plan:      * MRSA bacteremia    Blood Cultures 02/08/18 MRSA  in 1 set, 2nd set NGTD  Repeat Blood Cultures 02/10/18 2/2 MRSA  Lactic acid 0.9 02/08/10  Discontinued Daptomycin secondary to elevated CPK  Suspect endovascular source  New IJ catheter placed 2/12, blood cultures 2/13 are negative to date, but 2/16 culture is positive  Repeat blood cultures x2 2/17 are now positive  Removed all lines 02/19/18 for a line holiday for several days  Consulted Cardiology for repeat RANDA, plan for Thursday 2/22/18  Check Vancomycin levels are maintained in 15-20 range          Abnormal liver enzymes    Discontinued Amiodarone 2/13  AST/ALT decreasing, almost normal        Type 2 diabetes mellitus with chronic kidney disease on chronic dialysis, without long-term current use of insulin    A1c  6.5  PRN insulin  Well-controlled          ESRD (end stage renal disease)    Continue Dialysis MWF per Nephrology  Left Femoral Jaspreet cath removed   R IJ dialysis catheter placed 2/12  Removed R IJ and IVs 02/19/18 for holiday          Chronic atrial fibrillation    Continue Coreg  Rate controlled              VTE Risk Mitigation         Ordered     heparin (porcine) injection 2,000 Units  As needed (PRN)     Route:  Intravenous        02/13/18 0940     heparin (porcine) injection 5,000 Units  As needed (PRN)     Route:  Intra-Catheter        02/12/18 1834     heparin (porcine) injection 5,000 Units  Every 12 hours     Route:  Subcutaneous        02/08/18 0837     Medium Risk of VTE  Once      02/07/18 2255     Place sequential compression device  Until discontinued      02/07/18 2255     Place ANGELI hose  Until discontinued      02/07/18 2255              Cherelle Echevarria MD  Department of Hospital Medicine   [facility]

## 2018-02-21 NOTE — PLAN OF CARE
"Problem: Physical Therapy Goal  Goal: Physical Therapy Goal  Goals to be met by: 3/15/19     Patient will increase functional independence with mobility by performin. Supine to sit with supervision.   2. Sit to supine with supervision.   3. Sit<>stand transfer with CGA using rolling walker.   4. Gait > 10 feet with CGA using rolling walker.       Outcome: Ongoing (interventions implemented as appropriate)  Pt progressing slowly with PT. Noted (+) L LE DVT per imaging. RN beginning heparin drip at beginning session with recommendations for "light activity" only. Pt participated in sitting at edge of bed. Upon sitting, noted sheet and gown were wet at L UE IV site. RN notified and arrived during PT session. Assisted with positioning in bed as well as jose g-care following bowel movement. Will continue to follow and progress as tolerated. Please see progress note for detailed plan of care and recommendations (SNF).        "

## 2018-02-21 NOTE — PROGRESS NOTES
"Renal Progress Note    Admit Date: 2/7/2018   LOS: 14 days      67 y.o. white female with known MRSA bacteremia who was transferred here for ID and Neurosurgery services.  She has a history of ESRD, Afib, cardiomyopathy, and DM.  She has been hospitalized with bacteremia from a Right SC Malvin which was removed at outside facility.  She had a left femoral Jaspreet placed 2/5/2018.  Blood cultures remained positive; RANDA  normal, MRI of thoracic and lumbar spine showed abnormal foci thought  "probably a hemangioma or metastasis".  She normally dialyses MWF in [place], [place].  She is a very poor historian and says she does not know why her kidneys failed requiring she start HD in November 2017.  "You need to ask my daughter all these questions.  I just don't know."  No other vascular access ie AVF or grafts noted.    SUBJECTIVE:     Seen on HD.  Discussed extensively with team.  U/S LLE revealing for occlusive thrombus (was not notified).  No CP/SOB.  Cultures remain +.         Scheduled Meds:   calcitRIOL  0.25 mcg Oral Daily    carvedilol  25 mg Oral BID    ceftaroline fosamil  200 mg Intravenous Q12H    DAPTOmycin (CUBICIN)  IV  8 mg/kg Intravenous Q48H    docusate sodium  100 mg Oral BID    epoetin davion (PROCRIT) injection  20,000 Units Subcutaneous Every Mon, Wed, Fri    furosemide  80 mg Oral BID    heparin (PORCINE)  70 Units/kg Intravenous Once    insulin aspart U-100  1-10 Units Subcutaneous TIDWM    polyethylene glycol  17 g Oral Daily    sevelamer carbonate  800 mg Oral TID WM    SITagliptin  25 mg Oral Daily    vitamin D  2,000 Units Oral Daily    vitamin renal formula (B-complex-vitamin c-folic acid)  1 capsule Oral Daily       OBJECTIVE:     Vital Signs Range (Last 24H):  Temp:  [96.8 °F (36 °C)-98.9 °F (37.2 °C)]   Pulse:  [55-67]   Resp:  [14-18]   BP: (104-152)/(40-65)   SpO2:  [96 %-99 %]     I & O (Last 24H):    Intake/Output Summary (Last 24 hours) at 02/21/18 1111  Last data filed at " 02/21/18 0500   Gross per 24 hour   Intake              150 ml   Output                0 ml   Net              150 ml       Physical Exam:  Constitutional: She is oriented to person, place, and time. Morbidly obese, well-nourished.   Head: Normocephalic.   Eyes: Conjunctivae are normal.    Neck: Normal range of motion. Neck supple.   Cardiovascular: Regular rhythm, normal heart sounds. Bradycardia  Pulmonary/Chest: Effort normal and breath sounds normal. No respiratory distress.   Abdominal: Soft, obese. Bowel sounds are normal. She exhibits no distension. There is no tenderness.   Ext:  no appreciable edema  Neurological: NF  Skin: Skin is warm and dry + pallor  Psychiatric: She has a normal mood and affect. Her speech is normal and behavior is normal. Very pleasant.  Poor historian.   Access: DAYTON TSAI      Laboratory:  CBC:     Recent Labs  Lab 02/21/18 0429   WBC 6.59   RBC 2.91*   HGB 8.0*   HCT 25.8*      MCV 89   MCH 27.5   MCHC 31.0*     BMP:     Recent Labs  Lab 02/21/18 0429   GLU 81   *   K 3.7   CL 98   CO2 28   BUN 24*   CREATININE 6.0*   CALCIUM 8.6*   MG 1.7       Recent Labs  Lab 02/21/18 0429        Impression:   Extensive thrombus throughout the left lower extremity with nonocclusive thrombus in the common femoral vein with occlusive thrombus throughout the femoral, popliteal, and peroneal vein. Anterior and posterior tibial veins are patent.      ASSESSMENT/PLAN:     66 YO WF with ESRD and catheter associated MRSA bacteremia and suspected lumbar foci presents from [facility] for ID and NS services.    1. ESRD:  Dr. Nichols pulled line for holiday, however electrolytes warranted new line for HD on 2/12.  DAYTON TSAI placed 2/12. Continue MWF for now.  Renally dose meds, avoid nephrotoxins, and monitor I/O's closely.  2. Hyperkalemia from dietary choices vs  (E87.5):  Changed diet and consulted dietary for counseling.  Low K bath.  Improved.   3. Rhabdo from Cubicin  (M62.82):  Discussed with ID.  CPK stable so far after restarting.   4. Anemia of CKD:   holding IV iron given active infection.  Epo with HD.  Folate and B12 normal.  5. Hyperphos:  on Renvela now.  6. Hyponatremia- resolving with HD.    7. 2HPT/Vit D deficiency:  Vit D3 and calcitriol.  8. DM:  Currently on SSI.  Reduced Januvia to ESRD dosing of 25mg/d.  9. Hx of Afib:  On Carvedilol.  Defer to cards. Defer anticoagulation to Cards.    10. MRSA Bacteremia: Repeat BCx from 2/16, 17, 19 are positive!!!  Discussed with Dr. Fernandez.  Needs RADNA.  Findings of occlusive LLE DVT likely source. Leave EULALIO in for now but suspect will need another holiday.   11. Occlusive LLE DVT (I82.412):  No one was notified of this study.  Discussed with team and will start Heparin drip since will likely need new line soon and starting Eliquis would cause delay.  Consult CV/vascular surgery for input.         See above.

## 2018-02-21 NOTE — PLAN OF CARE
Problem: Patient Care Overview  Goal: Plan of Care Review  Outcome: Ongoing (interventions implemented as appropriate)  Plan of care reviewed with patient.  VSS.  Patient repositioning in bed independently, assistance provided as needed.  IV antibiotics given as ordered.  Purposeful rounding completed.  Call bell within reach, no needs at this time, will continue to monitor.

## 2018-02-21 NOTE — ASSESSMENT & PLAN NOTE
Continue Dialysis MWF per Nephrology  Left Femoral Jaspreet cath removed   R IJ dialysis catheter placed 2/12  Removed R IJ and IVs 02/19/18 for holiday

## 2018-02-21 NOTE — PROGRESS NOTES
"[facility]  Infectious Disease  Progress Note    Patient Name: Ymuiko Proctor  MRN: [id number]  Admission Date: 2/7/2018  Length of Stay: 14 days  Attending Physician: Cherelle Echevarria, *  Primary Care Provider: Primary Doctor No    Isolation Status: Contact     Assessment/Plan:      * MRSA bacteremia    - blood cultures negative from 2/13  - held daptomycin, repeat blood cultures positive  - ceftaroline started  - DVT noted and could be endovascular source  - would consider RANDA  - continue daptomycin, following CPK, and ceftaroline (limited data supports synergy)  - repeat blood cultures  - may need another line change/holiday, if possible           I will follow-up with patient. Please contact us if you have any additional questions.    Andrew Fernandez MD  Infectious Disease  [facility]    Subjective:     Principal Problem:MRSA bacteremia    HPI: This is a 68 yo woman with ESRD transferred to [facility] for persistent bacteremia due to MRSA. She reportedly had multiple studies performed at [facility] in [place], including MR imaging, CT imaging, a bone scan, and a RANDA, all of which were "negative." An indwelling HD catheter was discontinued and a left groin HD catheter was placed. The patient feels sick but denies any rigors or irving fever. The patient was admitted last night and placed on cefepime and Cipro in addition to the daptomycin. I am consulted for ID evaluation.    Interval History: Feeling better. Denies fever or chills. Tolerating abx. No muscle pain.    Review of Systems   Constitutional: Negative for chills and fever.   All other systems reviewed and are negative.    Objective:     Vital Signs (Most Recent):  Temp: 98 °F (36.7 °C) (02/21/18 0915)  Pulse: (!) 55 (02/21/18 1030)  Resp: 18 (02/21/18 0915)  BP: (!) 124/53 (02/21/18 1030)  SpO2: 97 % (02/21/18 0811) Vital Signs (24h Range):  Temp:  [96.8 °F (36 °C)-98.9 °F (37.2 °C)] 98 °F (36.7 °C)  Pulse:  [55-67] " 55  Resp:  [14-18] 18  SpO2:  [96 %-99 %] 97 %  BP: (104-152)/(46-65) 124/53     Weight: 108 kg (238 lb 3.2 oz)  Body mass index is 42.2 kg/m².    Estimated Creatinine Clearance: 10.7 mL/min (A) (based on SCr of 6 mg/dL (H)).    Physical Exam   Constitutional: She is oriented to person, place, and time. She appears well-developed and well-nourished. No distress.   HENT:   Head: Normocephalic and atraumatic.   Eyes: EOM are normal. Pupils are equal, round, and reactive to light.   Cardiovascular: Normal rate and regular rhythm.    Murmur heard.  Pulmonary/Chest: Effort normal and breath sounds normal. She has no rales.   Abdominal: Soft. Bowel sounds are normal.   Musculoskeletal: Normal range of motion. She exhibits no edema, tenderness or deformity.   Neurological: She is alert and oriented to person, place, and time.   Skin: She is not diaphoretic.   Psychiatric: She has a normal mood and affect. Her behavior is normal. Judgment and thought content normal.   Nursing note and vitals reviewed.      Significant Labs:   Blood Culture:   Recent Labs  Lab 02/13/18  1132 02/16/18  1037 02/17/18  1148 02/17/18  1200 02/19/18  0537   LABBLOO No growth after 5 days.  No growth after 5 days. Gram stain aer bottle: Gram positive cocci in clusters resembling Staph   Results called to and read back by:Ynes Steiner RN 02/17/2018  10:07  Gram stain vicente bottle: Gram positive cocci in clusters resembling Staph   Positive results previously called 02/17/2018  16:42  METHICILLIN RESISTANT STAPHYLOCOCCUS AUREUSID consult required at [facility] and [facility] locations. Gram stain aer bottle: Gram positive cocci in clusters resembling Staph   Results called to and read back by: Ynes Herrera RN  02/18/2018  18:31  STAPHYLOCOCCUS AUREUSID consult required at [facility] and [facility].For susceptibility see order #[id number] Gram stain vicente bottle: Gram positive cocci in clusters resembling Staph   Results  called to and read back by:Ynes Steiner RN 02/18/2018  14:40  Gram stain aer bottle: Gram positive cocci in clusters resembling Staph   Positive results previously called 02/18/2018  18:19  STAPHYLOCOCCUS AUREUSID consult required at [facility].[place].For susceptibility see order #[id number] Gram stain aer bottle: Gram positive cocci in clusters resembling Staph   Gram stain vicente bottle: Gram positive cocci in clusters resembling Staph   Results called to and read back by:Alison Oswald RN 02/20/2018    03:01     BMP:   Recent Labs  Lab 02/21/18  0429   GLU 81   *   K 3.7   CL 98   CO2 28   BUN 24*   CREATININE 6.0*   CALCIUM 8.6*   MG 1.7     CBC:   Recent Labs  Lab 02/20/18  0433 02/21/18  0429   WBC 6.62 6.59   HGB 8.2* 8.0*   HCT 26.2* 25.8*    197       Significant Imaging: I have reviewed all pertinent imaging results/findings within the past 24 hours.

## 2018-02-21 NOTE — PT/OT/SLP PROGRESS
Occupational Therapy      Patient Name:  Yumiko Proctor   MRN:  [id number]  B306/B306 A    Patient not seen today secondary to Dialysis (this was second attempt). First attempt was early a.m., RN to speak with MD doe therapy and (L) LE DVTs.  Will follow-up 02/22/18.    PATTY Alonzo  2/21/2018

## 2018-02-21 NOTE — ASSESSMENT & PLAN NOTE
Blood Cultures 02/08/18 MRSA in 1 set, 2nd set NGTD  Repeat Blood Cultures 02/10/18 2/2 MRSA  Lactic acid 0.9 02/08/10  Discontinued Daptomycin secondary to elevated CPK  Suspect endovascular source  New IJ catheter placed 2/12, blood cultures 2/13 are negative to date, but 2/16 culture is positive  Repeat blood cultures x2 2/17 are now positive  Removed all lines 02/19/18 for a line holiday for several days  Consulted Cardiology for repeat RANDA, plan for Thursday 2/22/18  Check Vancomycin levels are maintained in 15-20 range

## 2018-02-22 LAB
ALBUMIN SERPL BCP-MCNC: 2 G/DL
ALP SERPL-CCNC: 75 U/L
ALT SERPL W/O P-5'-P-CCNC: 48 U/L
ANION GAP SERPL CALC-SCNC: 9 MMOL/L
APTT BLDCRRT: 109.9 SEC
APTT BLDCRRT: 59 SEC
AST SERPL-CCNC: 35 U/L
BASOPHILS # BLD AUTO: 0.03 K/UL
BASOPHILS # BLD AUTO: 0.03 K/UL
BASOPHILS NFR BLD: 0.5 %
BASOPHILS NFR BLD: 0.5 %
BILIRUB SERPL-MCNC: 0.3 MG/DL
BUN SERPL-MCNC: 12 MG/DL
CALCIUM SERPL-MCNC: 8.3 MG/DL
CHLORIDE SERPL-SCNC: 100 MMOL/L
CO2 SERPL-SCNC: 26 MMOL/L
CREAT SERPL-MCNC: 3.5 MG/DL
DIFFERENTIAL METHOD: ABNORMAL
DIFFERENTIAL METHOD: ABNORMAL
EOSINOPHIL # BLD AUTO: 0.4 K/UL
EOSINOPHIL # BLD AUTO: 0.4 K/UL
EOSINOPHIL NFR BLD: 6.4 %
EOSINOPHIL NFR BLD: 6.4 %
ERYTHROCYTE [DISTWIDTH] IN BLOOD BY AUTOMATED COUNT: 16.9 %
ERYTHROCYTE [DISTWIDTH] IN BLOOD BY AUTOMATED COUNT: 16.9 %
EST. GFR  (AFRICAN AMERICAN): 15 ML/MIN/1.73 M^2
EST. GFR  (NON AFRICAN AMERICAN): 13 ML/MIN/1.73 M^2
FACT X PPP CHRO-ACNC: 0.32 IU/ML
GLUCOSE SERPL-MCNC: 95 MG/DL
HCT VFR BLD AUTO: 26.2 %
HCT VFR BLD AUTO: 26.2 %
HGB BLD-MCNC: 8.1 G/DL
HGB BLD-MCNC: 8.1 G/DL
LYMPHOCYTES # BLD AUTO: 2 K/UL
LYMPHOCYTES # BLD AUTO: 2 K/UL
LYMPHOCYTES NFR BLD: 33.9 %
LYMPHOCYTES NFR BLD: 33.9 %
MAGNESIUM SERPL-MCNC: 1.6 MG/DL
MCH RBC QN AUTO: 27.6 PG
MCH RBC QN AUTO: 27.6 PG
MCHC RBC AUTO-ENTMCNC: 30.9 G/DL
MCHC RBC AUTO-ENTMCNC: 30.9 G/DL
MCV RBC AUTO: 89 FL
MCV RBC AUTO: 89 FL
MONOCYTES # BLD AUTO: 0.6 K/UL
MONOCYTES # BLD AUTO: 0.6 K/UL
MONOCYTES NFR BLD: 10.1 %
MONOCYTES NFR BLD: 10.1 %
NEUTROPHILS # BLD AUTO: 2.9 K/UL
NEUTROPHILS # BLD AUTO: 2.9 K/UL
NEUTROPHILS NFR BLD: 48.4 %
NEUTROPHILS NFR BLD: 48.4 %
PHOSPHATE SERPL-MCNC: 2.7 MG/DL
PLATELET # BLD AUTO: 200 K/UL
PLATELET # BLD AUTO: 200 K/UL
PMV BLD AUTO: 9.5 FL
PMV BLD AUTO: 9.5 FL
POCT GLUCOSE: 116 MG/DL (ref 70–110)
POCT GLUCOSE: 128 MG/DL (ref 70–110)
POCT GLUCOSE: 131 MG/DL (ref 70–110)
POCT GLUCOSE: 95 MG/DL (ref 70–110)
POTASSIUM SERPL-SCNC: 3.5 MMOL/L
PROT SERPL-MCNC: 6.6 G/DL
RBC # BLD AUTO: 2.93 M/UL
RBC # BLD AUTO: 2.93 M/UL
SODIUM SERPL-SCNC: 135 MMOL/L
WBC # BLD AUTO: 5.95 K/UL
WBC # BLD AUTO: 5.95 K/UL

## 2018-02-22 PROCEDURE — 85520 HEPARIN ASSAY: CPT

## 2018-02-22 PROCEDURE — 25000003 PHARM REV CODE 250: Performed by: NURSE PRACTITIONER

## 2018-02-22 PROCEDURE — 97535 SELF CARE MNGMENT TRAINING: CPT

## 2018-02-22 PROCEDURE — 99900035 HC TECH TIME PER 15 MIN (STAT)

## 2018-02-22 PROCEDURE — 83735 ASSAY OF MAGNESIUM: CPT

## 2018-02-22 PROCEDURE — 80053 COMPREHEN METABOLIC PANEL: CPT

## 2018-02-22 PROCEDURE — 25000003 PHARM REV CODE 250: Performed by: INTERNAL MEDICINE

## 2018-02-22 PROCEDURE — 99233 SBSQ HOSP IP/OBS HIGH 50: CPT | Mod: ,,, | Performed by: INTERNAL MEDICINE

## 2018-02-22 PROCEDURE — 25000003 PHARM REV CODE 250: Performed by: PHYSICIAN ASSISTANT

## 2018-02-22 PROCEDURE — 97530 THERAPEUTIC ACTIVITIES: CPT

## 2018-02-22 PROCEDURE — 94761 N-INVAS EAR/PLS OXIMETRY MLT: CPT

## 2018-02-22 PROCEDURE — 63600175 PHARM REV CODE 636 W HCPCS: Performed by: INTERNAL MEDICINE

## 2018-02-22 PROCEDURE — 85730 THROMBOPLASTIN TIME PARTIAL: CPT

## 2018-02-22 PROCEDURE — 11000001 HC ACUTE MED/SURG PRIVATE ROOM

## 2018-02-22 PROCEDURE — 85025 COMPLETE CBC W/AUTO DIFF WBC: CPT

## 2018-02-22 PROCEDURE — 84100 ASSAY OF PHOSPHORUS: CPT

## 2018-02-22 PROCEDURE — 63600175 PHARM REV CODE 636 W HCPCS: Performed by: NURSE PRACTITIONER

## 2018-02-22 PROCEDURE — 87040 BLOOD CULTURE FOR BACTERIA: CPT

## 2018-02-22 RX ORDER — RAMELTEON 8 MG/1
8 TABLET ORAL NIGHTLY PRN
Status: DISCONTINUED | OUTPATIENT
Start: 2018-02-22 | End: 2018-03-01 | Stop reason: HOSPADM

## 2018-02-22 RX ADMIN — CEFTAROLINE FOSAMIL 200 MG: 600 POWDER, FOR SOLUTION INTRAVENOUS at 01:02

## 2018-02-22 RX ADMIN — CARVEDILOL 25 MG: 12.5 TABLET, FILM COATED ORAL at 08:02

## 2018-02-22 RX ADMIN — FUROSEMIDE 80 MG: 40 TABLET ORAL at 08:02

## 2018-02-22 RX ADMIN — VITAMIN D, TAB 1000IU (100/BT) 2000 UNITS: 25 TAB at 10:02

## 2018-02-22 RX ADMIN — OXYCODONE HYDROCHLORIDE AND ACETAMINOPHEN 1 TABLET: 5; 325 TABLET ORAL at 04:02

## 2018-02-22 RX ADMIN — FUROSEMIDE 80 MG: 40 TABLET ORAL at 10:02

## 2018-02-22 RX ADMIN — HEPARIN SODIUM AND DEXTROSE 12.96 UNITS/KG/HR: 10000; 5 INJECTION INTRAVENOUS at 08:02

## 2018-02-22 RX ADMIN — CALCITRIOL 0.25 MCG: 0.25 CAPSULE, LIQUID FILLED ORAL at 10:02

## 2018-02-22 RX ADMIN — SEVELAMER CARBONATE 800 MG: 800 TABLET, FILM COATED ORAL at 01:02

## 2018-02-22 RX ADMIN — DOCUSATE SODIUM 100 MG: 100 CAPSULE, LIQUID FILLED ORAL at 08:02

## 2018-02-22 RX ADMIN — SEVELAMER CARBONATE 800 MG: 800 TABLET, FILM COATED ORAL at 10:02

## 2018-02-22 RX ADMIN — Medication 1 CAPSULE: at 10:02

## 2018-02-22 RX ADMIN — SEVELAMER CARBONATE 800 MG: 800 TABLET, FILM COATED ORAL at 04:02

## 2018-02-22 RX ADMIN — SITAGLIPTIN 25 MG: 25 TABLET, FILM COATED ORAL at 10:02

## 2018-02-22 RX ADMIN — POLYETHYLENE GLYCOL 3350 17 G: 17 POWDER, FOR SOLUTION ORAL at 10:02

## 2018-02-22 RX ADMIN — OXYCODONE HYDROCHLORIDE AND ACETAMINOPHEN 1 TABLET: 5; 325 TABLET ORAL at 10:02

## 2018-02-22 RX ADMIN — CARVEDILOL 25 MG: 12.5 TABLET, FILM COATED ORAL at 10:02

## 2018-02-22 RX ADMIN — HEPARIN SODIUM AND DEXTROSE 13 UNITS/KG/HR: 10000; 5 INJECTION INTRAVENOUS at 04:02

## 2018-02-22 RX ADMIN — RAMELTEON 8 MG: 8 TABLET, FILM COATED ORAL at 08:02

## 2018-02-22 RX ADMIN — DOCUSATE SODIUM 100 MG: 100 CAPSULE, LIQUID FILLED ORAL at 10:02

## 2018-02-22 NOTE — PROGRESS NOTES
[facility] Hospital Medicine  Progress Note    Patient Name: Yumiko Proctor  MRN: [id number]  Patient Class: IP- Inpatient   Admission Date: 2/7/2018  Length of Stay: 14 days  Attending Physician: Cherelle Echevarria, *  Primary Care Provider: Primary Doctor No        Subjective:     Principal Problem:MRSA bacteremia    HPI:  The patient is a 66 yo female with known MRSA bacteremia who was transferred here for ID and Neurosurgery services.  She has a history of ESRD, Afib, cardiomyopathy, and DM.  She has been hospitalized with bacteremia from a vascular access which was taken out and replaced today with a lt femoral vas cath.  Blood cultures remained positive. She had a RANDA which was normal, MRI of thoracic and lumbar spine showed a abnormal foci in T( that was probably a hemangioma or metastasis.    Hospital Course:  Blood cultures from 02/08/18 2/4 (+) MRSA.  Patient currently receiving Daptomycin.  Cultures repeated again on 02/10/2018 and 2/2 bottles positive for MRSA.  ID on board, concerned there is endovascular source.  Left prince catheter removed yesterday and right IJ tunneled dialysis catheter placed 2/12. Repeat blood cultures 2/13 remain negative to date, but one bottle obtained 2/16 growing MRSA and 2/17 blood cultures are positive for gram positive cocci in clusters resembling Staph.  Patient may have some myositis from Daptomycin with elevation in CPK to 5175 which is now decreasing and this was discontinued.  Vancomycin given after dialysis treatments.  PT/OT consulted and recommend skilled nursing upon discharge.  Catheter, IV, removal after dialysis 2/19/2018.  Consult Cardiology for RANDA.    Interval History: No acute events overnight or this morning.  Discussed DVT in left leg, plan for RANDA, and patient in agreement.    Review of Systems   Musculoskeletal: Positive for back pain.     Objective:     Vital Signs (Most Recent):  Temp: 98.1 °F (36.7 °C) (02/21/18 1948)  Pulse:  65 (02/21/18 1948)  Resp: 16 (02/21/18 1948)  BP: (!) 114/57 (02/21/18 1948)  SpO2: 98 % (02/21/18 1948) Vital Signs (24h Range):  Temp:  [96.7 °F (35.9 °C)-98.4 °F (36.9 °C)] 98.1 °F (36.7 °C)  Pulse:  [53-66] 65  Resp:  [14-18] 16  SpO2:  [96 %-98 %] 98 %  BP: (104-152)/(40-68) 114/57     Weight: 108 kg (238 lb 3.2 oz)  Body mass index is 42.2 kg/m².    Intake/Output Summary (Last 24 hours) at 02/21/18 2203  Last data filed at 02/21/18 1330   Gross per 24 hour   Intake              650 ml   Output             2000 ml   Net            -1350 ml      Physical Exam   Constitutional: She is oriented to person, place, and time. She appears well-developed and well-nourished.   HENT:   Head: Normocephalic.   Eyes: Conjunctivae are normal. Right eye exhibits no discharge. Left eye exhibits no discharge.   Neck: Normal range of motion. Neck supple.   Cardiovascular: Regular rhythm and normal heart sounds.  Bradycardia present.    Pulmonary/Chest: Effort normal and breath sounds normal. No respiratory distress.   Abdominal: Soft. Bowel sounds are normal. She exhibits no distension. There is no tenderness.   Musculoskeletal: Normal range of motion.   Neurological: She is alert and oriented to person, place, and time.   Skin: Skin is warm and dry.   Psychiatric: She has a normal mood and affect. Her speech is normal and behavior is normal. Judgment and thought content normal.       Significant Labs:   CBC:   Recent Labs  Lab 02/20/18  0433 02/21/18  0429 02/21/18  1312   WBC 6.62 6.59 4.90   HGB 8.2* 8.0* 7.5*   HCT 26.2* 25.8* 24.2*    197 168  168     CMP:   Recent Labs  Lab 02/20/18  0433 02/21/18  0429   * 134*   K 3.6 3.7    98   CO2 27 28   GLU 86 81   BUN 19 24*   CREATININE 4.6* 6.0*   CALCIUM 8.4* 8.6*   PROT 5.8* 5.9*   ALBUMIN 1.7* 1.8*   BILITOT 0.2 0.2   ALKPHOS 66 72   AST 40 35   ALT 66* 54*   ANIONGAP 7* 8   EGFRNONAA 9* 7*     All pertinent labs within the past 24 hours have been  reviewed.    Significant Imaging: I have reviewed all pertinent imaging results/findings within the past 24 hours.     US Lower Extremity Veins Bilateral :  Extensive thrombus throughout the left lower extremity with nonocclusive thrombus in the common femoral vein with occlusive thrombus throughout the femoral, popliteal, and peroneal vein. Anterior and posterior tibial veins are patent.      Assessment/Plan:      * MRSA bacteremia    Blood Cultures 02/08/18 MRSA in 1 set, 2nd set NGTD  Repeat Blood Cultures 02/10/18 2/2 MRSA  Lactic acid 0.9 02/08/10  Discontinued Daptomycin secondary to elevated CPK  Suspect endovascular source  New IJ catheter placed 2/12, blood cultures 2/13 are negative to date, but 2/16 culture is positive  Repeat blood cultures x2 2/17 are now positive  Removed all lines 02/19/18 for a line holiday for several days  Consulted Cardiology for repeat RANDA, plan for Thursday 2/22/18            DVT of deep femoral vein, left    Consulted Dr. Nichols, Vascular Surgery for evaluation of thrombus  Heparin Infusion  Maybe endovascular source of bactermia        Abnormal liver enzymes    Discontinued Amiodarone 2/13  AST/ALT decreasing, almost normal        Type 2 diabetes mellitus with chronic kidney disease on chronic dialysis, without long-term current use of insulin    A1c  6.5  PRN insulin  Well-controlled          ESRD (end stage renal disease)    Continue Dialysis MWF per Nephrology  Left Femoral Jaspreet cath removed   R IJ dialysis catheter placed 2/12  Removed R IJ and IVs 02/19/18 for holiday          Chronic atrial fibrillation    Continue Coreg  Rate controlled              VTE Risk Mitigation         Ordered     heparin 25,000 units in dextrose 5% 250 mL (100 units/mL) infusion; FEMALE  Continuous     Route:  Intravenous        02/21/18 1056     heparin 25,000 units in dextrose 5% 250 mL (100 units/mL) bolus from bag; PRN BOLUS  As needed (PRN)     Route:  Intravenous        02/21/18 1056      heparin 25,000 units in dextrose 5% 250 mL (100 units/mL) bolus from bag; PRN BOLUS  As needed (PRN)     Route:  Intravenous        02/21/18 1056     heparin (porcine) injection 2,000 Units  As needed (PRN)     Route:  Intravenous        02/13/18 0940     heparin (porcine) injection 5,000 Units  As needed (PRN)     Route:  Intra-Catheter        02/12/18 1834     Medium Risk of VTE  Once      02/07/18 2255     Place sequential compression device  Until discontinued      02/07/18 2255     Place ANGELI hose  Until discontinued      02/07/18 2255              Cherelle Echevarria MD  Department of Hospital Medicine   [facility]

## 2018-02-22 NOTE — SUBJECTIVE & OBJECTIVE
Interval History: No acute events overnight or this morning.  Discussed DVT in left leg, plan for RANDA, and patient in agreement.    Review of Systems   Musculoskeletal: Positive for back pain.     Objective:     Vital Signs (Most Recent):  Temp: 98.1 °F (36.7 °C) (02/21/18 1948)  Pulse: 65 (02/21/18 1948)  Resp: 16 (02/21/18 1948)  BP: (!) 114/57 (02/21/18 1948)  SpO2: 98 % (02/21/18 1948) Vital Signs (24h Range):  Temp:  [96.7 °F (35.9 °C)-98.4 °F (36.9 °C)] 98.1 °F (36.7 °C)  Pulse:  [53-66] 65  Resp:  [14-18] 16  SpO2:  [96 %-98 %] 98 %  BP: (104-152)/(40-68) 114/57     Weight: 108 kg (238 lb 3.2 oz)  Body mass index is 42.2 kg/m².    Intake/Output Summary (Last 24 hours) at 02/21/18 2203  Last data filed at 02/21/18 1330   Gross per 24 hour   Intake              650 ml   Output             2000 ml   Net            -1350 ml      Physical Exam   Constitutional: She is oriented to person, place, and time. She appears well-developed and well-nourished.   HENT:   Head: Normocephalic.   Eyes: Conjunctivae are normal. Right eye exhibits no discharge. Left eye exhibits no discharge.   Neck: Normal range of motion. Neck supple.   Cardiovascular: Regular rhythm and normal heart sounds.  Bradycardia present.    Pulmonary/Chest: Effort normal and breath sounds normal. No respiratory distress.   Abdominal: Soft. Bowel sounds are normal. She exhibits no distension. There is no tenderness.   Musculoskeletal: Normal range of motion.   Neurological: She is alert and oriented to person, place, and time.   Skin: Skin is warm and dry.   Psychiatric: She has a normal mood and affect. Her speech is normal and behavior is normal. Judgment and thought content normal.       Significant Labs:   CBC:   Recent Labs  Lab 02/20/18  0433 02/21/18  0429 02/21/18  1312   WBC 6.62 6.59 4.90   HGB 8.2* 8.0* 7.5*   HCT 26.2* 25.8* 24.2*    197 168  168     CMP:   Recent Labs  Lab 02/20/18  0433 02/21/18  0429   * 134*   K 3.6 3.7     98   CO2 27 28   GLU 86 81   BUN 19 24*   CREATININE 4.6* 6.0*   CALCIUM 8.4* 8.6*   PROT 5.8* 5.9*   ALBUMIN 1.7* 1.8*   BILITOT 0.2 0.2   ALKPHOS 66 72   AST 40 35   ALT 66* 54*   ANIONGAP 7* 8   EGFRNONAA 9* 7*     All pertinent labs within the past 24 hours have been reviewed.    Significant Imaging: I have reviewed all pertinent imaging results/findings within the past 24 hours.     US Lower Extremity Veins Bilateral :  Extensive thrombus throughout the left lower extremity with nonocclusive thrombus in the common femoral vein with occlusive thrombus throughout the femoral, popliteal, and peroneal vein. Anterior and posterior tibial veins are patent.

## 2018-02-22 NOTE — PROGRESS NOTES
"Renal Progress Note    Admit Date: 2/7/2018   LOS: 15 days      67 y.o. white female with known MRSA bacteremia who was transferred here for ID and Neurosurgery services.  She has a history of ESRD, Afib, cardiomyopathy, and DM.  She has been hospitalized with bacteremia from a Right SC Malvin which was removed at outside facility.  She had a left femoral Jaspreet placed 2/5/2018.  Blood cultures remained positive; RANDA  normal, MRI of thoracic and lumbar spine showed abnormal foci thought  "probably a hemangioma or metastasis".  She normally dialyses MWF in [place], [place].  She is a very poor historian and says she does not know why her kidneys failed requiring she start HD in November 2017.  "You need to ask my daughter all these questions.  I just don't know."  No other vascular access ie AVF or grafts noted.    SUBJECTIVE:     Discussed with team.  Continues on Heparin drip w/o issues so far.  No CP/SOB.           Scheduled Meds:   calcitRIOL  0.25 mcg Oral Daily    carvedilol  25 mg Oral BID    ceftaroline fosamil  200 mg Intravenous Q12H    DAPTOmycin (CUBICIN)  IV  8 mg/kg Intravenous Q48H    docusate sodium  100 mg Oral BID    epoetin davion (PROCRIT) injection  20,000 Units Subcutaneous Every Mon, Wed, Fri    furosemide  80 mg Oral BID    insulin aspart U-100  1-10 Units Subcutaneous TIDWM    polyethylene glycol  17 g Oral Daily    sevelamer carbonate  800 mg Oral TID WM    SITagliptin  25 mg Oral Daily    vitamin D  2,000 Units Oral Daily    vitamin renal formula (B-complex-vitamin c-folic acid)  1 capsule Oral Daily       OBJECTIVE:     Vital Signs Range (Last 24H):  Temp:  [96.7 °F (35.9 °C)-98.7 °F (37.1 °C)]   Pulse:  [53-67]   Resp:  [16-18]   BP: (112-149)/(40-68)   SpO2:  [96 %-100 %]     I & O (Last 24H):    Intake/Output Summary (Last 24 hours) at 02/22/18 1032  Last data filed at 02/22/18 0600   Gross per 24 hour   Intake              800 ml   Output             2000 ml   Net            -1200 " ml       Physical Exam:  Constitutional: She is oriented to person, place, and time. Morbidly obese, well-nourished.   Head: Normocephalic.   Eyes: Conjunctivae are normal.    Neck: Normal range of motion. Neck supple.   Cardiovascular: Regular rhythm, normal heart sounds. Bradycardia  Pulmonary/Chest: Effort normal and breath sounds normal. No respiratory distress.   Abdominal: Soft, obese. Bowel sounds are normal. She exhibits no distension. There is no tenderness.   Ext:  no appreciable edema  Neurological: NF  Skin: Skin is warm and dry + pallor  Psychiatric: She has a normal mood and affect. Her speech is normal and behavior is normal. Very pleasant.  Poor historian.   Access: R [facility]      Laboratory:  CBC:     Recent Labs  Lab 02/22/18  0557   WBC 5.95  5.95   RBC 2.93*  2.93*   HGB 8.1*  8.1*   HCT 26.2*  26.2*     200   MCV 89  89   MCH 27.6  27.6   MCHC 30.9*  30.9*     BMP:     Recent Labs  Lab 02/22/18  0556   GLU 95   *   K 3.5      CO2 26   BUN 12   CREATININE 3.5*   CALCIUM 8.3*   MG 1.6     No results for input(s): CPK, CPKMB, TROPONINI, MB in the last 24 hours.  Impression:   Extensive thrombus throughout the left lower extremity with nonocclusive thrombus in the common femoral vein with occlusive thrombus throughout the femoral, popliteal, and peroneal vein. Anterior and posterior tibial veins are patent.      ASSESSMENT/PLAN:     68 YO WF with ESRD and catheter associated MRSA bacteremia and suspected lumbar foci presents from [facility] for ID and NS services.    1. ESRD:  Dr. Nichols pulled line for holiday, however electrolytes warranted new line for HD on 2/12.  R IJ EULALIO placed 2/12. Continue MWF for now.  Renally dose meds, avoid nephrotoxins, and monitor I/O's closely.  2. Hyperkalemia from dietary choices vs  (E87.5):  Changed diet and consulted dietary for counseling.  Low K bath.  Improved.   3. Rhabdo from Cubicin (M62.82):  Discussed with ID.  CPK  stable so far after restarting.   4. Anemia of CKD:   holding IV iron given active infection.  Epo with HD.  Folate and B12 normal.  5. Hyperphos:  on Renvela now.  6. Hyponatremia- resolving with HD.    7. 2HPT/Vit D deficiency:  Vit D3 and calcitriol.  8. DM:  Currently on SSI.  Reduced Januvia to ESRD dosing of 25mg/d.  9. Hx of Afib:  On Carvedilol.  Defer to cards. Defer anticoagulation to Cards.    10. MRSA Bacteremia: Repeat BCx from 2/16, 17, 19 are positive!!!  Needs RANDA and discussed with Dr. Gustafson.  Findings of occlusive LLE DVT likely source. Leaving EULALIO in for now but suspect will need another holiday. Will re-visit with Dr. Fernandez Friday.   11. Occlusive LLE DVT (I82.412):  No one was notified of this study.  Started Heparin drip since will likely need new line soon and starting Eliquis would cause delay.  Consulted CV/vascular surgery for input.         See above.

## 2018-02-22 NOTE — ASSESSMENT & PLAN NOTE
Consulted Dr. Nichols, Vascular Surgery for evaluation of thrombus  Heparin Infusion  Maybe endovascular source of bactermia

## 2018-02-22 NOTE — ASSESSMENT & PLAN NOTE
Blood Cultures 02/08/18 MRSA in 1 set, 2nd set NGTD  Repeat Blood Cultures 02/10/18 2/2 MRSA  Lactic acid 0.9 02/08/10  Discontinued Daptomycin secondary to elevated CPK  Suspect endovascular source  New IJ catheter placed 2/12, blood cultures 2/13 are negative to date, but 2/16 culture is positive  Repeat blood cultures x2 2/17 are now positive  Removed all lines 02/19/18 for a line holiday for several days  Consulted Cardiology for repeat RANDA, plan for Thursday 2/22/18

## 2018-02-22 NOTE — PT/OT/SLP PROGRESS
Occupational Therapy   Treatment    Name: Yumiko Proctor  MRN: [id number]  Admitting Diagnosis:  MRSA bacteremia  10 Days Post-Op    Recommendations:     Discharge Recommendations: nursing facility, skilled  Discharge Equipment Recommendations:  walker, rolling, ( owns TTB,  3-in-1 commode)  Barriers to discharge:  None    Subjective     Communicated with: nurse prior to session.  Pain/Comfort:  · Pain Rating 1: 7/10  · Location - Side 1: Bilateral  · Location - Orientation 1: generalized  · Location 1: back  · Pain Addressed 1: Pre-medicate for activity, Reposition, Distraction, Cessation of Activity, Nurse notified  · Pain Rating Post-Intervention 1: 10/10  · Pain Rating 2: 7/10  · Location - Side 2: Left  · Location - Orientation 2: generalized  · Location 2: leg  · Pain Addressed 2: Reposition, Distraction, Cessation of Activity, Nurse notified  · Pain Rating Post-Intervention 2: 10/10    Patients cultural, spiritual, Mu-ism conflicts given the current situation: na    Objective:     Patient found with: bed alarm, peripheral IV    General Precautions: Standard, fall, contact, diabetic   Orthopedic Precautions:N/A   Braces: N/A     Occupational Performance:    Bed Mobility:    · Patient completed Rolling/Turning to Right with stand by assistance and with side rail  · Patient completed Scooting/Bridging with moderate assistance and with side rail  · Patient completed Supine to Sit with moderate assistance and with side rail  · Patient completed Sit to Supine with moderate assistance     Functional Mobility/Transfers:  · Patient completed Sit <> Stand Transfer with moderate assistance  with  rolling walker   · Patient completed Toilet Transfer Step Transfer technique with moderate assistance with  bedside commode and rolling walker  · Functional Mobility: NA    Activities of Daily Living:  · LB Dressing: dependence socks eated EOB  · Toileting: total assistance from [facility] for clothes management  and hygiene;  initiated in bed 2/2 incontinent episode then completed on BSC    Patient left left sidelying with all lines intact, call button in reach, bed alarm on, nurse  notified and family member present    [facility] 6 Click:  [facility] Total Score: 12    Treatment & Education:  Pt c/o back and LLE pain with transitional movement and at rst; pt instructed in deep breathing as she performed supine<.sit in bed; pain increased to 10/10 from 7/10.  PAin in LLE throughout stand step t;f with RW to [facility].  Treatment ended after toielting on [facility].  Pt. Put back to bed and nurse notified.   Education:    Assessment:     Yumiko Proctor is a 67 y.o. female with a medical diagnosis of MRSA bacteremia.  She presents with  Slow progress today 2/2 limited by back and LLE pain with WB.;per chart,  pt has DVT LLE. And pt reports back pain started since admitted to hospital. Continue with OT POC.   Performance deficits affecting function are weakness, impaired self care skills, impaired balance, decreased safety awareness, pain, decreased lower extremity function, gait instability, impaired functional mobilty, impaired endurance.      Rehab Prognosis:  goodpatient would benefit from acute skilled OT services to address these deficits and reach maximum level of function.       Plan:     Patient to be seen 5 x/week to address the above listed problems via self-care/home management, therapeutic activities, therapeutic exercises  · Plan of Care Expires: 03/15/18  · Plan of Care Reviewed with: patient (family menber)    This Plan of care has been discussed with the patient who was involved in its development and understands and is in agreement with the identified goals and treatment plan    GOALS:    Occupational Therapy Goals        Problem: Occupational Therapy Goal    Goal Priority Disciplines Outcome Interventions   Occupational Therapy Goal     OT, PT/OT Ongoing (interventions implemented as appropriate)    Description:  Goals to be met by: 02/26/18      Patient will increase functional independence with ADLs by performing:    UE Dressing with Minimal Assistance.  LE Dressing with Maximum Assistance.  Rolling to Bilateral with Contact Guard Assistance. (MET 02/20/18)  Supine to sit with Minimal Assistance.  Upper extremity exercise program x10 reps per handout, with assistance as needed.  Assess sit to stand transfers. (MET 02/20/18)  NEW 02/20/18 Sit to stand with RW and Min assist  NEW 02/20/28 SPT to bedside chair/BSC with RW and min assist                       Time Tracking:     OT Date of Treatment: 02/22/18  OT Start Time: 1229  OT Stop Time: 1253  OT Total Time (min): 24 min    Billable Minutes:Self Care/Home Management 14  Therapeutic Activity 10  Total Time 24    Liza Reyes OT  2/22/2018

## 2018-02-22 NOTE — PLAN OF CARE
Discharge Planning:  Patient admitted on 2-7-18  LOS- day 14  Chart reviewed  Care plan discussed  Discussed care plan with treatment team  Discussed care plan with the attending Dr Echevarria  Current dispo - pending  RANDA - soon  Dr Nichols also consulted (left leg clot)    Case management  to follow  Consults following are: case mgt., inf disease, surgery and cardiology

## 2018-02-22 NOTE — PLAN OF CARE
02/21/18 1814   Discharge Reassessment   Assessment Type Discharge Planning Reassessment   Provided patient/caregiver education on the expected discharge date and the discharge plan Yes   Do you have any problems affording any of your prescribed medications? No   Discharge Plan A Home Health   Patient choice form signed by patient/caregiver N/A   Can the patient answer the patient profile reliably? Yes, cognitively intact   How does the patient rate their overall health at the present time? Fair   Describe the patient's ability to walk at the present time. Major restrictions/daily assistance from another person

## 2018-02-22 NOTE — PLAN OF CARE
Problem: Patient Care Overview  Goal: Plan of Care Review  Outcome: Ongoing (interventions implemented as appropriate)  Plan of care reviewed with patient.  Heparin drip continues, adjusted as per protocol.  IV antibiotics administered as ordered.  Pain managed with PRN medications.  Frequent weight shifts encouraged, and assistance provided.  Purposeful rounding completed, call bell within reach, no needs at this time. Will continue to monitor.

## 2018-02-22 NOTE — PLAN OF CARE
Problem: Patient Care Overview  Goal: Plan of Care Review  Patient free of trauma, falls and injury. Pt VSS and afebrile throughout shift. Pt with noted redness to sacrum. Pt IV and line dressings clean, dry and intact. Patient pain has been moderately controlled by PO pain meds and tolerated well. Pt has been eating well and voiding adequately throughout shift without difficulty. Pt has call light in reach, bed alarm on, bed brakes on, side rails up x3, bed in low position, TEDs/SCDs on, IS at bedside, and nonskid socks on. Pt lying in bed in no distress. Will continue to monitor until handoff.

## 2018-02-22 NOTE — PLAN OF CARE
Problem: Occupational Therapy Goal  Goal: Occupational Therapy Goal  Goals to be met by: 02/26/18     Patient will increase functional independence with ADLs by performing:    UE Dressing with Minimal Assistance.  LE Dressing with Maximum Assistance.  Rolling to Bilateral with Contact Guard Assistance. (MET 02/20/18)  Supine to sit with Minimal Assistance.  Upper extremity exercise program x10 reps per handout, with assistance as needed.  Assess sit to stand transfers. (MET 02/20/18)  NEW 02/20/18 Sit to stand with RW and Min assist  NEW 02/20/28 SPT to bedside chair/BSC with RW and min assist      Outcome: Ongoing (interventions implemented as appropriate)  Slow progress today 2/2 limited by back and LLE pain with WB..DVT LLE.  Nurse made aware.  Continue with OT POC.

## 2018-02-23 LAB
ALBUMIN SERPL BCP-MCNC: 1.8 G/DL
ALP SERPL-CCNC: 67 U/L
ALT SERPL W/O P-5'-P-CCNC: 37 U/L
ANION GAP SERPL CALC-SCNC: 11 MMOL/L
APTT BLDCRRT: 67.5 SEC
AST SERPL-CCNC: 29 U/L
BACTERIA BLD CULT: NORMAL
BASOPHILS # BLD AUTO: 0.03 K/UL
BASOPHILS NFR BLD: 0.5 %
BILIRUB SERPL-MCNC: 0.2 MG/DL
BUN SERPL-MCNC: 16 MG/DL
CALCIUM SERPL-MCNC: 8.3 MG/DL
CHLORIDE SERPL-SCNC: 98 MMOL/L
CK SERPL-CCNC: 118 U/L
CO2 SERPL-SCNC: 24 MMOL/L
CREAT SERPL-MCNC: 4.6 MG/DL
DIFFERENTIAL METHOD: ABNORMAL
EOSINOPHIL # BLD AUTO: 0.4 K/UL
EOSINOPHIL NFR BLD: 7.6 %
ERYTHROCYTE [DISTWIDTH] IN BLOOD BY AUTOMATED COUNT: 17.3 %
EST. GFR  (AFRICAN AMERICAN): 11 ML/MIN/1.73 M^2
EST. GFR  (NON AFRICAN AMERICAN): 9 ML/MIN/1.73 M^2
FACT X PPP CHRO-ACNC: 0.24 IU/ML
GLUCOSE SERPL-MCNC: 79 MG/DL
HCT VFR BLD AUTO: 23.7 %
HGB BLD-MCNC: 7.2 G/DL
LYMPHOCYTES # BLD AUTO: 2.3 K/UL
LYMPHOCYTES NFR BLD: 39 %
MAGNESIUM SERPL-MCNC: 1.6 MG/DL
MCH RBC QN AUTO: 27.1 PG
MCHC RBC AUTO-ENTMCNC: 30.4 G/DL
MCV RBC AUTO: 89 FL
MONOCYTES # BLD AUTO: 0.7 K/UL
MONOCYTES NFR BLD: 11.4 %
NEUTROPHILS # BLD AUTO: 2.4 K/UL
NEUTROPHILS NFR BLD: 41 %
PHOSPHATE SERPL-MCNC: 3.8 MG/DL
PLATELET # BLD AUTO: 197 K/UL
PMV BLD AUTO: 9.7 FL
POCT GLUCOSE: 115 MG/DL (ref 70–110)
POCT GLUCOSE: 119 MG/DL (ref 70–110)
POCT GLUCOSE: 129 MG/DL (ref 70–110)
POCT GLUCOSE: 99 MG/DL (ref 70–110)
POTASSIUM SERPL-SCNC: 3.7 MMOL/L
PROT SERPL-MCNC: 5.8 G/DL
RBC # BLD AUTO: 2.66 M/UL
SODIUM SERPL-SCNC: 133 MMOL/L
WBC # BLD AUTO: 5.8 K/UL

## 2018-02-23 PROCEDURE — 80100016 HC MAINTENANCE HEMODIALYSIS

## 2018-02-23 PROCEDURE — 25000003 PHARM REV CODE 250: Performed by: INTERNAL MEDICINE

## 2018-02-23 PROCEDURE — 80053 COMPREHEN METABOLIC PANEL: CPT

## 2018-02-23 PROCEDURE — 25000003 PHARM REV CODE 250: Performed by: NURSE PRACTITIONER

## 2018-02-23 PROCEDURE — 63600175 PHARM REV CODE 636 W HCPCS: Performed by: INTERNAL MEDICINE

## 2018-02-23 PROCEDURE — 63600175 PHARM REV CODE 636 W HCPCS: Performed by: NURSE PRACTITIONER

## 2018-02-23 PROCEDURE — 36415 COLL VENOUS BLD VENIPUNCTURE: CPT

## 2018-02-23 PROCEDURE — 97530 THERAPEUTIC ACTIVITIES: CPT

## 2018-02-23 PROCEDURE — 83735 ASSAY OF MAGNESIUM: CPT

## 2018-02-23 PROCEDURE — 25000003 PHARM REV CODE 250: Performed by: PHYSICIAN ASSISTANT

## 2018-02-23 PROCEDURE — 85025 COMPLETE CBC W/AUTO DIFF WBC: CPT

## 2018-02-23 PROCEDURE — 85730 THROMBOPLASTIN TIME PARTIAL: CPT

## 2018-02-23 PROCEDURE — 94761 N-INVAS EAR/PLS OXIMETRY MLT: CPT

## 2018-02-23 PROCEDURE — 99233 SBSQ HOSP IP/OBS HIGH 50: CPT | Mod: ,,, | Performed by: INTERNAL MEDICINE

## 2018-02-23 PROCEDURE — 11000001 HC ACUTE MED/SURG PRIVATE ROOM

## 2018-02-23 PROCEDURE — 85520 HEPARIN ASSAY: CPT

## 2018-02-23 PROCEDURE — 84100 ASSAY OF PHOSPHORUS: CPT

## 2018-02-23 PROCEDURE — 82550 ASSAY OF CK (CPK): CPT

## 2018-02-23 RX ADMIN — SEVELAMER CARBONATE 800 MG: 800 TABLET, FILM COATED ORAL at 10:02

## 2018-02-23 RX ADMIN — CALCITRIOL 0.25 MCG: 0.25 CAPSULE, LIQUID FILLED ORAL at 10:02

## 2018-02-23 RX ADMIN — Medication 1 CAPSULE: at 10:02

## 2018-02-23 RX ADMIN — VITAMIN D, TAB 1000IU (100/BT) 2000 UNITS: 25 TAB at 10:02

## 2018-02-23 RX ADMIN — CARVEDILOL 25 MG: 12.5 TABLET, FILM COATED ORAL at 10:02

## 2018-02-23 RX ADMIN — POLYETHYLENE GLYCOL 3350 17 G: 17 POWDER, FOR SOLUTION ORAL at 10:02

## 2018-02-23 RX ADMIN — HEPARIN SODIUM 5000 UNITS: 5000 INJECTION, SOLUTION INTRAVENOUS; SUBCUTANEOUS at 09:02

## 2018-02-23 RX ADMIN — FUROSEMIDE 80 MG: 40 TABLET ORAL at 10:02

## 2018-02-23 RX ADMIN — DAPTOMYCIN 865 MG: 500 INJECTION, POWDER, LYOPHILIZED, FOR SOLUTION INTRAVENOUS at 07:02

## 2018-02-23 RX ADMIN — SEVELAMER CARBONATE 800 MG: 800 TABLET, FILM COATED ORAL at 06:02

## 2018-02-23 RX ADMIN — DOCUSATE SODIUM 100 MG: 100 CAPSULE, LIQUID FILLED ORAL at 10:02

## 2018-02-23 RX ADMIN — CEFTAROLINE FOSAMIL 200 MG: 600 POWDER, FOR SOLUTION INTRAVENOUS at 02:02

## 2018-02-23 RX ADMIN — HEPARIN SODIUM AND DEXTROSE 12.96 UNITS/KG/HR: 10000; 5 INJECTION INTRAVENOUS at 10:02

## 2018-02-23 RX ADMIN — SEVELAMER CARBONATE 800 MG: 800 TABLET, FILM COATED ORAL at 01:02

## 2018-02-23 RX ADMIN — ERYTHROPOIETIN 20000 UNITS: 20000 INJECTION, SOLUTION INTRAVENOUS; SUBCUTANEOUS at 10:02

## 2018-02-23 RX ADMIN — RAMELTEON 8 MG: 8 TABLET, FILM COATED ORAL at 09:02

## 2018-02-23 RX ADMIN — FUROSEMIDE 80 MG: 40 TABLET ORAL at 06:02

## 2018-02-23 RX ADMIN — CARVEDILOL 25 MG: 12.5 TABLET, FILM COATED ORAL at 09:02

## 2018-02-23 RX ADMIN — CEFTAROLINE FOSAMIL 200 MG: 600 POWDER, FOR SOLUTION INTRAVENOUS at 12:02

## 2018-02-23 RX ADMIN — OXYCODONE HYDROCHLORIDE AND ACETAMINOPHEN 1 TABLET: 5; 325 TABLET ORAL at 01:02

## 2018-02-23 RX ADMIN — DOCUSATE SODIUM 100 MG: 100 CAPSULE, LIQUID FILLED ORAL at 09:02

## 2018-02-23 RX ADMIN — OXYCODONE HYDROCHLORIDE AND ACETAMINOPHEN 1 TABLET: 5; 325 TABLET ORAL at 06:02

## 2018-02-23 RX ADMIN — SITAGLIPTIN 25 MG: 25 TABLET, FILM COATED ORAL at 10:02

## 2018-02-23 NOTE — PLAN OF CARE
[facility]   Department of Hospital Medicine  [address], [place] [address]  [phone] (phone)  [phone] (fax)      Facility Transfer Orders                          Yumiko Proctor    Admit to:  prelimary snf orders      Diagnoses:  Active Hospital Problems    Diagnosis  POA    *MRSA bacteremia [R78.81]  Yes    DVT of deep femoral vein, left [I82.412]  Clinically Undetermined    Drug-induced muscle inflammation [M60.9, T50.905A]  No    Abnormal liver enzymes [R74.8]  No    Chronic atrial fibrillation [I48.2]  Yes    ESRD (end stage renal disease) [N18.6]  Yes    Type 2 diabetes mellitus with chronic kidney disease on chronic dialysis, without long-term current use of insulin [E11.22, N18.6, Z99.2]  Not Applicable      Resolved Hospital Problems    Diagnosis Date Resolved POA   No resolved problems to display.       Allergies:  Review of patient's allergies indicates:   Allergen Reactions    Sulfa (sulfonamide antibiotics) Anaphylaxis    Albuterol sulfate Palpitations       Vitals:      Routine, once monthly     Once weekly     Every shift (Skilled Nursing patients)    Diet:           Activity:    - Up in a chair each morning as tolerated   - Ambulate with assistance to bathroom   - May ambulate independently   - Weight bearing:     Nursing Precautions:    - Aspiration precautions:             - Total assistance with meals            -  Upright 90 degrees befor during and after meals             -  Suction at bedside          - Fall precautions   - Seizure precaution   - Decubitus precautions:        -  for positioning   - Pressure reducing foam mattress   - Turn patient every two hours. Use wedge pillows to anchor patient              CONSULTS:      PT to evaluate and treat - five times a week     OT to evaluate and treat - five times a week        LABS:        DIABETES CARE:      Check blood sugar:      Fingerstick blood sugar a.m and p.m.     Fingerstick blood sugar AC and HS   Fingerstick blood sugar every 6 hours if unable to eat      Report CBG < 60 or > 400 to physician.                                          Insulin Sliding Scale          Glucose  Novolog Insulin Subcutaneous        0 - 60   Orange juice or glucose tablet, hold insulin      No insulin   201-250  2 units   251-300  4 units   301-350  6 units   351-400  8 units   >400   10 units then call physician      Medications: Discontinue all previous medication orders, if any. See new list below.       Current Discharge Medication List      CONTINUE these medications which have NOT CHANGED    Details   amiodarone (PACERONE) 100 MG Tab Take 200 mg by mouth once daily.      carvedilol (COREG) 25 MG tablet Take 25 mg by mouth 2 (two) times daily.      ferric citrate 210 mg iron Tab Take 210 mg by mouth 3 (three) times daily.      hydrALAZINE (APRESOLINE) 100 MG tablet Take 100 mg by mouth 3 (three) times daily.      ISOSORBIDE MONONITRATE ORAL Take 30 mg by mouth Daily.      rivaroxaban (XARELTO) 10 mg Tab Take 10 mg by mouth daily with dinner or evening meal.      traMADol (ULTRAM) 50 mg tablet Take 50 mg by mouth every 8 (eight) hours as needed for Pain.

## 2018-02-23 NOTE — PROGRESS NOTES
"[facility]  Infectious Disease  Progress Note    Patient Name: Yumiko Proctor  MRN: [id number]  Admission Date: 2/7/2018  Length of Stay: 16 days  Attending Physician: Cherelle Echevarria, *  Primary Care Provider: Primary Doctor No    Isolation Status: Contact     Assessment/Plan:      * MRSA bacteremia    - blood cultures from yesterday NGTD  - on ceftaroline and daptomycin  - DVT noted and could be endovascular source  - would consider RANDA to r/o ring abscess as source of persistent bacteremia  - may need another line change/holiday, if possible          I will follow-up with patient. Please contact us if you have any additional questions.    Andrew Fernandez MD  Infectious Disease  [facility]    Subjective:     Principal Problem:MRSA bacteremia    HPI: This is a 68 yo woman with ESRD transferred to [facility] for persistent bacteremia due to MRSA. She reportedly had multiple studies performed at [facility] in [place], including MR imaging, CT imaging, a bone scan, and a RANDA, all of which were "negative." An indwelling HD catheter was discontinued and a left groin HD catheter was placed. The patient feels sick but denies any rigors or irving fever. The patient was admitted last night and placed on cefepime and Cipro in addition to the daptomycin. I am consulted for ID evaluation.    Interval History: Seen in HD. Feeling better. No complaints. Blood culture from yesterday is NGTD.    Review of Systems   Constitutional: Negative for chills and fever.   All other systems reviewed and are negative.    Objective:     Vital Signs (Most Recent):  Temp: 98.2 °F (36.8 °C) (02/23/18 0625)  Pulse: (!) 53 (02/23/18 0900)  Resp: 18 (02/23/18 0630)  BP: (!) 118/49 (02/23/18 0900)  SpO2: 96 % (02/23/18 0356) Vital Signs (24h Range):  Temp:  [96.6 °F (35.9 °C)-98.4 °F (36.9 °C)] 98.2 °F (36.8 °C)  Pulse:  [53-65] 53  Resp:  [17-18] 18  SpO2:  [96 %-100 %] 96 %  BP: (108-145)/(38-61) 118/49 "     Weight: 108 kg (238 lb 3.2 oz)  Body mass index is 42.2 kg/m².    Estimated Creatinine Clearance: 14 mL/min (A) (based on SCr of 4.6 mg/dL (H)).    Physical Exam   Constitutional: She is oriented to person, place, and time. She appears well-developed and well-nourished. No distress.   HENT:   Head: Normocephalic and atraumatic.   Eyes: EOM are normal. Pupils are equal, round, and reactive to light.   Cardiovascular: Normal rate.    Murmur heard.  Pulmonary/Chest: Effort normal.   Abdominal: Bowel sounds are normal.   Musculoskeletal: Normal range of motion. She exhibits no edema or tenderness.   Neurological: She is alert and oriented to person, place, and time.   Skin: She is not diaphoretic.   Psychiatric: She has a normal mood and affect. Her behavior is normal. Thought content normal.   Nursing note and vitals reviewed.      Significant Labs:   Blood Culture:   Recent Labs  Lab 02/16/18  1037 02/17/18  1148 02/17/18  1200 02/19/18  0537 02/22/18  0557   LABBLOO Gram stain aer bottle: Gram positive cocci in clusters resembling Staph   Results called to and read back by:Ynes Steiner RN 02/17/2018  10:07  Gram stain vicente bottle: Gram positive cocci in clusters resembling Staph   Positive results previously called 02/17/2018  16:42  METHICILLIN RESISTANT STAPHYLOCOCCUS AUREUSID consult required at [facility] and [facility] locations. Gram stain aer bottle: Gram positive cocci in clusters resembling Staph   Results called to and read back by: Ynes Herrera RN  02/18/2018  18:31  STAPHYLOCOCCUS AUREUSID consult required at [facility] and [facility] locations.For susceptibility see order #[id number] Gram stain vicente bottle: Gram positive cocci in clusters resembling Staph   Results called to and read back by:Ynes Steiner RN 02/18/2018  14:40  Gram stain aer bottle: Gram positive cocci in clusters resembling Staph   Positive results previously called 02/18/2018  18:19  STAPHYLOCOCCUS AUREUSID consult  required at [facility] and [facility].For susceptibility see order #[id number] Gram stain aer bottle: Gram positive cocci in clusters resembling Staph   Gram stain vicente bottle: Gram positive cocci in clusters resembling Staph   Results called to and read back by:Alison Oswald RN 02/20/2018    03:01  METHICILLIN RESISTANT STAPHYLOCOCCUS AUREUSID consult required at [facility] and [facility]. No Growth to date     CBC:   Recent Labs  Lab 02/21/18  1312 02/22/18  0557 02/23/18  0448   WBC 4.90 5.95  5.95 5.80   HGB 7.5* 8.1*  8.1* 7.2*   HCT 24.2* 26.2*  26.2* 23.7*     168 200  200 197     All pertinent labs within the past 24 hours have been reviewed.    Significant Imaging: I have reviewed all pertinent imaging results/findings within the past 24 hours.

## 2018-02-23 NOTE — SUBJECTIVE & OBJECTIVE
Interval History: Seen in HD. Feeling better. No complaints. Blood culture from yesterday is NGTD.    Review of Systems   Constitutional: Negative for chills and fever.   All other systems reviewed and are negative.    Objective:     Vital Signs (Most Recent):  Temp: 98.2 °F (36.8 °C) (02/23/18 0625)  Pulse: (!) 53 (02/23/18 0900)  Resp: 18 (02/23/18 0630)  BP: (!) 118/49 (02/23/18 0900)  SpO2: 96 % (02/23/18 0356) Vital Signs (24h Range):  Temp:  [96.6 °F (35.9 °C)-98.4 °F (36.9 °C)] 98.2 °F (36.8 °C)  Pulse:  [53-65] 53  Resp:  [17-18] 18  SpO2:  [96 %-100 %] 96 %  BP: (108-145)/(38-61) 118/49     Weight: 108 kg (238 lb 3.2 oz)  Body mass index is 42.2 kg/m².    Estimated Creatinine Clearance: 14 mL/min (A) (based on SCr of 4.6 mg/dL (H)).    Physical Exam   Constitutional: She is oriented to person, place, and time. She appears well-developed and well-nourished. No distress.   HENT:   Head: Normocephalic and atraumatic.   Eyes: EOM are normal. Pupils are equal, round, and reactive to light.   Cardiovascular: Normal rate.    Murmur heard.  Pulmonary/Chest: Effort normal.   Abdominal: Bowel sounds are normal.   Musculoskeletal: Normal range of motion. She exhibits no edema or tenderness.   Neurological: She is alert and oriented to person, place, and time.   Skin: She is not diaphoretic.   Psychiatric: She has a normal mood and affect. Her behavior is normal. Thought content normal.   Nursing note and vitals reviewed.      Significant Labs:   Blood Culture:   Recent Labs  Lab 02/16/18  1037 02/17/18  1148 02/17/18  1200 02/19/18  0537 02/22/18  0557   LABBLOO Gram stain aer bottle: Gram positive cocci in clusters resembling Staph   Results called to and read back by:Ynes Steiner RN 02/17/2018  10:07  Gram stain vicente bottle: Gram positive cocci in clusters resembling Staph   Positive results previously called 02/17/2018  16:42  METHICILLIN RESISTANT STAPHYLOCOCCUS AUREUSID consult required at [facility]  and [place] locations. Gram stain aer bottle: Gram positive cocci in clusters resembling Staph   Results called to and read back by: Ynes Herrera RN  02/18/2018  18:31  STAPHYLOCOCCUS AUREUSID consult required at [facility].For susceptibility see order #[id number] Gram stain vicente bottle: Gram positive cocci in clusters resembling Staph   Results called to and read back by:Ynes Steiner RN 02/18/2018  14:40  Gram stain aer bottle: Gram positive cocci in clusters resembling Staph   Positive results previously called 02/18/2018  18:19  STAPHYLOCOCCUS AUREUSID consult required at [facility].For susceptibility see order #[id number] Gram stain aer bottle: Gram positive cocci in clusters resembling Staph   Gram stain vicente bottle: Gram positive cocci in clusters resembling Staph   Results called to and read back by:Alison Oswald RN 02/20/2018    03:01  METHICILLIN RESISTANT STAPHYLOCOCCUS AUREUSID consult required at [facility]. No Growth to date     CBC:   Recent Labs  Lab 02/21/18  1312 02/22/18  0557 02/23/18  0448   WBC 4.90 5.95  5.95 5.80   HGB 7.5* 8.1*  8.1* 7.2*   HCT 24.2* 26.2*  26.2* 23.7*     168 200  200 197     All pertinent labs within the past 24 hours have been reviewed.    Significant Imaging: I have reviewed all pertinent imaging results/findings within the past 24 hours.

## 2018-02-23 NOTE — ASSESSMENT & PLAN NOTE
- blood cultures from yesterday NGTD  - on ceftaroline and daptomycin  - DVT noted and could be endovascular source  - would consider RANDA to r/o ring abscess as source of persistent bacteremia  - may need another line change/holiday, if possible

## 2018-02-23 NOTE — PROGRESS NOTES
[facility] Hospital Medicine  Progress Note    Patient Name: Yumiko Proctor  MRN: [id number]  Patient Class: IP- Inpatient   Admission Date: 2/7/2018  Length of Stay: 15 days  Attending Physician: Cherelle Echevarria, *  Primary Care Provider: Primary Doctor No        Subjective:     Principal Problem:MRSA bacteremia    HPI:  The patient is a 68 yo female with known MRSA bacteremia who was transferred here for ID and Neurosurgery services.  She has a history of ESRD, Afib, cardiomyopathy, and DM.  She has been hospitalized with bacteremia from a vascular access which was taken out and replaced today with a lt femoral vas cath.  Blood cultures remained positive. She had a RANDA which was normal, MRI of thoracic and lumbar spine showed a abnormal foci in T( that was probably a hemangioma or metastasis.    Hospital Course:  Blood cultures from 02/08/18 2/4 (+) MRSA.  Patient currently receiving Daptomycin.  Cultures repeated again on 02/10/2018 and 2/2 bottles positive for MRSA.  ID on board, concerned there is endovascular source.  Left prince catheter removed yesterday and right IJ tunneled dialysis catheter placed 2/12. Repeat blood cultures 2/13 remain negative to date, but one bottle obtained 2/16 growing MRSA and 2/17 blood cultures are positive for gram positive cocci in clusters resembling Staph.  Patient may have some myositis from Daptomycin with elevation in CPK to 5175 which is now decreasing and this was discontinued.  Vancomycin given after dialysis treatments.  PT/OT consulted and recommend skilled nursing upon discharge.  Catheter, IV, removal after dialysis 2/19/2018.  Consult Cardiology for RANDA.    Interval History: Daughter at bedside, discussed plan of care. Patient and daughter in agreement with plan. Plan for RANDA    Review of Systems   Musculoskeletal: Positive for back pain.     Objective:     Vital Signs (Most Recent):  Temp: 98.1 °F (36.7 °C) (02/22/18 2028)  Pulse: 65  (02/22/18 2028)  Resp: 18 (02/22/18 2028)  BP: (!) 133/58 (02/22/18 2028)  SpO2: 96 % (02/22/18 2028) Vital Signs (24h Range):  Temp:  [96.6 °F (35.9 °C)-98.7 °F (37.1 °C)] 98.1 °F (36.7 °C)  Pulse:  [56-67] 65  Resp:  [17-18] 18  SpO2:  [96 %-100 %] 96 %  BP: (108-145)/(48-60) 133/58     Weight: 108 kg (238 lb 3.2 oz)  Body mass index is 42.2 kg/m².    Intake/Output Summary (Last 24 hours) at 02/22/18 2028  Last data filed at 02/22/18 1301   Gross per 24 hour   Intake              700 ml   Output                0 ml   Net              700 ml      Physical Exam   Constitutional: She is oriented to person, place, and time. She appears well-developed and well-nourished.   HENT:   Head: Normocephalic.   Eyes: Conjunctivae are normal. Right eye exhibits no discharge. Left eye exhibits no discharge.   Neck: Normal range of motion. Neck supple.   Cardiovascular: Regular rhythm and normal heart sounds.  Bradycardia present.    Pulmonary/Chest: Effort normal and breath sounds normal. No respiratory distress.   Abdominal: Soft. Bowel sounds are normal. She exhibits no distension. There is no tenderness.   Musculoskeletal: Normal range of motion.   Neurological: She is alert and oriented to person, place, and time.   Skin: Skin is warm and dry.   Psychiatric: She has a normal mood and affect. Her speech is normal and behavior is normal. Judgment and thought content normal.       Significant Labs:   CBC:   Recent Labs  Lab 02/21/18  0429 02/21/18  1312 02/22/18  0557   WBC 6.59 4.90 5.95  5.95   HGB 8.0* 7.5* 8.1*  8.1*   HCT 25.8* 24.2* 26.2*  26.2*    168  168 200  200     CMP:   Recent Labs  Lab 02/21/18  0429 02/22/18  0556   * 135*   K 3.7 3.5   CL 98 100   CO2 28 26   GLU 81 95   BUN 24* 12   CREATININE 6.0* 3.5*   CALCIUM 8.6* 8.3*   PROT 5.9* 6.6   ALBUMIN 1.8* 2.0*   BILITOT 0.2 0.3   ALKPHOS 72 75   AST 35 35   ALT 54* 48*   ANIONGAP 8 9   EGFRNONAA 7* 13*     All pertinent labs within the past 24  hours have been reviewed.    Significant Imaging: I have reviewed all pertinent imaging results/findings within the past 24 hours.    Assessment/Plan:      * MRSA bacteremia    Blood Cultures 02/08/18 MRSA in 1 set, 2nd set NGTD  Repeat Blood Cultures 02/10/18 2/2 MRSA  Lactic acid 0.9 02/08/10  Discontinued Daptomycin secondary to elevated CPK  Suspect endovascular source  New IJ catheter placed 2/12, blood cultures 2/13 are negative to date, but 2/16 culture is positive  Repeat blood cultures x2 2/17 are now positive  Removed all lines 02/19/18 for a line holiday for several days  Awaiting further recs from ID            DVT of deep femoral vein, left    Consulted Dr. Nichols, Vascular Surgery for evaluation of thrombus  Heparin Infusion  Maybe endovascular source of bactermia        Abnormal liver enzymes    Discontinued Amiodarone 2/13  AST/ALT decreasing, almost normal        Type 2 diabetes mellitus with chronic kidney disease on chronic dialysis, without long-term current use of insulin    A1c  6.5  PRN insulin  Well-controlled          ESRD (end stage renal disease)    Continue Dialysis MWF per Nephrology  Left Femoral Jaspreet cath removed   R IJ dialysis catheter placed 2/12  Removed R IJ and IVs 02/19/18 for holiday          Chronic atrial fibrillation    Continue Coreg  Rate controlled              VTE Risk Mitigation         Ordered     heparin 25,000 units in dextrose 5% 250 mL (100 units/mL) infusion; FEMALE  Continuous     Route:  Intravenous        02/21/18 1056     heparin 25,000 units in dextrose 5% 250 mL (100 units/mL) bolus from bag; PRN BOLUS  As needed (PRN)     Route:  Intravenous        02/21/18 1056     heparin 25,000 units in dextrose 5% 250 mL (100 units/mL) bolus from bag; PRN BOLUS  As needed (PRN)     Route:  Intravenous        02/21/18 1056     heparin (porcine) injection 2,000 Units  As needed (PRN)     Route:  Intravenous        02/13/18 0940     heparin (porcine) injection 5,000  Units  As needed (PRN)     Route:  Intra-Catheter        02/12/18 1834     Medium Risk of VTE  Once      02/07/18 2255     Place sequential compression device  Until discontinued      02/07/18 2255     Place ANGELI hose  Until discontinued      02/07/18 2255              Cheerlle Echevarria MD  Department of Hospital Medicine   [facility]

## 2018-02-23 NOTE — NURSING
Family and patient concerned about lasix 80mg PO without any recent urine output    Notified and confirmed with Dr. Walter (nephrology) of medication safety   Okay with lasix for now, will cont to monitor

## 2018-02-23 NOTE — PLAN OF CARE
ATTN: TEAM DC PLANNING    update info sent top snf on Friday   transfer from [facility] in [facility] [place]  Daughter - Keira - [phone]. Contact info.    PLAN SNF PENDING ACCEPTANCE     - PREFERS [facility] [place]  PER [facility] - [facility] [place] - PENDING ACCEPTANCE     HX: [facility]  IN [place] [facility] [place]   HX: MRSA - PENDING IF IV ABX - WILL NEEDED     [facility] ON CASE ALSO #[id number]   Josephine Garcia RN  Case management 2/23/201811:30 AM  # [phone] (FAX) [phone]     02/23/18 1130   Discharge Assessment   Assessment Type Discharge Planning Reassessment

## 2018-02-23 NOTE — CONSULTS
[facility]  Cardiology  Consult Note    Patient Name: Yumiko Proctor  MRN: [id number]  Admission Date: 2/7/2018  Hospital Length of Stay: 15 days  Code Status: Full Code   Attending Provider: Cherelle Echevarria, *   Consulting Provider: Rikki Delaney MD  Primary Care Physician: Primary Doctor No  Principal Problem:MRSA bacteremia    Patient information was obtained from patient.     Inpatient consult to Cardiology  Consult performed by: RIKKI DELANEY  Consult ordered by: CARMEL STEVE        Subjective:     Chief Complaint:  Weakness.    HPI:    The patient is a 68 yo female with MRSA bacteremia who was transferred here for ID and Neurosurgery services from [facility].  She has ESRD, Afib, cardiomyopathy, and DM. She had a RANDA in [place] that was negative on about 2/5/2018. She had a TTE on 2/9/2018 that revealed unremarkable valves. Asked to see to consider RANDA.    Past Medical History:   Diagnosis Date    A-fib     Cardiomyopathy     Diabetes mellitus     ESRD (end stage renal disease)        History reviewed. No pertinent surgical history.    Review of patient's allergies indicates:   Allergen Reactions    Sulfa (sulfonamide antibiotics) Anaphylaxis    Albuterol sulfate Palpitations       No current facility-administered medications on file prior to encounter.      No current outpatient prescriptions on file prior to encounter.     Family History     None        Social History Main Topics    Smoking status: Never Smoker    Smokeless tobacco: Never Used    Alcohol use No    Drug use: No    Sexual activity: Not Currently     Review of Systems   Constitution: Positive for weakness.   Cardiovascular: Negative for chest pain.   Respiratory: Negative for shortness of breath.      Objective:     Vital Signs (Most Recent):  Temp: 98.4 °F (36.9 °C) (02/22/18 1612)  Pulse: (!) 56 (02/22/18 1612)  Resp: 17 (02/22/18 1612)  BP: (!) 108/48 (02/22/18 1612)  SpO2: 100 % (02/22/18 1612) Vital  Signs (24h Range):  Temp:  [96.6 °F (35.9 °C)-98.7 °F (37.1 °C)] 98.4 °F (36.9 °C)  Pulse:  [56-67] 56  Resp:  [16-17] 17  SpO2:  [98 %-100 %] 100 %  BP: (108-145)/(48-60) 108/48     Weight: 108 kg (238 lb 3.2 oz)  Body mass index is 42.2 kg/m².    SpO2: 100 %  O2 Device (Oxygen Therapy): room air      Intake/Output Summary (Last 24 hours) at 02/22/18 1933  Last data filed at 02/22/18 0600   Gross per 24 hour   Intake              300 ml   Output                0 ml   Net              300 ml       Lines/Drains/Airways     Central Venous Catheter Line                 Hemodialysis Catheter 02/12/18 0200 right internal jugular 10 days          Airway                 Airway - Non-Surgical 02/12/18 1310 Nasal Cannula 10 days          Pressure Ulcer                 Pressure Injury 02/07/18 2254 medial Gluteal cleft Stage 1 14 days          Peripheral Intravenous Line                 Peripheral IV - Single Lumen 02/21/18 1500 Left Forearm 1 day         Peripheral IV - Single Lumen 02/21/18 2100 Left Other less than 1 day                Physical Exam   Cardiovascular: Normal rate and regular rhythm.    Murmur heard.  Pulmonary/Chest: Effort normal and breath sounds normal.   Abdominal: Soft.     Current Medications:     calcitRIOL  0.25 mcg Oral Daily    carvedilol  25 mg Oral BID    ceftaroline fosamil  200 mg Intravenous Q12H    DAPTOmycin (CUBICIN)  IV  8 mg/kg Intravenous Q48H    docusate sodium  100 mg Oral BID    epoetin davion (PROCRIT) injection  20,000 Units Subcutaneous Every Mon, Wed, Fri    furosemide  80 mg Oral BID    insulin aspart U-100  1-10 Units Subcutaneous TIDWM    polyethylene glycol  17 g Oral Daily    sevelamer carbonate  800 mg Oral TID WM    SITagliptin  25 mg Oral Daily    vitamin D  2,000 Units Oral Daily    vitamin renal formula (B-complex-vitamin c-folic acid)  1 capsule Oral Daily     Current Laboratory Results:    Recent Results (from the past 24 hour(s))   POCT glucose     Collection Time: 02/21/18  9:21 PM   Result Value Ref Range    POCT Glucose 118 (H) 70 - 110 mg/dL   APTT    Collection Time: 02/21/18 11:14 PM   Result Value Ref Range    aPTT 109.9 (H) 21.0 - 32.0 sec   Comprehensive Metabolic Panel (CMP)    Collection Time: 02/22/18  5:56 AM   Result Value Ref Range    Sodium 135 (L) 136 - 145 mmol/L    Potassium 3.5 3.5 - 5.1 mmol/L    Chloride 100 95 - 110 mmol/L    CO2 26 23 - 29 mmol/L    Glucose 95 70 - 110 mg/dL    BUN, Bld 12 8 - 23 mg/dL    Creatinine 3.5 (H) 0.5 - 1.4 mg/dL    Calcium 8.3 (L) 8.7 - 10.5 mg/dL    Total Protein 6.6 6.0 - 8.4 g/dL    Albumin 2.0 (L) 3.5 - 5.2 g/dL    Total Bilirubin 0.3 0.1 - 1.0 mg/dL    Alkaline Phosphatase 75 55 - 135 U/L    AST 35 10 - 40 U/L    ALT 48 (H) 10 - 44 U/L    Anion Gap 9 8 - 16 mmol/L    eGFR if African American 15 (A) >60 mL/min/1.73 m^2    eGFR if non African American 13 (A) >60 mL/min/1.73 m^2   Magnesium    Collection Time: 02/22/18  5:56 AM   Result Value Ref Range    Magnesium 1.6 1.6 - 2.6 mg/dL   Phosphorus    Collection Time: 02/22/18  5:56 AM   Result Value Ref Range    Phosphorus 2.7 2.7 - 4.5 mg/dL   Anti-Xa Heparin Monitoring    Collection Time: 02/22/18  5:56 AM   Result Value Ref Range    Heparin Anti-Xa 0.32 0.30 - 0.70 IU/mL   APTT    Collection Time: 02/22/18  5:56 AM   Result Value Ref Range    aPTT 59.0 (H) 21.0 - 32.0 sec   CBC with Automated Differential    Collection Time: 02/22/18  5:57 AM   Result Value Ref Range    WBC 5.95 3.90 - 12.70 K/uL    RBC 2.93 (L) 4.00 - 5.40 M/uL    Hemoglobin 8.1 (L) 12.0 - 16.0 g/dL    Hematocrit 26.2 (L) 37.0 - 48.5 %    MCV 89 82 - 98 fL    MCH 27.6 27.0 - 31.0 pg    MCHC 30.9 (L) 32.0 - 36.0 g/dL    RDW 16.9 (H) 11.5 - 14.5 %    Platelets 200 150 - 350 K/uL    MPV 9.5 9.2 - 12.9 fL    Gran # (ANC) 2.9 1.8 - 7.7 K/uL    Lymph # 2.0 1.0 - 4.8 K/uL    Mono # 0.6 0.3 - 1.0 K/uL    Eos # 0.4 0.0 - 0.5 K/uL    Baso # 0.03 0.00 - 0.20 K/uL    Gran% 48.4 38.0 - 73.0 %     Lymph% 33.9 18.0 - 48.0 %    Mono% 10.1 4.0 - 15.0 %    Eosinophil% 6.4 0.0 - 8.0 %    Basophil% 0.5 0.0 - 1.9 %    Differential Method Automated    Blood culture    Collection Time: 02/22/18  5:57 AM   Result Value Ref Range    Blood Culture, Routine No Growth to date    CBC auto differential    Collection Time: 02/22/18  5:57 AM   Result Value Ref Range    WBC 5.95 3.90 - 12.70 K/uL    RBC 2.93 (L) 4.00 - 5.40 M/uL    Hemoglobin 8.1 (L) 12.0 - 16.0 g/dL    Hematocrit 26.2 (L) 37.0 - 48.5 %    MCV 89 82 - 98 fL    MCH 27.6 27.0 - 31.0 pg    MCHC 30.9 (L) 32.0 - 36.0 g/dL    RDW 16.9 (H) 11.5 - 14.5 %    Platelets 200 150 - 350 K/uL    MPV 9.5 9.2 - 12.9 fL    Gran # (ANC) 2.9 1.8 - 7.7 K/uL    Lymph # 2.0 1.0 - 4.8 K/uL    Mono # 0.6 0.3 - 1.0 K/uL    Eos # 0.4 0.0 - 0.5 K/uL    Baso # 0.03 0.00 - 0.20 K/uL    Gran% 48.4 38.0 - 73.0 %    Lymph% 33.9 18.0 - 48.0 %    Mono% 10.1 4.0 - 15.0 %    Eosinophil% 6.4 0.0 - 8.0 %    Basophil% 0.5 0.0 - 1.9 %    Differential Method Automated    POCT glucose    Collection Time: 02/22/18  7:39 AM   Result Value Ref Range    POCT Glucose 95 70 - 110 mg/dL   POCT glucose    Collection Time: 02/22/18 12:11 PM   Result Value Ref Range    POCT Glucose 128 (H) 70 - 110 mg/dL   POCT glucose    Collection Time: 02/22/18  4:17 PM   Result Value Ref Range    POCT Glucose 131 (H) 70 - 110 mg/dL     Current Imaging Results:    Imaging Results    None         Date of Procedure: 02/09/2018    TEST DESCRIPTION   Technical Quality: This is a technically adequate study.     Aorta: The aortic root is normal in size, measuring 2.2 cm at sinotubular junction and 2.0 cm at Sinuses of Valsalva. The proximal ascending aorta is normal in size, measuring 3.4 cm across.     Left Atrium: The left atrial volume index is mildly enlarged, measuring 25.30 cc/m2.     Left Ventricle: The left ventricle is normal in size, with an end-diastolic diameter of 4.5 cm, and an end-systolic diameter of 3.2 cm. Wall  thickness is mildly increased, with the septum and the posterior wall each measuring 1.2 cm across. Relative wall   thickness was increased at 0.53, and the LV mass index was increased at 112.2 g/m2 consistent with concentric left ventricular hypertrophy. There are no regional wall motion abnormalities. Left ventricular systolic function appears low normal to mildly   depressed. Visually estimated ejection fraction is 50-55%. The LV Doppler derived stroke volume equals 64.0 ccs.     Diastolic indices: E wave velocity 1.0 m/s, E/A ratio 1.6,  msec., E/e' ratio(avg) 10. Diastolic function is normal.     Right Atrium: The right atrium is normal in size, measuring 5.0 cm in length and 2.9 cm in width in the apical view.     Right Ventricle: The right ventricle is normal in size measuring 2.7 cm at the base in the apical right ventricle-focused view. Global right ventricular systolic function appears normal. The estimated PA systolic pressure is 24 mmHg.     Aortic Valve:  The aortic valve is normal in structure.     Mitral Valve:  The mitral valve is normal in structure. There is mild to moderate mitral regurgitation.     Tricuspid Valve:  The tricuspid valve is normal in structure. There is mild tricuspid regurgitation.     Pulmonary Valve:  The pulmonic valve is normal in structure.     Pericardium: There is no evidence of pericardial effusion.      IVC: IVC is normal in size and collapses > 50% with a sniff, suggesting normal right atrial pressure of 3 mmHg.     Intracavitary: There is no evidence of intracavitary mass or thrombus. There is no evidence of vegetation.         CONCLUSIONS     1 - Concentric hypertrophy.     2 - Low normal to mildly depressed left ventricular systolic function (EF 50-55%).     3 - No wall motion abnormalities.     4 - Mild left atrial enlargement.     5 - Mild to moderate mitral regurgitation.     6 - Mild tricuspid regurgitation.             This document has been electronically     SIGNED BY: Rikki Gustafson MD On: 02/09/2018 14:54    Assessment and Plan:     Problem List:    Active Diagnoses:    Diagnosis Date Noted POA    PRINCIPAL PROBLEM:  MRSA bacteremia [R78.81] 02/07/2018 Yes    DVT of deep femoral vein, left [I82.412] 02/21/2018 Clinically Undetermined    Drug-induced muscle inflammation [M60.9, T50.905A] 02/16/2018 No    Abnormal liver enzymes [R74.8] 02/12/2018 No    Chronic atrial fibrillation [I48.2] 02/08/2018 Yes    ESRD (end stage renal disease) [N18.6] 02/08/2018 Yes    Type 2 diabetes mellitus with chronic kidney disease on chronic dialysis, without long-term current use of insulin [E11.22, N18.6, Z99.2] 02/08/2018 Not Applicable      Problems Resolved During this Admission:    Diagnosis Date Noted Date Resolved POA     Assessment and Plan:    1. MRSA Bacetremia   2/9/2018: TTE: No vegetations seen.    Feel another RANDA would have very low yield.   Will discuss with Dr. Fernandez.       VTE Risk Mitigation         Ordered     heparin 25,000 units in dextrose 5% 250 mL (100 units/mL) infusion; FEMALE  Continuous     Route:  Intravenous        02/21/18 1056     heparin 25,000 units in dextrose 5% 250 mL (100 units/mL) bolus from bag; PRN BOLUS  As needed (PRN)     Route:  Intravenous        02/21/18 1056     heparin 25,000 units in dextrose 5% 250 mL (100 units/mL) bolus from bag; PRN BOLUS  As needed (PRN)     Route:  Intravenous        02/21/18 1056     heparin (porcine) injection 2,000 Units  As needed (PRN)     Route:  Intravenous        02/13/18 0940     heparin (porcine) injection 5,000 Units  As needed (PRN)     Route:  Intra-Catheter        02/12/18 1834     Medium Risk of VTE  Once      02/07/18 2255     Place sequential compression device  Until discontinued      02/07/18 2255     Place ANGELI hose  Until discontinued      02/07/18 2255          Thank you for your consult.    I will follow-up with patient. Please contact us if you have any additional  questions.    Rikki Gustafson MD  Cardiology   [facility]

## 2018-02-23 NOTE — PLAN OF CARE
Problem: Patient Care Overview  Goal: Plan of Care Review  Outcome: Outcome(s) achieved Date Met: 02/23/18  Pt remains on RA. No distress noted.

## 2018-02-23 NOTE — SUBJECTIVE & OBJECTIVE
Interval History: Daughter at bedside, discussed plan of care. Patient and daughter in agreement with plan. Plan for RANDA    Review of Systems   Musculoskeletal: Positive for back pain.     Objective:     Vital Signs (Most Recent):  Temp: 98.1 °F (36.7 °C) (02/22/18 2028)  Pulse: 65 (02/22/18 2028)  Resp: 18 (02/22/18 2028)  BP: (!) 133/58 (02/22/18 2028)  SpO2: 96 % (02/22/18 2028) Vital Signs (24h Range):  Temp:  [96.6 °F (35.9 °C)-98.7 °F (37.1 °C)] 98.1 °F (36.7 °C)  Pulse:  [56-67] 65  Resp:  [17-18] 18  SpO2:  [96 %-100 %] 96 %  BP: (108-145)/(48-60) 133/58     Weight: 108 kg (238 lb 3.2 oz)  Body mass index is 42.2 kg/m².    Intake/Output Summary (Last 24 hours) at 02/22/18 2028  Last data filed at 02/22/18 1301   Gross per 24 hour   Intake              700 ml   Output                0 ml   Net              700 ml      Physical Exam   Constitutional: She is oriented to person, place, and time. She appears well-developed and well-nourished.   HENT:   Head: Normocephalic.   Eyes: Conjunctivae are normal. Right eye exhibits no discharge. Left eye exhibits no discharge.   Neck: Normal range of motion. Neck supple.   Cardiovascular: Regular rhythm and normal heart sounds.  Bradycardia present.    Pulmonary/Chest: Effort normal and breath sounds normal. No respiratory distress.   Abdominal: Soft. Bowel sounds are normal. She exhibits no distension. There is no tenderness.   Musculoskeletal: Normal range of motion.   Neurological: She is alert and oriented to person, place, and time.   Skin: Skin is warm and dry.   Psychiatric: She has a normal mood and affect. Her speech is normal and behavior is normal. Judgment and thought content normal.       Significant Labs:   CBC:   Recent Labs  Lab 02/21/18  0429 02/21/18  1312 02/22/18  0557   WBC 6.59 4.90 5.95  5.95   HGB 8.0* 7.5* 8.1*  8.1*   HCT 25.8* 24.2* 26.2*  26.2*    168  168 200  200     CMP:   Recent Labs  Lab 02/21/18  0429 02/22/18  0556   *  135*   K 3.7 3.5   CL 98 100   CO2 28 26   GLU 81 95   BUN 24* 12   CREATININE 6.0* 3.5*   CALCIUM 8.6* 8.3*   PROT 5.9* 6.6   ALBUMIN 1.8* 2.0*   BILITOT 0.2 0.3   ALKPHOS 72 75   AST 35 35   ALT 54* 48*   ANIONGAP 8 9   EGFRNONAA 7* 13*     All pertinent labs within the past 24 hours have been reviewed.    Significant Imaging: I have reviewed all pertinent imaging results/findings within the past 24 hours.

## 2018-02-23 NOTE — PROGRESS NOTES
"[facility]  Adult Nutrition  Progress Note     SUMMARY      Recommendations     Recommendation/Intervention:   1. Continue 2000kcal DM + renal diet, 1000ml FR  2. Monitor PO intake   3. RD to follow      Goals:   1. Meet >85% EEN and EPN   2. Prevent dry wt loss >2%/week   3. Promote nutrition related labs wnl     Nutrition Goal Status: progressing towards goal  Communication of RD Recs: discussed with RN      Reason for Assessment     Reason for Assessment: RD follow-up  Diagnosis:  1. Bacteremia due to methicillin resistant Staphylococcus aureus    2. MRSA bacteremia    3. Endocarditis    4. ESRD (end stage renal disease)    5. Chronic atrial fibrillation    6. Type 2 diabetes mellitus with chronic kidney disease on chronic dialysis, without long-term current use of insulin    7. Abnormal liver enzymes            Past Medical History:   Diagnosis Date    A-fib      Cardiomyopathy      Diabetes mellitus      ESRD (end stage renal disease)          Interdisciplinary Rounds: jacoby not attend     General Information Comments: Pt appetite is improving and consuming 75% at this time   Nutrition Discharge Planning: d/c on DM renal diet     Nutrition Prescription Ordered     Current Diet Order: 2000kcal DM renal, 1000 ml FR     Evaluation of Received Nutrients/Fluid Intake     % Intake of Estimated Energy Needs: 50-75 %  % Meal Intake: 50%     Nutrition Risk Screen     Nutrition Risk Screen: no indicators present     Nutrition/Diet History     Food Preferences: No cultural/spiritual prefs noted     Labs/Tests/Procedures/Meds     Pertinent Labs Reviewed: reviewed      Pertinent Medications Reviewed: reviewed  Scheduled Meds:    Physical Findings     Overall Physical Appearance: nourished, obese  Oral/Mouth Cavity: tooth/teeth missing  Skin: pressure ulcer(s) (stage I)     Anthropometrics     Temp: 98.4 °F (36.9 °C)  Height: 5' 3" (160 cm)  Weight Method: Bed Scale  Weight: 108 kg (238 lb 3.2 " oz)  Ideal Body Weight (IBW), Female: 115 lb  % Ideal Body Weight, Female (lb): 207.13 lb  BMI (Calculated): 42.3  BMI Grade: greater than 40 - morbid obesity     Estimated/Assessed Needs     Weight Used For Calorie Calculations: 108 kg (238 lb 1.6 oz)   Energy Calorie Requirements (kcal): 2059  Energy Need Method: [place]-St Jeor (stress factor 1.3)     Weight Used For Protein Calculations: 52.2 kg (115 lb) (IBW)  Protein Requirements:  gm/d (1.8-2 gm/kg IBW)     Fluid Requirements (mL): 1000 (+UOP, or per team)     Assessment and Plan         ESRD (end stage renal disease)     Nutrition Diagnosis:  Increased nutrient needs (kcal, protein)     Related to (etiology):   HD     Signs and Symptoms (as evidenced by):   Pt with ESRD on HD      Interventions/Recommendations (treatment strategy):  2000kcal DM + renal diet + recommend Novasource Renal 1x/d      Nutrition Diagnosis Status:   Continues             Monitor and Evaluation     Food and Nutrient Intake: energy intake, food and beverage intake  Food and Nutrient Adminstration: diet order  Physical Activity and Function: nutrition-related ADLs and IADLs  Anthropometric Measurements: weight, weight change  Biochemical Data, Medical Tests and Procedures: electrolyte and renal panel, gastrointestinal profile, glucose/endocrine profile, inflammatory profile, lipid profile  Nutrition-Focused Physical Findings: overall appearance, extremities, muscles and bones, skin     Nutrition Risk     Level of Risk: other (see comments) (f/u 2x/wk)     Nutrition Follow-Up     RD Follow-up?: Yes

## 2018-02-23 NOTE — PLAN OF CARE
Problem: Patient Care Overview  Goal: Plan of Care Review  Outcome: Ongoing (interventions implemented as appropriate)  Plan of care reviewed with patient and daughter.  PRN pain medication administered at patient's request.  Report given to oncoming RN.

## 2018-02-23 NOTE — PLAN OF CARE
Problem: Patient Care Overview  Goal: Plan of Care Review  Outcome: Ongoing (interventions implemented as appropriate)  Patient on RA. Sats 96%. Pt. in no distress, will continue to monitor.

## 2018-02-23 NOTE — PLAN OF CARE
Problem: Physical Therapy Goal  Goal: Physical Therapy Goal  Goals to be met by: 3/15/19     Patient will increase functional independence with mobility by performin. Supine to sit with supervision.   2. Sit to supine with supervision.   3. Sit<>stand transfer with CGA using rolling walker.   4. Gait > 10 feet with CGA using rolling walker.        Outcome: Ongoing (interventions implemented as appropriate)  Pt assist to EOB with mod A.  C/o severe pain in R rib region when up.  Stands with mod a and takes sidesteps to HOB with increasing pain in LLE with wt bearing on L.  Repositioned in bed .  Nurse notified  REC:  SNF

## 2018-02-23 NOTE — ASSESSMENT & PLAN NOTE
Blood Cultures 02/08/18 MRSA in 1 set, 2nd set NGTD  Repeat Blood Cultures 02/10/18 2/2 MRSA  Lactic acid 0.9 02/08/10  Discontinued Daptomycin secondary to elevated CPK  Suspect endovascular source  New IJ catheter placed 2/12, blood cultures 2/13 are negative to date, but 2/16 culture is positive  Repeat blood cultures x2 2/17 are now positive  Removed all lines 02/19/18 for a line holiday for several days  Awaiting further recs from ID

## 2018-02-23 NOTE — CONSULTS
DATE OF CONSULTATION:  02/22/2018    HISTORY OF PRESENT ILLNESS:  The patient is a 67-year-old female who presented   with MRSA bacteremia.  She had a previous left femoral hemodialysis catheter,   which was removed at the bedside to give her a line holiday.  Since that time,   the patient was noted to have a deep venous thrombosis of the left leg.    ALLERGIES:  The patient is allergic to sulfa and albuterol.    SOCIAL HISTORY:  The patient does not use alcohol or tobacco products.    REVIEW OF SYSTEMS:  No fever, chills or weight loss.  No chest pain, shortness   of breath, cough, sputum production or hemoptysis.  No nausea, vomiting,   diarrhea or constipation.  No hematuria, dysuria, urgency or frequency.  No   motor weakness, tremor or myalgias.    PHYSICAL EXAMINATION:  GENERAL:  The patient is awake, alert and oriented.  HEENT:  She is normocephalic.  Her extraocular movements are intact.    Conjunctivae anicteric.  Face atraumatic.  NECK:  Supple.  Trachea midline.  CHEST:  Clear.  HEART:  Has a regular rate and rhythm.  ABDOMEN:  Soft, nontender.  There are no masses.  EXTREMITIES:  The left lower extremity is neurologically intact.  There is some   trace edema.  There is no discrete tenderness to palpation.  There are no cords.    The foot is pink and warm.    IMAGING STUDIES:  Ultrasound of the left lower extremity shows extensive   thrombus throughout the left lower extremity with nonocclusive thrombus in the   common femoral vein, occlusive thrombus in the popliteal and peroneal veins.    IMPRESSION:  Deep venous thrombosis, left leg, likely secondary to femoral   catheter placement and sepsis.  The patient has no evidence of vascular   compromise of the left lower extremity, is neurologically intact, pink and warm.    Should the patient develop cerulea phlegmasia, lytic therapy and possible   thrombectomy are consideration; however, continued heparin therapy should be   adequate for this patient to  prevent development of further thrombus.      JJW/JASSON  dd: 02/22/2018 18:48:16 (CST)  td: 02/22/2018 19:16:48 (CST)  Doc ID   #[id number]  Job ID #[id number]    CC:

## 2018-02-23 NOTE — PLAN OF CARE
Problem: Physical Therapy Goal  Goal: Physical Therapy Goal  Goals to be met by: 3/15/19     Patient will increase functional independence with mobility by performin. Supine to sit with supervision.   2. Sit to supine with supervision.   3. Sit<>stand transfer with CGA using rolling walker.   4. Gait > 10 feet with CGA using rolling walker.        Outcome: Ongoing (interventions implemented as appropriate)    Patient complained of being being tired and complained of sever pain in back. Patient was unable to transfers on this day.

## 2018-02-23 NOTE — PT/OT/SLP PROGRESS
Physical Therapy Treatment    Patient Name:  Yumiko Proctor   MRN:  [id number]    Recommendations:     Discharge Recommendations:  nursing facility, skilled   Discharge Equipment Recommendations: walker, rolling   Barriers to discharge: decrease caregiver     Assessment:     Yumiko Proctor is a 67 y.o. female admitted with a medical diagnosis of MRSA bacteremia.  She presents with the following impairments/functional limitations:  weakness, impaired endurance, impaired functional mobilty, gait instability, decreased ROM, edema, impaired skin patient was limited secondary to just returning from dialysis. Patient complained of increase pain in lower back and feeling like she was going to pass out while seated on edge of bed. Patient required total A of 2 people to return to supine.     Rehab Prognosis:  fair; patient would benefit from acute skilled PT services to address these deficits and reach maximum level of function.      Recent Surgery: Procedure(s) (LRB):  INSERTION-CATHETER-DIALYSIS (N/A) 11 Days Post-Op    Plan:     During this hospitalization, patient to be seen 6 x/week to address the above listed problems via gait training, therapeutic activities, therapeutic exercises, neuromuscular re-education  · Plan of Care Expires:  03/19/18   Plan of Care Reviewed with: patient    Subjective     Communicated with nurse prior to session.  Patient found supine upon PT entry to room, agreeable to treatment.      Chief Complaint:lower back pain  Patient comments/goals: none stated   Pain/Comfort:  · Pain Rating 1: 10/10  · Location - Side 1: Bilateral  · Location - Orientation 1: generalized  · Location 1:  (lower back )  · Pain Addressed 1: Reposition, Cessation of Activity, Nurse notified  · Pain Rating Post-Intervention 1: 3/10    Patients cultural, spiritual, Jew conflicts given the current situation: none stated    Objective:     Patient found with: peripheral IV     General Precautions: Standard, contact,  fall   Orthopedic Precautions:N/A   Braces: N/A     Functional Mobility:    Rolling right/left with Hand rail with CGA for safety several times and required total A for cleaning after having bowel incontinence. Patient also required total A for drawsheet change and positioning.   · Supine to sit with close SBA and use of hand rails  · Sit to supine with maximum assistance of 2 people patient complained of severe back pain  · Patient sat on edge of bed for 10 minutes required total A to rambo socks and rambo/doff gown with CGA for safety; after approx. 10 minutes patient complained of feeling faint and passing out. Patient was returned to supine with total A of 2 people. Patient then required total A for drawsheet to HOB     AM-PAC 6 CLICK MOBILITY  Turning over in bed (including adjusting bedclothes, sheets and blankets)?: 3  Sitting down on and standing up from a chair with arms (e.g., wheelchair, bedside commode, etc.): 2  Moving from lying on back to sitting on the side of the bed?: 2  Moving to and from a bed to a chair (including a wheelchair)?: 2  Need to walk in hospital room?: 1  Climbing 3-5 steps with a railing?: 1  Total Score: 11       Therapeutic Activities and Exercises:  Educated patient and daughter on the importance of getting out of bed and sitting in bedside chair to decrease back pain.     Patient left right sidelying with all lines intact, call button in reach, nurse notified and daughter present..    GOALS:    Physical Therapy Goals        Problem: Physical Therapy Goal    Goal Priority Disciplines Outcome Goal Variances Interventions   Physical Therapy Goal     PT/OT, PT Ongoing (interventions implemented as appropriate)     Description:  Goals to be met by: 3/15/19     Patient will increase functional independence with mobility by performin. Supine to sit with supervision.   2. Sit to supine with supervision.   3. Sit<>stand transfer with CGA using rolling walker.   4. Gait > 10 feet  with CGA using rolling walker.                         Time Tracking:     PT Received On: 02/23/18  PT Start Time: 1130     PT Stop Time: 1215  PT Total Time (min): 45 min     Billable Minutes: Therapeutic Activity 45    Treatment Type: Treatment  PT/PTA: PTA     PTA Visit Number: 1     Kajal Navas, PTA  02/23/2018

## 2018-02-23 NOTE — PLAN OF CARE
Problem: Patient Care Overview  Goal: Plan of Care Review  Outcome: Ongoing (interventions implemented as appropriate)  Patient resting in bed at this time, all important items within reach, pt free from injuires, instructed to use call light as needed, bed in lowest and locked position, night light on, non skid footwear on, bed alarm used as needed, purposeful rounding done      Patient BR, turned q2hr   Hep gtt (for L leg DVT) and IV Ab, pt tolerating   incontinence pads changed and patient cleaned as needed   anuric   No stools reported  Tired, depressed   No pain reported   VSS  Blood sugars WNL       No further questions or concerns at this time  Will cont to monitor

## 2018-02-23 NOTE — PT/OT/SLP PROGRESS
Physical Therapy Treatment    Patient Name:  Yumiko Proctor   MRN:  [id number]    Recommendations:     Discharge Recommendations:  nursing facility, skilled   Discharge Equipment Recommendations: walker, rolling   Barriers to discharge: None    Assessment:     Yumiko Proctor is a 67 y.o. female admitted with a medical diagnosis of MRSA bacteremia.  She presents with the following impairments/functional limitations:  weakness, impaired endurance, impaired functional mobilty, gait instability, pain, impaired balance, decreased lower extremity function, decreased ROM severe pain in R rib region and LLE with wt bearing limiting progress.  Pt needs acute with transition to post acute to maximize functional mobility      Rehab Prognosis:  good; patient would benefit from acute skilled PT services to address these deficits and reach maximum level of function.      Recent Surgery: Procedure(s) (LRB):  INSERTION-CATHETER-DIALYSIS (N/A) 10 Days Post-Op    Plan:     During this hospitalization, patient to be seen 6 x/week to address the above listed problems via gait training, therapeutic activities, therapeutic exercises, neuromuscular re-education  · Plan of Care Expires:  03/19/18   Plan of Care Reviewed with: patient, daughter    Subjective     Communicated with nursing, OT prior to session.  Patient found in bed with dtr present upon PT entry to room, agreeable to treatment.      Chief Complaint: R rib and LLE pain  Patient comments/goals: you dont know how bad this hurts.  I know I have to do it though  Pain/Comfort:  · Pain Rating 1: 7/10  · Location - Side 1: Right  · Location - Orientation 1: generalized  · Location 1:  (ribs)  · Pain Addressed 1: Reposition, Distraction, Cessation of Activity, Nurse notified  · Pain Rating Post-Intervention 1: 10/10  · Pain Rating 2: 7/10  · Location - Side 2: Left  · Location - Orientation 2: generalized  · Location 2: leg  · Pain Addressed 2: Reposition, Distraction, Cessation of  Activity, Nurse notified  · Pain Rating Post-Intervention 2: 10/10    Patients cultural, spiritual, Mormon conflicts given the current situation: none stated    Objective:     Patient found with: bed alarm, peripheral IV (HD line)     General Precautions: Standard, contact, diabetic, fall   Orthopedic Precautions:N/A   Braces: N/A     Functional Mobility:  · Bed Mobility:     · Supine to Sit: moderate assistance  · Sit to Supine: moderate assistance  · Transfers:     · Sit to Stand:  moderate assistance with rolling walker  · Gait: 3 sidesteps to HOB with RW and min a.    · Balance: sitting fair, standing poor      AM-PAC 6 CLICK MOBILITY  Turning over in bed (including adjusting bedclothes, sheets and blankets)?: 3  Sitting down on and standing up from a chair with arms (e.g., wheelchair, bedside commode, etc.): 2  Moving from lying on back to sitting on the side of the bed?: 2  Moving to and from a bed to a chair (including a wheelchair)?: 2  Need to walk in hospital room?: 1  Climbing 3-5 steps with a railing?: 1  Total Score: 11       Therapeutic Activities and Exercises:   Pt assist to EOB with mod A.  C/o severe pain in R rib region when up.  Stands with mod a and takes sidesteps to HOB with increasing pain in LLE with wt bearing on L.  Repositioned in bed .  Nurse notified    Patient left HOB elevated with all lines intact, call button in reach, bed alarm on, nurse notified and daughter present..    GOALS:    Physical Therapy Goals        Problem: Physical Therapy Goal    Goal Priority Disciplines Outcome Goal Variances Interventions   Physical Therapy Goal     PT/OT, PT Ongoing (interventions implemented as appropriate)     Description:  Goals to be met by: 3/15/19     Patient will increase functional independence with mobility by performin. Supine to sit with supervision.   2. Sit to supine with supervision.   3. Sit<>stand transfer with CGA using rolling walker.   4. Gait > 10 feet with CGA  using rolling walker.                         Time Tracking:     PT Received On: 02/22/18  PT Start Time: 1531     PT Stop Time: 1606  PT Total Time (min): 35 min     Billable Minutes: Therapeutic Activity 35    Treatment Type: Treatment  PT/PTA: PT     PTA Visit Number: 0     Angeline Curiel, PT  02/22/2018

## 2018-02-23 NOTE — PROGRESS NOTES
"Renal Progress Note    Admit Date: 2/7/2018   LOS: 16 days      67 y.o. white female with known MRSA bacteremia who was transferred here for ID and Neurosurgery services.  She has a history of ESRD, Afib, cardiomyopathy, and DM.  She has been hospitalized with bacteremia from a Right SC Malvin which was removed at outside facility.  She had a left femoral Jaspreet placed 2/5/2018.  Blood cultures remained positive; RANDA  normal, MRI of thoracic and lumbar spine showed abnormal foci thought  "probably a hemangioma or metastasis".  She normally dialyses MWF in [place], [place].  She is a very poor historian and says she does not know why her kidneys failed requiring she start HD in November 2017.  "You need to ask my daughter all these questions.  I just don't know."  No other vascular access ie AVF or grafts noted.    SUBJECTIVE:     Discussed with team.  Seen on HD.  No c/o this am.          Scheduled Meds:   calcitRIOL  0.25 mcg Oral Daily    carvedilol  25 mg Oral BID    ceftaroline fosamil  200 mg Intravenous Q12H    DAPTOmycin (CUBICIN)  IV  8 mg/kg Intravenous Q48H    docusate sodium  100 mg Oral BID    epoetin davion (PROCRIT) injection  20,000 Units Subcutaneous Every Mon, Wed, Fri    furosemide  80 mg Oral BID    insulin aspart U-100  1-10 Units Subcutaneous TIDWM    polyethylene glycol  17 g Oral Daily    sevelamer carbonate  800 mg Oral TID WM    SITagliptin  25 mg Oral Daily    vitamin D  2,000 Units Oral Daily    vitamin renal formula (B-complex-vitamin c-folic acid)  1 capsule Oral Daily       OBJECTIVE:     Vital Signs Range (Last 24H):  Temp:  [96.6 °F (35.9 °C)-98.4 °F (36.9 °C)]   Pulse:  [51-65]   Resp:  [17-18]   BP: (103-145)/(38-61)   SpO2:  [96 %-100 %]     I & O (Last 24H):    Intake/Output Summary (Last 24 hours) at 02/23/18 0955  Last data filed at 02/23/18 0500   Gross per 24 hour   Intake              868 ml   Output                0 ml   Net              868 ml       Physical " Exam:  Constitutional: She is oriented to person, place, and time. Morbidly obese, well-nourished.   Head: Normocephalic.   Eyes: Conjunctivae are normal.    Neck: Normal range of motion. Neck supple.   Cardiovascular: Regular rhythm, normal heart sounds.   Pulmonary/Chest: Effort normal and breath sounds normal. No respiratory distress.   Abdominal: Soft, obese. Bowel sounds are normal. She exhibits no distension. There is no tenderness.   Ext:  no appreciable edema  Neurological: NF  Skin: Skin is warm and dry + pallor  Psychiatric: She has a normal mood and affect. Her speech is normal and behavior is normal. Very pleasant.  Poor historian.   Access: R [facility]      Laboratory:  CBC:     Recent Labs  Lab 02/23/18  0448   WBC 5.80   RBC 2.66*   HGB 7.2*   HCT 23.7*      MCV 89   MCH 27.1   MCHC 30.4*     BMP:     Recent Labs  Lab 02/23/18  0448   GLU 79   *   K 3.7   CL 98   CO2 24   BUN 16   CREATININE 4.6*   CALCIUM 8.3*   MG 1.6       Recent Labs  Lab 02/23/18  0448        Impression:   Extensive thrombus throughout the left lower extremity with nonocclusive thrombus in the common femoral vein with occlusive thrombus throughout the femoral, popliteal, and peroneal vein. Anterior and posterior tibial veins are patent.      ASSESSMENT/PLAN:     68 YO WF with ESRD and catheter associated MRSA bacteremia and suspected lumbar foci presents from [facility] for ID and NS services.    1. ESRD:  Dr. Nichols pulled line for holiday, however electrolytes warranted new line for HD on 2/12.  R [facility] placed 2/12. Continue MWF for now.  Renally dose meds, avoid nephrotoxins, and monitor I/O's closely.  2. Hyperkalemia from dietary choices vs  (E87.5):  Changed diet and consulted dietary for counseling.  Low K bath.  Improved.   3. Rhabdo from Cubicin (M62.82):  Discussed with ID.  CPK stable so far after restarting.   4. Anemia of CKD:   holding IV iron given active infection.  Epo with HD.  Folate  and B12 normal.  May need transfusion soon.  5. Hyperphos:  on Renvela now.  6. Hyponatremia- resolving with HD.    7. 2HPT/Vit D deficiency:  Vit D3 and calcitriol.  8. DM:  Currently on SSI.  Reduced Januvia to ESRD dosing of 25mg/d.  9. Hx of Afib:  On Carvedilol.  Defer to cards. Defer anticoagulation to Cards.    10. MRSA Bacteremia: Repeat BCx from 2/16, 17, 19 are positive!!!  Likely needs RANDA and discussed with Dr. Gustafson.  Findings of occlusive LLE DVT likely source. Leaving EULALIO in for now but suspect will need another holiday. Discussed with Dr. Fernandez today and will leave in for now until next week pending further cultures/RANDA.   11. Occlusive LLE DVT (I82.412):  No one was notified of this study.  Started Heparin drip since will likely need new line and starting Eliquis would cause delay.  Consulted CV/vascular surgery for input.         See above.

## 2018-02-24 LAB
ALBUMIN SERPL BCP-MCNC: 1.9 G/DL
ALP SERPL-CCNC: 68 U/L
ALT SERPL W/O P-5'-P-CCNC: 35 U/L
ANION GAP SERPL CALC-SCNC: 5 MMOL/L
APTT BLDCRRT: 58.3 SEC
AST SERPL-CCNC: 29 U/L
BASOPHILS # BLD AUTO: 0.01 K/UL
BASOPHILS NFR BLD: 0.2 %
BILIRUB SERPL-MCNC: 0.2 MG/DL
BUN SERPL-MCNC: 11 MG/DL
CALCIUM SERPL-MCNC: 8.1 MG/DL
CHLORIDE SERPL-SCNC: 101 MMOL/L
CO2 SERPL-SCNC: 29 MMOL/L
CREAT SERPL-MCNC: 3.1 MG/DL
DIFFERENTIAL METHOD: ABNORMAL
EOSINOPHIL # BLD AUTO: 0.3 K/UL
EOSINOPHIL NFR BLD: 5.2 %
ERYTHROCYTE [DISTWIDTH] IN BLOOD BY AUTOMATED COUNT: 17.7 %
EST. GFR  (AFRICAN AMERICAN): 17 ML/MIN/1.73 M^2
EST. GFR  (NON AFRICAN AMERICAN): 15 ML/MIN/1.73 M^2
FACT X PPP CHRO-ACNC: 0.23 IU/ML
GLUCOSE SERPL-MCNC: 85 MG/DL
HCT VFR BLD AUTO: 24.8 %
HGB BLD-MCNC: 7.8 G/DL
LYMPHOCYTES # BLD AUTO: 2 K/UL
LYMPHOCYTES NFR BLD: 33.5 %
MAGNESIUM SERPL-MCNC: 1.7 MG/DL
MCH RBC QN AUTO: 28.4 PG
MCHC RBC AUTO-ENTMCNC: 31.5 G/DL
MCV RBC AUTO: 90 FL
MONOCYTES # BLD AUTO: 0.6 K/UL
MONOCYTES NFR BLD: 10 %
NEUTROPHILS # BLD AUTO: 3 K/UL
NEUTROPHILS NFR BLD: 50.4 %
PHOSPHATE SERPL-MCNC: 3.1 MG/DL
PLATELET # BLD AUTO: 192 K/UL
PMV BLD AUTO: 9.8 FL
POCT GLUCOSE: 118 MG/DL (ref 70–110)
POCT GLUCOSE: 123 MG/DL (ref 70–110)
POCT GLUCOSE: 87 MG/DL (ref 70–110)
POCT GLUCOSE: 94 MG/DL (ref 70–110)
POTASSIUM SERPL-SCNC: 3.9 MMOL/L
PROT SERPL-MCNC: 6.1 G/DL
RBC # BLD AUTO: 2.75 M/UL
SODIUM SERPL-SCNC: 135 MMOL/L
WBC # BLD AUTO: 5.91 K/UL

## 2018-02-24 PROCEDURE — 25000003 PHARM REV CODE 250: Performed by: INTERNAL MEDICINE

## 2018-02-24 PROCEDURE — 85025 COMPLETE CBC W/AUTO DIFF WBC: CPT

## 2018-02-24 PROCEDURE — 85730 THROMBOPLASTIN TIME PARTIAL: CPT

## 2018-02-24 PROCEDURE — 99233 SBSQ HOSP IP/OBS HIGH 50: CPT | Mod: ,,, | Performed by: INTERNAL MEDICINE

## 2018-02-24 PROCEDURE — 84100 ASSAY OF PHOSPHORUS: CPT

## 2018-02-24 PROCEDURE — 83735 ASSAY OF MAGNESIUM: CPT

## 2018-02-24 PROCEDURE — 36415 COLL VENOUS BLD VENIPUNCTURE: CPT

## 2018-02-24 PROCEDURE — 80053 COMPREHEN METABOLIC PANEL: CPT

## 2018-02-24 PROCEDURE — 63600175 PHARM REV CODE 636 W HCPCS: Performed by: NURSE PRACTITIONER

## 2018-02-24 PROCEDURE — 25000003 PHARM REV CODE 250: Performed by: NURSE PRACTITIONER

## 2018-02-24 PROCEDURE — 85520 HEPARIN ASSAY: CPT

## 2018-02-24 PROCEDURE — 25000003 PHARM REV CODE 250: Performed by: PHYSICIAN ASSISTANT

## 2018-02-24 PROCEDURE — 11000001 HC ACUTE MED/SURG PRIVATE ROOM

## 2018-02-24 PROCEDURE — 63600175 PHARM REV CODE 636 W HCPCS: Performed by: INTERNAL MEDICINE

## 2018-02-24 RX ADMIN — FUROSEMIDE 80 MG: 40 TABLET ORAL at 09:02

## 2018-02-24 RX ADMIN — CARVEDILOL 25 MG: 12.5 TABLET, FILM COATED ORAL at 09:02

## 2018-02-24 RX ADMIN — FUROSEMIDE 80 MG: 40 TABLET ORAL at 05:02

## 2018-02-24 RX ADMIN — OXYCODONE HYDROCHLORIDE AND ACETAMINOPHEN 1 TABLET: 5; 325 TABLET ORAL at 05:02

## 2018-02-24 RX ADMIN — CALCITRIOL 0.25 MCG: 0.25 CAPSULE, LIQUID FILLED ORAL at 09:02

## 2018-02-24 RX ADMIN — POLYETHYLENE GLYCOL 3350 17 G: 17 POWDER, FOR SOLUTION ORAL at 09:02

## 2018-02-24 RX ADMIN — DOCUSATE SODIUM 100 MG: 100 CAPSULE, LIQUID FILLED ORAL at 09:02

## 2018-02-24 RX ADMIN — SODIUM CHLORIDE 250 ML: 0.9 INJECTION, SOLUTION INTRAVENOUS at 01:02

## 2018-02-24 RX ADMIN — VITAMIN D, TAB 1000IU (100/BT) 2000 UNITS: 25 TAB at 09:02

## 2018-02-24 RX ADMIN — OXYCODONE HYDROCHLORIDE AND ACETAMINOPHEN 1 TABLET: 5; 325 TABLET ORAL at 09:02

## 2018-02-24 RX ADMIN — TRAMADOL HYDROCHLORIDE 50 MG: 50 TABLET, COATED ORAL at 01:02

## 2018-02-24 RX ADMIN — RAMELTEON 8 MG: 8 TABLET, FILM COATED ORAL at 09:02

## 2018-02-24 RX ADMIN — SITAGLIPTIN 25 MG: 25 TABLET, FILM COATED ORAL at 09:02

## 2018-02-24 RX ADMIN — CEFTAROLINE FOSAMIL 200 MG: 600 POWDER, FOR SOLUTION INTRAVENOUS at 01:02

## 2018-02-24 RX ADMIN — SEVELAMER CARBONATE 800 MG: 800 TABLET, FILM COATED ORAL at 01:02

## 2018-02-24 RX ADMIN — HEPARIN SODIUM AND DEXTROSE 12.96 UNITS/KG/HR: 10000; 5 INJECTION INTRAVENOUS at 04:02

## 2018-02-24 RX ADMIN — HEPARIN SODIUM AND DEXTROSE 12.96 UNITS/KG/HR: 10000; 5 INJECTION INTRAVENOUS at 10:02

## 2018-02-24 RX ADMIN — Medication 1 CAPSULE: at 09:02

## 2018-02-24 RX ADMIN — SEVELAMER CARBONATE 800 MG: 800 TABLET, FILM COATED ORAL at 09:02

## 2018-02-24 RX ADMIN — CEFTAROLINE FOSAMIL 200 MG: 600 POWDER, FOR SOLUTION INTRAVENOUS at 12:02

## 2018-02-24 RX ADMIN — SEVELAMER CARBONATE 800 MG: 800 TABLET, FILM COATED ORAL at 05:02

## 2018-02-24 NOTE — PROGRESS NOTES
Renal Progress Note    Admit Date: 2/7/2018   LOS: 17 days      Pt. Feels well today. She tells me that RANDA will be done on monday.    SUBJECTIVE:     Discussed with team.  Seen on HD.  No c/o this am.          Scheduled Meds:   calcitRIOL  0.25 mcg Oral Daily    carvedilol  25 mg Oral BID    ceftaroline fosamil  200 mg Intravenous Q12H    DAPTOmycin (CUBICIN)  IV  8 mg/kg Intravenous Q48H    docusate sodium  100 mg Oral BID    epoetin davion (PROCRIT) injection  20,000 Units Subcutaneous Every Mon, Wed, Fri    furosemide  80 mg Oral BID    insulin aspart U-100  1-10 Units Subcutaneous TIDWM    polyethylene glycol  17 g Oral Daily    sevelamer carbonate  800 mg Oral TID WM    SITagliptin  25 mg Oral Daily    vitamin D  2,000 Units Oral Daily    vitamin renal formula (B-complex-vitamin c-folic acid)  1 capsule Oral Daily       OBJECTIVE:     Vital Signs Range (Last 24H):  Temp:  [97.2 °F (36.2 °C)-98.1 °F (36.7 °C)]   Pulse:  [56-64]   Resp:  [16-17]   BP: ()/(40-62)   SpO2:  [96 %-100 %]     I & O (Last 24H):    Intake/Output Summary (Last 24 hours) at 02/24/18 1551  Last data filed at 02/24/18 1321   Gross per 24 hour   Intake              410 ml   Output                0 ml   Net              410 ml       Physical Exam:  Constitutional: She is oriented to person, place, and time. Morbidly obese, well-nourished.   Head: Normocephalic.   Eyes: Conjunctivae are normal.    Neck: Normal range of motion. Neck supple.   Cardiovascular: Regular rhythm, normal heart sounds.   Pulmonary/Chest: Effort normal and breath sounds normal. No respiratory distress.   Abdominal: Soft, obese. Bowel sounds are normal. She exhibits no distension. There is no tenderness.   Ext:  no appreciable edema  Neurological: NF  Skin: Skin is warm and dry + pallor  Psychiatric: She has a normal mood and affect. Her speech is normal and behavior is normal. Very pleasant.  Poor historian.   Access: [facility]      Laboratory:  CBC:      Recent Labs  Lab 02/24/18  0458   WBC 5.91   RBC 2.75*   HGB 7.8*   HCT 24.8*      MCV 90   MCH 28.4   MCHC 31.5*     BMP:     Recent Labs  Lab 02/24/18  0458   GLU 85   *   K 3.9      CO2 29   BUN 11   CREATININE 3.1*   CALCIUM 8.1*   MG 1.7     No results for input(s): CPK, CPKMB, TROPONINI, MB in the last 24 hours.  Impression:   Extensive thrombus throughout the left lower extremity with nonocclusive thrombus in the common femoral vein with occlusive thrombus throughout the femoral, popliteal, and peroneal vein. Anterior and posterior tibial veins are patent.      ASSESSMENT/PLAN:     66 YO WF with ESRD and catheter associated MRSA bacteremia and suspected lumbar foci presents from [facility] for ID and NS services.    1. ESRD:  Dr. Nichols pulled line for holiday, however electrolytes warranted new line for HD on 2/12.  R IJ EULALIO placed 2/12. Continue MWF for now.  Renally dose meds, avoid nephrotoxins, and monitor I/O's closely.  2. Hyperkalemia from dietary choices vs  (E87.5):  Changed diet and consulted dietary for counseling.  Low K bath.  Improved.   3. Rhabdo from Cubicin (M62.82):  D/C'd.  CPK normalized then  daptomycin resumed. Also on on ceftaroline. Recehck CPK on Monday.  4. Anemia of CKD:   holding IV iron given active infection.  Epo with HD.  Folate and B12 normal.  May need transfusion soon if declines further..    5. 2HPT/Vit D deficiency:  Vit D3 and calcitriol.  6. DM:  Currently on SSI.  Reduced Januvia to ESRD dosing of 25mg/d. Accuchecks good.  7. Hx of Afib:  On Carvedilol.  Defer to cards. Defer anticoagulation to Cards, but currently on heparin for clot.    8. MRSA Bacteremia:   -Repeat BCx from 2/16, 17, 19 are positive!!!    -Dr. Gustafson feels repeat RANDA will be low yield, but will do Monday.  - blood cultures from yesterday NGTD  - on ceftaroline and daptomycin  - extensive DVT noted and could be endovascular source  - would still consider RANDA to r/o  ring abscess as source of persistent bacteremia  - may need another line change/holiday, if possible, if bacteremia returns  - anticipating 8 weeks of IV abx beginning with sterile culture date     9. Occlusive LLE DVT (I82.412):  No one was notified of this study.  Started Heparin drip since will likely need new line and starting Eliquis would cause delay.  Dr. Nichols-Vascular surgery feels heparin for now unless pts develops cerulea phlegmasia.    See above.

## 2018-02-24 NOTE — PROGRESS NOTES
[facility]  Cardiology  Progress Note    Patient Name: Yumiko Proctor  MRN: [id number]  Admission Date: 2/7/2018  Hospital Length of Stay: 17 days  Code Status: Full Code   Attending Physician: Cherelle Echevarria, *   Primary Care Physician: Primary Doctor No  Expected Discharge Date:   Principal Problem:MRSA bacteremia    Subjective:     Brief HPI:     The patient is a 66 yo female with MRSA bacteremia who was transferred here for ID and Neurosurgery services from [facility].  She has ESRD, Afib, cardiomyopathy, and DM. She had a RANDA in [place] that was negative on about 2/5/2018. She had a TTE on 2/9/2018 that revealed unremarkable valves. Asked to see to consider RANDA.    Hospital Course:     Antibiotics.    Interval History:    No complaints.     2/26/2018: Plan RANDA.    Review of Systems   Cardiovascular: Negative for chest pain.   Respiratory: Negative for cough, hemoptysis and wheezing.      Objective:     Vital Signs (Most Recent):  Temp: 98 °F (36.7 °C) (02/24/18 1200)  Pulse: (!) 57 (02/24/18 1200)  Resp: 17 (02/24/18 1200)  BP: 132/62 (02/24/18 1200)  SpO2: 100 % (02/24/18 1200) Vital Signs (24h Range):  Temp:  [97.2 °F (36.2 °C)-98.1 °F (36.7 °C)] 98 °F (36.7 °C)  Pulse:  [56-64] 57  Resp:  [16-18] 17  SpO2:  [96 %-100 %] 100 %  BP: ()/(40-62) 132/62     Weight: 111.5 kg (245 lb 13 oz)  Body mass index is 43.54 kg/m².    SpO2: 100 %  O2 Device (Oxygen Therapy): room air      Intake/Output Summary (Last 24 hours) at 02/24/18 1337  Last data filed at 02/24/18 1321   Gross per 24 hour   Intake              360 ml   Output                0 ml   Net              360 ml       Lines/Drains/Airways     Central Venous Catheter Line                 Hemodialysis Catheter 02/12/18 0200 right internal jugular 12 days          Airway                 Airway - Non-Surgical 02/12/18 1310 Nasal Cannula 12 days          Pressure Ulcer                 Pressure Injury 02/07/18 2254 medial Gluteal cleft  Stage 1 16 days          Peripheral Intravenous Line                 Peripheral IV - Single Lumen 02/21/18 2100 Left Other 2 days         Peripheral IV - Single Lumen 02/23/18 2252 Left Forearm less than 1 day                Physical Exam   Constitutional: She is oriented to person, place, and time.   Cardiovascular: Normal rate and regular rhythm.    Pulmonary/Chest: Effort normal and breath sounds normal.   Neurological: She is alert and oriented to person, place, and time.   Skin: Skin is warm. No rash noted.       Current Medications:     calcitRIOL  0.25 mcg Oral Daily    carvedilol  25 mg Oral BID    ceftaroline fosamil  200 mg Intravenous Q12H    DAPTOmycin (CUBICIN)  IV  8 mg/kg Intravenous Q48H    docusate sodium  100 mg Oral BID    epoetin davion (PROCRIT) injection  20,000 Units Subcutaneous Every Mon, Wed, Fri    furosemide  80 mg Oral BID    insulin aspart U-100  1-10 Units Subcutaneous TIDWM    polyethylene glycol  17 g Oral Daily    sevelamer carbonate  800 mg Oral TID WM    SITagliptin  25 mg Oral Daily    vitamin D  2,000 Units Oral Daily    vitamin renal formula (B-complex-vitamin c-folic acid)  1 capsule Oral Daily     Current Laboratory Results:    Recent Results (from the past 24 hour(s))   POCT glucose    Collection Time: 02/23/18  5:58 PM   Result Value Ref Range    POCT Glucose 119 (H) 70 - 110 mg/dL   POCT glucose    Collection Time: 02/23/18  9:03 PM   Result Value Ref Range    POCT Glucose 129 (H) 70 - 110 mg/dL   Comprehensive Metabolic Panel (CMP)    Collection Time: 02/24/18  4:58 AM   Result Value Ref Range    Sodium 135 (L) 136 - 145 mmol/L    Potassium 3.9 3.5 - 5.1 mmol/L    Chloride 101 95 - 110 mmol/L    CO2 29 23 - 29 mmol/L    Glucose 85 70 - 110 mg/dL    BUN, Bld 11 8 - 23 mg/dL    Creatinine 3.1 (H) 0.5 - 1.4 mg/dL    Calcium 8.1 (L) 8.7 - 10.5 mg/dL    Total Protein 6.1 6.0 - 8.4 g/dL    Albumin 1.9 (L) 3.5 - 5.2 g/dL    Total Bilirubin 0.2 0.1 - 1.0 mg/dL     Alkaline Phosphatase 68 55 - 135 U/L    AST 29 10 - 40 U/L    ALT 35 10 - 44 U/L    Anion Gap 5 (L) 8 - 16 mmol/L    eGFR if African American 17 (A) >60 mL/min/1.73 m^2    eGFR if non African American 15 (A) >60 mL/min/1.73 m^2   Magnesium    Collection Time: 02/24/18  4:58 AM   Result Value Ref Range    Magnesium 1.7 1.6 - 2.6 mg/dL   Phosphorus    Collection Time: 02/24/18  4:58 AM   Result Value Ref Range    Phosphorus 3.1 2.7 - 4.5 mg/dL   CBC with Automated Differential    Collection Time: 02/24/18  4:58 AM   Result Value Ref Range    WBC 5.91 3.90 - 12.70 K/uL    RBC 2.75 (L) 4.00 - 5.40 M/uL    Hemoglobin 7.8 (L) 12.0 - 16.0 g/dL    Hematocrit 24.8 (L) 37.0 - 48.5 %    MCV 90 82 - 98 fL    MCH 28.4 27.0 - 31.0 pg    MCHC 31.5 (L) 32.0 - 36.0 g/dL    RDW 17.7 (H) 11.5 - 14.5 %    Platelets 192 150 - 350 K/uL    MPV 9.8 9.2 - 12.9 fL    Gran # (ANC) 3.0 1.8 - 7.7 K/uL    Lymph # 2.0 1.0 - 4.8 K/uL    Mono # 0.6 0.3 - 1.0 K/uL    Eos # 0.3 0.0 - 0.5 K/uL    Baso # 0.01 0.00 - 0.20 K/uL    Gran% 50.4 38.0 - 73.0 %    Lymph% 33.5 18.0 - 48.0 %    Mono% 10.0 4.0 - 15.0 %    Eosinophil% 5.2 0.0 - 8.0 %    Basophil% 0.2 0.0 - 1.9 %    Differential Method Automated    Anti-Xa Heparin Monitoring    Collection Time: 02/24/18  4:58 AM   Result Value Ref Range    Heparin Anti-Xa 0.23 (L) 0.30 - 0.70 IU/mL   APTT    Collection Time: 02/24/18  4:58 AM   Result Value Ref Range    aPTT 58.3 (H) 21.0 - 32.0 sec   POCT glucose    Collection Time: 02/24/18  7:53 AM   Result Value Ref Range    POCT Glucose 87 70 - 110 mg/dL   POCT glucose    Collection Time: 02/24/18 11:20 AM   Result Value Ref Range    POCT Glucose 123 (H) 70 - 110 mg/dL     Current Imaging Results:    Imaging Results    None           Assessment and Plan:     Problem List:    Active Diagnoses:    Diagnosis Date Noted POA    PRINCIPAL PROBLEM:  MRSA bacteremia [R78.81] 02/07/2018 Yes    DVT of deep femoral vein, left [I82.412] 02/21/2018 Clinically  Undetermined    Drug-induced muscle inflammation [M60.9, T50.905A] 02/16/2018 No    Abnormal liver enzymes [R74.8] 02/12/2018 No    Chronic atrial fibrillation [I48.2] 02/08/2018 Yes    ESRD (end stage renal disease) [N18.6] 02/08/2018 Yes    Type 2 diabetes mellitus with chronic kidney disease on chronic dialysis, without long-term current use of insulin [E11.22, N18.6, Z99.2] 02/08/2018 Not Applicable      Problems Resolved During this Admission:    Diagnosis Date Noted Date Resolved POA     Assessment and Plan:     1. MRSA Bacetremia              2/9/2018: TTE: No vegetations seen.               2/26/2018: Plan RANDA.    The planned procedure was discussed in detail with the patient and the family members present. Risk, benefit and alternatives were reviewed. All questions answered. Consent was then signed.    If further questions or concerns arise the patient was encouraged to contact me prior to the planned procedure.     VTE Risk Mitigation         Ordered     heparin 25,000 units in dextrose 5% 250 mL (100 units/mL) infusion; FEMALE  Continuous     Route:  Intravenous        02/21/18 1056     heparin 25,000 units in dextrose 5% 250 mL (100 units/mL) bolus from bag; PRN BOLUS  As needed (PRN)     Route:  Intravenous        02/21/18 1056     heparin 25,000 units in dextrose 5% 250 mL (100 units/mL) bolus from bag; PRN BOLUS  As needed (PRN)     Route:  Intravenous        02/21/18 1056     heparin (porcine) injection 2,000 Units  As needed (PRN)     Route:  Intravenous        02/13/18 0940     heparin (porcine) injection 5,000 Units  As needed (PRN)     Route:  Intra-Catheter        02/12/18 1834     Medium Risk of VTE  Once      02/07/18 2255     Place sequential compression device  Until discontinued      02/07/18 2255     Place ANGELI hose  Until discontinued      02/07/18 2255          Rikki Gustafson MD  Cardiology  [facility]

## 2018-02-24 NOTE — SUBJECTIVE & OBJECTIVE
Interval History: Feeling better. Tolerating abx. Denies fever or chills.    Review of Systems   Constitutional: Negative for chills and fever.   All other systems reviewed and are negative.    Objective:     Vital Signs (Most Recent):  Temp: 97.5 °F (36.4 °C) (02/24/18 0800)  Pulse: 63 (02/24/18 0925)  Resp: 16 (02/24/18 0800)  BP: 138/60 (02/24/18 0800)  SpO2: 100 % (02/24/18 0800) Vital Signs (24h Range):  Temp:  [97.2 °F (36.2 °C)-98.1 °F (36.7 °C)] 97.5 °F (36.4 °C)  Pulse:  [56-64] 63  Resp:  [16-19] 16  SpO2:  [96 %-100 %] 100 %  BP: ()/(40-60) 138/60     Weight: 111.5 kg (245 lb 13 oz)  Body mass index is 43.54 kg/m².    Estimated Creatinine Clearance: 21.1 mL/min (A) (based on SCr of 3.1 mg/dL (H)).    Physical Exam   Constitutional: She is oriented to person, place, and time. She appears well-developed and well-nourished. No distress.   HENT:   Head: Normocephalic and atraumatic.   Eyes: EOM are normal. Pupils are equal, round, and reactive to light.   Cardiovascular: Normal rate.    Murmur heard.  Pulmonary/Chest: Effort normal and breath sounds normal.   Abdominal: Soft. Bowel sounds are normal.   Neurological: She is alert and oriented to person, place, and time.   Skin: She is not diaphoretic.   Psychiatric: She has a normal mood and affect. Her behavior is normal. Judgment and thought content normal.   Nursing note and vitals reviewed.      Significant Labs:   Blood Culture:   Recent Labs  Lab 02/16/18  1037 02/17/18  1148 02/17/18  1200 02/19/18  0537 02/22/18  0557   LABBLOO Gram stain aer bottle: Gram positive cocci in clusters resembling Staph   Results called to and read back by:Ynes Steiner RN 02/17/2018  10:07  Gram stain vicente bottle: Gram positive cocci in clusters resembling Staph   Positive results previously called 02/17/2018  16:42  METHICILLIN RESISTANT STAPHYLOCOCCUS AUREUSID consult required at [facility] [place]. Gram stain aer bottle: Gram positive cocci  in clusters resembling Staph   Results called to and read back by: Ynes Herrera RN  02/18/2018  18:31  STAPHYLOCOCCUS AUREUSID consult required at [facility].For susceptibility see order #[id number] Gram stain vicente bottle: Gram positive cocci in clusters resembling Staph   Results called to and read back by:Ynes Steiner RN 02/18/2018  14:40  Gram stain aer bottle: Gram positive cocci in clusters resembling Staph   Positive results previously called 02/18/2018  18:19  STAPHYLOCOCCUS AUREUSID consult required at [facility].For susceptibility see order #[id number] Gram stain aer bottle: Gram positive cocci in clusters resembling Staph   Gram stain vicente bottle: Gram positive cocci in clusters resembling Staph   Results called to and read back by:Alison Oswald RN 02/20/2018    03:01  METHICILLIN RESISTANT STAPHYLOCOCCUS AUREUSID consult required at [facility]. No Growth to date  No Growth to date  No Growth to date     BMP:   Recent Labs  Lab 02/24/18  0458   GLU 85   *   K 3.9      CO2 29   BUN 11   CREATININE 3.1*   CALCIUM 8.1*   MG 1.7     CBC:   Recent Labs  Lab 02/23/18  0448 02/24/18  0458   WBC 5.80 5.91   HGB 7.2* 7.8*   HCT 23.7* 24.8*    192       Significant Imaging: I have reviewed all pertinent imaging results/findings within the past 24 hours.

## 2018-02-24 NOTE — PROGRESS NOTES
"Patient in bed watching television. Complained of pain to lower back and left leg. Percocet given. Patient did not participate in therapy sessions today saying that she was in "too much pain" even after pain meds were given. Patient ambulated to bed side commode with minimal assist today. Heparin drip infusing. Bed locked and low. Call bell in reach.  "

## 2018-02-24 NOTE — PROGRESS NOTES
"[facility]  Infectious Disease  Progress Note    Patient Name: Yumiko Proctor  MRN: [id number]  Admission Date: 2/7/2018  Length of Stay: 17 days  Attending Physician: Cherelle Echevarria, *  Primary Care Provider: Primary Doctor No    Isolation Status: Contact  Assessment/Plan:      * MRSA bacteremia    - complicated due to persistence  - blood cultures from yesterday NGTD  - on ceftaroline and daptomycin  - extensive DVT noted and could be endovascular source  - would still consider RANDA to r/o ring abscess as source of persistent bacteremia  - may need another line change/holiday, if possible, if bacteremia returns  - anticipating 8 weeks of IV abx beginning with sterile culture date  - CPK on Monday          . I will follow-up with patient. Please contact us if you have any additional questions.    Andrew Fernandez MD  Infectious Disease  [facility]    Subjective:     Principal Problem:MRSA bacteremia    HPI: This is a 68 yo woman with ESRD transferred to [facility] for persistent bacteremia due to MRSA. She reportedly had multiple studies performed at [facility] in [place], including MR imaging, CT imaging, a bone scan, and a RANDA, all of which were "negative." An indwelling HD catheter was discontinued and a left groin HD catheter was placed. The patient feels sick but denies any rigors or irving fever. The patient was admitted last night and placed on cefepime and Cipro in addition to the daptomycin. I am consulted for ID evaluation.    Interval History: Feeling better. Tolerating abx. Denies fever or chills.    Review of Systems   Constitutional: Negative for chills and fever.   All other systems reviewed and are negative.    Objective:     Vital Signs (Most Recent):  Temp: 97.5 °F (36.4 °C) (02/24/18 0800)  Pulse: 63 (02/24/18 0925)  Resp: 16 (02/24/18 0800)  BP: 138/60 (02/24/18 0800)  SpO2: 100 % (02/24/18 0800) Vital Signs (24h Range):  Temp:  [97.2 °F (36.2 °C)-98.1 °F " (36.7 °C)] 97.5 °F (36.4 °C)  Pulse:  [56-64] 63  Resp:  [16-19] 16  SpO2:  [96 %-100 %] 100 %  BP: ()/(40-60) 138/60     Weight: 111.5 kg (245 lb 13 oz)  Body mass index is 43.54 kg/m².    Estimated Creatinine Clearance: 21.1 mL/min (A) (based on SCr of 3.1 mg/dL (H)).    Physical Exam   Constitutional: She is oriented to person, place, and time. She appears well-developed and well-nourished. No distress.   HENT:   Head: Normocephalic and atraumatic.   Eyes: EOM are normal. Pupils are equal, round, and reactive to light.   Cardiovascular: Normal rate.    Murmur heard.  Pulmonary/Chest: Effort normal and breath sounds normal.   Abdominal: Soft. Bowel sounds are normal.   Neurological: She is alert and oriented to person, place, and time.   Skin: She is not diaphoretic.   Psychiatric: She has a normal mood and affect. Her behavior is normal. Judgment and thought content normal.   Nursing note and vitals reviewed.      Significant Labs:   Blood Culture:   Recent Labs  Lab 02/16/18  1037 02/17/18  1148 02/17/18  1200 02/19/18  0537 02/22/18  0557   LABBLOO Gram stain aer bottle: Gram positive cocci in clusters resembling Staph   Results called to and read back by:Ynes Steiner RN 02/17/2018  10:07  Gram stain vicente bottle: Gram positive cocci in clusters resembling Staph   Positive results previously called 02/17/2018  16:42  METHICILLIN RESISTANT STAPHYLOCOCCUS AUREUSID consult required at [facility] and [facility] locations. Gram stain aer bottle: Gram positive cocci in clusters resembling Staph   Results called to and read back by: Ynse Herrera RN  02/18/2018  18:31  STAPHYLOCOCCUS AUREUSID consult required at [facility].For susceptibility see order #[id number] Gram stain vicente bottle: Gram positive cocci in clusters resembling Staph   Results called to and read back by:Ynes Steiner RN 02/18/2018  14:40  Gram stain aer bottle: Gram positive cocci in clusters resembling  Staph   Positive results previously called 02/18/2018  18:19  STAPHYLOCOCCUS AUREUSID consult required at [facility].For susceptibility see order #[id number] Gram stain aer bottle: Gram positive cocci in clusters resembling Staph   Gram stain vicente bottle: Gram positive cocci in clusters resembling Staph   Results called to and read back by:Alison Oswald RN 02/20/2018    03:01  METHICILLIN RESISTANT STAPHYLOCOCCUS AUREUSID consult required at [facility]. No Growth to date  No Growth to date  No Growth to date     BMP:   Recent Labs  Lab 02/24/18  0458   GLU 85   *   K 3.9      CO2 29   BUN 11   CREATININE 3.1*   CALCIUM 8.1*   MG 1.7     CBC:   Recent Labs  Lab 02/23/18  0448 02/24/18  0458   WBC 5.80 5.91   HGB 7.2* 7.8*   HCT 23.7* 24.8*    192       Significant Imaging: I have reviewed all pertinent imaging results/findings within the past 24 hours.

## 2018-02-24 NOTE — ASSESSMENT & PLAN NOTE
Blood Cultures 02/08/18 MRSA in 1 set, 2nd set NGTD  Repeat Blood Cultures 02/10/18 2/2 MRSA  Lactic acid 0.9 02/08/10  Discontinued Daptomycin initially secondary to elevated CPK  Suspect endovascular source  New IJ catheter placed 2/12, blood cultures 2/13 are negative to date, but 2/16 culture is positive  Repeat blood cultures x2 2/17 are now positive  Removed all lines 02/19/18 for a line holiday for several days  Currently receiving Daptomycin and Ceftaroline  Awaiting further recs from ID/cardiology

## 2018-02-24 NOTE — ASSESSMENT & PLAN NOTE
- complicated due to persistence  - blood cultures from yesterday NGTD  - on ceftaroline and daptomycin  - extensive DVT noted and could be endovascular source  - would still consider RANDA to r/o ring abscess as source of persistent bacteremia  - may need another line change/holiday, if possible, if bacteremia returns  - anticipating 8 weeks of IV abx beginning with sterile culture date  - CPK on Monday

## 2018-02-24 NOTE — PROGRESS NOTES
[facility] Hospital Medicine  Progress Note    Patient Name: Yumiko Proctor  MRN: [id number]  Patient Class: IP- Inpatient   Admission Date: 2/7/2018  Length of Stay: 16 days  Attending Physician: Cherelle Echevarria, *  Primary Care Provider: Primary Doctor No        Subjective:     Principal Problem:MRSA bacteremia    HPI:  The patient is a 68 yo female with known MRSA bacteremia who was transferred here for ID and Neurosurgery services.  She has a history of ESRD, Afib, cardiomyopathy, and DM.  She has been hospitalized with bacteremia from a vascular access which was taken out and replaced today with a lt femoral vas cath.  Blood cultures remained positive. She had a RANDA which was normal, MRI of thoracic and lumbar spine showed a abnormal foci in T( that was probably a hemangioma or metastasis.    Hospital Course:  Blood cultures from 02/08/18 2/4 (+) MRSA.  Patient currently receiving Daptomycin.  Cultures repeated again on 02/10/2018 and 2/2 bottles positive for MRSA.  ID on board, concerned there is endovascular source.  Left prince catheter removed yesterday and right IJ tunneled dialysis catheter placed 2/12. Repeat blood cultures 2/13 remain negative to date, but one bottle obtained 2/16 growing MRSA and 2/17 blood cultures are positive for gram positive cocci in clusters resembling Staph.  Patient may have some myositis from Daptomycin with elevation in CPK to 5175 which is now decreasing and this was discontinued.  Vancomycin given after dialysis treatments.  PT/OT consulted and recommend skilled nursing upon discharge.  Catheter, IV, removal after dialysis 2/19/2018.  Consult Cardiology for RANDA.    Interval History: Did have some back pain earlier but relieved with pain meds.  Discussed possible discharge to SNF in [place] early next week, patient in agreement.    Review of Systems   Musculoskeletal: Positive for back pain.     Objective:     Vital Signs (Most Recent):  Temp:  97.4 °F (36.3 °C) (02/23/18 1952)  Pulse: 64 (02/23/18 1952)  Resp: 16 (02/23/18 1952)  BP: (!) 117/53 (02/23/18 1952)  SpO2: 96 % (02/23/18 1952) Vital Signs (24h Range):  Temp:  [97.4 °F (36.3 °C)-98.2 °F (36.8 °C)] 97.4 °F (36.3 °C)  Pulse:  [51-64] 64  Resp:  [16-19] 16  SpO2:  [96 %-99 %] 96 %  BP: (103-136)/(38-61) 117/53     Weight: 108 kg (238 lb 3.2 oz)  Body mass index is 42.2 kg/m².    Intake/Output Summary (Last 24 hours) at 02/23/18 2250  Last data filed at 02/23/18 1000   Gross per 24 hour   Intake              968 ml   Output             1750 ml   Net             -782 ml      Physical Exam   Constitutional: She is oriented to person, place, and time. She appears well-developed and well-nourished.   HENT:   Head: Normocephalic.   Eyes: Conjunctivae are normal. Right eye exhibits no discharge. Left eye exhibits no discharge.   Neck: Normal range of motion. Neck supple.   Cardiovascular: Regular rhythm and normal heart sounds.  Bradycardia present.    Pulmonary/Chest: Effort normal and breath sounds normal. No respiratory distress.   Abdominal: Soft. Bowel sounds are normal. She exhibits no distension. There is no tenderness.   Musculoskeletal: Normal range of motion.   Neurological: She is alert and oriented to person, place, and time.   Skin: Skin is warm and dry.   Psychiatric: She has a normal mood and affect. Her speech is normal and behavior is normal. Judgment and thought content normal.       Significant Labs:   CBC:   Recent Labs  Lab 02/22/18  0557 02/23/18  0448   WBC 5.95  5.95 5.80   HGB 8.1*  8.1* 7.2*   HCT 26.2*  26.2* 23.7*     200 197     CMP:   Recent Labs  Lab 02/22/18  0556 02/23/18  0448   * 133*   K 3.5 3.7    98   CO2 26 24   GLU 95 79   BUN 12 16   CREATININE 3.5* 4.6*   CALCIUM 8.3* 8.3*   PROT 6.6 5.8*   ALBUMIN 2.0* 1.8*   BILITOT 0.3 0.2   ALKPHOS 75 67   AST 35 29   ALT 48* 37   ANIONGAP 9 11   EGFRNONAA 13* 9*     All pertinent labs within the past 24  hours have been reviewed.    Significant Imaging: I have reviewed all pertinent imaging results/findings within the past 24 hours.    Assessment/Plan:      * MRSA bacteremia    Blood Cultures 02/08/18 MRSA in 1 set, 2nd set NGTD  Repeat Blood Cultures 02/10/18 2/2 MRSA  Lactic acid 0.9 02/08/10  Discontinued Daptomycin initially secondary to elevated CPK  Suspect endovascular source  New IJ catheter placed 2/12, blood cultures 2/13 are negative to date, but 2/16 culture is positive  Repeat blood cultures x2 2/17 are now positive  Removed all lines 02/19/18 for a line holiday for several days  Currently receiving Daptomycin and Ceftaroline  Awaiting further recs from ID/cardiology            DVT of deep femoral vein, left    Consulted Dr. Nichols, Vascular Surgery for evaluation of thrombus  Heparin Infusion  Maybe endovascular source of bactermia        Abnormal liver enzymes    Discontinued Amiodarone 2/13  AST/ALT decreasing, almost normal        Type 2 diabetes mellitus with chronic kidney disease on chronic dialysis, without long-term current use of insulin    A1c  6.5  PRN insulin  Well-controlled          ESRD (end stage renal disease)    Continue Dialysis MWF per Nephrology  Left Femoral Jaspreet cath removed   R IJ dialysis catheter placed 2/12  Removed R IJ and IVs 02/19/18 for holiday          Chronic atrial fibrillation    Continue Coreg  Rate controlled              VTE Risk Mitigation         Ordered     heparin 25,000 units in dextrose 5% 250 mL (100 units/mL) infusion; FEMALE  Continuous     Route:  Intravenous        02/21/18 1056     heparin 25,000 units in dextrose 5% 250 mL (100 units/mL) bolus from bag; PRN BOLUS  As needed (PRN)     Route:  Intravenous        02/21/18 1056     heparin 25,000 units in dextrose 5% 250 mL (100 units/mL) bolus from bag; PRN BOLUS  As needed (PRN)     Route:  Intravenous        02/21/18 1056     heparin (porcine) injection 2,000 Units  As needed (PRN)     Route:   Intravenous        02/13/18 0940     heparin (porcine) injection 5,000 Units  As needed (PRN)     Route:  Intra-Catheter        02/12/18 1834     Medium Risk of VTE  Once      02/07/18 2255     Place sequential compression device  Until discontinued      02/07/18 2255     Place ANGELI hose  Until discontinued      02/07/18 2255              Cherelle Echevarria MD  Department of Hospital Medicine   [facility]

## 2018-02-24 NOTE — PLAN OF CARE
Received call from RN regarding patient's low BP. Patient is dialysis-dependant. Will try small 250 cc fluid bolus, and reassess vitals.

## 2018-02-24 NOTE — PLAN OF CARE
Problem: Patient Care Overview  Goal: Plan of Care Review  Outcome: Ongoing (interventions implemented as appropriate)  No injuries. Heparin gtt infusing. Pt hypotensive this shift. 250ml NS bolus given. Pain managed with PRN medication. Plan of care reviewed with patient. Verbalized understanding. Will continue to monitor.

## 2018-02-24 NOTE — PROGRESS NOTES
[facility], [place], operated by [facility]  Cardiology  Progress Note    Patient Name: Yumiko Proctor  MRN: [id number]  Admission Date: 2/7/2018  Hospital Length of Stay: 16 days  Code Status: Full Code   Attending Physician: Cherelle Echevarria, *   Primary Care Physician: Primary Doctor No  Expected Discharge Date:   Principal Problem:MRSA bacteremia    Subjective:     Brief HPI:     The patient is a 68 yo female with MRSA bacteremia who was transferred here for ID and Neurosurgery services from [facility].  She has ESRD, Afib, cardiomyopathy, and DM. She had a RANDA in [place] that was negative on about 2/5/2018. She had a TTE on 2/9/2018 that revealed unremarkable valves. Asked to see to consider RANDA.    Hospital Course:     Antibiotics.    Interval History:    No complaints.    ROS  Objective:     Vital Signs (Most Recent):  Temp: 97.4 °F (36.3 °C) (02/23/18 1952)  Pulse: 64 (02/23/18 1952)  Resp: 16 (02/23/18 1952)  BP: (!) 117/53 (02/23/18 1952)  SpO2: 96 % (02/23/18 1952) Vital Signs (24h Range):  Temp:  [97.4 °F (36.3 °C)-98.2 °F (36.8 °C)] 97.4 °F (36.3 °C)  Pulse:  [51-65] 64  Resp:  [16-19] 16  SpO2:  [96 %-99 %] 96 %  BP: (103-136)/(38-61) 117/53     Weight: 108 kg (238 lb 3.2 oz)  Body mass index is 42.2 kg/m².    SpO2: 96 %  O2 Device (Oxygen Therapy): room air      Intake/Output Summary (Last 24 hours) at 02/23/18 2016  Last data filed at 02/23/18 1000   Gross per 24 hour   Intake              968 ml   Output             1750 ml   Net             -782 ml       Lines/Drains/Airways     Central Venous Catheter Line                 Hemodialysis Catheter 02/12/18 0200 right internal jugular 11 days          Airway                 Airway - Non-Surgical 02/12/18 1310 Nasal Cannula 11 days          Pressure Ulcer                 Pressure Injury 02/07/18 2254 medial Gluteal cleft Stage 1 15 days          Peripheral Intravenous Line                 Peripheral IV - Single Lumen 02/21/18 1500 Left Forearm 2 days         Peripheral  IV - Single Lumen 02/21/18 2100 Left Other 1 day                Physical Exam    Current Medications:     calcitRIOL  0.25 mcg Oral Daily    carvedilol  25 mg Oral BID    ceftaroline fosamil  200 mg Intravenous Q12H    DAPTOmycin (CUBICIN)  IV  8 mg/kg Intravenous Q48H    docusate sodium  100 mg Oral BID    epoetin davion (PROCRIT) injection  20,000 Units Subcutaneous Every Mon, Wed, Fri    furosemide  80 mg Oral BID    insulin aspart U-100  1-10 Units Subcutaneous TIDWM    polyethylene glycol  17 g Oral Daily    sevelamer carbonate  800 mg Oral TID WM    SITagliptin  25 mg Oral Daily    vitamin D  2,000 Units Oral Daily    vitamin renal formula (B-complex-vitamin c-folic acid)  1 capsule Oral Daily     Current Laboratory Results:    Recent Results (from the past 24 hour(s))   POCT glucose    Collection Time: 02/22/18  9:15 PM   Result Value Ref Range    POCT Glucose 116 (H) 70 - 110 mg/dL   Comprehensive Metabolic Panel (CMP)    Collection Time: 02/23/18  4:48 AM   Result Value Ref Range    Sodium 133 (L) 136 - 145 mmol/L    Potassium 3.7 3.5 - 5.1 mmol/L    Chloride 98 95 - 110 mmol/L    CO2 24 23 - 29 mmol/L    Glucose 79 70 - 110 mg/dL    BUN, Bld 16 8 - 23 mg/dL    Creatinine 4.6 (H) 0.5 - 1.4 mg/dL    Calcium 8.3 (L) 8.7 - 10.5 mg/dL    Total Protein 5.8 (L) 6.0 - 8.4 g/dL    Albumin 1.8 (L) 3.5 - 5.2 g/dL    Total Bilirubin 0.2 0.1 - 1.0 mg/dL    Alkaline Phosphatase 67 55 - 135 U/L    AST 29 10 - 40 U/L    ALT 37 10 - 44 U/L    Anion Gap 11 8 - 16 mmol/L    eGFR if African American 11 (A) >60 mL/min/1.73 m^2    eGFR if non African American 9 (A) >60 mL/min/1.73 m^2   Magnesium    Collection Time: 02/23/18  4:48 AM   Result Value Ref Range    Magnesium 1.6 1.6 - 2.6 mg/dL   Phosphorus    Collection Time: 02/23/18  4:48 AM   Result Value Ref Range    Phosphorus 3.8 2.7 - 4.5 mg/dL   CBC with Automated Differential    Collection Time: 02/23/18  4:48 AM   Result Value Ref Range    WBC 5.80 3.90 -  12.70 K/uL    RBC 2.66 (L) 4.00 - 5.40 M/uL    Hemoglobin 7.2 (L) 12.0 - 16.0 g/dL    Hematocrit 23.7 (L) 37.0 - 48.5 %    MCV 89 82 - 98 fL    MCH 27.1 27.0 - 31.0 pg    MCHC 30.4 (L) 32.0 - 36.0 g/dL    RDW 17.3 (H) 11.5 - 14.5 %    Platelets 197 150 - 350 K/uL    MPV 9.7 9.2 - 12.9 fL    Gran # (ANC) 2.4 1.8 - 7.7 K/uL    Lymph # 2.3 1.0 - 4.8 K/uL    Mono # 0.7 0.3 - 1.0 K/uL    Eos # 0.4 0.0 - 0.5 K/uL    Baso # 0.03 0.00 - 0.20 K/uL    Gran% 41.0 38.0 - 73.0 %    Lymph% 39.0 18.0 - 48.0 %    Mono% 11.4 4.0 - 15.0 %    Eosinophil% 7.6 0.0 - 8.0 %    Basophil% 0.5 0.0 - 1.9 %    Differential Method Automated    CK    Collection Time: 02/23/18  4:48 AM   Result Value Ref Range     20 - 180 U/L   Anti-Xa Heparin Monitoring    Collection Time: 02/23/18  4:48 AM   Result Value Ref Range    Heparin Anti-Xa 0.24 (L) 0.30 - 0.70 IU/mL   APTT    Collection Time: 02/23/18  4:48 AM   Result Value Ref Range    aPTT 67.5 (H) 21.0 - 32.0 sec   POCT glucose    Collection Time: 02/23/18  7:51 AM   Result Value Ref Range    POCT Glucose 99 70 - 110 mg/dL   POCT glucose    Collection Time: 02/23/18 12:09 PM   Result Value Ref Range    POCT Glucose 115 (H) 70 - 110 mg/dL   POCT glucose    Collection Time: 02/23/18  5:58 PM   Result Value Ref Range    POCT Glucose 119 (H) 70 - 110 mg/dL     Current Imaging Results:    Imaging Results    None           Assessment and Plan:     Problem List:    Active Diagnoses:    Diagnosis Date Noted POA    PRINCIPAL PROBLEM:  MRSA bacteremia [R78.81] 02/07/2018 Yes    DVT of deep femoral vein, left [I82.412] 02/21/2018 Clinically Undetermined    Drug-induced muscle inflammation [M60.9, T50.905A] 02/16/2018 No    Abnormal liver enzymes [R74.8] 02/12/2018 No    Chronic atrial fibrillation [I48.2] 02/08/2018 Yes    ESRD (end stage renal disease) [N18.6] 02/08/2018 Yes    Type 2 diabetes mellitus with chronic kidney disease on chronic dialysis, without long-term current use of insulin  [E11.22, N18.6, Z99.2] 02/08/2018 Not Applicable      Problems Resolved During this Admission:    Diagnosis Date Noted Date Resolved POA     Assessment and Plan:     1. MRSA Bacetremia              2/9/2018: TTE: No vegetations seen.               Feel another RANDA would have a very low yield.              Will discuss with Dr. Fernandez in am.    VTE Risk Mitigation         Ordered     heparin 25,000 units in dextrose 5% 250 mL (100 units/mL) infusion; FEMALE  Continuous     Route:  Intravenous        02/21/18 1056     heparin 25,000 units in dextrose 5% 250 mL (100 units/mL) bolus from bag; PRN BOLUS  As needed (PRN)     Route:  Intravenous        02/21/18 1056     heparin 25,000 units in dextrose 5% 250 mL (100 units/mL) bolus from bag; PRN BOLUS  As needed (PRN)     Route:  Intravenous        02/21/18 1056     heparin (porcine) injection 2,000 Units  As needed (PRN)     Route:  Intravenous        02/13/18 0940     heparin (porcine) injection 5,000 Units  As needed (PRN)     Route:  Intra-Catheter        02/12/18 1834     Medium Risk of VTE  Once      02/07/18 2255     Place sequential compression device  Until discontinued      02/07/18 2255     Place ANGELI hose  Until discontinued      02/07/18 2255          Rikki Gustafson MD  Cardiology  [facility]

## 2018-02-24 NOTE — PLAN OF CARE
Problem: Patient Care Overview  Goal: Plan of Care Review  Outcome: Ongoing (interventions implemented as appropriate)  Pt remains free from falls, injury and skin breakdown. VSS. Pt denies chest pain, SOB or difficulty breathing. Pt complained of chronic lower back pain, PRN analgesics given X 3 to good effect. Pt encouraged to move in bed, turns Q2H with prompting, pillows in use. Heparin gtt continues to infuse. Pt denies LLE pain. UOP = 0 mL. 1L Fluid precautions observed. Strict I&Os recorded. Pt updated on POC: NPO after midnight Sunday for RANDA with Dr Gustafson Sunday AM - consent in chart. Pt resting, call bell within reach and bed alarm on.

## 2018-02-24 NOTE — SUBJECTIVE & OBJECTIVE
Interval History: Did have some back pain earlier but relieved with pain meds.  Discussed possible discharge to SNF in [place] early next week, patient in agreement.    Review of Systems   Musculoskeletal: Positive for back pain.     Objective:     Vital Signs (Most Recent):  Temp: 97.4 °F (36.3 °C) (02/23/18 1952)  Pulse: 64 (02/23/18 1952)  Resp: 16 (02/23/18 1952)  BP: (!) 117/53 (02/23/18 1952)  SpO2: 96 % (02/23/18 1952) Vital Signs (24h Range):  Temp:  [97.4 °F (36.3 °C)-98.2 °F (36.8 °C)] 97.4 °F (36.3 °C)  Pulse:  [51-64] 64  Resp:  [16-19] 16  SpO2:  [96 %-99 %] 96 %  BP: (103-136)/(38-61) 117/53     Weight: 108 kg (238 lb 3.2 oz)  Body mass index is 42.2 kg/m².    Intake/Output Summary (Last 24 hours) at 02/23/18 2250  Last data filed at 02/23/18 1000   Gross per 24 hour   Intake              968 ml   Output             1750 ml   Net             -782 ml      Physical Exam   Constitutional: She is oriented to person, place, and time. She appears well-developed and well-nourished.   HENT:   Head: Normocephalic.   Eyes: Conjunctivae are normal. Right eye exhibits no discharge. Left eye exhibits no discharge.   Neck: Normal range of motion. Neck supple.   Cardiovascular: Regular rhythm and normal heart sounds.  Bradycardia present.    Pulmonary/Chest: Effort normal and breath sounds normal. No respiratory distress.   Abdominal: Soft. Bowel sounds are normal. She exhibits no distension. There is no tenderness.   Musculoskeletal: Normal range of motion.   Neurological: She is alert and oriented to person, place, and time.   Skin: Skin is warm and dry.   Psychiatric: She has a normal mood and affect. Her speech is normal and behavior is normal. Judgment and thought content normal.       Significant Labs:   CBC:   Recent Labs  Lab 02/22/18  0557 02/23/18  0448   WBC 5.95  5.95 5.80   HGB 8.1*  8.1* 7.2*   HCT 26.2*  26.2* 23.7*     200 197     CMP:   Recent Labs  Lab 02/22/18  0556 02/23/18  0448    * 133*   K 3.5 3.7    98   CO2 26 24   GLU 95 79   BUN 12 16   CREATININE 3.5* 4.6*   CALCIUM 8.3* 8.3*   PROT 6.6 5.8*   ALBUMIN 2.0* 1.8*   BILITOT 0.3 0.2   ALKPHOS 75 67   AST 35 29   ALT 48* 37   ANIONGAP 9 11   EGFRNONAA 13* 9*     All pertinent labs within the past 24 hours have been reviewed.    Significant Imaging: I have reviewed all pertinent imaging results/findings within the past 24 hours.

## 2018-02-25 PROBLEM — R74.8 ABNORMAL LIVER ENZYMES: Status: RESOLVED | Noted: 2018-02-12 | Resolved: 2018-02-25

## 2018-02-25 LAB
ALBUMIN SERPL BCP-MCNC: 1.9 G/DL
ALP SERPL-CCNC: 71 U/L
ALT SERPL W/O P-5'-P-CCNC: 31 U/L
ANION GAP SERPL CALC-SCNC: 9 MMOL/L
APTT BLDCRRT: 62.6 SEC
AST SERPL-CCNC: 25 U/L
BASOPHILS # BLD AUTO: 0.02 K/UL
BASOPHILS NFR BLD: 0.3 %
BILIRUB SERPL-MCNC: 0.2 MG/DL
BUN SERPL-MCNC: 16 MG/DL
CALCIUM SERPL-MCNC: 8.6 MG/DL
CHLORIDE SERPL-SCNC: 97 MMOL/L
CO2 SERPL-SCNC: 26 MMOL/L
CREAT SERPL-MCNC: 4.2 MG/DL
DIFFERENTIAL METHOD: ABNORMAL
EOSINOPHIL # BLD AUTO: 0.3 K/UL
EOSINOPHIL NFR BLD: 5.9 %
ERYTHROCYTE [DISTWIDTH] IN BLOOD BY AUTOMATED COUNT: 17.7 %
EST. GFR  (AFRICAN AMERICAN): 12 ML/MIN/1.73 M^2
EST. GFR  (NON AFRICAN AMERICAN): 10 ML/MIN/1.73 M^2
FACT X PPP CHRO-ACNC: 0.25 IU/ML
GLUCOSE SERPL-MCNC: 74 MG/DL
HCT VFR BLD AUTO: 26.6 %
HGB BLD-MCNC: 8.2 G/DL
LYMPHOCYTES # BLD AUTO: 2 K/UL
LYMPHOCYTES NFR BLD: 34.5 %
MAGNESIUM SERPL-MCNC: 1.8 MG/DL
MCH RBC QN AUTO: 27.6 PG
MCHC RBC AUTO-ENTMCNC: 30.8 G/DL
MCV RBC AUTO: 90 FL
MONOCYTES # BLD AUTO: 0.6 K/UL
MONOCYTES NFR BLD: 9.9 %
NEUTROPHILS # BLD AUTO: 2.8 K/UL
NEUTROPHILS NFR BLD: 48.9 %
PHOSPHATE SERPL-MCNC: 4.5 MG/DL
PLATELET # BLD AUTO: 195 K/UL
PMV BLD AUTO: 9.9 FL
POCT GLUCOSE: 74 MG/DL (ref 70–110)
POCT GLUCOSE: 80 MG/DL (ref 70–110)
POCT GLUCOSE: 87 MG/DL (ref 70–110)
POCT GLUCOSE: 96 MG/DL (ref 70–110)
POTASSIUM SERPL-SCNC: 4.5 MMOL/L
PROT SERPL-MCNC: 6.3 G/DL
RBC # BLD AUTO: 2.97 M/UL
SODIUM SERPL-SCNC: 132 MMOL/L
WBC # BLD AUTO: 5.74 K/UL

## 2018-02-25 PROCEDURE — 84100 ASSAY OF PHOSPHORUS: CPT

## 2018-02-25 PROCEDURE — 25000003 PHARM REV CODE 250: Performed by: INTERNAL MEDICINE

## 2018-02-25 PROCEDURE — 85520 HEPARIN ASSAY: CPT

## 2018-02-25 PROCEDURE — 85025 COMPLETE CBC W/AUTO DIFF WBC: CPT

## 2018-02-25 PROCEDURE — 80053 COMPREHEN METABOLIC PANEL: CPT

## 2018-02-25 PROCEDURE — 11000001 HC ACUTE MED/SURG PRIVATE ROOM

## 2018-02-25 PROCEDURE — 25000003 PHARM REV CODE 250: Performed by: NURSE PRACTITIONER

## 2018-02-25 PROCEDURE — 83735 ASSAY OF MAGNESIUM: CPT

## 2018-02-25 PROCEDURE — 36415 COLL VENOUS BLD VENIPUNCTURE: CPT

## 2018-02-25 PROCEDURE — 63600175 PHARM REV CODE 636 W HCPCS: Performed by: INTERNAL MEDICINE

## 2018-02-25 PROCEDURE — 25000003 PHARM REV CODE 250: Performed by: PHYSICIAN ASSISTANT

## 2018-02-25 PROCEDURE — 63600175 PHARM REV CODE 636 W HCPCS: Performed by: NURSE PRACTITIONER

## 2018-02-25 PROCEDURE — 85730 THROMBOPLASTIN TIME PARTIAL: CPT

## 2018-02-25 PROCEDURE — 99233 SBSQ HOSP IP/OBS HIGH 50: CPT | Mod: ,,, | Performed by: INTERNAL MEDICINE

## 2018-02-25 RX ORDER — SODIUM CHLORIDE 9 MG/ML
INJECTION, SOLUTION INTRAVENOUS ONCE
Status: COMPLETED | OUTPATIENT
Start: 2018-02-26 | End: 2018-02-26

## 2018-02-25 RX ADMIN — FUROSEMIDE 80 MG: 40 TABLET ORAL at 06:02

## 2018-02-25 RX ADMIN — DAPTOMYCIN 865 MG: 500 INJECTION, POWDER, LYOPHILIZED, FOR SOLUTION INTRAVENOUS at 08:02

## 2018-02-25 RX ADMIN — CEFTAROLINE FOSAMIL 200 MG: 600 POWDER, FOR SOLUTION INTRAVENOUS at 02:02

## 2018-02-25 RX ADMIN — HEPARIN SODIUM AND DEXTROSE 16 UNITS/KG/HR: 10000; 5 INJECTION INTRAVENOUS at 05:02

## 2018-02-25 RX ADMIN — SEVELAMER CARBONATE 800 MG: 800 TABLET, FILM COATED ORAL at 08:02

## 2018-02-25 RX ADMIN — CEFTAROLINE FOSAMIL 200 MG: 600 POWDER, FOR SOLUTION INTRAVENOUS at 12:02

## 2018-02-25 RX ADMIN — OXYCODONE HYDROCHLORIDE AND ACETAMINOPHEN 1 TABLET: 5; 325 TABLET ORAL at 06:02

## 2018-02-25 RX ADMIN — OXYCODONE HYDROCHLORIDE AND ACETAMINOPHEN 1 TABLET: 5; 325 TABLET ORAL at 08:02

## 2018-02-25 RX ADMIN — CALCITRIOL 0.25 MCG: 0.25 CAPSULE, LIQUID FILLED ORAL at 08:02

## 2018-02-25 RX ADMIN — OXYCODONE HYDROCHLORIDE AND ACETAMINOPHEN 1 TABLET: 5; 325 TABLET ORAL at 02:02

## 2018-02-25 RX ADMIN — CARVEDILOL 25 MG: 12.5 TABLET, FILM COATED ORAL at 08:02

## 2018-02-25 RX ADMIN — RAMELTEON 8 MG: 8 TABLET, FILM COATED ORAL at 08:02

## 2018-02-25 RX ADMIN — FUROSEMIDE 80 MG: 40 TABLET ORAL at 08:02

## 2018-02-25 RX ADMIN — POLYETHYLENE GLYCOL 3350 17 G: 17 POWDER, FOR SOLUTION ORAL at 08:02

## 2018-02-25 RX ADMIN — SEVELAMER CARBONATE 800 MG: 800 TABLET, FILM COATED ORAL at 01:02

## 2018-02-25 RX ADMIN — SEVELAMER CARBONATE 800 MG: 800 TABLET, FILM COATED ORAL at 06:02

## 2018-02-25 RX ADMIN — TRAMADOL HYDROCHLORIDE 50 MG: 50 TABLET, COATED ORAL at 01:02

## 2018-02-25 RX ADMIN — SITAGLIPTIN 25 MG: 25 TABLET, FILM COATED ORAL at 08:02

## 2018-02-25 RX ADMIN — VITAMIN D, TAB 1000IU (100/BT) 2000 UNITS: 25 TAB at 08:02

## 2018-02-25 RX ADMIN — DOCUSATE SODIUM 100 MG: 100 CAPSULE, LIQUID FILLED ORAL at 08:02

## 2018-02-25 RX ADMIN — Medication 1 CAPSULE: at 08:02

## 2018-02-25 NOTE — ASSESSMENT & PLAN NOTE
Blood Cultures 02/08/18 MRSA in 1 set, 2nd set NGTD  Repeat Blood Cultures 02/10/18 2/2 MRSA  Lactic acid 0.9 02/08/10  Discontinued Daptomycin initially secondary to elevated CPK  Suspect endovascular source  New IJ catheter placed 2/12  Blood cultures 2/13 NGTD  2/16, 2/17, 2/19 cultures are positive  Blood Culture 2/22/18 are NGTD  Removed all lines 02/19/18 for a line holiday for several days  Currently receiving Daptomycin and Ceftaroline  Plan for RANDA 02/26/18  Awaiting further recs from ID/cardiology

## 2018-02-25 NOTE — SUBJECTIVE & OBJECTIVE
Interval History: Patient in good spirits and pleasant this morning.  Denies pain, eager to get back to [place].    Review of Systems   Musculoskeletal: Positive for back pain.     Objective:     Vital Signs (Most Recent):  Temp: 97.7 °F (36.5 °C) (02/25/18 1659)  Pulse: (!) 55 (02/25/18 1659)  Resp: 18 (02/25/18 1659)  BP: (!) 108/51 (02/25/18 1659)  SpO2: 100 % (02/25/18 1659) Vital Signs (24h Range):  Temp:  [97.7 °F (36.5 °C)-98 °F (36.7 °C)] 97.7 °F (36.5 °C)  Pulse:  [54-60] 55  Resp:  [16-20] 18  SpO2:  [96 %-100 %] 100 %  BP: (100-120)/(51-58) 108/51     Weight: 111.5 kg (245 lb 13 oz)  Body mass index is 43.54 kg/m².    Intake/Output Summary (Last 24 hours) at 02/25/18 1732  Last data filed at 02/25/18 0622   Gross per 24 hour   Intake             1023 ml   Output                0 ml   Net             1023 ml      Physical Exam   Constitutional: She is oriented to person, place, and time. She appears well-developed and well-nourished.   HENT:   Head: Normocephalic.   Eyes: Conjunctivae are normal. Right eye exhibits no discharge. Left eye exhibits no discharge.   Neck: Normal range of motion. Neck supple.   Cardiovascular: Regular rhythm and normal heart sounds.  Bradycardia present.    Pulmonary/Chest: Effort normal and breath sounds normal. No respiratory distress.   Abdominal: Soft. Bowel sounds are normal. She exhibits no distension. There is no tenderness.   Musculoskeletal: Normal range of motion.   Neurological: She is alert and oriented to person, place, and time.   Skin: Skin is warm and dry.   Psychiatric: She has a normal mood and affect. Her speech is normal and behavior is normal. Judgment and thought content normal.       Significant Labs:   CBC:   Recent Labs  Lab 02/24/18  0458 02/25/18  0440   WBC 5.91 5.74   HGB 7.8* 8.2*   HCT 24.8* 26.6*    195     CMP:   Recent Labs  Lab 02/24/18  0458 02/25/18  0439   * 132*   K 3.9 4.5    97   CO2 29 26   GLU 85 74   BUN 11 16    CREATININE 3.1* 4.2*   CALCIUM 8.1* 8.6*   PROT 6.1 6.3   ALBUMIN 1.9* 1.9*   BILITOT 0.2 0.2   ALKPHOS 68 71   AST 29 25   ALT 35 31   ANIONGAP 5* 9   EGFRNONAA 15* 10*     All pertinent labs within the past 24 hours have been reviewed.    Significant Imaging: I have reviewed all pertinent imaging results/findings within the past 24 hours.

## 2018-02-25 NOTE — PROGRESS NOTES
[facility] Hospital Medicine  Progress Note    Patient Name: Yumiko Proctor  MRN: [id number]  Patient Class: IP- Inpatient   Admission Date: 2/7/2018  Length of Stay: 18 days  Attending Physician: Cherelle Echevarria, *  Primary Care Provider: Primary Doctor No        Subjective:     Principal Problem:MRSA bacteremia    HPI:  The patient is a 66 yo female with known MRSA bacteremia who was transferred here for ID and Neurosurgery services.  She has a history of ESRD, Afib, cardiomyopathy, and DM.  She has been hospitalized with bacteremia from a vascular access which was taken out and replaced today with a lt femoral vas cath.  Blood cultures remained positive. She had a RANDA which was normal, MRI of thoracic and lumbar spine showed a abnormal foci in T( that was probably a hemangioma or metastasis.    Hospital Course:  Blood cultures from 02/08/18 2/4 (+) MRSA.  Patient currently receiving Daptomycin.  Cultures repeated again on 02/10/2018 and 2/2 bottles positive for MRSA.  ID on board, concerned there is endovascular source.  Left prince catheter removed yesterday and right IJ tunneled dialysis catheter placed 2/12. Repeat blood cultures 2/13 remain negative to date, but one bottle obtained 2/16 growing MRSA and 2/17 blood cultures are positive for gram positive cocci in clusters resembling Staph.  Patient may have some myositis from Daptomycin with elevation in CPK to 5175 which is now decreasing and this was discontinued.  Vancomycin given after dialysis treatments.  PT/OT consulted and recommend skilled nursing upon discharge.  Catheter, IV, removal after dialysis 2/19/2018.  Consult Cardiology for RANDA.    Interval History: Patient in good spirits and pleasant this morning.  Denies pain, eager to get back to [place].    Review of Systems   Musculoskeletal: Positive for back pain.     Objective:     Vital Signs (Most Recent):  Temp: 97.7 °F (36.5 °C) (02/25/18 1659)  Pulse: (!) 55  (02/25/18 1659)  Resp: 18 (02/25/18 1659)  BP: (!) 108/51 (02/25/18 1659)  SpO2: 100 % (02/25/18 1659) Vital Signs (24h Range):  Temp:  [97.7 °F (36.5 °C)-98 °F (36.7 °C)] 97.7 °F (36.5 °C)  Pulse:  [54-60] 55  Resp:  [16-20] 18  SpO2:  [96 %-100 %] 100 %  BP: (100-120)/(51-58) 108/51     Weight: 111.5 kg (245 lb 13 oz)  Body mass index is 43.54 kg/m².    Intake/Output Summary (Last 24 hours) at 02/25/18 1732  Last data filed at 02/25/18 0622   Gross per 24 hour   Intake             1023 ml   Output                0 ml   Net             1023 ml      Physical Exam   Constitutional: She is oriented to person, place, and time. She appears well-developed and well-nourished.   HENT:   Head: Normocephalic.   Eyes: Conjunctivae are normal. Right eye exhibits no discharge. Left eye exhibits no discharge.   Neck: Normal range of motion. Neck supple.   Cardiovascular: Regular rhythm and normal heart sounds.  Bradycardia present.    Pulmonary/Chest: Effort normal and breath sounds normal. No respiratory distress.   Abdominal: Soft. Bowel sounds are normal. She exhibits no distension. There is no tenderness.   Musculoskeletal: Normal range of motion.   Neurological: She is alert and oriented to person, place, and time.   Skin: Skin is warm and dry.   Psychiatric: She has a normal mood and affect. Her speech is normal and behavior is normal. Judgment and thought content normal.       Significant Labs:   CBC:   Recent Labs  Lab 02/24/18 0458 02/25/18  0440   WBC 5.91 5.74   HGB 7.8* 8.2*   HCT 24.8* 26.6*    195     CMP:   Recent Labs  Lab 02/24/18  0458 02/25/18  0439   * 132*   K 3.9 4.5    97   CO2 29 26   GLU 85 74   BUN 11 16   CREATININE 3.1* 4.2*   CALCIUM 8.1* 8.6*   PROT 6.1 6.3   ALBUMIN 1.9* 1.9*   BILITOT 0.2 0.2   ALKPHOS 68 71   AST 29 25   ALT 35 31   ANIONGAP 5* 9   EGFRNONAA 15* 10*     All pertinent labs within the past 24 hours have been reviewed.    Significant Imaging: I have reviewed all  pertinent imaging results/findings within the past 24 hours.    Assessment/Plan:      * MRSA bacteremia    Blood Cultures 02/08/18 MRSA in 1 set, 2nd set NGTD  Repeat Blood Cultures 02/10/18 2/2 MRSA  Lactic acid 0.9 02/08/10  Discontinued Daptomycin initially secondary to elevated CPK  Suspect endovascular source  New IJ catheter placed 2/12  Blood cultures 2/13 NGTD  2/16, 2/17, 2/19 cultures are positive  Blood Culture 2/22/18 are NGTD  Removed all lines 02/19/18 for a line holiday for several days  Currently receiving Daptomycin and Ceftaroline  Plan for RANDA tomorrow 02/26/18  Awaiting further recs from ID/cardiology        DVT of deep femoral vein, left    Consulted Dr. Nichols, Vascular Surgery for evaluation of thrombus  Heparin Infusion  Maybe endovascular source of bactermia        Drug-induced muscle inflammation              Type 2 diabetes mellitus with chronic kidney disease on chronic dialysis, without long-term current use of insulin    A1c  6.5  PRN insulin  Well-controlled          ESRD (end stage renal disease)    Continue Dialysis MWF per Nephrology  Left Femoral Jaspreet cath removed   R IJ dialysis catheter placed 2/12  Removed R IJ and IVs 02/19/18 for holiday          Chronic atrial fibrillation    Continue Coreg  Rate controlled              VTE Risk Mitigation         Ordered     heparin 25,000 units in dextrose 5% 250 mL (100 units/mL) infusion; FEMALE  Continuous     Route:  Intravenous        02/21/18 1056     heparin 25,000 units in dextrose 5% 250 mL (100 units/mL) bolus from bag; PRN BOLUS  As needed (PRN)     Route:  Intravenous        02/21/18 1056     heparin 25,000 units in dextrose 5% 250 mL (100 units/mL) bolus from bag; PRN BOLUS  As needed (PRN)     Route:  Intravenous        02/21/18 1056     heparin (porcine) injection 2,000 Units  As needed (PRN)     Route:  Intravenous        02/13/18 0940     heparin (porcine) injection 5,000 Units  As needed (PRN)     Route:  Intra-Catheter         02/12/18 1834     Medium Risk of VTE  Once      02/07/18 2255     Place sequential compression device  Until discontinued      02/07/18 2255     Place ANGELI hose  Until discontinued      02/07/18 2255              Cherelle Echevarria MD  Department of Hospital Medicine   [facility]

## 2018-02-25 NOTE — ASSESSMENT & PLAN NOTE
Blood Cultures 02/08/18 MRSA in 1 set, 2nd set NGTD  Repeat Blood Cultures 02/10/18 2/2 MRSA  Lactic acid 0.9 02/08/10  Discontinued Daptomycin initially secondary to elevated CPK  Suspect endovascular source  New IJ catheter placed 2/12  Blood cultures 2/13 NGTD  2/16, 2/17, 2/19 cultures are positive  Blood Culture 2/22/18 are NGTD  Removed all lines 02/19/18 for a line holiday for several days  Currently receiving Daptomycin and Ceftaroline  Plan for RANDA tomorrow 02/26/18  Awaiting further recs from ID/cardiology

## 2018-02-25 NOTE — PROGRESS NOTES
Renal Progress Note    Admit Date: 2/7/2018   LOS: 18 days      Pt. Feels well today. Remains afebrile. BP lowish. Awaiting RANDA Monday..    SUBJECTIVE:     Discussed with team.  Seen on HD.  No c/o this am.          Scheduled Meds:   calcitRIOL  0.25 mcg Oral Daily    carvedilol  25 mg Oral BID    ceftaroline fosamil  200 mg Intravenous Q12H    DAPTOmycin (CUBICIN)  IV  8 mg/kg Intravenous Q48H    docusate sodium  100 mg Oral BID    epoetin davion (PROCRIT) injection  20,000 Units Subcutaneous Every Mon, Wed, Fri    furosemide  80 mg Oral BID    insulin aspart U-100  1-10 Units Subcutaneous TIDWM    polyethylene glycol  17 g Oral Daily    sevelamer carbonate  800 mg Oral TID WM    SITagliptin  25 mg Oral Daily    vitamin D  2,000 Units Oral Daily    vitamin renal formula (B-complex-vitamin c-folic acid)  1 capsule Oral Daily       OBJECTIVE:     Vital Signs Range (Last 24H):  Temp:  [97.7 °F (36.5 °C)-98 °F (36.7 °C)]   Pulse:  [54-60]   Resp:  [16-20]   BP: (100-134)/(48-58)   SpO2:  [96 %-100 %]     I & O (Last 24H):    Intake/Output Summary (Last 24 hours) at 02/25/18 1459  Last data filed at 02/25/18 0622   Gross per 24 hour   Intake             1023 ml   Output                0 ml   Net             1023 ml       Physical Exam:  Constitutional: She is oriented to person, place, and time. Morbidly obese, well-nourished.   Head: Normocephalic.   Eyes: Conjunctivae are normal.    Neck: Normal range of motion. Neck supple.   Cardiovascular: Regular rhythm, normal heart sounds.   Pulmonary/Chest: Effort normal and breath sounds normal. No respiratory distress.   Abdominal: Soft, obese. Bowel sounds are normal. She exhibits no distension. There is no tenderness.   Ext:  no appreciable edema  Neurological: NF  Skin: Skin is warm and dry + pallor  Psychiatric: She has a normal mood and affect. Her speech is normal and behavior is normal. Very pleasant.  Poor historian.   Access: [facility]  [place]      Laboratory:  CBC:     Recent Labs  Lab 02/25/18  0440   WBC 5.74   RBC 2.97*   HGB 8.2*   HCT 26.6*      MCV 90   MCH 27.6   MCHC 30.8*     BMP:     Recent Labs  Lab 02/25/18  0439   GLU 74   *   K 4.5   CL 97   CO2 26   BUN 16   CREATININE 4.2*   CALCIUM 8.6*   MG 1.8     No results for input(s): CPK, CPKMB, TROPONINI, MB in the last 24 hours.  Impression:   Extensive thrombus throughout the left lower extremity with nonocclusive thrombus in the common femoral vein with occlusive thrombus throughout the femoral, popliteal, and peroneal vein. Anterior and posterior tibial veins are patent.      ASSESSMENT/PLAN:     66 YO WF with ESRD and catheter associated MRSA bacteremia and suspected lumbar foci presents from [facility] for ID and NS services.    1. ESRD:  Dr. Nichols pulled line for holiday, however electrolytes warranted new line for HD on 2/12.  R IJ EULALIO placed 2/12. Continue MWF for now.  Renally dose meds, avoid nephrotoxins, and monitor I/O's closely.  2. Hyperkalemia from dietary choices vs  (E87.5):  Changed diet and consulted dietary for counseling.  Low K bath.  Improved.   3. Rhabdo from Cubicin (M62.82):  D/C'd.  CPK normalized then  daptomycin resumed. Also on on ceftaroline. Recehck CPK on Monday.  4. Anemia of CKD:   holding IV iron given active infection.  Epo with HD.  Folate and B12 normal.  May need transfusion soon if declines further..    5. 2HPT/Vit D deficiency:  Vit D3 and calcitriol.  6. DM:  Currently on SSI.  Reduced Januvia to ESRD dosing of 25mg/d. Accuchecks good.  7. Hx of Afib:  On Carvedilol.  Defer to cards. Defer anticoagulation to Cards, but currently on heparin for clot.    8. MRSA Bacteremia:   -Repeat BCx from 2/16, 17, 19 are positive!!!    -Dr. Gustafson feels repeat RANDA will be low yield, but will do Monday.  - blood cultures from yesterday NGTD  - on ceftaroline and daptomycin  - extensive DVT noted and could be endovascular source  - would  still consider RANDA to r/o ring abscess as source of persistent bacteremia  - may need another line change/holiday, if possible, if bacteremia returns  - anticipating 8 weeks of IV abx beginning with sterile culture date     9. Occlusive LLE DVT (I82.412):  No one was notified of this study.  Started Heparin drip since will likely need new line and starting Eliquis would cause delay.  Dr. Nichols-Vascular surgery feels heparin for now unless pts develops cerulea phlegmasia.    See above.

## 2018-02-25 NOTE — PROGRESS NOTES
[facility] Hospital Medicine  Progress Note    Patient Name: Yumiko Proctor  MRN: [id number]  Patient Class: IP- Inpatient   Admission Date: 2/7/2018  Length of Stay: 17 days  Attending Physician: Cherelle Echevarria, *  Primary Care Provider: Primary Doctor No        Subjective:     Principal Problem:MRSA bacteremia    HPI:  The patient is a 68 yo female with known MRSA bacteremia who was transferred here for ID and Neurosurgery services.  She has a history of ESRD, Afib, cardiomyopathy, and DM.  She has been hospitalized with bacteremia from a vascular access which was taken out and replaced today with a lt femoral vas cath.  Blood cultures remained positive. She had a RANDA which was normal, MRI of thoracic and lumbar spine showed a abnormal foci in T( that was probably a hemangioma or metastasis.    Hospital Course:  Blood cultures from 02/08/18 2/4 (+) MRSA.  Patient currently receiving Daptomycin.  Cultures repeated again on 02/10/2018 and 2/2 bottles positive for MRSA.  ID on board, concerned there is endovascular source.  Left prince catheter removed yesterday and right IJ tunneled dialysis catheter placed 2/12. Repeat blood cultures 2/13 remain negative to date, but one bottle obtained 2/16 growing MRSA and 2/17 blood cultures are positive for gram positive cocci in clusters resembling Staph.  Patient may have some myositis from Daptomycin with elevation in CPK to 5175 which is now decreasing and this was discontinued.  Vancomycin given after dialysis treatments.  PT/OT consulted and recommend skilled nursing upon discharge.  Catheter, IV, removal after dialysis 2/19/2018.  Consult Cardiology for RANDA.    Interval History: No complaints today, discussed plan for RANDA on Monday 02/26 and patient in agreement    Review of Systems   Musculoskeletal: Positive for back pain.     Objective:     Vital Signs (Most Recent):  Temp: 97.9 °F (36.6 °C) (02/24/18 1615)  Pulse: (!) 59 (02/24/18  1615)  Resp: 18 (02/24/18 1615)  BP: (!) 134/48 (02/24/18 1615)  SpO2: 100 % (02/24/18 1615) Vital Signs (24h Range):  Temp:  [97.2 °F (36.2 °C)-98.1 °F (36.7 °C)] 97.9 °F (36.6 °C)  Pulse:  [56-64] 59  Resp:  [16-18] 18  SpO2:  [96 %-100 %] 100 %  BP: ()/(40-62) 134/48     Weight: 111.5 kg (245 lb 13 oz)  Body mass index is 43.54 kg/m².    Intake/Output Summary (Last 24 hours) at 02/24/18 1907  Last data filed at 02/24/18 1815   Gross per 24 hour   Intake              723 ml   Output                0 ml   Net              723 ml      Physical Exam   Constitutional: She is oriented to person, place, and time. She appears well-developed and well-nourished.   HENT:   Head: Normocephalic.   Eyes: Conjunctivae are normal. Right eye exhibits no discharge. Left eye exhibits no discharge.   Neck: Normal range of motion. Neck supple.   Cardiovascular: Regular rhythm and normal heart sounds.  Bradycardia present.    Pulmonary/Chest: Effort normal and breath sounds normal. No respiratory distress.   Abdominal: Soft. Bowel sounds are normal. She exhibits no distension. There is no tenderness.   Musculoskeletal: Normal range of motion.   Neurological: She is alert and oriented to person, place, and time.   Skin: Skin is warm and dry.   Psychiatric: She has a normal mood and affect. Her speech is normal and behavior is normal. Judgment and thought content normal.       Significant Labs:   CBC:   Recent Labs  Lab 02/23/18  0448 02/24/18 0458   WBC 5.80 5.91   HGB 7.2* 7.8*   HCT 23.7* 24.8*    192     CMP:   Recent Labs  Lab 02/23/18 0448 02/24/18 0458   * 135*   K 3.7 3.9   CL 98 101   CO2 24 29   GLU 79 85   BUN 16 11   CREATININE 4.6* 3.1*   CALCIUM 8.3* 8.1*   PROT 5.8* 6.1   ALBUMIN 1.8* 1.9*   BILITOT 0.2 0.2   ALKPHOS 67 68   AST 29 29   ALT 37 35   ANIONGAP 11 5*   EGFRNONAA 9* 15*     All pertinent labs within the past 24 hours have been reviewed.    Significant Imaging: I have reviewed all  pertinent imaging results/findings within the past 24 hours.    Assessment/Plan:      * MRSA bacteremia    Blood Cultures 02/08/18 MRSA in 1 set, 2nd set NGTD  Repeat Blood Cultures 02/10/18 2/2 MRSA  Lactic acid 0.9 02/08/10  Discontinued Daptomycin initially secondary to elevated CPK  Suspect endovascular source  New IJ catheter placed 2/12  Blood cultures 2/13 NGTD  2/16, 2/17, 2/19 cultures are positive  Blood Culture 2/22/18 are NGTD  Removed all lines 02/19/18 for a line holiday for several days  Currently receiving Daptomycin and Ceftaroline  Plan for RANDA 02/26/18  Awaiting further recs from ID/cardiology        DVT of deep femoral vein, left    Consulted Dr. Nichols, Vascular Surgery for evaluation of thrombus  Heparin Infusion  Maybe endovascular source of bactermia        Drug-induced muscle inflammation              Abnormal liver enzymes    Discontinued Amiodarone 2/13  AST/ALT decreasing, almost normal        Type 2 diabetes mellitus with chronic kidney disease on chronic dialysis, without long-term current use of insulin    A1c  6.5  PRN insulin  Well-controlled          ESRD (end stage renal disease)    Continue Dialysis MWF per Nephrology  Left Femoral Jaspreet cath removed   R IJ dialysis catheter placed 2/12  Removed R IJ and IVs 02/19/18 for holiday          Chronic atrial fibrillation    Continue Coreg  Rate controlled              VTE Risk Mitigation         Ordered     heparin 25,000 units in dextrose 5% 250 mL (100 units/mL) infusion; FEMALE  Continuous     Route:  Intravenous        02/21/18 1056     heparin 25,000 units in dextrose 5% 250 mL (100 units/mL) bolus from bag; PRN BOLUS  As needed (PRN)     Route:  Intravenous        02/21/18 1056     heparin 25,000 units in dextrose 5% 250 mL (100 units/mL) bolus from bag; PRN BOLUS  As needed (PRN)     Route:  Intravenous        02/21/18 1056     heparin (porcine) injection 2,000 Units  As needed (PRN)     Route:  Intravenous        02/13/18 0940      heparin (porcine) injection 5,000 Units  As needed (PRN)     Route:  Intra-Catheter        02/12/18 1834     Medium Risk of VTE  Once      02/07/18 2255     Place sequential compression device  Until discontinued      02/07/18 2255     Place ANGELI hose  Until discontinued      02/07/18 2255              Cherelle Echevarria MD  Department of Hospital Medicine   [facility]

## 2018-02-25 NOTE — SUBJECTIVE & OBJECTIVE
Interval History: No complaints today, discussed plan for RANDA on Monday 02/26 and patient in agreement    Review of Systems   Musculoskeletal: Positive for back pain.     Objective:     Vital Signs (Most Recent):  Temp: 97.9 °F (36.6 °C) (02/24/18 1615)  Pulse: (!) 59 (02/24/18 1615)  Resp: 18 (02/24/18 1615)  BP: (!) 134/48 (02/24/18 1615)  SpO2: 100 % (02/24/18 1615) Vital Signs (24h Range):  Temp:  [97.2 °F (36.2 °C)-98.1 °F (36.7 °C)] 97.9 °F (36.6 °C)  Pulse:  [56-64] 59  Resp:  [16-18] 18  SpO2:  [96 %-100 %] 100 %  BP: ()/(40-62) 134/48     Weight: 111.5 kg (245 lb 13 oz)  Body mass index is 43.54 kg/m².    Intake/Output Summary (Last 24 hours) at 02/24/18 1907  Last data filed at 02/24/18 1815   Gross per 24 hour   Intake              723 ml   Output                0 ml   Net              723 ml      Physical Exam   Constitutional: She is oriented to person, place, and time. She appears well-developed and well-nourished.   HENT:   Head: Normocephalic.   Eyes: Conjunctivae are normal. Right eye exhibits no discharge. Left eye exhibits no discharge.   Neck: Normal range of motion. Neck supple.   Cardiovascular: Regular rhythm and normal heart sounds.  Bradycardia present.    Pulmonary/Chest: Effort normal and breath sounds normal. No respiratory distress.   Abdominal: Soft. Bowel sounds are normal. She exhibits no distension. There is no tenderness.   Musculoskeletal: Normal range of motion.   Neurological: She is alert and oriented to person, place, and time.   Skin: Skin is warm and dry.   Psychiatric: She has a normal mood and affect. Her speech is normal and behavior is normal. Judgment and thought content normal.       Significant Labs:   CBC:   Recent Labs  Lab 02/23/18  0448 02/24/18  0458   WBC 5.80 5.91   HGB 7.2* 7.8*   HCT 23.7* 24.8*    192     CMP:   Recent Labs  Lab 02/23/18  0448 02/24/18  0458   * 135*   K 3.7 3.9   CL 98 101   CO2 24 29   GLU 79 85   BUN 16 11   CREATININE  4.6* 3.1*   CALCIUM 8.3* 8.1*   PROT 5.8* 6.1   ALBUMIN 1.8* 1.9*   BILITOT 0.2 0.2   ALKPHOS 67 68   AST 29 29   ALT 37 35   ANIONGAP 11 5*   EGFRNONAA 9* 15*     All pertinent labs within the past 24 hours have been reviewed.    Significant Imaging: I have reviewed all pertinent imaging results/findings within the past 24 hours.

## 2018-02-26 LAB
ALBUMIN SERPL BCP-MCNC: 1.8 G/DL
ALP SERPL-CCNC: 73 U/L
ALT SERPL W/O P-5'-P-CCNC: 27 U/L
ANION GAP SERPL CALC-SCNC: 9 MMOL/L
APTT BLDCRRT: 66 SEC
AST SERPL-CCNC: 23 U/L
BASOPHILS # BLD AUTO: 0.03 K/UL
BASOPHILS NFR BLD: 0.5 %
BILIRUB SERPL-MCNC: 0.2 MG/DL
BUN SERPL-MCNC: 20 MG/DL
CALCIUM SERPL-MCNC: 8.3 MG/DL
CHLORIDE SERPL-SCNC: 95 MMOL/L
CK SERPL-CCNC: 74 U/L
CO2 SERPL-SCNC: 25 MMOL/L
CREAT SERPL-MCNC: 5.2 MG/DL
DIASTOLIC DYSFUNCTION: NO
DIFFERENTIAL METHOD: ABNORMAL
EOSINOPHIL # BLD AUTO: 0.4 K/UL
EOSINOPHIL NFR BLD: 6.1 %
ERYTHROCYTE [DISTWIDTH] IN BLOOD BY AUTOMATED COUNT: 17.3 %
EST. GFR  (AFRICAN AMERICAN): 9 ML/MIN/1.73 M^2
EST. GFR  (NON AFRICAN AMERICAN): 8 ML/MIN/1.73 M^2
FACT X PPP CHRO-ACNC: 0.22 IU/ML
GLUCOSE SERPL-MCNC: 72 MG/DL
HCT VFR BLD AUTO: 25.9 %
HGB BLD-MCNC: 8.3 G/DL
LYMPHOCYTES # BLD AUTO: 2.3 K/UL
LYMPHOCYTES NFR BLD: 37.6 %
MAGNESIUM SERPL-MCNC: 1.7 MG/DL
MCH RBC QN AUTO: 28.3 PG
MCHC RBC AUTO-ENTMCNC: 32 G/DL
MCV RBC AUTO: 88 FL
MITRAL VALVE REGURGITATION: NORMAL
MONOCYTES # BLD AUTO: 0.6 K/UL
MONOCYTES NFR BLD: 10 %
NEUTROPHILS # BLD AUTO: 2.8 K/UL
NEUTROPHILS NFR BLD: 45.1 %
PHOSPHATE SERPL-MCNC: 5.5 MG/DL
PLATELET # BLD AUTO: 201 K/UL
PMV BLD AUTO: 9.7 FL
POCT GLUCOSE: 149 MG/DL (ref 70–110)
POCT GLUCOSE: 78 MG/DL (ref 70–110)
POCT GLUCOSE: 81 MG/DL (ref 70–110)
POCT GLUCOSE: 87 MG/DL (ref 70–110)
POTASSIUM SERPL-SCNC: 4.8 MMOL/L
PROT SERPL-MCNC: 6.1 G/DL
RBC # BLD AUTO: 2.93 M/UL
RETIRED EF AND QEF - SEE NOTES: 60 (ref 55–65)
SODIUM SERPL-SCNC: 129 MMOL/L
WBC # BLD AUTO: 6.09 K/UL

## 2018-02-26 PROCEDURE — 99233 SBSQ HOSP IP/OBS HIGH 50: CPT | Mod: ,,, | Performed by: INTERNAL MEDICINE

## 2018-02-26 PROCEDURE — 80100016 HC MAINTENANCE HEMODIALYSIS

## 2018-02-26 PROCEDURE — 63600175 PHARM REV CODE 636 W HCPCS: Performed by: INTERNAL MEDICINE

## 2018-02-26 PROCEDURE — 25000003 PHARM REV CODE 250: Performed by: INTERNAL MEDICINE

## 2018-02-26 PROCEDURE — 82550 ASSAY OF CK (CPK): CPT

## 2018-02-26 PROCEDURE — 80053 COMPREHEN METABOLIC PANEL: CPT

## 2018-02-26 PROCEDURE — 63600175 PHARM REV CODE 636 W HCPCS: Performed by: NURSE PRACTITIONER

## 2018-02-26 PROCEDURE — 85730 THROMBOPLASTIN TIME PARTIAL: CPT

## 2018-02-26 PROCEDURE — 85520 HEPARIN ASSAY: CPT

## 2018-02-26 PROCEDURE — 84100 ASSAY OF PHOSPHORUS: CPT

## 2018-02-26 PROCEDURE — 63600175 PHARM REV CODE 636 W HCPCS

## 2018-02-26 PROCEDURE — 25000003 PHARM REV CODE 250: Performed by: PHYSICIAN ASSISTANT

## 2018-02-26 PROCEDURE — 36415 COLL VENOUS BLD VENIPUNCTURE: CPT

## 2018-02-26 PROCEDURE — 11000001 HC ACUTE MED/SURG PRIVATE ROOM

## 2018-02-26 PROCEDURE — 25000003 PHARM REV CODE 250: Performed by: NURSE PRACTITIONER

## 2018-02-26 PROCEDURE — 94761 N-INVAS EAR/PLS OXIMETRY MLT: CPT

## 2018-02-26 PROCEDURE — 99152 MOD SED SAME PHYS/QHP 5/>YRS: CPT

## 2018-02-26 PROCEDURE — 83735 ASSAY OF MAGNESIUM: CPT

## 2018-02-26 PROCEDURE — 85025 COMPLETE CBC W/AUTO DIFF WBC: CPT

## 2018-02-26 RX ADMIN — SEVELAMER CARBONATE 800 MG: 800 TABLET, FILM COATED ORAL at 05:02

## 2018-02-26 RX ADMIN — FUROSEMIDE 80 MG: 40 TABLET ORAL at 05:02

## 2018-02-26 RX ADMIN — DOCUSATE SODIUM 100 MG: 100 CAPSULE, LIQUID FILLED ORAL at 08:02

## 2018-02-26 RX ADMIN — VITAMIN D, TAB 1000IU (100/BT) 2000 UNITS: 25 TAB at 03:02

## 2018-02-26 RX ADMIN — HEPARIN SODIUM 5000 UNITS: 5000 INJECTION, SOLUTION INTRAVENOUS; SUBCUTANEOUS at 10:02

## 2018-02-26 RX ADMIN — Medication 1 CAPSULE: at 03:02

## 2018-02-26 RX ADMIN — POLYETHYLENE GLYCOL 3350 17 G: 17 POWDER, FOR SOLUTION ORAL at 03:02

## 2018-02-26 RX ADMIN — RAMELTEON 8 MG: 8 TABLET, FILM COATED ORAL at 08:02

## 2018-02-26 RX ADMIN — CEFTAROLINE FOSAMIL 200 MG: 600 POWDER, FOR SOLUTION INTRAVENOUS at 11:02

## 2018-02-26 RX ADMIN — HEPARIN SODIUM 2000 UNITS: 1000 INJECTION, SOLUTION INTRAVENOUS; SUBCUTANEOUS at 10:02

## 2018-02-26 RX ADMIN — OXYCODONE HYDROCHLORIDE AND ACETAMINOPHEN 1 TABLET: 5; 325 TABLET ORAL at 03:02

## 2018-02-26 RX ADMIN — ERYTHROPOIETIN 20000 UNITS: 20000 INJECTION, SOLUTION INTRAVENOUS; SUBCUTANEOUS at 10:02

## 2018-02-26 RX ADMIN — HEPARIN SODIUM AND DEXTROSE 12.96 UNITS/KG/HR: 10000; 5 INJECTION INTRAVENOUS at 10:02

## 2018-02-26 RX ADMIN — CARVEDILOL 25 MG: 12.5 TABLET, FILM COATED ORAL at 08:02

## 2018-02-26 RX ADMIN — SODIUM CHLORIDE: 0.9 INJECTION, SOLUTION INTRAVENOUS at 06:02

## 2018-02-26 RX ADMIN — SITAGLIPTIN 25 MG: 25 TABLET, FILM COATED ORAL at 03:02

## 2018-02-26 RX ADMIN — DOCUSATE SODIUM 100 MG: 100 CAPSULE, LIQUID FILLED ORAL at 03:02

## 2018-02-26 RX ADMIN — CARVEDILOL 25 MG: 12.5 TABLET, FILM COATED ORAL at 03:02

## 2018-02-26 RX ADMIN — CEFTAROLINE FOSAMIL 200 MG: 600 POWDER, FOR SOLUTION INTRAVENOUS at 12:02

## 2018-02-26 RX ADMIN — CALCITRIOL 0.25 MCG: 0.25 CAPSULE, LIQUID FILLED ORAL at 03:02

## 2018-02-26 NOTE — BRIEF OP NOTE
2/26/2018: RANDA: Small calcified nodule on atrial side of posterior mitral leaflet. Mild to moderate MR. Aortic valve unremarkable.    Rikki Gustafson M.D.

## 2018-02-26 NOTE — PT/OT/SLP PROGRESS
Occupational Therapy      Patient Name:  Yumiko Proctor   MRN:  [id number]    Patient not seen today secondary to  (Cath Lab) and directly following transferred to Dialysis. Will follow-up when aprropriate.    Tucker Knight, OT  2/26/2018

## 2018-02-26 NOTE — PHYSICIAN QUERY
"PT Name: Yumiko Proctor  MR #: [id number]     Physician Query Form - Documentation Clarification      CDS: Nat Degroot RN, CCDS         Contact information :[phone]  [email or web address]       This form is a permanent document in the medical record.     Query Date: 2018    By submitting this query, we are merely seeking further clarification of documentation. Please utilize your independent clinical judgment when addressing the question(s) below.    The Medical record reflects the following:    Supporting Clinical Findings Location in Medical Record         "Pressure Injury 18 2254 medial Gluteal cleft Stage 1   Pressure Injury Properties Date First Assessed: 18  -KR, 18  Time First Assessed:   -KR, 18  Pressure Injury Present on Admission: yes  -KR, 18  Orientation: medial  -KR, 18  Location: Gluteal cleft  -KR, 18  Is this injury device related?: No  -KR, 18  Stagin  -KR, 18      "Overall Physical Appearance: nourished, obese  Oral/Mouth Cavity: tooth/teeth missing  Skin: pressure ulcer(s) (stage I)"     RN assessment -Adult pt care summary Flow Sheet  18                      RD consult 18                                                                                Doctor, Please specify diagnosis or diagnoses associated with above clinical findings.    Provider Use Only          _x__Stage 1 pressure injury gluteal cleft , present on admission    ___Other condition, _______                                                                                                               [  ] Clinically undetermined            "

## 2018-02-26 NOTE — PROGRESS NOTES
[facility]  Cardiology  Progress Note    Patient Name: Yumiko Proctor  MRN: [id number]  Admission Date: 2/7/2018  Hospital Length of Stay: 19 days  Code Status: Full Code   Attending Physician: Cherelle Echevarria, *   Primary Care Physician: Primary Doctor No  Expected Discharge Date:   Principal Problem:MRSA bacteremia    Subjective:     Brief HPI:     The patient is a 66 yo female with MRSA bacteremia who was transferred here for ID and Neurosurgery services from [facility].  She has ESRD, Afib, cardiomyopathy, and DM. She had a RANDA in [place] that was negative on about 2/5/2018. She had a TTE on 2/9/2018 that revealed unremarkable valves. Asked to see to consider RANDA.    Hospital Course:     Antibiotics.    2/26/2018: RANDA: Small calcified nodule on atrial side of posterior mitral leaflet. Mild to moderate MR. Aortic valve unremarkable.    Interval History:    No complaints.     Review of Systems   Cardiovascular: Negative for chest pain.   Respiratory: Negative for cough, hemoptysis and wheezing.      Objective:     Vital Signs (Most Recent):  Temp: 97.7 °F (36.5 °C) (02/26/18 0729)  Pulse: (!) 54 (02/26/18 0850)  Resp: 18 (02/26/18 0850)  BP: 139/65 (02/26/18 0850)  SpO2: 100 % (02/26/18 0850) Vital Signs (24h Range):  Temp:  [96.3 °F (35.7 °C)-98 °F (36.7 °C)] 97.7 °F (36.5 °C)  Pulse:  [53-61] 54  Resp:  [18] 18  SpO2:  [96 %-100 %] 100 %  BP: (104-146)/(51-70) 139/65     Weight: 111.5 kg (245 lb 13 oz)  Body mass index is 43.54 kg/m².    SpO2: 100 %  O2 Device (Oxygen Therapy): room air      Intake/Output Summary (Last 24 hours) at 02/26/18 0924  Last data filed at 02/26/18 0643   Gross per 24 hour   Intake              660 ml   Output                0 ml   Net              660 ml       Lines/Drains/Airways     Central Venous Catheter Line                 Hemodialysis Catheter 02/12/18 0200 right internal jugular 14 days          Airway                 Airway - Non-Surgical 02/12/18 1310 Nasal  Cannula 13 days          Pressure Ulcer                 Pressure Injury 02/07/18 2254 medial Gluteal cleft Stage 1 18 days          Peripheral Intravenous Line                 Peripheral IV - Single Lumen 02/21/18 2100 Left Other 4 days         Peripheral IV - Single Lumen 02/23/18 2252 Left Forearm 2 days                Physical Exam   Constitutional: She is oriented to person, place, and time.   Cardiovascular: Normal rate and regular rhythm.    Pulmonary/Chest: Effort normal and breath sounds normal.   Neurological: She is alert and oriented to person, place, and time.   Skin: Skin is warm. No rash noted.       Current Medications:     calcitRIOL  0.25 mcg Oral Daily    carvedilol  25 mg Oral BID    ceftaroline fosamil  200 mg Intravenous Q12H    DAPTOmycin (CUBICIN)  IV  8 mg/kg Intravenous Q48H    docusate sodium  100 mg Oral BID    epoetin davion (PROCRIT) injection  20,000 Units Subcutaneous Every Mon, Wed, Fri    furosemide  80 mg Oral BID    insulin aspart U-100  1-10 Units Subcutaneous TIDWM    polyethylene glycol  17 g Oral Daily    sevelamer carbonate  800 mg Oral TID WM    SITagliptin  25 mg Oral Daily    vitamin D  2,000 Units Oral Daily    vitamin renal formula (B-complex-vitamin c-folic acid)  1 capsule Oral Daily     Current Laboratory Results:    Recent Results (from the past 24 hour(s))   POCT glucose    Collection Time: 02/25/18 11:38 AM   Result Value Ref Range    POCT Glucose 96 70 - 110 mg/dL   POCT glucose    Collection Time: 02/25/18  5:09 PM   Result Value Ref Range    POCT Glucose 74 70 - 110 mg/dL   POCT glucose    Collection Time: 02/25/18  9:29 PM   Result Value Ref Range    POCT Glucose 87 70 - 110 mg/dL   Comprehensive Metabolic Panel (CMP)    Collection Time: 02/26/18  4:55 AM   Result Value Ref Range    Sodium 129 (L) 136 - 145 mmol/L    Potassium 4.8 3.5 - 5.1 mmol/L    Chloride 95 95 - 110 mmol/L    CO2 25 23 - 29 mmol/L    Glucose 72 70 - 110 mg/dL    BUN, Bld 20 8 -  23 mg/dL    Creatinine 5.2 (H) 0.5 - 1.4 mg/dL    Calcium 8.3 (L) 8.7 - 10.5 mg/dL    Total Protein 6.1 6.0 - 8.4 g/dL    Albumin 1.8 (L) 3.5 - 5.2 g/dL    Total Bilirubin 0.2 0.1 - 1.0 mg/dL    Alkaline Phosphatase 73 55 - 135 U/L    AST 23 10 - 40 U/L    ALT 27 10 - 44 U/L    Anion Gap 9 8 - 16 mmol/L    eGFR if African American 9 (A) >60 mL/min/1.73 m^2    eGFR if non African American 8 (A) >60 mL/min/1.73 m^2   Magnesium    Collection Time: 02/26/18  4:55 AM   Result Value Ref Range    Magnesium 1.7 1.6 - 2.6 mg/dL   Phosphorus    Collection Time: 02/26/18  4:55 AM   Result Value Ref Range    Phosphorus 5.5 (H) 2.7 - 4.5 mg/dL   CK    Collection Time: 02/26/18  4:55 AM   Result Value Ref Range    CPK 74 20 - 180 U/L   CBC with Automated Differential    Collection Time: 02/26/18  4:56 AM   Result Value Ref Range    WBC 6.09 3.90 - 12.70 K/uL    RBC 2.93 (L) 4.00 - 5.40 M/uL    Hemoglobin 8.3 (L) 12.0 - 16.0 g/dL    Hematocrit 25.9 (L) 37.0 - 48.5 %    MCV 88 82 - 98 fL    MCH 28.3 27.0 - 31.0 pg    MCHC 32.0 32.0 - 36.0 g/dL    RDW 17.3 (H) 11.5 - 14.5 %    Platelets 201 150 - 350 K/uL    MPV 9.7 9.2 - 12.9 fL    Gran # (ANC) 2.8 1.8 - 7.7 K/uL    Lymph # 2.3 1.0 - 4.8 K/uL    Mono # 0.6 0.3 - 1.0 K/uL    Eos # 0.4 0.0 - 0.5 K/uL    Baso # 0.03 0.00 - 0.20 K/uL    Gran% 45.1 38.0 - 73.0 %    Lymph% 37.6 18.0 - 48.0 %    Mono% 10.0 4.0 - 15.0 %    Eosinophil% 6.1 0.0 - 8.0 %    Basophil% 0.5 0.0 - 1.9 %    Differential Method Automated    APTT    Collection Time: 02/26/18  4:56 AM   Result Value Ref Range    aPTT 66.0 (H) 21.0 - 32.0 sec   POCT glucose    Collection Time: 02/26/18  7:31 AM   Result Value Ref Range    POCT Glucose 81 70 - 110 mg/dL     Current Imaging Results:    Imaging Results    None           Assessment and Plan:     Problem List:    Active Diagnoses:    Diagnosis Date Noted POA    PRINCIPAL PROBLEM:  MRSA bacteremia [R78.81] 02/07/2018 Yes    DVT of deep femoral vein, left [I82.412]  02/21/2018 Clinically Undetermined    Drug-induced muscle inflammation [M60.9, T50.905A] 02/16/2018 No    Chronic atrial fibrillation [I48.2] 02/08/2018 Yes    ESRD (end stage renal disease) [N18.6] 02/08/2018 Yes    Type 2 diabetes mellitus with chronic kidney disease on chronic dialysis, without long-term current use of insulin [E11.22, N18.6, Z99.2] 02/08/2018 Not Applicable      Problems Resolved During this Admission:    Diagnosis Date Noted Date Resolved POA    Abnormal liver enzymes [R74.8] 02/12/2018 02/25/2018 No     Assessment and Plan:     1. MRSA Bacetremia              2/9/2018: TTE: No vegetations seen.   2/26/2018: RANDA: Small calcified nodule on atrial side of posterior mitral leaflet. Mild to moderate MR. Aortic valve unremarkable.   No findings to suggest endocarditis.      VTE Risk Mitigation         Ordered     heparin 25,000 units in dextrose 5% 250 mL (100 units/mL) infusion; FEMALE  Continuous     Route:  Intravenous        02/21/18 1056     heparin 25,000 units in dextrose 5% 250 mL (100 units/mL) bolus from bag; PRN BOLUS  As needed (PRN)     Route:  Intravenous        02/21/18 1056     heparin 25,000 units in dextrose 5% 250 mL (100 units/mL) bolus from bag; PRN BOLUS  As needed (PRN)     Route:  Intravenous        02/21/18 1056     heparin (porcine) injection 2,000 Units  As needed (PRN)     Route:  Intravenous        02/13/18 0940     heparin (porcine) injection 5,000 Units  As needed (PRN)     Route:  Intra-Catheter        02/12/18 1834     Medium Risk of VTE  Once      02/07/18 2255     Place sequential compression device  Until discontinued      02/07/18 2255     Place ANGELI hose  Until discontinued      02/07/18 2255          Rikki Gustafson MD  Cardiology  [facility]

## 2018-02-26 NOTE — PROGRESS NOTES
Patient not seen secondary to being away at cath lab then away at dialysis. Will attempt as schedule.

## 2018-02-26 NOTE — PLAN OF CARE
Discharge Planning:  Patient admitted on 2-7-18  LOS- day 19  Chart reviewed  Care plan discussed  Discussed care plan with treatment team  Discussed care plan with the attending Dr Echevarria  Current dispo - SNF tomorrow ?  RANDA - completed  Dr Nichols also was following (left leg clot)     Case management  to follow  Consults following are: case mgt., inf disease, surgery and cardiology      Fax  -  [phone]  [facility] accepted  Martina, admissions dept.  Per Facility, awaiting [facility] to provide an Auth-  Will also need SNF orders   Therapy notes were also forwarded  Will follow closely

## 2018-02-26 NOTE — PROGRESS NOTES
"Renal Progress Note    Admit Date: 2/7/2018   LOS: 19 days        SUBJECTIVE:     Seen on HD after RANDA.  Negative findings.  No CP/SOB.  "Can I go home".      Scheduled Meds:   calcitRIOL  0.25 mcg Oral Daily    carvedilol  25 mg Oral BID    ceftaroline fosamil  200 mg Intravenous Q12H    DAPTOmycin (CUBICIN)  IV  8 mg/kg Intravenous Q48H    docusate sodium  100 mg Oral BID    epoetin davion (PROCRIT) injection  20,000 Units Subcutaneous Every Mon, Wed, Fri    furosemide  80 mg Oral BID    insulin aspart U-100  1-10 Units Subcutaneous TIDWM    polyethylene glycol  17 g Oral Daily    sevelamer carbonate  800 mg Oral TID WM    SITagliptin  25 mg Oral Daily    vitamin D  2,000 Units Oral Daily    vitamin renal formula (B-complex-vitamin c-folic acid)  1 capsule Oral Daily       OBJECTIVE:     Vital Signs Range (Last 24H):  Temp:  [96.3 °F (35.7 °C)-98 °F (36.7 °C)]   Pulse:  [53-72]   Resp:  [18]   BP: (104-196)/(51-81)   SpO2:  [96 %-100 %]     I & O (Last 24H):    Intake/Output Summary (Last 24 hours) at 02/26/18 1032  Last data filed at 02/26/18 0643   Gross per 24 hour   Intake              660 ml   Output                0 ml   Net              660 ml       Physical Exam:  Constitutional: She is oriented to person, place, and time. Morbidly obese, well-nourished.   Head: Normocephalic.   Eyes: Conjunctivae are normal.    Neck: Normal range of motion. Neck supple.   Cardiovascular: Regular rhythm, normal heart sounds.   Pulmonary/Chest: Effort normal and breath sounds normal. No respiratory distress.   Abdominal: Soft, obese. Bowel sounds are normal. She exhibits no distension. There is no tenderness.   Ext:  no appreciable edema  Neurological: NF  Skin: Skin is warm and dry + pallor  Psychiatric: She has a normal mood and affect. Her speech is normal and behavior is normal. Very pleasant.  Poor historian.   Access: [facility]      Laboratory:  CBC:     Recent Labs  Lab 02/26/18  0456   WBC 6.09   RBC 2.93* "   HGB 8.3*   HCT 25.9*      MCV 88   MCH 28.3   MCHC 32.0     BMP:     Recent Labs  Lab 02/26/18  0455   GLU 72   *   K 4.8   CL 95   CO2 25   BUN 20   CREATININE 5.2*   CALCIUM 8.3*   MG 1.7       Recent Labs  Lab 02/26/18  0455   CPK 74     Impression:   Extensive thrombus throughout the left lower extremity with nonocclusive thrombus in the common femoral vein with occlusive thrombus throughout the femoral, popliteal, and peroneal vein. Anterior and posterior tibial veins are patent.      ASSESSMENT/PLAN:     68 YO WF with ESRD and catheter associated MRSA bacteremia and suspected lumbar foci presents from [facility] for ID and NS services.    1. ESRD:  Dr. Nichols pulled line for holiday, however electrolytes warranted new line for HD on 2/12.  R IJ EULALIO placed 2/12. Continue MWF for now.  Renally dose meds, avoid nephrotoxins, and monitor I/O's closely.  2. Hyperkalemia from dietary choices vs  (E87.5):  Changed diet and consulted dietary for counseling.  Low K bath.  Improved.   3. Rhabdo from Cubicin (M62.82):  D/C'd.  CPK normalized then  daptomycin resumed. Also on on ceftaroline.  4. Anemia of CKD:   holding IV iron given active infection.  Epo with HD.  Folate and B12 normal.  May need transfusion soon if declines further..    5. 2HPT/Vit D deficiency:  Vit D3 and calcitriol.  6. DM:  Currently on SSI.  Reduced Januvia to ESRD dosing of 25mg/d. Accuchecks good.  7. Hx of Afib:  On Carvedilol.  Defer to cards. Defer anticoagulation to Cards, but currently on heparin for clot.    8. MRSA Bacteremia:   -RANDA negative.  - blood cultures from 2/22 NGTD  - on ceftaroline and daptomycin  - extensive DVT noted and could be endovascular source  - may need another line change/holiday, if possible, if bacteremia returns but so far NGTD  - anticipating 8 weeks of IV abx beginning with sterile culture date     9. Occlusive LLE DVT (I82.412):  No one was notified of this study.  Started Heparin drip  since will likely need new line and starting Eliquis would cause delay.  Dr. Nichols-Vascular surgery feels heparin for now unless pts develops cerulea phlegmasia.    See above.

## 2018-02-26 NOTE — PLAN OF CARE
Pt resting without complaints of pain. PTT values therapeutic. No changes to heparin drip. Pt remains NPO for morning procedure. Safety measures maintained. Will continue to monitor.

## 2018-02-27 LAB
ALBUMIN SERPL BCP-MCNC: 2 G/DL
ALP SERPL-CCNC: 78 U/L
ALT SERPL W/O P-5'-P-CCNC: 28 U/L
ANION GAP SERPL CALC-SCNC: 9 MMOL/L
APTT BLDCRRT: 47.5 SEC
AST SERPL-CCNC: 26 U/L
BACTERIA BLD CULT: NORMAL
BASOPHILS # BLD AUTO: 0.03 K/UL
BASOPHILS NFR BLD: 0.4 %
BILIRUB SERPL-MCNC: 0.3 MG/DL
BUN SERPL-MCNC: 8 MG/DL
CALCIUM SERPL-MCNC: 8.2 MG/DL
CHLORIDE SERPL-SCNC: 99 MMOL/L
CO2 SERPL-SCNC: 26 MMOL/L
CREAT SERPL-MCNC: 2.9 MG/DL
DIFFERENTIAL METHOD: ABNORMAL
EOSINOPHIL # BLD AUTO: 0.2 K/UL
EOSINOPHIL NFR BLD: 3.6 %
ERYTHROCYTE [DISTWIDTH] IN BLOOD BY AUTOMATED COUNT: 17.5 %
EST. GFR  (AFRICAN AMERICAN): 18 ML/MIN/1.73 M^2
EST. GFR  (NON AFRICAN AMERICAN): 16 ML/MIN/1.73 M^2
FACT X PPP CHRO-ACNC: 0.27 IU/ML
GLUCOSE SERPL-MCNC: 82 MG/DL
HCT VFR BLD AUTO: 27.3 %
HGB BLD-MCNC: 8.5 G/DL
LYMPHOCYTES # BLD AUTO: 2.1 K/UL
LYMPHOCYTES NFR BLD: 31.6 %
MAGNESIUM SERPL-MCNC: 1.9 MG/DL
MCH RBC QN AUTO: 27.5 PG
MCHC RBC AUTO-ENTMCNC: 31.1 G/DL
MCV RBC AUTO: 88 FL
MONOCYTES # BLD AUTO: 0.7 K/UL
MONOCYTES NFR BLD: 11 %
NEUTROPHILS # BLD AUTO: 3.6 K/UL
NEUTROPHILS NFR BLD: 52.7 %
PHOSPHATE SERPL-MCNC: 3.4 MG/DL
PLATELET # BLD AUTO: 211 K/UL
PMV BLD AUTO: 10.2 FL
POCT GLUCOSE: 104 MG/DL (ref 70–110)
POCT GLUCOSE: 225 MG/DL (ref 70–110)
POCT GLUCOSE: 70 MG/DL (ref 70–110)
POCT GLUCOSE: 93 MG/DL (ref 70–110)
POTASSIUM SERPL-SCNC: 4.2 MMOL/L
PROT SERPL-MCNC: 6.5 G/DL
RBC # BLD AUTO: 3.09 M/UL
SODIUM SERPL-SCNC: 134 MMOL/L
WBC # BLD AUTO: 6.74 K/UL

## 2018-02-27 PROCEDURE — 99233 SBSQ HOSP IP/OBS HIGH 50: CPT | Mod: ,,, | Performed by: HOSPITALIST

## 2018-02-27 PROCEDURE — 85520 HEPARIN ASSAY: CPT

## 2018-02-27 PROCEDURE — 80053 COMPREHEN METABOLIC PANEL: CPT

## 2018-02-27 PROCEDURE — 11000001 HC ACUTE MED/SURG PRIVATE ROOM

## 2018-02-27 PROCEDURE — 63600175 PHARM REV CODE 636 W HCPCS: Performed by: INTERNAL MEDICINE

## 2018-02-27 PROCEDURE — 25000003 PHARM REV CODE 250: Performed by: INTERNAL MEDICINE

## 2018-02-27 PROCEDURE — 97803 MED NUTRITION INDIV SUBSEQ: CPT

## 2018-02-27 PROCEDURE — 97535 SELF CARE MNGMENT TRAINING: CPT

## 2018-02-27 PROCEDURE — 25000003 PHARM REV CODE 250: Performed by: NURSE PRACTITIONER

## 2018-02-27 PROCEDURE — 84100 ASSAY OF PHOSPHORUS: CPT

## 2018-02-27 PROCEDURE — 85730 THROMBOPLASTIN TIME PARTIAL: CPT

## 2018-02-27 PROCEDURE — 25000003 PHARM REV CODE 250: Performed by: PHYSICIAN ASSISTANT

## 2018-02-27 PROCEDURE — 83735 ASSAY OF MAGNESIUM: CPT

## 2018-02-27 PROCEDURE — 36415 COLL VENOUS BLD VENIPUNCTURE: CPT

## 2018-02-27 PROCEDURE — 97530 THERAPEUTIC ACTIVITIES: CPT

## 2018-02-27 PROCEDURE — 85025 COMPLETE CBC W/AUTO DIFF WBC: CPT

## 2018-02-27 PROCEDURE — 99900035 HC TECH TIME PER 15 MIN (STAT)

## 2018-02-27 PROCEDURE — 63600175 PHARM REV CODE 636 W HCPCS: Performed by: NURSE PRACTITIONER

## 2018-02-27 RX ORDER — DOCUSATE SODIUM 100 MG/1
100 CAPSULE, LIQUID FILLED ORAL 2 TIMES DAILY
Refills: 0 | COMMUNITY
Start: 2018-02-27

## 2018-02-27 RX ORDER — CALCITRIOL 0.25 UG/1
0.25 CAPSULE ORAL DAILY
Qty: 30 CAPSULE | Refills: 3 | Status: ON HOLD | OUTPATIENT
Start: 2018-02-28 | End: 2019-10-22 | Stop reason: CLARIF

## 2018-02-27 RX ORDER — FUROSEMIDE 80 MG/1
80 TABLET ORAL 2 TIMES DAILY
Qty: 60 TABLET | Refills: 3 | Status: ON HOLD | OUTPATIENT
Start: 2018-02-27 | End: 2019-10-22 | Stop reason: CLARIF

## 2018-02-27 RX ORDER — SEVELAMER CARBONATE 800 MG/1
800 TABLET, FILM COATED ORAL
Qty: 90 TABLET | Refills: 3 | Status: SHIPPED | OUTPATIENT
Start: 2018-02-27 | End: 2019-10-21

## 2018-02-27 RX ORDER — CHOLECALCIFEROL (VITAMIN D3) 25 MCG
2000 TABLET ORAL DAILY
COMMUNITY
Start: 2018-02-28

## 2018-02-27 RX ORDER — OXYCODONE AND ACETAMINOPHEN 5; 325 MG/1; MG/1
1 TABLET ORAL EVERY 4 HOURS PRN
Qty: 28 TABLET | Refills: 0 | Status: SHIPPED | OUTPATIENT
Start: 2018-02-27 | End: 2018-02-28

## 2018-02-27 RX ADMIN — INSULIN ASPART 4 UNITS: 100 INJECTION, SOLUTION INTRAVENOUS; SUBCUTANEOUS at 06:02

## 2018-02-27 RX ADMIN — CALCITRIOL 0.25 MCG: 0.25 CAPSULE, LIQUID FILLED ORAL at 10:02

## 2018-02-27 RX ADMIN — VITAMIN D, TAB 1000IU (100/BT) 2000 UNITS: 25 TAB at 10:02

## 2018-02-27 RX ADMIN — HEPARIN SODIUM AND DEXTROSE 12.96 UNITS/KG/HR: 10000; 5 INJECTION INTRAVENOUS at 04:02

## 2018-02-27 RX ADMIN — DOCUSATE SODIUM 100 MG: 100 CAPSULE, LIQUID FILLED ORAL at 10:02

## 2018-02-27 RX ADMIN — CARVEDILOL 25 MG: 12.5 TABLET, FILM COATED ORAL at 10:02

## 2018-02-27 RX ADMIN — OXYCODONE HYDROCHLORIDE AND ACETAMINOPHEN 1 TABLET: 5; 325 TABLET ORAL at 06:02

## 2018-02-27 RX ADMIN — POLYETHYLENE GLYCOL 3350 17 G: 17 POWDER, FOR SOLUTION ORAL at 10:02

## 2018-02-27 RX ADMIN — FUROSEMIDE 80 MG: 40 TABLET ORAL at 10:02

## 2018-02-27 RX ADMIN — SITAGLIPTIN 25 MG: 25 TABLET, FILM COATED ORAL at 10:02

## 2018-02-27 RX ADMIN — FUROSEMIDE 80 MG: 40 TABLET ORAL at 06:02

## 2018-02-27 RX ADMIN — OXYCODONE HYDROCHLORIDE AND ACETAMINOPHEN 1 TABLET: 5; 325 TABLET ORAL at 10:02

## 2018-02-27 RX ADMIN — APIXABAN 2.5 MG: 2.5 TABLET, FILM COATED ORAL at 08:02

## 2018-02-27 RX ADMIN — SEVELAMER CARBONATE 800 MG: 800 TABLET, FILM COATED ORAL at 12:02

## 2018-02-27 RX ADMIN — Medication 1 CAPSULE: at 10:02

## 2018-02-27 RX ADMIN — CEFTAROLINE FOSAMIL 200 MG: 600 POWDER, FOR SOLUTION INTRAVENOUS at 12:02

## 2018-02-27 RX ADMIN — SEVELAMER CARBONATE 800 MG: 800 TABLET, FILM COATED ORAL at 10:02

## 2018-02-27 RX ADMIN — CEFTAROLINE FOSAMIL 200 MG: 600 POWDER, FOR SOLUTION INTRAVENOUS at 11:02

## 2018-02-27 RX ADMIN — DOCUSATE SODIUM 100 MG: 100 CAPSULE, LIQUID FILLED ORAL at 08:02

## 2018-02-27 RX ADMIN — TRAMADOL HYDROCHLORIDE 50 MG: 50 TABLET, COATED ORAL at 04:02

## 2018-02-27 RX ADMIN — OXYCODONE HYDROCHLORIDE AND ACETAMINOPHEN 1 TABLET: 5; 325 TABLET ORAL at 11:02

## 2018-02-27 RX ADMIN — DAPTOMYCIN 865 MG: 500 INJECTION, POWDER, LYOPHILIZED, FOR SOLUTION INTRAVENOUS at 08:02

## 2018-02-27 RX ADMIN — SEVELAMER CARBONATE 800 MG: 800 TABLET, FILM COATED ORAL at 06:02

## 2018-02-27 RX ADMIN — CARVEDILOL 25 MG: 12.5 TABLET, FILM COATED ORAL at 08:02

## 2018-02-27 RX ADMIN — APIXABAN 2.5 MG: 2.5 TABLET, FILM COATED ORAL at 12:02

## 2018-02-27 NOTE — PT/OT/SLP PROGRESS
Occupational Therapy   Treatment    Name: Yumiko Proctor  MRN: [id number]  Admitting Diagnosis:  MRSA bacteremia  1 Day Post-Op    Recommendations:     Discharge Recommendations: nursing facility, skilled  Discharge Equipment Recommendations:  walker, rolling, 3-in-1 commode, bath bench  Barriers to discharge:  None    Subjective     Communicated with: nurse prior to session.  Pain/Comfort:  · Pain Rating 1: 0/10  · Location - Side 1: Bilateral  · Location - Orientation 1: generalized  · Location 1: abdomen  · Pain Addressed 1: Reposition, Distraction, Cessation of Activity  · Pain Rating Post-Intervention 1: 10/10    Patients cultural, spiritual, Anglican conflicts given the current situation: NA    Objective:     Patient found with: bed alarm, peripheral IV    General Precautions: Standard, fall, special contact   Orthopedic Precautions:N/A   Braces: N/A     Occupational Performance:    Bed Mobility:    · Patient completed Scooting/Bridging with minimum assistance with overhead bed rials and brige tech toward HOB  · Patient completed Sit to Supine with maximal assistance     Functional Mobility/Transfers:  · Patient completed Sit <> Stand Transfer with contact guard assistance and minimum assistance  with  rolling walker   · Patient completed Toilet Transfer Step Transfer technique with contact guard assistance and minimum assistance with  bedside commode and rolling walker  · Functional Mobility: Min assist x 2 p[erson assist for safetty 2/2 weakness and pain in back     Activities of Daily Living:  · Grooming: minimum assistance for fhair seated EOB limited shld AROM 2/2 weakness ; oral hygiene with swab SBA; washed face SBA seated EOB  · Toileting: total assistance from BSC stood with RW [facility] second person and hygiene for BM    Patient left HOB elevated with all lines intact, call button in reach and bed alarm on    AMPA 6 Click:  [facility] Total Score: 13    Treatment & Education:  Safety hand placement and  relaxation breathing 2/2 increased pain and anxiety during BSC toileting and mobility  Education:    Assessment:     Yumiko Proctor is a 68 y.o. female with a medical diagnosis of MRSA bacteremia.  She presents with  progress toward goals.  1 OT goal met today with increased independence with sit<>stand with min assist. Increased back and abdomen pain toward end of session.  Fall risk 2/ person assist for safety with fx ambulation with RW. SNF recommended.  Continue with OT POC.  .  Performance deficits affecting function are weakness, impaired functional mobilty, impaired balance, decreased lower extremity function, decreased upper extremity function, gait instability, impaired self care skills, impaired endurance, decreased safety awareness, decreased ROM, pain.      Rehab Prognosis:  good; patient would benefit from acute skilled OT services to address these deficits and reach maximum level of function.       Plan:     Patient to be seen 5 x/week to address the above listed problems via therapeutic exercises, self-care/home management, therapeutic activities  · Plan of Care Expires: 03/15/18  · Plan of Care Reviewed with: patient    This Plan of care has been discussed with the patient who was involved in its development and understands and is in agreement with the identified goals and treatment plan    GOALS:    Occupational Therapy Goals        Problem: Occupational Therapy Goal    Goal Priority Disciplines Outcome Interventions   Occupational Therapy Goal     OT, PT/OT Ongoing (interventions implemented as appropriate)    Description:  Goals to be met by: 02/26/18     Patient will increase functional independence with ADLs by performing:    UE Dressing with Minimal Assistance.  LE Dressing with Maximum Assistance.  Rolling to Bilateral with Contact Guard Assistance. (MET 02/20/18)  Supine to sit with Minimal Assistance.  Upper extremity exercise program x10 reps per handout, with assistance as needed.  Assess  sit to stand transfers. (MET 02/20/18)  NEW 02/20/18 Sit to stand with RW and Min assist-met 2/27/2018  NEW 02/20/28 SPT to bedside chair/BSC with RW and min assist                        Time Tracking:     OT Date of Treatment: 02/27/18  OT Start Time: 1444  OT Stop Time: 1507  OT Total Time (min): 23 min    Billable Minutes:Self Care/Home Management 10  Total Time 23 COTX with PT    Liza Reyes OT  2/27/2018

## 2018-02-27 NOTE — PROGRESS NOTES
[facility] Hospital Medicine  Progress Note    Patient Name: Yumiko Proctor  MRN: [id number]  Patient Class: IP- Inpatient   Admission Date: 2/7/2018  Length of Stay: 20 days  Attending Physician: Patricia Lu MD  Primary Care Provider: [facility]        Subjective:     Principal Problem:MRSA bacteremia    HPI:  The patient is a 66 yo female with known MRSA bacteremia who was transferred here for ID and Neurosurgery services.  She has a history of ESRD, Afib, cardiomyopathy, and DM.  She has been hospitalized with bacteremia from a vascular access which was taken out and replaced today with a lt femoral vas cath.  Blood cultures remained positive. She had a RANDA which was normal, MRI of thoracic and lumbar spine showed a abnormal foci in T( that was probably a hemangioma or metastasis.    Hospital Course:  Blood cultures from 02/08/18 2/4 (+) MRSA.  Patient was initially started on Daptomycin upon arrival to this facility.  Cultures were repeated again on 02/10/2018 and 2/2 bottles were positive for MRSA.  ID on board, concerned there is endovascular source.  Left prince catheter was removed and a right IJ tunneled dialysis catheter placed 2/12. Repeat blood cultures on 2/13 remained negative, but one bottle obtained 2/16 growing MRSA and 2/17 blood cultures were also positive for MRSA.  Patient had some myositis from Daptomycin with an elevation in CPK to 5175 which normalized and this was discontinued.  Vancomycin given after dialysis treatments.  PT/OT consulted and recommend skilled nursing upon discharge.  Blood cultures remained positive for MRSA.  Her existing catheter and IV were removed after dialysis 2/19/2018.  She had a line holiday for a few days.  Blood cultures drawn on 02/22 remained negative.  RANDA done 02/26 was negative for vegetation.  The patient did have a DVT in the left leg which was present when she arrived to this facility and ID  suspected a possible endovascular source of the infection since she was so hard to clear.  Vascular surgery was consulted and did not recommend thrombectomy, and the patient will continue anticoagulation.  ID resumed Daptomycin and started Ceftaroline.  CPK levels have not increased again.  The patient will continue this therapy for 8 weeks starting from 2/22/2018, with an end date on 4/4/2018.  Awaiting placement in SNF in [facility]    Interval History: The patient still has significant complaints of back pain worse after ambulation.    Review of Systems   Musculoskeletal: Positive for back pain.     Objective:     Vital Signs (Most Recent):  Temp: 97.9 °F (36.6 °C) (02/27/18 1212)  Pulse: 60 (02/27/18 1212)  Resp: 18 (02/27/18 1212)  BP: (!) 101/44 (02/27/18 1212)  SpO2: 95 % (02/27/18 1212) Vital Signs (24h Range):  Temp:  [97.9 °F (36.6 °C)-98.2 °F (36.8 °C)] 97.9 °F (36.6 °C)  Pulse:  [59-62] 60  Resp:  [16-18] 18  SpO2:  [95 %-98 %] 95 %  BP: (100-125)/(42-67) 101/44     Weight: 111.5 kg (245 lb 13 oz)  Body mass index is 43.54 kg/m².    Intake/Output Summary (Last 24 hours) at 02/27/18 1633  Last data filed at 02/27/18 1254   Gross per 24 hour   Intake          1081.35 ml   Output                0 ml   Net          1081.35 ml      Physical Exam   Constitutional: She is oriented to person, place, and time. She appears well-developed and well-nourished.   HENT:   Head: Normocephalic.   Eyes: Conjunctivae are normal. Right eye exhibits no discharge. Left eye exhibits no discharge.   Neck: Normal range of motion. Neck supple.   Cardiovascular: Regular rhythm and normal heart sounds.  Bradycardia present.    Pulmonary/Chest: Effort normal and breath sounds normal. No respiratory distress.   Abdominal: Soft. Bowel sounds are normal. She exhibits no distension. There is no tenderness.   Musculoskeletal: Normal range of motion.   Neurological: She is alert and oriented to person, place, and time.   Skin: Skin is  warm and dry.   Psychiatric: She has a normal mood and affect. Her speech is normal and behavior is normal. Judgment and thought content normal.       Significant Labs:   CBC:   Recent Labs  Lab 02/26/18  0456 02/27/18  0451   WBC 6.09 6.74   HGB 8.3* 8.5*   HCT 25.9* 27.3*    211     CMP:   Recent Labs  Lab 02/26/18 0455 02/27/18  0451   * 134*   K 4.8 4.2   CL 95 99   CO2 25 26   GLU 72 82   BUN 20 8   CREATININE 5.2* 2.9*   CALCIUM 8.3* 8.2*   PROT 6.1 6.5   ALBUMIN 1.8* 2.0*   BILITOT 0.2 0.3   ALKPHOS 73 78   AST 23 26   ALT 27 28   ANIONGAP 9 9   EGFRNONAA 8* 16*     Magnesium:   Recent Labs  Lab 02/26/18 0455 02/27/18  0451   MG 1.7 1.9       Significant Imaging: I have reviewed and interpreted all pertinent imaging results/findings within the past 24 hours.    Assessment/Plan:      * MRSA bacteremia    Blood Cultures 02/08/18 MRSA in 1 set, 2nd set NGTD  Repeat Blood Cultures 02/10/18 2/2 MRSA  Lactic acid 0.9 02/08/10  Discontinued Daptomycin initially secondary to elevated CPK  Suspected endovascular source is occlusive thrombus in left leg  New IJ catheter placed 2/12  Blood cultures 2/13 NGTD  2/16, 2/17, 2/19 cultures are positive  Blood Culture 2/22/18 are NGTD  Removed all lines 02/19/18 for a line holiday for several days  Currently receiving Daptomycin and Ceftaroline  RANDA today negative for vegetation  Plan is to continue antibiotics for 8 weeks from last (-) blood culture which is on 02/22/18  Will discharge to [facility] [place]  Case Management working on placement.          ESRD (end stage renal disease)    Continue Dialysis MWF per Nephrology  Left Femoral Jaspreet cath removed   R IJ dialysis catheter placed 2/12  Removed R IJ and IVs 02/19/18 for holiday   Patient currently has a right tunneled catheter          Chronic atrial fibrillation    Continue Coreg  Rate controlled            Type 2 diabetes mellitus with chronic kidney disease on chronic dialysis, without long-term  current use of insulin    A1c  6.5  PRN insulin  Well-controlled          DVT of deep femoral vein, left    Consulted Dr. Nichols, Vascular Surgery for evaluation of thrombus, no acute intervention available  May be endovascular source of bactermia  Continue Apixaban today for anticoagulation        Drug-induced muscle inflammation    No current signs of myositis  Continue to check CPK weekly            VTE Risk Mitigation         Ordered     apixaban tablet 2.5 mg  2 times daily     Route:  Oral        02/27/18 1042     heparin (porcine) injection 2,000 Units  As needed (PRN)     Route:  Intravenous        02/13/18 0940     heparin (porcine) injection 5,000 Units  As needed (PRN)     Route:  Intra-Catheter        02/12/18 1834     Medium Risk of VTE  Once      02/07/18 2255     Place sequential compression device  Until discontinued      02/07/18 2255     Place ANGELI hose  Until discontinued      02/07/18 2255              Patricia Lu MD  Department of Hospital Medicine   [facility]

## 2018-02-27 NOTE — ASSESSMENT & PLAN NOTE
Consulted Dr. Nichols, Vascular Surgery for evaluation of thrombus, no acute intervention available  May be endovascular source of bactermia  Continue Apixaban today for anticoagulation

## 2018-02-27 NOTE — ASSESSMENT & PLAN NOTE
Consulted Dr. Nichols, Vascular Surgery for evaluation of thrombus, no acute intervention available  Heparin Infusion  May be endovascular source of bactermia

## 2018-02-27 NOTE — PROGRESS NOTES
"[facility]  Adult Nutrition  Progress Note    SUMMARY     Recommendations    Recommendation/Intervention:   1. Continue 2000kcal DM + renal diet   2. Consider Novasource Renal due to poor intake    Goals:   1. Meet >85% EEN and EPN   2. Prevent dry wt loss >2%/week   3. Promote nutrition related labs wnl    Nutrition Goal Status: progressing towards goal  Communication of RD Recs: discussed on rounds    Reason for Assessment    Reason for Assessment: RD follow-up  Diagnosis: infection/sepsis  Interdisciplinary Rounds: attended  General Information Comments: Pt continues to have 25% PO intake without experiencing n/v/d/c. Pt is currently on HD.     Nutrition Discharge Planning: PO intake >=85% EEN, EPN    Nutrition Prescription Ordered    Current Diet Order: 2000kcal DM renal    Evaluation of Received Nutrients/Fluid Intake    Energy Calories Required: not meeting needs  Protein Required: not meeting needs  Fluid Required: other (see comments) (Per MD)    % Intake of Estimated Energy Needs: 0 - 25 %  % Meal Intake: 25%     Nutrition Risk Screen    Nutrition Risk Screen: no indicators present    Nutrition/Diet History    Food Preferences: No cultural/spiritual prefs noted    Factors Affecting Nutritional Intake: decreased appetite    Labs/Tests/Procedures/Meds    Pertinent Labs Reviewed: reviewed  Pertinent Labs Comments: GFR 16, Alb 2.0, Ca 8.2  Pertinent Medications Reviewed: reviewed  Pertinent Medications Comments: Lasix, insulin, vitamin renal formula    Physical Findings    Overall Physical Appearance: nourished, obese  Oral/Mouth Cavity: tooth/teeth missing  Skin: pressure ulcer(s) (stage I)    Anthropometrics    Temp: 97.9 °F (36.6 °C)  Height: 5' 3" (160 cm)  Weight Method: Bed Scale  Weight: 111.5 kg (245 lb 13 oz)  Ideal Body Weight (IBW), Female: 115 lb  % Ideal Body Weight, Female (lb): 207.13 lb  BMI (Calculated): 42.3  BMI Grade: greater than 40 - morbid obesity    Estimated/Assessed " Needs    Weight Used For Calorie Calculations: 111.5 kg (245 lb 13 oz)   Energy Calorie Requirements (kcal): 2098  Energy Need Method: Maxwell-St Jeor (stress factor 1.3)  RMR (Maxwell-St. Jeor Equation): 1614.12     Weight Used For Protein Calculations: 52.4 kg (115 lb 8.3 oz) (IBW)  Protein Requirements:  g/d (1.8-2.0 g/kg) (IBW)     Fluid Requirements (mL): 1000 (+UOP, or per team)  Fluid Need Method: RDA Method (Per MD)    RDA Method (mL): 2098    CHO Requirement: 50% total kcal     Assessment and Plan    ESRD (end stage renal disease)    Nutrition Diagnosis:  Increased nutrient needs (kcal, protein)    Related to (etiology):   HD    Signs and Symptoms (as evidenced by):   Pt with ESRD on HD     Interventions/Recommendations (treatment strategy):  2000kcal DM + renal diet + recommend Novasource Renal 1x/d     Nutrition Diagnosis Status:   Continues              Monitor and Evaluation    Food and Nutrient Intake: energy intake, food and beverage intake  Food and Nutrient Adminstration: diet order     Physical Activity and Function: nutrition-related ADLs and IADLs  Anthropometric Measurements: weight, weight change  Biochemical Data, Medical Tests and Procedures: electrolyte and renal panel, gastrointestinal profile, glucose/endocrine profile, inflammatory profile, lipid profile  Nutrition-Focused Physical Findings: overall appearance, extremities, muscles and bones, skin    Nutrition Risk    Level of Risk: other (see comments) (2x/week)    Nutrition Follow-Up    RD Follow-up?: Yes     Dany Dempsey  Dietetic Intern    I certify that I directed the dietetic intern in service delivery and guided them using my skilled judgment. As the cosigning dietitian, I have reviewed the dietetic interns documentation and am responsible for the treatment, assessment, and plan.

## 2018-02-27 NOTE — ASSESSMENT & PLAN NOTE
Continue Dialysis MWF per Nephrology  Left Femoral Jaspreet cath removed   R IJ dialysis catheter placed 2/12  Removed R IJ and IVs 02/19/18 for holiday   Patient currently has a right tunneled catheter

## 2018-02-27 NOTE — PT/OT/SLP PROGRESS
"Physical Therapy Treatment    Patient Name:  Yumiko Proctor   MRN:  [id number]    Recommendations:     Discharge Recommendations:  nursing facility, skilled   Discharge Equipment Recommendations: walker, rolling   Barriers to discharge: Inaccessible home and Decreased caregiver support    Assessment:     Yumiko Proctor is a 68 y.o. female admitted with a medical diagnosis of MRSA bacteremia.  She presents with the following impairments/functional limitations:  weakness, impaired self care skills, impaired functional mobilty, gait instability, impaired balance, pain, decreased ROM, edema, decreased lower extremity function, decreased upper extremity function, impaired endurance.  Pt progressing slowly with PT. Noted (+) L LE DVT per imaging. RN beginning heparin drip at beginning session with recommendations for "light activity" only. Pt participated in sitting at edge of bed. Upon sitting, noted sheet and gown were wet at L UE IV site. RN notified and arrived during PT session. Assisted with positioning in bed as well as jose g-care following bowel movement. Will continue to follow and progress as tolerated.  Rehab Prognosis:  Good; patient would benefit from acute skilled PT services to address these deficits and reach maximum level of function.      Recent Surgery: Procedure(s) (LRB):  TRANSESOPHAGEAL ECHOCARDIOGRAM (RANDA) (N/A) 1 Day Post-Op    Plan:     During this hospitalization, patient to be seen 6 x/week to address the above listed problems via gait training, therapeutic activities, therapeutic exercises, neuromuscular re-education  · Plan of Care Expires:  03/19/18   Plan of Care Reviewed with: patient    Subjective     Communicated with nurse prior to session.  Patient found supine in bed with sheet and gown near IV site saturdated upon PT entry to room, agreeable to treatment.  Nurse notified immediately and arrived during session    Chief Complaint: Fatigue  Patient comments/goals: Pt expressed c/o bowel " incontinence  Pain/Comfort: No pain before or after session.   Patients cultural, spiritual, Christianity conflicts given the current situation: none stated    Objective:     Patient found with:  (L UE peripheral IV leaking)     General Precautions: Standard, contact, fall   Orthopedic Precautions:N/A     Functional Mobility:  · Bed Mobility:     · Scooting: contact guard assistance and lateral scooting at edge of bed  · Supine to Sit: minimum assistance and HOB elevated  · Sit to Supine: moderate assistance and bed flat  · Balance: CGA<>SBA for sitting up at edge of bed. Total time limited by RN arriving to check IV site as well as bowel incontinence       02/21/18 1702   General   PT Received On 02/21/18   PT Start Time 1502   PT Stop Time 1535   PT Total Time (min) 33 min   Treatment Type Treatment   PT/PTA PT   PTA Visit Number 0   Patient found with (L UE peripheral IV leaking)   Precautions   General Precautions contact  (fall risk)   Orthopedic Precautions  N/A   Braces N/A   Pain/Comfort   Pain Rating 1 no pain   Pain Rating Post-Intervention 1 no pain   AM-PAC: How much help from another person does the patient currently need?   Turning over in bed (including adjusting bedclothes, sheets and blankets)? 3   Sitting down on and standing up from a chair with arms (e.g., wheelchair, bedside commode, etc.) 2   Moving from lying on back to sitting on the side of the bed? 3   Moving to and from a bed to a chair (including a wheelchair)? 2   Need to walk in hospital room? 1   Climbing 3-5 steps with a railing? 1   Total Score 12   Assessment   Rehab identified problem list/impairments weakness;impaired self care skills;impaired functional mobilty;gait instability;impaired balance;pain;decreased ROM;edema;decreased lower extremity function;decreased upper extremity function;impaired endurance   Discharge Recommendations   Equipment Needed After Discharge walker, rolling   Discharge Facility/Level Of Care Needs nursing  facility, skilled   Plan   Planned Interventions gait training;therapeutic activities;therapeutic exercises;neuromuscular re-education;wheelchair management/training   Physical Therapy Follow-up   PT Follow-up? Yes   Plan of Care Review   Plan Of Care Reviewed With patient         Therapeutic Activities and Exercises:   Min A for rolling from PT near end session and total assist from nursing for jose g-care following bowel incontinence. Assisted with changing gown/linen due to moisture.     Patient left supine with all lines intact, call button in reach, bed alarm on, nurse notified and nurse and PCT present..    GOALS:    Physical Therapy Goals        Problem: Physical Therapy Goal    Goal Priority Disciplines Outcome Goal Variances Interventions   Physical Therapy Goal     PT/OT, PT Ongoing (interventions implemented as appropriate)     Description:  Goals to be met by: 3/15/19     Patient will increase functional independence with mobility by performin. Supine to sit with supervision.   2. Sit to supine with supervision.   3. Sit<>stand transfer with CGA using rolling walker.   4. Gait > 10 feet with CGA using rolling walker.                         Time Tracking:     PT Received On: 18  PT Start Time: 1502     PT Stop Time: 1535  PT Total Time (min): 33 min     Billable Minutes: Therapeutic Activity 30    Treatment Type: Treatment  PT/PTA: PT     PTA Visit Number: 1     Yudi Amira, PT  2018

## 2018-02-27 NOTE — PROGRESS NOTES
"[facility]  Infectious Disease  Progress Note    Patient Name: Yumiko Proctor  MRN: [id number]  Admission Date: 2/7/2018  Length of Stay: 19 days  Attending Physician: Cherelle Echevarria, *  Primary Care Provider: [facility]    Isolation Status: Contact  Assessment/Plan:      * MRSA bacteremia    - complicated due to persistence  - blood cultures from 2/22 NGTD  - on ceftaroline and daptomycin  - extensive DVT noted and could be endovascular source  - RANDA negative for vegetation although MV sclerotic with small calcium nodule   - anticipating 8 weeks of IV abx beginning with sterile culture date (2/22)  - CPK normal today 2/26  - will need weekly CBC, CMP, CPK weekly              Thank you for your consult. I will follow-up with patient. Please contact us if you have any additional questions.    Katherine K Baumgarten, MD  Infectious Disease  [facility]    Subjective:     Principal Problem:MRSA bacteremia    HPI: This is a 66 yo woman with ESRD [place] [facility] [place] due to MRSA. She reportedly had multiple studies performed at [facility] in [place], including MR imaging, CT imaging, a bone scan, and a RANDA, all of which were "negative." An indwelling HD catheter was discontinued and a left groin HD catheter was placed. The patient feels sick but denies any rigors or irving fever. The patient was admitted last night and placed on cefepime and Cipro in addition to the daptomycin. I am consulted for ID evaluation.    Interval History: Tolerating abx. Denies fever or chills. Denies leg pain or swelling. PT in the room.    Review of Systems   Constitutional: Negative for chills and fever.   All other systems reviewed and are negative.    Objective:     Vital Signs (Most Recent):  Temp: 97.8 °F (36.6 °C) (02/26/18 1551)  Pulse: (!) 59 (02/26/18 1941)  Resp: 16 (02/26/18 1941)  BP: 125/67 (02/26/18 1941)  SpO2: 97 % (02/26/18 1941) " Vital Signs (24h Range):  Temp:  [97.6 °F (36.4 °C)-98 °F (36.7 °C)] 97.8 °F (36.6 °C)  Pulse:  [52-72] 59  Resp:  [16-18] 16  SpO2:  [96 %-100 %] 97 %  BP: ()/(32-81) 125/67     Weight: 111.5 kg (245 lb 13 oz)  Body mass index is 43.54 kg/m².    Estimated Creatinine Clearance: 12.4 mL/min (A) (based on SCr of 5.2 mg/dL (H)).    Physical Exam   Constitutional: She is oriented to person, place, and time. She appears well-developed and well-nourished. No distress.   HENT:   Head: Normocephalic and atraumatic.   Eyes: EOM are normal. Pupils are equal, round, and reactive to light.   Cardiovascular: Normal rate.    Murmur heard.  Pulmonary/Chest: Effort normal and breath sounds normal. No respiratory distress. She has no wheezes.   Abdominal: Soft. Bowel sounds are normal. She exhibits no distension. There is no tenderness. There is no guarding.   Musculoskeletal: She exhibits no edema.   Neurological: She is alert and oriented to person, place, and time.   Skin: No rash noted. She is not diaphoretic.   Psychiatric: She has a normal mood and affect. Her behavior is normal. Judgment and thought content normal.   Nursing note and vitals reviewed.      Significant Labs:   Blood Culture:     Recent Labs  Lab 02/16/18  1037 02/17/18  1148 02/17/18  1200 02/19/18  0537 02/22/18  0557   LABBLOO Gram stain aer bottle: Gram positive cocci in clusters resembling Staph   Results called to and read back by:Ynes Steiner RN 02/17/2018  10:07  Gram stain vicente bottle: Gram positive cocci in clusters resembling Staph   Positive results previously called 02/17/2018  16:42  METHICILLIN RESISTANT STAPHYLOCOCCUS AUREUSID consult required at [facility] and [facility]. Gram stain aer bottle: Gram positive cocci in clusters resembling Staph   Results called to and read back by: Ynes Herrera RN  02/18/2018  18:31  STAPHYLOCOCCUS AUREUSID consult required at [facility] and [facility].For susceptibility  see order #[id number] Gram stain vicente bottle: Gram positive cocci in clusters resembling Staph   Results called to and read back by:Ynes Steiner RN 02/18/2018  14:40  Gram stain aer bottle: Gram positive cocci in clusters resembling Staph   Positive results previously called 02/18/2018  18:19  STAPHYLOCOCCUS AUREUSID consult required at [facility] and [facility].For susceptibility see order #[id number] Gram stain aer bottle: Gram positive cocci in clusters resembling Staph   Gram stain vicente bottle: Gram positive cocci in clusters resembling Staph   Results called to and read back by:Alison Oswald RN 02/20/2018    03:01  METHICILLIN RESISTANT STAPHYLOCOCCUS AUREUSID consult required at [facility] and [facility] locations. No Growth to date  No Growth to date  No Growth to date  No Growth to date  No Growth to date     BMP:     Recent Labs  Lab 02/26/18  0455   GLU 72   *   K 4.8   CL 95   CO2 25   BUN 20   CREATININE 5.2*   CALCIUM 8.3*   MG 1.7     CBC:     Recent Labs  Lab 02/25/18  0440 02/26/18  0456   WBC 5.74 6.09   HGB 8.2* 8.3*   HCT 26.6* 25.9*    201       Significant Imaging: I have reviewed all pertinent imaging results/findings within the past 24 hours.

## 2018-02-27 NOTE — PROGRESS NOTES
[facility] Hospital Medicine  Progress Note    Patient Name: Yumiko Proctor  MRN: [id number]  Patient Class: IP- Inpatient   Admission Date: 2/7/2018  Length of Stay: 19 days  Attending Physician: Cherelle Echevarria, *  Primary Care Provider: [facility]        Subjective:     Principal Problem:MRSA bacteremia    HPI:  The patient is a 66 yo female with known MRSA bacteremia who was transferred here for ID and Neurosurgery services.  She has a history of ESRD, Afib, cardiomyopathy, and DM.  She has been hospitalized with bacteremia from a vascular access which was taken out and replaced today with a lt femoral vas cath.  Blood cultures remained positive. She had a RANDA which was normal, MRI of thoracic and lumbar spine showed a abnormal foci in T( that was probably a hemangioma or metastasis.    Hospital Course:  Blood cultures from 02/08/18 2/4 (+) MRSA.  Patient currently receiving Daptomycin.  Cultures repeated again on 02/10/2018 and 2/2 bottles positive for MRSA.  ID on board, concerned there is endovascular source.  Left prince catheter removed yesterday and right IJ tunneled dialysis catheter placed 2/12. Repeat blood cultures 2/13 remain negative to date, but one bottle obtained 2/16 growing MRSA and 2/17 blood cultures are positive for gram positive cocci in clusters resembling Staph.  Patient may have some myositis from Daptomycin with elevation in CPK to 5175 which is now decreasing and this was discontinued.  Vancomycin given after dialysis treatments.  PT/OT consulted and recommend skilled nursing upon discharge.  Catheter, IV, removal after dialysis 2/19/2018.  Consult Cardiology for RANDA.    Interval History: No complaints this morning, currently in dialysis.  RANDA performed this morning.  Awaiting placement to NH in [place], [place]    Review of Systems   Musculoskeletal: Positive for back pain.     Objective:     Vital Signs (Most Recent):  Temp: 97.8 °F  (36.6 °C) (02/26/18 1551)  Pulse: (!) 59 (02/26/18 1941)  Resp: 16 (02/26/18 1941)  BP: 125/67 (02/26/18 1941)  SpO2: 97 % (02/26/18 1941) Vital Signs (24h Range):  Temp:  [97.6 °F (36.4 °C)-98 °F (36.7 °C)] 97.8 °F (36.6 °C)  Pulse:  [52-72] 59  Resp:  [16-18] 16  SpO2:  [96 %-100 %] 97 %  BP: ()/(32-81) 125/67     Weight: 111.5 kg (245 lb 13 oz)  Body mass index is 43.54 kg/m².    Intake/Output Summary (Last 24 hours) at 02/26/18 2051  Last data filed at 02/26/18 1430   Gross per 24 hour   Intake           606.88 ml   Output             1500 ml   Net          -893.12 ml      Physical Exam   Constitutional: She is oriented to person, place, and time. She appears well-developed and well-nourished.   HENT:   Head: Normocephalic.   Eyes: Conjunctivae are normal. Right eye exhibits no discharge. Left eye exhibits no discharge.   Neck: Normal range of motion. Neck supple.   Cardiovascular: Regular rhythm and normal heart sounds.  Bradycardia present.    Pulmonary/Chest: Effort normal and breath sounds normal. No respiratory distress.   Abdominal: Soft. Bowel sounds are normal. She exhibits no distension. There is no tenderness.   Musculoskeletal: Normal range of motion.   Neurological: She is alert and oriented to person, place, and time.   Skin: Skin is warm and dry.   Psychiatric: She has a normal mood and affect. Her speech is normal and behavior is normal. Judgment and thought content normal.       Significant Labs:   CBC:   Recent Labs  Lab 02/25/18  0440 02/26/18  0456   WBC 5.74 6.09   HGB 8.2* 8.3*   HCT 26.6* 25.9*    201     CMP:   Recent Labs  Lab 02/25/18  0439 02/26/18  0455   * 129*   K 4.5 4.8   CL 97 95   CO2 26 25   GLU 74 72   BUN 16 20   CREATININE 4.2* 5.2*   CALCIUM 8.6* 8.3*   PROT 6.3 6.1   ALBUMIN 1.9* 1.8*   BILITOT 0.2 0.2   ALKPHOS 71 73   AST 25 23   ALT 31 27   ANIONGAP 9 9   EGFRNONAA 10* 8*     All pertinent labs within the past 24 hours have been  reviewed.    Significant Imaging: I have reviewed all pertinent imaging results/findings within the past 24 hours.    Assessment/Plan:      * MRSA bacteremia    Blood Cultures 02/08/18 MRSA in 1 set, 2nd set NGTD  Repeat Blood Cultures 02/10/18 2/2 MRSA  Lactic acid 0.9 02/08/10  Discontinued Daptomycin initially secondary to elevated CPK  Suspected endovascular source is occlusive thrombus in left leg  New IJ catheter placed 2/12  Blood cultures 2/13 NGTD  2/16, 2/17, 2/19 cultures are positive  Blood Culture 2/22/18 are NGTD  Removed all lines 02/19/18 for a line holiday for several days  Currently receiving Daptomycin and Ceftaroline  RANDA today negative for vegetation  Plan is to continue antibiotics for 8 weeks from last (-) blood culture which is on 02/22/18  Will discharge to [facility] [place]  Case Management working on placement.  Awaiting further recs from ID/cardiology        DVT of deep femoral vein, left    Consulted Dr. Nichols, Vascular Surgery for evaluation of thrombus, no acute intervention available  Heparin Infusion  May be endovascular source of bactermia        Drug-induced muscle inflammation              Type 2 diabetes mellitus with chronic kidney disease on chronic dialysis, without long-term current use of insulin    A1c  6.5  PRN insulin  Well-controlled          ESRD (end stage renal disease)    Continue Dialysis MWF per Nephrology  Left Femoral Jaspreet cath removed   R IJ dialysis catheter placed 2/12  Removed R IJ and IVs 02/19/18 for holiday          Chronic atrial fibrillation    Continue Coreg  Rate controlled              VTE Risk Mitigation         Ordered     heparin 25,000 units in dextrose 5% 250 mL (100 units/mL) infusion; FEMALE  Continuous     Route:  Intravenous        02/21/18 1056     heparin 25,000 units in dextrose 5% 250 mL (100 units/mL) bolus from bag; PRN BOLUS  As needed (PRN)     Route:  Intravenous        02/21/18 1056     heparin 25,000 units in dextrose 5% 250 mL  (100 units/mL) bolus from bag; PRN BOLUS  As needed (PRN)     Route:  Intravenous        02/21/18 1056     heparin (porcine) injection 2,000 Units  As needed (PRN)     Route:  Intravenous        02/13/18 0940     heparin (porcine) injection 5,000 Units  As needed (PRN)     Route:  Intra-Catheter        02/12/18 1834     Medium Risk of VTE  Once      02/07/18 2255     Place sequential compression device  Until discontinued      02/07/18 2255     Place ANGELI hose  Until discontinued      02/07/18 2255              Cherelle Echevarria MD  Department of Hospital Medicine   [facility]

## 2018-02-27 NOTE — PLAN OF CARE
Problem: Patient Care Overview  Goal: Plan of Care Review  Outcome: Ongoing (interventions implemented as appropriate)  Recommendations    Recommendation/Intervention:   1. Continue 2000kcal DM + renal diet   2. Consider Novasource Renal due to poor intake      Assessment and Plan           ESRD (end stage renal disease)     Nutrition Diagnosis:  Increased nutrient needs (kcal, protein)     Related to (etiology):   HD     Signs and Symptoms (as evidenced by):   Pt with ESRD on HD      Interventions/Recommendations (treatment strategy):  2000kcal DM + renal diet + recommend Novasource Renal 1x/d      Nutrition Diagnosis Status:   Continues

## 2018-02-27 NOTE — ASSESSMENT & PLAN NOTE
- complicated due to persistence  - blood cultures from 2/22 NGTD  - on ceftaroline and daptomycin  - extensive DVT noted and could be endovascular source  - RANDA negative for vegetation although MV sclerotic with small calcium nodule   - anticipating 8 weeks of IV abx beginning with sterile culture date (2/22)  - CPK normal today 2/26  - will need weekly CBC, CMP, CPK weekly

## 2018-02-27 NOTE — PLAN OF CARE
Message left for Martina in admissions at [facility] to follow up on pending admit & possible need for IV antibiotics at SNF - awaiting response

## 2018-02-27 NOTE — SUBJECTIVE & OBJECTIVE
Interval History: The patient still has significant complaints of back pain worse after ambulation.    Review of Systems   Musculoskeletal: Positive for back pain.     Objective:     Vital Signs (Most Recent):  Temp: 97.9 °F (36.6 °C) (02/27/18 1212)  Pulse: 60 (02/27/18 1212)  Resp: 18 (02/27/18 1212)  BP: (!) 101/44 (02/27/18 1212)  SpO2: 95 % (02/27/18 1212) Vital Signs (24h Range):  Temp:  [97.9 °F (36.6 °C)-98.2 °F (36.8 °C)] 97.9 °F (36.6 °C)  Pulse:  [59-62] 60  Resp:  [16-18] 18  SpO2:  [95 %-98 %] 95 %  BP: (100-125)/(42-67) 101/44     Weight: 111.5 kg (245 lb 13 oz)  Body mass index is 43.54 kg/m².    Intake/Output Summary (Last 24 hours) at 02/27/18 1633  Last data filed at 02/27/18 1254   Gross per 24 hour   Intake          1081.35 ml   Output                0 ml   Net          1081.35 ml      Physical Exam   Constitutional: She is oriented to person, place, and time. She appears well-developed and well-nourished.   HENT:   Head: Normocephalic.   Eyes: Conjunctivae are normal. Right eye exhibits no discharge. Left eye exhibits no discharge.   Neck: Normal range of motion. Neck supple.   Cardiovascular: Regular rhythm and normal heart sounds.  Bradycardia present.    Pulmonary/Chest: Effort normal and breath sounds normal. No respiratory distress.   Abdominal: Soft. Bowel sounds are normal. She exhibits no distension. There is no tenderness.   Musculoskeletal: Normal range of motion.   Neurological: She is alert and oriented to person, place, and time.   Skin: Skin is warm and dry.   Psychiatric: She has a normal mood and affect. Her speech is normal and behavior is normal. Judgment and thought content normal.       Significant Labs:   CBC:   Recent Labs  Lab 02/26/18  0456 02/27/18  0451   WBC 6.09 6.74   HGB 8.3* 8.5*   HCT 25.9* 27.3*    211     CMP:   Recent Labs  Lab 02/26/18  0455 02/27/18  0451   * 134*   K 4.8 4.2   CL 95 99   CO2 25 26   GLU 72 82   BUN 20 8   CREATININE 5.2* 2.9*    CALCIUM 8.3* 8.2*   PROT 6.1 6.5   ALBUMIN 1.8* 2.0*   BILITOT 0.2 0.3   ALKPHOS 73 78   AST 23 26   ALT 27 28   ANIONGAP 9 9   EGFRNONAA 8* 16*     Magnesium:   Recent Labs  Lab 02/26/18  0455 02/27/18  0451   MG 1.7 1.9       Significant Imaging: I have reviewed and interpreted all pertinent imaging results/findings within the past 24 hours.

## 2018-02-27 NOTE — PLAN OF CARE
02/26/18 1905   Discharge Reassessment   Assessment Type Discharge Planning Reassessment   Provided patient/caregiver education on the expected discharge date and the discharge plan Yes   Do you have any problems affording any of your prescribed medications? No   Discharge Plan A Skilled Nursing Facility   Patient choice form signed by patient/caregiver N/A   Can the patient answer the patient profile reliably? Yes, cognitively intact   How does the patient rate their overall health at the present time? Fair   How often would a person be available to care for the patient? Whenever needed  (at SNF)   Number of comorbid conditions (as recorded on the chart) Five or more

## 2018-02-27 NOTE — SUBJECTIVE & OBJECTIVE
Interval History: Tolerating abx. Denies fever or chills. Denies leg pain or swelling. PT in the room.    Review of Systems   Constitutional: Negative for chills and fever.   All other systems reviewed and are negative.    Objective:     Vital Signs (Most Recent):  Temp: 97.8 °F (36.6 °C) (02/26/18 1551)  Pulse: (!) 59 (02/26/18 1941)  Resp: 16 (02/26/18 1941)  BP: 125/67 (02/26/18 1941)  SpO2: 97 % (02/26/18 1941) Vital Signs (24h Range):  Temp:  [97.6 °F (36.4 °C)-98 °F (36.7 °C)] 97.8 °F (36.6 °C)  Pulse:  [52-72] 59  Resp:  [16-18] 16  SpO2:  [96 %-100 %] 97 %  BP: ()/(32-81) 125/67     Weight: 111.5 kg (245 lb 13 oz)  Body mass index is 43.54 kg/m².    Estimated Creatinine Clearance: 12.4 mL/min (A) (based on SCr of 5.2 mg/dL (H)).    Physical Exam   Constitutional: She is oriented to person, place, and time. She appears well-developed and well-nourished. No distress.   HENT:   Head: Normocephalic and atraumatic.   Eyes: EOM are normal. Pupils are equal, round, and reactive to light.   Cardiovascular: Normal rate.    Murmur heard.  Pulmonary/Chest: Effort normal and breath sounds normal. No respiratory distress. She has no wheezes.   Abdominal: Soft. Bowel sounds are normal. She exhibits no distension. There is no tenderness. There is no guarding.   Musculoskeletal: She exhibits no edema.   Neurological: She is alert and oriented to person, place, and time.   Skin: No rash noted. She is not diaphoretic.   Psychiatric: She has a normal mood and affect. Her behavior is normal. Judgment and thought content normal.   Nursing note and vitals reviewed.      Significant Labs:   Blood Culture:     Recent Labs  Lab 02/16/18  1037 02/17/18  1148 02/17/18  1200 02/19/18  0537 02/22/18  0557   LABBLOO Gram stain aer bottle: Gram positive cocci in clusters resembling Staph   Results called to and read back by:Ynes Steiner RN 02/17/2018  10:07  Gram stain vicente bottle: Gram positive cocci in clusters resembling Staph    Positive results previously called 02/17/2018  16:42  METHICILLIN RESISTANT STAPHYLOCOCCUS AUREUSID consult required at [facility]. Gram stain aer bottle: Gram positive cocci in clusters resembling Staph   Results called to and read back by: Ynes Herrera RN  02/18/2018  18:31  STAPHYLOCOCCUS AUREUSID consult required at [facility].For susceptibility see order #[id number] Gram stain vicente bottle: Gram positive cocci in clusters resembling Staph   Results called to and read back by:Ynes Steiner RN 02/18/2018  14:40  Gram stain aer bottle: Gram positive cocci in clusters resembling Staph   Positive results previously called 02/18/2018  18:19  STAPHYLOCOCCUS AUREUSID consult required at [facility].For susceptibility see order #[id number] Gram stain aer bottle: Gram positive cocci in clusters resembling Staph   Gram stain vicente bottle: Gram positive cocci in clusters resembling Staph   Results called to and read back by:Alison Oswald RN 02/20/2018    03:01  METHICILLIN RESISTANT STAPHYLOCOCCUS AUREUSID consult required at [facility]. No Growth to date  No Growth to date  No Growth to date  No Growth to date  No Growth to date     BMP:     Recent Labs  Lab 02/26/18  0455   GLU 72   *   K 4.8   CL 95   CO2 25   BUN 20   CREATININE 5.2*   CALCIUM 8.3*   MG 1.7     CBC:     Recent Labs  Lab 02/25/18  0440 02/26/18  0456   WBC 5.74 6.09   HGB 8.2* 8.3*   HCT 26.6* 25.9*    201       Significant Imaging: I have reviewed all pertinent imaging results/findings within the past 24 hours.

## 2018-02-27 NOTE — PLAN OF CARE
Problem: Physical Therapy Goal  Goal: Physical Therapy Goal  Goals to be met by: 3/15/19     Patient will increase functional independence with mobility by performin. Supine to sit with supervision.   2. Sit to supine with supervision.   3. Sit<>stand transfer with CGA using rolling walker.   4. Gait > 10 feet with CGA using rolling walker.        Outcome: Ongoing (interventions implemented as appropriate)    Patient improved on this day. Required Min A for transfers with RW from bed and bedside commode. Able to take 6~7 steps with Rolling walker Min A of 2 people second person for safety. Patient with bilateral LE/UE weakness.

## 2018-02-27 NOTE — PROGRESS NOTES
Renal Progress Note    Admit Date: 2/7/2018   LOS: 20 days        SUBJECTIVE:     Uneventful night.  Discussed with team.  Pt looks/feels better and asking to go home. No CP/SOB.     Scheduled Meds:   apixaban  2.5 mg Oral BID    calcitRIOL  0.25 mcg Oral Daily    carvedilol  25 mg Oral BID    ceftaroline fosamil  200 mg Intravenous Q12H    DAPTOmycin (CUBICIN)  IV  8 mg/kg Intravenous Q48H    docusate sodium  100 mg Oral BID    epoetin davion (PROCRIT) injection  20,000 Units Subcutaneous Every Mon, Wed, Fri    furosemide  80 mg Oral BID    insulin aspart U-100  1-10 Units Subcutaneous TIDWM    polyethylene glycol  17 g Oral Daily    sevelamer carbonate  800 mg Oral TID WM    SITagliptin  25 mg Oral Daily    vitamin D  2,000 Units Oral Daily    vitamin renal formula (B-complex-vitamin c-folic acid)  1 capsule Oral Daily       OBJECTIVE:     Vital Signs Range (Last 24H):  Temp:  [97.6 °F (36.4 °C)-98.2 °F (36.8 °C)]   Pulse:  [52-65]   Resp:  [16-18]   BP: ()/(32-67)   SpO2:  [95 %-100 %]     I & O (Last 24H):    Intake/Output Summary (Last 24 hours) at 02/27/18 1044  Last data filed at 02/27/18 1040   Gross per 24 hour   Intake          1007.38 ml   Output             1500 ml   Net          -492.62 ml       Physical Exam:  Constitutional: She is oriented to person, place, and time. Morbidly obese, well-nourished.   Head: Normocephalic.   Eyes: Conjunctivae are normal.    Neck: Normal range of motion. Neck supple.   Cardiovascular: Regular rhythm, normal heart sounds.   Pulmonary/Chest: Effort normal and breath sounds normal. No respiratory distress.   Abdominal: Soft, obese. Bowel sounds are normal. She exhibits no distension. There is no tenderness.   Ext:  no appreciable edema  Neurological: NF  Skin: Skin is warm and dry + pallor  Psychiatric: She has a normal mood and affect. Her speech is normal and behavior is normal. Very pleasant.     Access: [facility]      Laboratory:  CBC:     Recent  Labs  Lab 02/27/18  0451   WBC 6.74   RBC 3.09*   HGB 8.5*   HCT 27.3*      MCV 88   MCH 27.5   MCHC 31.1*     BMP:     Recent Labs  Lab 02/27/18  0451   GLU 82   *   K 4.2   CL 99   CO2 26   BUN 8   CREATININE 2.9*   CALCIUM 8.2*   MG 1.9     No results for input(s): CPK, CPKMB, TROPONINI, MB in the last 24 hours.  Impression:   Extensive thrombus throughout the left lower extremity with nonocclusive thrombus in the common femoral vein with occlusive thrombus throughout the femoral, popliteal, and peroneal vein. Anterior and posterior tibial veins are patent.      ASSESSMENT/PLAN:     66 YO WF with ESRD and catheter associated MRSA bacteremia and suspected lumbar foci presents from [facility] for ID and NS services.    1. ESRD:  Dr. Nichols pulled line for holiday, however electrolytes warranted new line for HD on 2/12.  R IJ EULALIO placed 2/12. Continue MWF for now.  Renally dose meds, avoid nephrotoxins, and monitor I/O's closely.  2. Hyperkalemia from dietary choices vs  (E87.5):  Changed diet and consulted dietary for counseling.  Low K bath.  Improved.   3. Rhabdo from Cubicin (M62.82):  D/C'd.  CPK normalized then  daptomycin resumed. Also on on ceftaroline.  4. Anemia of CKD:   Epo with HD.  Folate and B12 normal.      5. 2HPT/Vit D deficiency:  Vit D3 and calcitriol.  6. DM:  Currently on SSI.  Reduced Januvia to ESRD dosing of 25mg/d. Accuchecks good.  7. Hx of Afib:  On Carvedilol.  Defer to cards.     8. MRSA Bacteremia:   -RANDA negative.  - blood cultures from 2/22 NGTD  - on ceftaroline and daptomycin  - extensive DVT noted and could be endovascular source  - anticipating 8 weeks of IV abx beginning with sterile culture date     9. Occlusive LLE DVT (I82.412):  No one was notified of this study.  Started Heparin drip and will transition to Eliquis today since no need for line change with sterile culture.  Dr. Nichols-Vascular surgery feels heparin for now unless pts develops cerulea  phlegmasia.    See above.   Ok to DC to SNF from Renal.

## 2018-02-27 NOTE — ASSESSMENT & PLAN NOTE
Blood Cultures 02/08/18 MRSA in 1 set, 2nd set NGTD  Repeat Blood Cultures 02/10/18 2/2 MRSA  Lactic acid 0.9 02/08/10  Discontinued Daptomycin initially secondary to elevated CPK  Suspected endovascular source is occlusive thrombus in left leg  New IJ catheter placed 2/12  Blood cultures 2/13 NGTD  2/16, 2/17, 2/19 cultures are positive  Blood Culture 2/22/18 are NGTD  Removed all lines 02/19/18 for a line holiday for several days  Currently receiving Daptomycin and Ceftaroline  RANDA today negative for vegetation  Plan is to continue antibiotics for 8 weeks from last (-) blood culture which is on 02/22/18  [place] SNF [place]  Case Management working on placement.

## 2018-02-27 NOTE — ASSESSMENT & PLAN NOTE
Blood Cultures 02/08/18 MRSA in 1 set, 2nd set NGTD  Repeat Blood Cultures 02/10/18 2/2 MRSA  Lactic acid 0.9 02/08/10  Discontinued Daptomycin initially secondary to elevated CPK  Suspected endovascular source is occlusive thrombus in left leg  New IJ catheter placed 2/12  Blood cultures 2/13 NGTD  2/16, 2/17, 2/19 cultures are positive  Blood Culture 2/22/18 are NGTD  Removed all lines 02/19/18 for a line holiday for several days  Currently receiving Daptomycin and Ceftaroline  RANDA today negative for vegetation  Plan is to continue antibiotics for 8 weeks from last (-) blood culture which is on 02/22/18  Will discharge to SNF [place]  Case Management working on placement.  Awaiting further recs from ID/cardiology

## 2018-02-27 NOTE — PLAN OF CARE
Problem: Occupational Therapy Goal  Goal: Occupational Therapy Goal  Goals to be met by: 02/26/18     Patient will increase functional independence with ADLs by performing:    UE Dressing with Minimal Assistance.  LE Dressing with Maximum Assistance.  Rolling to Bilateral with Contact Guard Assistance. (MET 02/20/18)  Supine to sit with Minimal Assistance.  Upper extremity exercise program x10 reps per handout, with assistance as needed.  Assess sit to stand transfers. (MET 02/20/18)  NEW 02/20/18 Sit to stand with RW and Min assist-met 2/27/2018  NEW 02/20/28 SPT to bedside chair/BSC with RW and min assist      Outcome: Ongoing (interventions implemented as appropriate)  Pt. Making progress toward goals.  1 OT goal met today with increased independence with sit<>stand with min assist.  SNF recommended.  Continue with OT POC.

## 2018-02-27 NOTE — PLAN OF CARE
[facility]  [address]  [place] [address]  [phone] after hours  [phone]  Fax      Facility Transfer Orders                        03/01/2018    Admit to: Skilled Nursing Unit    Diagnoses:  Active Hospital Problems    Diagnosis  POA    *MRSA bacteremia [R78.81]  Yes     Priority: 1 - High    ESRD (end stage renal disease) [N18.6]  Yes     Priority: 2     Chronic atrial fibrillation [I48.2]  Yes     Priority: 3     Type 2 diabetes mellitus with chronic kidney disease on chronic dialysis, without long-term current use of insulin [E11.22, N18.6, Z99.2]  Not Applicable     Priority: 4     DVT of deep femoral vein, left [I82.412]  Yes    Drug-induced muscle inflammation [M60.9, T50.905A]  No      Resolved Hospital Problems    Diagnosis Date Resolved POA    Abnormal liver enzymes [R74.8] 02/25/2018 No       Allergies:  Review of patient's allergies indicates:   Allergen Reactions    Sulfa (sulfonamide antibiotics) Anaphylaxis    Albuterol sulfate Palpitations       Vitals:       Every shift (Skilled Nursing patients)    Diet: 2000 ADA, Renal    Activity:     - Up in a chair each morning as tolerated   - Ambulate with assistance to bathroom     Nursing Precautions:            - Fall precautions     - Decubitus precautions:        -  for positioning   - Pressure reducing foam mattress   - Turn patient every two hours. Use wedge pillows to anchor patient    CONSULTS:      PT to evaluate and treat - five times a week     OT to evaluate and treat - five times a week     Nutrition to evaluate and recommend diet      LABS:  CBC, CMP, CPK Qweek      DIABETES CARE:      Check blood sugar:       Fingerstick blood sugar AC and HS        Report CBG < 60 or > 400 to physician.                                          Insulin Sliding Scale          Glucose  Novolog Insulin Subcutaneous        0 - 60   Orange juice or glucose tablet, hold insulin      No  insulin   201-250  2 units   251-300  4 units   301-350  6 units   351-400  8 units   >400   10 units then call physician      Medications: Discontinue all previous medication orders, if any. See new list below.     Yumiko Proctor   Home Medication Instructions VINCE:[id number]    Printed on:02/27/18 9863   Medication Information                      amiodarone (PACERONE) 100 MG Tab  Take 200 mg by mouth once daily.             apixaban 2.5 mg Tab  Take 1 tablet (2.5 mg total) by mouth 2 (two) times daily.             calcitRIOL (ROCALTROL) 0.25 MCG Cap  Take 1 capsule (0.25 mcg total) by mouth once daily.             carvedilol (COREG) 25 MG tablet  Take 25 mg by mouth 2 (two) times daily.             dextrose 5 % SolP 50 mL with ceftaroline fosamil 600 mg SolR 200 mg  Inject 200 mg into the vein every 12 (twelve) hours.  Start Date: 3/2/2018  End Date: 4/4/2018           docusate sodium (COLACE) 100 MG capsule  Take 1 capsule (100 mg total) by mouth 2 (two) times daily.             epoetin davion (PROCRIT) 20,000 unit/mL injection  Inject 1 mL (20,000 Units total) into the skin every Mon, Wed, Fri.             ferric citrate 210 mg iron Tab  Take 210 mg by mouth 3 (three) times daily.             furosemide (LASIX) 80 MG tablet  Take 1 tablet (80 mg total) by mouth 2 (two) times daily.             hydrALAZINE (APRESOLINE) 100 MG tablet  Take 100 mg by mouth 3 (three) times daily.             ISOSORBIDE MONONITRATE ORAL  Take 30 mg by mouth Daily.             oxyCODONE-acetaminophen (PERCOCET) 5-325 mg per tablet  Take 1 tablet by mouth every 4 (four) hours as needed.             sevelamer carbonate (RENVELA) 800 mg Tab  Take 1 tablet (800 mg total) by mouth 3 (three) times daily with meals.             SITagliptin (JANUVIA) 25 MG Tab  Take 1 tablet (25 mg total) by mouth once daily.             sodium chloride 0.9% SolP 50 mL with DAPTOmycin 500 mg SolR 865 mg  Inject 865 mg into the vein every Mon, Wed, Fri. After  dialysis treatments.  Start Date: 3/2/2018  End Date: 4/4/2018           vitamin D 1000 units Tab  Take 2 tablets (2,000 Units total) by mouth once daily.             vitamin renal formula, B-complex-vitamin c-folic acid, (NEPHROCAP) 1 mg Cap  Take 1 capsule by mouth once daily.                 _________________________________  Patricia Lu MD  02/27/2018

## 2018-02-27 NOTE — PLAN OF CARE
02/26/18 1905   Medicare Message   Important Message from Medicare regarding Discharge Appeal Rights Given to patient/caregiver;Explained to patient/caregiver;Signed/date by patient/caregiver   Date IMM was signed 02/26/18   Time IMM was signed 1800

## 2018-02-27 NOTE — PT/OT/SLP PROGRESS
Physical Therapy Treatment    Patient Name:  Yumiko Proctor   MRN:  [id number]    Recommendations:     Discharge Recommendations:  nursing facility, skilled   Discharge Equipment Recommendations: 3-in-1 commode, walker, rolling   Barriers to discharge: Decreased caregiver support    Assessment:     Yumiko Proctor is a 68 y.o. female admitted with a medical diagnosis of MRSA bacteremia.  She presents with the following impairments/functional limitations:  weakness, impaired endurance, impaired self care skills, impaired functional mobilty, gait instability, impaired balance, decreased upper extremity function, decreased lower extremity function, decreased coordination, decreased ROM, pain, impaired joint extensibility patient improved on this day and participated well with PT. Patient still presents with limited endurance and strength. Patient required Min A for transfers. Patient was able to take steps with Rolling walker with Min A of 2 people second person for safety due to bilateral upper and lower extremity weakness. Patient would benefit from sitting in bedside chair next treatment session.     Rehab Prognosis:  fair; patient would benefit from acute skilled PT services to address these deficits and reach maximum level of function.      Recent Surgery: Procedure(s) (LRB):  TRANSESOPHAGEAL ECHOCARDIOGRAM (RANDA) (N/A) 1 Day Post-Op    Plan:     During this hospitalization, patient to be seen 6 x/week to address the above listed problems via gait training, therapeutic activities, therapeutic exercises, neuromuscular re-education  · Plan of Care Expires:  03/19/18   Plan of Care Reviewed with: patient    Subjective     Communicated with nurse prior to session.  Patient found supine upon PT entry to room, agreeable to treatment.      Chief Complaint: patient was agreeable to participate with PT session. Patient then reported I have pain everywhere and I feel weak  Patient comments/goals: none stated    Pain/Comfort:  · (patient never rated pain but complained of pain in LLE initially then back, and also stomach area )    Patients cultural, spiritual, Synagogue conflicts given the current situation: none stated    Objective:     Patient found with: peripheral IV     General Precautions: Standard, fall, contact   Orthopedic Precautions:N/A   Braces: N/A     Functional Mobility:  · Supine to sit with use of bedrail HOB slightly elevated with SBA and encouragement   · Sit to supine with Maximum A of 1 person management of bilateral LE's onto bed, Min A at trunk for safety.         Scooted to head of bed with bilateral knees flexed with using bilateral UE's             Min A of 2 people        Sit to stand from bed with CGA/Min A with rolling walker verbal cues for hand         placement X 3 trials         SPT from bed<>bedside commode with Rolling walker with CGA/Min A     Patient took 6 steps with rolling walker with Min A of 2 people second person for safety patient demo decrease step length, slow deniz, forward flex trunk and bilateral LE weakness.       AM-PAC 6 CLICK MOBILITY  Turning over in bed (including adjusting bedclothes, sheets and blankets)?: 3  Sitting down on and standing up from a chair with arms (e.g., wheelchair, bedside commode, etc.): 3  Moving from lying on back to sitting on the side of the bed?: 2  Moving to and from a bed to a chair (including a wheelchair)?: 3  Need to walk in hospital room?: 2  Climbing 3-5 steps with a railing?: 1  Total Score: 14       Therapeutic Activities and Exercises:  Patient had two episodes of bowel incontinence and required total A for cleaning both times. OT was present for second trial     Patient left bed in seated position with all lines intact, call button in reach and nurse notified..    GOALS:    Physical Therapy Goals        Problem: Physical Therapy Goal    Goal Priority Disciplines Outcome Goal Variances Interventions   Physical Therapy Goal      PT/OT, PT Ongoing (interventions implemented as appropriate)     Description:  Goals to be met by: 3/15/19     Patient will increase functional independence with mobility by performin. Supine to sit with supervision.   2. Sit to supine with supervision.   3. Sit<>stand transfer with CGA using rolling walker.   4. Gait > 10 feet with CGA using rolling walker.                         Time Tracking:     PT Received On: 18  PT Start Time: 1422     PT Stop Time: 1507  PT Total Time (min): 45 min     Billable Minutes: Therapeutic Activity 45    Treatment Type: Treatment  PT/PTA: PTA     PTA Visit Number: 2     Kajal Navas PTA  2018

## 2018-02-27 NOTE — SUBJECTIVE & OBJECTIVE
Interval History: No complaints this morning, currently in dialysis.  RANDA performed this morning.  Awaiting placement to NH in [place]    Review of Systems   Musculoskeletal: Positive for back pain.     Objective:     Vital Signs (Most Recent):  Temp: 97.8 °F (36.6 °C) (02/26/18 1551)  Pulse: (!) 59 (02/26/18 1941)  Resp: 16 (02/26/18 1941)  BP: 125/67 (02/26/18 1941)  SpO2: 97 % (02/26/18 1941) Vital Signs (24h Range):  Temp:  [97.6 °F (36.4 °C)-98 °F (36.7 °C)] 97.8 °F (36.6 °C)  Pulse:  [52-72] 59  Resp:  [16-18] 16  SpO2:  [96 %-100 %] 97 %  BP: ()/(32-81) 125/67     Weight: 111.5 kg (245 lb 13 oz)  Body mass index is 43.54 kg/m².    Intake/Output Summary (Last 24 hours) at 02/26/18 2051  Last data filed at 02/26/18 1430   Gross per 24 hour   Intake           606.88 ml   Output             1500 ml   Net          -893.12 ml      Physical Exam   Constitutional: She is oriented to person, place, and time. She appears well-developed and well-nourished.   HENT:   Head: Normocephalic.   Eyes: Conjunctivae are normal. Right eye exhibits no discharge. Left eye exhibits no discharge.   Neck: Normal range of motion. Neck supple.   Cardiovascular: Regular rhythm and normal heart sounds.  Bradycardia present.    Pulmonary/Chest: Effort normal and breath sounds normal. No respiratory distress.   Abdominal: Soft. Bowel sounds are normal. She exhibits no distension. There is no tenderness.   Musculoskeletal: Normal range of motion.   Neurological: She is alert and oriented to person, place, and time.   Skin: Skin is warm and dry.   Psychiatric: She has a normal mood and affect. Her speech is normal and behavior is normal. Judgment and thought content normal.       Significant Labs:   CBC:   Recent Labs  Lab 02/25/18  0440 02/26/18  0456   WBC 5.74 6.09   HGB 8.2* 8.3*   HCT 26.6* 25.9*    201     CMP:   Recent Labs  Lab 02/25/18  0439 02/26/18  0455   * 129*   K 4.5 4.8   CL 97 95   CO2 26 25   GLU 74 72    BUN 16 20   CREATININE 4.2* 5.2*   CALCIUM 8.6* 8.3*   PROT 6.3 6.1   ALBUMIN 1.9* 1.8*   BILITOT 0.2 0.2   ALKPHOS 71 73   AST 25 23   ALT 31 27   ANIONGAP 9 9   EGFRNONAA 10* 8*     All pertinent labs within the past 24 hours have been reviewed.    Significant Imaging: I have reviewed all pertinent imaging results/findings within the past 24 hours.

## 2018-02-27 NOTE — PLAN OF CARE
Martina from [facility] reports they continue to await auth from [facility] but she has reached out to patient's daughter who is scheduled to complete admission packet - will follow up tomorrow am

## 2018-02-27 NOTE — PLAN OF CARE
Martina returned call & reports they are able to administer IV antibiotics on a case to case basis depending on duration & frequency. Will forward discharge orders once available. Martina to submit for [facility] auth

## 2018-02-28 LAB
ALBUMIN SERPL BCP-MCNC: 1.9 G/DL
ALP SERPL-CCNC: 77 U/L
ALT SERPL W/O P-5'-P-CCNC: 22 U/L
ANION GAP SERPL CALC-SCNC: 10 MMOL/L
AST SERPL-CCNC: 23 U/L
BASOPHILS # BLD AUTO: 0.03 K/UL
BASOPHILS NFR BLD: 0.5 %
BILIRUB SERPL-MCNC: 0.2 MG/DL
BUN SERPL-MCNC: 12 MG/DL
CALCIUM SERPL-MCNC: 8.5 MG/DL
CHLORIDE SERPL-SCNC: 99 MMOL/L
CO2 SERPL-SCNC: 24 MMOL/L
CREAT SERPL-MCNC: 4 MG/DL
DIFFERENTIAL METHOD: ABNORMAL
EOSINOPHIL # BLD AUTO: 0.3 K/UL
EOSINOPHIL NFR BLD: 4.1 %
ERYTHROCYTE [DISTWIDTH] IN BLOOD BY AUTOMATED COUNT: 17.5 %
EST. GFR  (AFRICAN AMERICAN): 13 ML/MIN/1.73 M^2
EST. GFR  (NON AFRICAN AMERICAN): 11 ML/MIN/1.73 M^2
GLUCOSE SERPL-MCNC: 73 MG/DL
HCT VFR BLD AUTO: 25.8 %
HGB BLD-MCNC: 7.8 G/DL
LYMPHOCYTES # BLD AUTO: 2.2 K/UL
LYMPHOCYTES NFR BLD: 33.9 %
MAGNESIUM SERPL-MCNC: 1.7 MG/DL
MCH RBC QN AUTO: 27.1 PG
MCHC RBC AUTO-ENTMCNC: 30.2 G/DL
MCV RBC AUTO: 90 FL
MONOCYTES # BLD AUTO: 0.6 K/UL
MONOCYTES NFR BLD: 9.5 %
NEUTROPHILS # BLD AUTO: 3.4 K/UL
NEUTROPHILS NFR BLD: 51.4 %
PHOSPHATE SERPL-MCNC: 4.5 MG/DL
PLATELET # BLD AUTO: 196 K/UL
PMV BLD AUTO: 10.8 FL
POCT GLUCOSE: 106 MG/DL (ref 70–110)
POCT GLUCOSE: 110 MG/DL (ref 70–110)
POCT GLUCOSE: 152 MG/DL (ref 70–110)
POCT GLUCOSE: 84 MG/DL (ref 70–110)
POTASSIUM SERPL-SCNC: 4.5 MMOL/L
PROT SERPL-MCNC: 6.3 G/DL
RBC # BLD AUTO: 2.88 M/UL
SODIUM SERPL-SCNC: 133 MMOL/L
WBC # BLD AUTO: 6.6 K/UL

## 2018-02-28 PROCEDURE — 25000003 PHARM REV CODE 250: Performed by: NURSE PRACTITIONER

## 2018-02-28 PROCEDURE — 99232 SBSQ HOSP IP/OBS MODERATE 35: CPT | Mod: ,,, | Performed by: INTERNAL MEDICINE

## 2018-02-28 PROCEDURE — 63600175 PHARM REV CODE 636 W HCPCS: Performed by: INTERNAL MEDICINE

## 2018-02-28 PROCEDURE — 36415 COLL VENOUS BLD VENIPUNCTURE: CPT

## 2018-02-28 PROCEDURE — 94761 N-INVAS EAR/PLS OXIMETRY MLT: CPT

## 2018-02-28 PROCEDURE — 83735 ASSAY OF MAGNESIUM: CPT

## 2018-02-28 PROCEDURE — 99232 SBSQ HOSP IP/OBS MODERATE 35: CPT | Mod: ,,, | Performed by: HOSPITALIST

## 2018-02-28 PROCEDURE — 63600175 PHARM REV CODE 636 W HCPCS: Performed by: NURSE PRACTITIONER

## 2018-02-28 PROCEDURE — 11000001 HC ACUTE MED/SURG PRIVATE ROOM

## 2018-02-28 PROCEDURE — 25000003 PHARM REV CODE 250: Performed by: INTERNAL MEDICINE

## 2018-02-28 PROCEDURE — 85025 COMPLETE CBC W/AUTO DIFF WBC: CPT

## 2018-02-28 PROCEDURE — 97110 THERAPEUTIC EXERCISES: CPT

## 2018-02-28 PROCEDURE — 80100016 HC MAINTENANCE HEMODIALYSIS

## 2018-02-28 PROCEDURE — 97530 THERAPEUTIC ACTIVITIES: CPT

## 2018-02-28 PROCEDURE — 80053 COMPREHEN METABOLIC PANEL: CPT

## 2018-02-28 PROCEDURE — 97535 SELF CARE MNGMENT TRAINING: CPT

## 2018-02-28 PROCEDURE — 84100 ASSAY OF PHOSPHORUS: CPT

## 2018-02-28 PROCEDURE — 99900035 HC TECH TIME PER 15 MIN (STAT)

## 2018-02-28 PROCEDURE — 25000003 PHARM REV CODE 250: Performed by: PHYSICIAN ASSISTANT

## 2018-02-28 RX ORDER — OXYCODONE AND ACETAMINOPHEN 5; 325 MG/1; MG/1
1 TABLET ORAL EVERY 4 HOURS PRN
Qty: 28 TABLET | Refills: 0 | Status: ON HOLD | OUTPATIENT
Start: 2018-02-28 | End: 2018-05-04

## 2018-02-28 RX ORDER — OXYCODONE AND ACETAMINOPHEN 5; 325 MG/1; MG/1
1 TABLET ORAL EVERY 4 HOURS PRN
Qty: 28 TABLET | Refills: 0 | Status: SHIPPED | OUTPATIENT
Start: 2018-02-28 | End: 2018-02-28

## 2018-02-28 RX ADMIN — CEFTAROLINE FOSAMIL 200 MG: 600 POWDER, FOR SOLUTION INTRAVENOUS at 03:02

## 2018-02-28 RX ADMIN — SEVELAMER CARBONATE 800 MG: 800 TABLET, FILM COATED ORAL at 10:02

## 2018-02-28 RX ADMIN — VITAMIN D, TAB 1000IU (100/BT) 2000 UNITS: 25 TAB at 10:02

## 2018-02-28 RX ADMIN — SEVELAMER CARBONATE 800 MG: 800 TABLET, FILM COATED ORAL at 01:02

## 2018-02-28 RX ADMIN — ERYTHROPOIETIN 20000 UNITS: 20000 INJECTION, SOLUTION INTRAVENOUS; SUBCUTANEOUS at 03:02

## 2018-02-28 RX ADMIN — SITAGLIPTIN 25 MG: 25 TABLET, FILM COATED ORAL at 10:02

## 2018-02-28 RX ADMIN — Medication 1 CAPSULE: at 10:02

## 2018-02-28 RX ADMIN — OXYCODONE HYDROCHLORIDE AND ACETAMINOPHEN 1 TABLET: 5; 325 TABLET ORAL at 10:02

## 2018-02-28 RX ADMIN — DOCUSATE SODIUM 100 MG: 100 CAPSULE, LIQUID FILLED ORAL at 10:02

## 2018-02-28 RX ADMIN — FUROSEMIDE 80 MG: 40 TABLET ORAL at 10:02

## 2018-02-28 RX ADMIN — APIXABAN 2.5 MG: 2.5 TABLET, FILM COATED ORAL at 10:02

## 2018-02-28 RX ADMIN — CARVEDILOL 25 MG: 12.5 TABLET, FILM COATED ORAL at 08:02

## 2018-02-28 RX ADMIN — FUROSEMIDE 80 MG: 40 TABLET ORAL at 06:02

## 2018-02-28 RX ADMIN — DOCUSATE SODIUM 100 MG: 100 CAPSULE, LIQUID FILLED ORAL at 08:02

## 2018-02-28 RX ADMIN — CALCITRIOL 0.25 MCG: 0.25 CAPSULE, LIQUID FILLED ORAL at 10:02

## 2018-02-28 RX ADMIN — OXYCODONE HYDROCHLORIDE AND ACETAMINOPHEN 1 TABLET: 5; 325 TABLET ORAL at 08:02

## 2018-02-28 RX ADMIN — SEVELAMER CARBONATE 800 MG: 800 TABLET, FILM COATED ORAL at 06:02

## 2018-02-28 NOTE — PLAN OF CARE
Ilan Perez nephrology NP spoke with nephrologist in [place] & reports they don't allow   PICC lines in the arms of their patients. Dr Lu has consulted IR for placement of TLC

## 2018-02-28 NOTE — PROGRESS NOTES
Renal Progress Note    Admit Date: 2/7/2018   LOS: 21 days        SUBJECTIVE:     Uneventful night.  Discussed with team.  [facility].  Awaiting PICC/Midline.  No CP/SOB.    Scheduled Meds:   apixaban  2.5 mg Oral BID    calcitRIOL  0.25 mcg Oral Daily    carvedilol  25 mg Oral BID    ceftaroline fosamil  200 mg Intravenous Q12H    DAPTOmycin (CUBICIN)  IV  8 mg/kg Intravenous Q48H    docusate sodium  100 mg Oral BID    epoetin davion (PROCRIT) injection  20,000 Units Subcutaneous Every Mon, Wed, Fri    furosemide  80 mg Oral BID    insulin aspart U-100  1-10 Units Subcutaneous TIDWM    polyethylene glycol  17 g Oral Daily    sevelamer carbonate  800 mg Oral TID WM    SITagliptin  25 mg Oral Daily    vitamin D  2,000 Units Oral Daily    vitamin renal formula (B-complex-vitamin c-folic acid)  1 capsule Oral Daily       OBJECTIVE:     Vital Signs Range (Last 24H):  Temp:  [96.7 °F (35.9 °C)-98.2 °F (36.8 °C)]   Pulse:  [56-61]   Resp:  [16-18]   BP: (101-126)/(44-80)   SpO2:  [95 %-99 %]     I & O (Last 24H):    Intake/Output Summary (Last 24 hours) at 02/28/18 1113  Last data filed at 02/28/18 0600   Gross per 24 hour   Intake           383.97 ml   Output                0 ml   Net           383.97 ml       Physical Exam:  Constitutional: She is oriented to person, place, and time. Morbidly obese, well-nourished.   Head: Normocephalic.   Eyes: Conjunctivae are normal.    Neck: Normal range of motion. Neck supple.   Cardiovascular: Regular rhythm, normal heart sounds.   Pulmonary/Chest: Effort normal and breath sounds normal. No respiratory distress.   Abdominal: Soft, obese. Bowel sounds are normal. She exhibits no distension. There is no tenderness.   Ext:  no appreciable edema  Neurological: NF  Skin: Skin is warm and dry + pallor  Psychiatric: She has a normal mood and affect. Her speech is normal and behavior is normal. Very pleasant.     Access: [facility]      Laboratory:  CBC:      Recent Labs  Lab 02/28/18  0543   WBC 6.60   RBC 2.88*   HGB 7.8*   HCT 25.8*      MCV 90   MCH 27.1   MCHC 30.2*     BMP:     Recent Labs  Lab 02/28/18  0543   GLU 73   *   K 4.5   CL 99   CO2 24   BUN 12   CREATININE 4.0*   CALCIUM 8.5*   MG 1.7     No results for input(s): CPK, CPKMB, TROPONINI, MB in the last 24 hours.  Impression:   Extensive thrombus throughout the left lower extremity with nonocclusive thrombus in the common femoral vein with occlusive thrombus throughout the femoral, popliteal, and peroneal vein. Anterior and posterior tibial veins are patent.      ASSESSMENT/PLAN:     66 YO WF with ESRD and catheter associated MRSA bacteremia and suspected lumbar foci presents from [facility] for ID and NS services.    1. ESRD:  Dr. Nichols pulled line for holiday, however electrolytes warranted new line for HD on 2/12.  R IJ EULALIO placed 2/12. Continue MWF for now.  Renally dose meds, avoid nephrotoxins, and monitor I/O's closely.  2. Hyperkalemia from dietary choices vs  (E87.5):  Changed diet and consulted dietary for counseling.  Low K bath.  Improved.   3. Rhabdo from Cubicin (M62.82):   CPK normalized then daptomycin resumed. Also on on ceftaroline.  4. Anemia of CKD:   Epo with HD.  Folate and B12 normal.   Hgb trending downward. If this continues she may need to transfusion   5. 2HPT/Vit D deficiency:  Vit D3 and calcitriol.  6. DM:  Currently on SSI.  Reduced Januvia to ESRD dosing of 25mg/d. Accuchecks good.  7. Hx of Afib:  On Carvedilol.  Defer to cards.     8. MRSA Bacteremia:   -RANDA negative.  - blood cultures from 2/22 NGTD  - on ceftaroline and daptomycin  - extensive DVT noted and could be endovascular source  - anticipating 8 weeks of IV abx beginning with sterile culture date     9. Occlusive LLE DVT (I82.412):  No one was notified of this study.  Started Heparin drip and transitioned to Eliquis.    See above.   Ok to DC to SNF from Renal.       Addendum:    Spoke  to Dr. Axel Sharpe ([phone]) ([facility]'s Nephrologist).  They do not allow arm PICC's and we agree as this destroys a potential site for future access.  Spoke to Dr. Francis in IR and he will do neck/SC PICC today or in am.

## 2018-02-28 NOTE — CONSULTS
Consult/H&P Note  Interventional Radiology    Consult Requested By: nephrology/medicine    Reason for Consult: MRSA bacteremia    SUBJECTIVE:     Chief Complaint: MRSA bacteremia    History of Present Illness: 67 yo F with ESRD and MRSA bacteremia.  She needs access for long term antibiotic therapy, but should not receive arm PICC due to ESRD and dialysis dependence.    Past Medical History:   Diagnosis Date    A-fib     Cardiomyopathy     Diabetes mellitus     ESRD (end stage renal disease)      History reviewed. No pertinent surgical history.  History reviewed. No pertinent family history.  Social History   Substance Use Topics    Smoking status: Never Smoker    Smokeless tobacco: Never Used    Alcohol use No       Review of Systems:  As per primary team      OBJECTIVE:     Vital Signs Range (Last 24H):  Temp:  [96.7 °F (35.9 °C)-98.2 °F (36.8 °C)]   Pulse:  [56-61]   Resp:  [16-18]   BP: (110-140)/(41-80)   SpO2:  [95 %-99 %]     Physical Exam:  General- Patient alert and oriented x3 in NAD  CV- Regular rate and rhythm  Resp-  No increased WOB  GI- Non tender/non-distended  Extrem- No cyanosis, clubbing, edema.   Derm- No rashes, masses, or lesions noted  Neuro-  No focal deficits noted.   She has a tunneled R IJ dialysis catheter.    Physical Exam  Body mass index is 43.54 kg/m².    Scheduled Meds:    apixaban  2.5 mg Oral BID    calcitRIOL  0.25 mcg Oral Daily    carvedilol  25 mg Oral BID    ceftaroline fosamil  200 mg Intravenous Q12H    DAPTOmycin (CUBICIN)  IV  8 mg/kg Intravenous Q48H    docusate sodium  100 mg Oral BID    epoetin davion (PROCRIT) injection  20,000 Units Subcutaneous Every Mon, Wed, Fri    furosemide  80 mg Oral BID    insulin aspart U-100  1-10 Units Subcutaneous TIDWM    polyethylene glycol  17 g Oral Daily    sevelamer carbonate  800 mg Oral TID WM    SITagliptin  25 mg Oral Daily    vitamin D  2,000 Units Oral Daily    vitamin renal formula (B-complex-vitamin c-folic  acid)  1 capsule Oral Daily     Continuous Infusions:   PRN Meds:sodium chloride 0.9%, acetaminophen, dextrose 50%, dextrose 50%, glucagon (human recombinant), glucose, glucose, heparin (porcine), heparin (porcine), influenza, ondansetron, oxyCODONE-acetaminophen, pneumoc 13-myron conj-dip cr(PF), ramelteon, sodium chloride 0.9%, traMADol    Allergies:   Review of patient's allergies indicates:   Allergen Reactions    Sulfa (sulfonamide antibiotics) Anaphylaxis    Albuterol sulfate Palpitations       Labs:  No results for input(s): INR in the last 168 hours.    Invalid input(s):  PT,  PTT    Recent Labs  Lab 02/28/18  0543   WBC 6.60   HGB 7.8*   HCT 25.8*   MCV 90         Recent Labs  Lab 02/28/18  0543   GLU 73   *   K 4.5   CL 99   CO2 24   BUN 12   CREATININE 4.0*   CALCIUM 8.5*   MG 1.7   ALT 22   AST 23   ALBUMIN 1.9*   BILITOT 0.2       Vitals (Most Recent):  Temp: 98.1 °F (36.7 °C) (02/28/18 1315)  Pulse: 60 (02/28/18 1600)  Resp: 18 (02/28/18 1600)  BP: 123/61 (02/28/18 1600)  SpO2: 99 % (02/28/18 0808)    ASA: 3  Mallampati: 2    Consent obtained    ASSESSMENT/PLAN:     L IJ tunneled PICC line. Moderate sedation.    Active Hospital Problems    Diagnosis  POA    *MRSA bacteremia [R78.81]  Yes    DVT of deep femoral vein, left [I82.412]  Yes    Drug-induced muscle inflammation [M60.9, T50.905A]  No    Chronic atrial fibrillation [I48.2]  Yes    ESRD (end stage renal disease) [N18.6]  Yes    Type 2 diabetes mellitus with chronic kidney disease on chronic dialysis, without long-term current use of insulin [E11.22, N18.6, Z99.2]  Not Applicable      Resolved Hospital Problems    Diagnosis Date Resolved POA    Abnormal liver enzymes [R74.8] 02/25/2018 No           Aditya Francis MD

## 2018-02-28 NOTE — PLAN OF CARE
Martina at [facility] reports they have SNF auth from [facility]. Continue to await placement of TLC

## 2018-02-28 NOTE — PT/OT/SLP PROGRESS
Physical Therapy      Patient Name:  Yumiko Proctor   MRN:  [id number]    Patient not seen today secondary to Dialysis. Will follow-up tomorrow.    Angeline Curiel, PT

## 2018-02-28 NOTE — PROGRESS NOTES
Called patients primary Nephrologist for PICC insertion approval. Spoke with Dr. Axel Sharpe.  stated that he does not allow PICC insertion in the arms of his patients due to possible thrombus formation. Relayed info to Ilan to call Dr. Sharpe at [phone].

## 2018-02-28 NOTE — PLAN OF CARE
Problem: Patient Care Overview  Goal: Individualization & Mutuality  Outcome: Ongoing (interventions implemented as appropriate)  Bed in low and locked position and able to reposition per self and up with walker.  Remains free of injury during shift.

## 2018-02-28 NOTE — PLAN OF CARE
Patient scheduled for PICC placement today. Spoke with Martina at [facility] who reports they continue to await [employer] auth. Voicemail left with rep from [facility] to follow up on auth

## 2018-02-28 NOTE — PROGRESS NOTES
[facility] Hospital Medicine  Progress Note    Patient Name: Yumiko Proctor  MRN: [id number]  Patient Class: IP- Inpatient   Admission Date: 2/7/2018  Length of Stay: 21 days  Attending Physician: Patricia Lu MD  Primary Care Provider: [facility]        Subjective:     Principal Problem:MRSA bacteremia    HPI:  The patient is a 66 yo female with known MRSA bacteremia who was transferred here for ID and Neurosurgery services.  She has a history of ESRD, Afib, cardiomyopathy, and DM.  She has been hospitalized with bacteremia from a vascular access which was taken out and replaced today with a lt femoral vas cath.  Blood cultures remained positive. She had a RANDA which was normal, MRI of thoracic and lumbar spine showed a abnormal foci in T( that was probably a hemangioma or metastasis.    Hospital Course:  Blood cultures from 02/08/18 2/4 (+) MRSA.  Patient was initially started on Daptomycin upon arrival to this facility.  Cultures were repeated again on 02/10/2018 and 2/2 bottles were positive for MRSA.  ID on board, concerned there is endovascular source.  Left prince catheter was removed and a right IJ tunneled dialysis catheter placed 2/12. Repeat blood cultures on 2/13 remained negative, but one bottle obtained 2/16 growing MRSA and 2/17 blood cultures were also positive for MRSA.  Patient had some myositis from Daptomycin with an elevation in CPK to 5175, the medication was held, and CPK levels normalized.  Vancomycin was given after dialysis treatments.  PT/OT was consulted and recommended skilled nursing upon discharge. Blood cultures remained positive for MRSA.  The patient did not have any further line holidays, but blood cultures drawn on 02/22 remained negative. RANDA was done 02/26 and negative for any vegetations.  The patient did have a DVT in the left leg which was present when she arrived to this facility, and ID suspected this was the possible  endovascular source of the infection since it was so difficult to clear her blood cultures.   Vascular surgery was consulted and did not recommend thrombectomy, and the patient will continue anticoagulation with Apixaban.  ID resumed Daptomycin and started Ceftaroline.  CPK levels have been monitored and there has not been an increase in levels.  The patient will continue this therapy for 8 weeks starting from 2/22/2018, with an end date on 4/4/2018.  The patient will require a tunneled line placement in the neck which will be placed by IR so she can continue her antibiotics.  Awaiting placement in SNF placement in [place].    Interval History: The patient has no complaints this am.    Review of Systems   Musculoskeletal: Positive for back pain.     Objective:     Vital Signs (Most Recent):  Temp: 96.7 °F (35.9 °C) (02/28/18 0808)  Pulse: (!) 56 (02/28/18 0808)  Resp: 18 (02/28/18 0808)  BP: (!) 126/58 (02/28/18 0808)  SpO2: 99 % (02/28/18 0808) Vital Signs (24h Range):  Temp:  [96.7 °F (35.9 °C)-98.2 °F (36.8 °C)] 96.7 °F (35.9 °C)  Pulse:  [56-61] 56  Resp:  [16-18] 18  SpO2:  [95 %-99 %] 99 %  BP: (110-126)/(51-80) 126/58     Weight: 111.5 kg (245 lb 13 oz)  Body mass index is 43.54 kg/m².    Intake/Output Summary (Last 24 hours) at 02/28/18 1336  Last data filed at 02/28/18 0600   Gross per 24 hour   Intake              310 ml   Output                0 ml   Net              310 ml      Physical Exam   Constitutional: She is oriented to person, place, and time. She appears well-developed and well-nourished.   HENT:   Head: Normocephalic.   Eyes: Conjunctivae are normal. Right eye exhibits no discharge. Left eye exhibits no discharge.   Neck: Normal range of motion. Neck supple.   Cardiovascular: Regular rhythm and normal heart sounds.  Bradycardia present.    Pulmonary/Chest: Effort normal and breath sounds normal. No respiratory distress.   Abdominal: Soft. Bowel sounds are normal. She exhibits no distension.  There is no tenderness.   Musculoskeletal: Normal range of motion.   Neurological: She is alert and oriented to person, place, and time.   Skin: Skin is warm and dry.   Psychiatric: She has a normal mood and affect. Her speech is normal and behavior is normal. Judgment and thought content normal.       Significant Labs:   CBC:   Recent Labs  Lab 02/27/18  0451 02/28/18  0543   WBC 6.74 6.60   HGB 8.5* 7.8*   HCT 27.3* 25.8*    196     CMP:   Recent Labs  Lab 02/27/18  0451 02/28/18  0543   * 133*   K 4.2 4.5   CL 99 99   CO2 26 24   GLU 82 73   BUN 8 12   CREATININE 2.9* 4.0*   CALCIUM 8.2* 8.5*   PROT 6.5 6.3   ALBUMIN 2.0* 1.9*   BILITOT 0.3 0.2   ALKPHOS 78 77   AST 26 23   ALT 28 22   ANIONGAP 9 10   EGFRNONAA 16* 11*     Magnesium:   Recent Labs  Lab 02/27/18  0451 02/28/18  0543   MG 1.9 1.7       Significant Imaging: I have reviewed and interpreted all pertinent imaging results/findings within the past 24 hours.    Assessment/Plan:      * MRSA bacteremia    Blood Cultures 02/08/18 MRSA in 1 set, 2nd set NGTD  Repeat Blood Cultures 02/10/18 2/2 MRSA  Lactic acid 0.9 02/08/10  Discontinued Daptomycin initially secondary to elevated CPK  Suspected endovascular source is occlusive thrombus in left leg  New IJ catheter placed 2/12  Blood cultures 2/13 NGTD  2/16, 2/17, 2/19 cultures are positive  Blood Culture 2/22/18 are NGTD  Removed all lines 02/19/18 for a line holiday for several days  Currently receiving Daptomycin and Ceftaroline  RANDA today negative for vegetation  Plan is to continue antibiotics for 8 weeks from last (-) blood culture which is on 02/22/18  [place]  Case Management working on placement.          ESRD (end stage renal disease)    Continue Dialysis MWF per Nephrology  Left Femoral Jaspreet cath removed   R IJ dialysis catheter placed 2/12  Removed R IJ and IVs 02/19/18 for holiday   Patient currently has a right tunneled catheter          Chronic  atrial fibrillation    Continue Coreg  Rate controlled            Type 2 diabetes mellitus with chronic kidney disease on chronic dialysis, without long-term current use of insulin    A1c  6.5  PRN insulin  Well-controlled          DVT of deep femoral vein, left    Consulted Dr. Nichols, Vascular Surgery for evaluation of thrombus, no acute intervention available  May be endovascular source of bactermia  Continue Apixaban today for anticoagulation        Drug-induced muscle inflammation    No current signs of myositis  Continue to check CPK weekly            VTE Risk Mitigation         Ordered     apixaban tablet 2.5 mg  2 times daily     Route:  Oral        02/27/18 1042     heparin (porcine) injection 2,000 Units  As needed (PRN)     Route:  Intravenous        02/13/18 0940     heparin (porcine) injection 5,000 Units  As needed (PRN)     Route:  Intra-Catheter        02/12/18 1834     Medium Risk of VTE  Once      02/07/18 2255     Place sequential compression device  Until discontinued      02/07/18 2255     Place ANGELI hose  Until discontinued      02/07/18 2255              Patricia Lu MD  Department of Hospital Medicine   [facility]

## 2018-02-28 NOTE — PLAN OF CARE
Problem: Occupational Therapy Goal  Goal: Occupational Therapy Goal  Goals to be met by: 02/26/18     Patient will increase functional independence with ADLs by performing:    UE Dressing with Minimal Assistance.  LE Dressing with Maximum Assistance.  Rolling to Bilateral with Contact Guard Assistance. (MET 02/20/18)  Supine to sit with Minimal Assistance.  Upper extremity exercise program x10 reps per handout, with assistance as needed.  Assess sit to stand transfers. (MET 02/20/18)  NEW 02/20/18 Sit to stand with RW and Min assist-met 2/27/2018  NEW 02/20/28 SPT to bedside chair/BSC with RW and min assist       Outcome: Ongoing (interventions implemented as appropriate)  Pt. Making steady gains toward goals.  Continue with OT POC.

## 2018-02-28 NOTE — PT/OT/SLP PROGRESS
Occupational Therapy   Treatment    Name: Yumiko Proctor  MRN: [id number]  Admitting Diagnosis:  MRSA bacteremia  2 Days Post-Op    Recommendations:     Discharge Recommendations: nursing facility, skilled  Discharge Equipment Recommendations:  3-in-1 commode, walker, rolling  Barriers to discharge:  None    Subjective     Communicated with: nurse prior to session.  Pain/Comfort:  · Pain Rating 1: 3/10  · Location - Side 1: Bilateral  · Location - Orientation 1: generalized  · Location 1: back  · Pain Addressed 1: Pre-medicate for activity, Reposition, Distraction  · Pain Rating Post-Intervention 1: 7/10    Patients cultural, spiritual, Adventism conflicts given the current situation: na    Objective:     Patient found with: bed alarm    General Precautions: Standard, fall, contact   Orthopedic Precautions:N/A   Braces: N/A     Occupational Performance:    Bed Mobility:    · Patient completed Rolling/Turning to Left with  stand by assistance and with side rail  · Patient completed Scooting/Bridging with minimum assistance and with side rail  · Patient completed Supine to Sit with minimum assistance, with side rail and HOB slightly elevated   · Patient completed Sit to Supine with moderate assistance for BLE assist     Functional Mobility/Transfers:  · Patient completed Sit <> Stand Transfer with minimum assistance  with  rolling walker   · Patient completed Toilet Transfer Step Transfer technique with minimum assistance with  bedside commode and rolling walker  · Functional Mobility: min assist few steps toward HOB; assisted with walker placment and repositioning    Activities of Daily Living:  · Toileting: total assistance from [facility] for clothes and rear hygiene 2/2 limited reach    Patient left HOB elevated with all lines intact, call button in reach and bed alarm on    AMPAC 6 Click:  [facility] Total Score: 14    Treatment & Education:  BUE AROM towe//dlowel ex:  10 x 1 set chest press, chops and lifts, rowing (clock and  counter clock) and shld flexion/ext supine in bed; isometric shld strengthening ex all planes supine in bed x 5 reps each arm  Education:    Assessment:     Yumiko Proctor is a 68 y.o. female with a medical diagnosis of MRSA bacteremia.  She presents with back pain with back to bed transition otherwise progressing toward OT goals.  Performance deficits affecting function are weakness, impaired functional mobilty, impaired balance, decreased upper extremity function, decreased lower extremity function, pain, decreased safety awareness, decreased ROM, gait instability, impaired self care skills, impaired endurance.      Rehab Prognosis:  good; patient would benefit from acute skilled OT services to address these deficits and reach maximum level of function.       Plan:     Patient to be seen 5 x/week to address the above listed problems via self-care/home management, therapeutic activities, therapeutic exercises  · Plan of Care Expires: 03/15/18  · Plan of Care Reviewed with: patient    This Plan of care has been discussed with the patient who was involved in its development and understands and is in agreement with the identified goals and treatment plan    GOALS:    Occupational Therapy Goals        Problem: Occupational Therapy Goal    Goal Priority Disciplines Outcome Interventions   Occupational Therapy Goal     OT, PT/OT Ongoing (interventions implemented as appropriate)    Description:  Goals to be met by: 02/26/18     Patient will increase functional independence with ADLs by performing:    UE Dressing with Minimal Assistance.  LE Dressing with Maximum Assistance.  Rolling to Bilateral with Contact Guard Assistance. (MET 02/20/18)  Supine to sit with Minimal Assistance.  Upper extremity exercise program x10 reps per handout, with assistance as needed.  Assess sit to stand transfers. (MET 02/20/18)  NEW 02/20/18 Sit to stand with RW and Min assist-met 2/27/2018  NEW 02/20/28 SPT to bedside chair/BSC with RW and  min assist                        Time Tracking:     OT Date of Treatment: 02/28/18  OT Start Time: 1106  OT Stop Time: 1152  OT Total Time (min): 46 min    Billable Minutes:Self Care/Home Management 10  Therapeutic Activity 15  Therapeutic Exercise 21  Total Time 46    Liza Reyes OT  2/28/2018

## 2018-02-28 NOTE — PLAN OF CARE
Spoke to Didier with [facility] who plans to finalize SNF auth shortly. Updated PT/OT notes faxed at his request

## 2018-03-01 VITALS
BODY MASS INDEX: 43.55 KG/M2 | WEIGHT: 245.81 LBS | OXYGEN SATURATION: 98 % | DIASTOLIC BLOOD PRESSURE: 47 MMHG | SYSTOLIC BLOOD PRESSURE: 112 MMHG | RESPIRATION RATE: 18 BRPM | HEART RATE: 57 BPM | HEIGHT: 63 IN | TEMPERATURE: 97 F

## 2018-03-01 LAB
ALBUMIN SERPL BCP-MCNC: 1.9 G/DL
ALP SERPL-CCNC: 76 U/L
ALT SERPL W/O P-5'-P-CCNC: 19 U/L
ANION GAP SERPL CALC-SCNC: 9 MMOL/L
AST SERPL-CCNC: 18 U/L
BASOPHILS # BLD AUTO: 0.02 K/UL
BASOPHILS NFR BLD: 0.3 %
BILIRUB SERPL-MCNC: 0.2 MG/DL
BUN SERPL-MCNC: 8 MG/DL
CALCIUM SERPL-MCNC: 8.3 MG/DL
CHLORIDE SERPL-SCNC: 101 MMOL/L
CO2 SERPL-SCNC: 26 MMOL/L
CREAT SERPL-MCNC: 3 MG/DL
DIFFERENTIAL METHOD: ABNORMAL
EOSINOPHIL # BLD AUTO: 0.2 K/UL
EOSINOPHIL NFR BLD: 3.3 %
ERYTHROCYTE [DISTWIDTH] IN BLOOD BY AUTOMATED COUNT: 17.6 %
EST. GFR  (AFRICAN AMERICAN): 18 ML/MIN/1.73 M^2
EST. GFR  (NON AFRICAN AMERICAN): 15 ML/MIN/1.73 M^2
GLUCOSE SERPL-MCNC: 95 MG/DL
HCT VFR BLD AUTO: 28.1 %
HGB BLD-MCNC: 8.3 G/DL
LYMPHOCYTES # BLD AUTO: 2.1 K/UL
LYMPHOCYTES NFR BLD: 35.1 %
MAGNESIUM SERPL-MCNC: 1.7 MG/DL
MCH RBC QN AUTO: 27.3 PG
MCHC RBC AUTO-ENTMCNC: 29.5 G/DL
MCV RBC AUTO: 92 FL
MONOCYTES # BLD AUTO: 0.7 K/UL
MONOCYTES NFR BLD: 11.6 %
NEUTROPHILS # BLD AUTO: 3 K/UL
NEUTROPHILS NFR BLD: 49.4 %
PHOSPHATE SERPL-MCNC: 3.3 MG/DL
PLATELET # BLD AUTO: 227 K/UL
PMV BLD AUTO: 10 FL
POCT GLUCOSE: 77 MG/DL (ref 70–110)
POCT GLUCOSE: 78 MG/DL (ref 70–110)
POCT GLUCOSE: 89 MG/DL (ref 70–110)
POTASSIUM SERPL-SCNC: 4.6 MMOL/L
PROT SERPL-MCNC: 6.2 G/DL
RBC # BLD AUTO: 3.04 M/UL
SODIUM SERPL-SCNC: 136 MMOL/L
WBC # BLD AUTO: 6.06 K/UL

## 2018-03-01 PROCEDURE — 84100 ASSAY OF PHOSPHORUS: CPT

## 2018-03-01 PROCEDURE — 97110 THERAPEUTIC EXERCISES: CPT

## 2018-03-01 PROCEDURE — 25000003 PHARM REV CODE 250: Performed by: INTERNAL MEDICINE

## 2018-03-01 PROCEDURE — 94761 N-INVAS EAR/PLS OXIMETRY MLT: CPT

## 2018-03-01 PROCEDURE — 97530 THERAPEUTIC ACTIVITIES: CPT

## 2018-03-01 PROCEDURE — 25000003 PHARM REV CODE 250: Performed by: PHYSICIAN ASSISTANT

## 2018-03-01 PROCEDURE — 25000003 PHARM REV CODE 250

## 2018-03-01 PROCEDURE — 63600175 PHARM REV CODE 636 W HCPCS: Performed by: HOSPITALIST

## 2018-03-01 PROCEDURE — 85025 COMPLETE CBC W/AUTO DIFF WBC: CPT

## 2018-03-01 PROCEDURE — 25000003 PHARM REV CODE 250: Performed by: NURSE PRACTITIONER

## 2018-03-01 PROCEDURE — 36415 COLL VENOUS BLD VENIPUNCTURE: CPT

## 2018-03-01 PROCEDURE — 99152 MOD SED SAME PHYS/QHP 5/>YRS: CPT | Performed by: RADIOLOGY

## 2018-03-01 PROCEDURE — 80053 COMPREHEN METABOLIC PANEL: CPT

## 2018-03-01 PROCEDURE — 97535 SELF CARE MNGMENT TRAINING: CPT

## 2018-03-01 PROCEDURE — 86580 TB INTRADERMAL TEST: CPT | Performed by: HOSPITALIST

## 2018-03-01 PROCEDURE — 63600175 PHARM REV CODE 636 W HCPCS: Performed by: INTERNAL MEDICINE

## 2018-03-01 PROCEDURE — 63600175 PHARM REV CODE 636 W HCPCS

## 2018-03-01 PROCEDURE — 63600175 PHARM REV CODE 636 W HCPCS: Performed by: RADIOLOGY

## 2018-03-01 PROCEDURE — 0JH63XZ INSERTION OF TUNNELED VASCULAR ACCESS DEVICE INTO CHEST SUBCUTANEOUS TISSUE AND FASCIA, PERCUTANEOUS APPROACH: ICD-10-PCS | Performed by: RADIOLOGY

## 2018-03-01 PROCEDURE — 02HV33Z INSERTION OF INFUSION DEVICE INTO SUPERIOR VENA CAVA, PERCUTANEOUS APPROACH: ICD-10-PCS | Performed by: RADIOLOGY

## 2018-03-01 PROCEDURE — C1788 PORT, INDWELLING, IMP: HCPCS | Performed by: RADIOLOGY

## 2018-03-01 PROCEDURE — 99239 HOSP IP/OBS DSCHRG MGMT >30: CPT | Mod: ,,, | Performed by: HOSPITALIST

## 2018-03-01 PROCEDURE — 83735 ASSAY OF MAGNESIUM: CPT

## 2018-03-01 PROCEDURE — C1894 INTRO/SHEATH, NON-LASER: HCPCS | Performed by: RADIOLOGY

## 2018-03-01 RX ORDER — MIDAZOLAM HYDROCHLORIDE 1 MG/ML
INJECTION, SOLUTION INTRAMUSCULAR; INTRAVENOUS
Status: DISCONTINUED | OUTPATIENT
Start: 2018-03-01 | End: 2018-03-01 | Stop reason: HOSPADM

## 2018-03-01 RX ORDER — FENTANYL CITRATE 50 UG/ML
INJECTION, SOLUTION INTRAMUSCULAR; INTRAVENOUS
Status: DISCONTINUED | OUTPATIENT
Start: 2018-03-01 | End: 2018-03-01 | Stop reason: HOSPADM

## 2018-03-01 RX ADMIN — CEFTAROLINE FOSAMIL 200 MG: 600 POWDER, FOR SOLUTION INTRAVENOUS at 01:03

## 2018-03-01 RX ADMIN — SITAGLIPTIN 25 MG: 25 TABLET, FILM COATED ORAL at 09:03

## 2018-03-01 RX ADMIN — CALCITRIOL 0.25 MCG: 0.25 CAPSULE, LIQUID FILLED ORAL at 09:03

## 2018-03-01 RX ADMIN — OXYCODONE HYDROCHLORIDE AND ACETAMINOPHEN 1 TABLET: 5; 325 TABLET ORAL at 04:03

## 2018-03-01 RX ADMIN — OXYCODONE HYDROCHLORIDE AND ACETAMINOPHEN 1 TABLET: 5; 325 TABLET ORAL at 09:03

## 2018-03-01 RX ADMIN — VITAMIN D, TAB 1000IU (100/BT) 2000 UNITS: 25 TAB at 09:03

## 2018-03-01 RX ADMIN — SEVELAMER CARBONATE 800 MG: 800 TABLET, FILM COATED ORAL at 12:03

## 2018-03-01 RX ADMIN — TRAMADOL HYDROCHLORIDE 50 MG: 50 TABLET, COATED ORAL at 06:03

## 2018-03-01 RX ADMIN — Medication 1 CAPSULE: at 09:03

## 2018-03-01 RX ADMIN — SEVELAMER CARBONATE 800 MG: 800 TABLET, FILM COATED ORAL at 09:03

## 2018-03-01 RX ADMIN — CARVEDILOL 25 MG: 12.5 TABLET, FILM COATED ORAL at 09:03

## 2018-03-01 RX ADMIN — DOCUSATE SODIUM 100 MG: 100 CAPSULE, LIQUID FILLED ORAL at 09:03

## 2018-03-01 RX ADMIN — FUROSEMIDE 80 MG: 40 TABLET ORAL at 09:03

## 2018-03-01 RX ADMIN — POLYETHYLENE GLYCOL 3350 17 G: 17 POWDER, FOR SOLUTION ORAL at 09:03

## 2018-03-01 RX ADMIN — TUBERCULIN PURIFIED PROTEIN DERIVATIVE 5 UNITS: 5 INJECTION, SOLUTION INTRADERMAL at 04:03

## 2018-03-01 NOTE — PT/OT/SLP PROGRESS
Physical Therapy Treatment    Patient Name:  Yumiko Proctor   MRN:  [id number]    Recommendations:     Discharge Recommendations:  nursing facility, skilled   Discharge Equipment Recommendations: 3-in-1 commode, walker, rolling   Barriers to discharge: Decreased caregiver support    Assessment:     Yumiko Proctor is a 68 y.o. female admitted with a medical diagnosis of MRSA bacteremia.  She presents with the following impairments/functional limitations:  weakness, impaired endurance, impaired self care skills, impaired functional mobilty, gait instability, decreased ROM, decreased lower extremity function, decreased upper extremity function patient improved with mobility on this day and had no complaints of pain. Patient required rest breaks and tolerated sitting in bedside chair for approx~2 hours.     Rehab Prognosis:  good; patient would benefit from acute skilled PT services to address these deficits and reach maximum level of function.      Recent Surgery: Procedure(s) (LRB):  TRANSESOPHAGEAL ECHOCARDIOGRAM (RANDA) (N/A) 3 Days Post-Op    Plan:     During this hospitalization, patient to be seen 6 x/week to address the above listed problems via gait training, therapeutic activities, therapeutic exercises, neuromuscular re-education  · Plan of Care Expires:  03/19/18   Plan of Care Reviewed with: patient    Subjective     Communicated with nurse prior to session.  Patient found supine upon PT entry to room, agreeable to treatment.      Chief Complaint: patient reported I keep having bowel movements  Patient comments/goals: patient reported I'm going for a procedure  Pain/Comfort:  · Pain Rating 1:  (patient had no complaints of pain but complained of being weakl)    Patients cultural, spiritual, Hinduism conflicts given the current situation: none stated    Objective:     Patient found with: peripheral IV     General Precautions: Standard, fall, contact   Orthopedic Precautions:N/A   Braces: N/A     Functional  Mobility:   · Supine to sit with SBA with HOB slightly elevated.   · Sit to stand from bed X 3 trials with Rolling walker with CGA verbal cues for hand placement.   · Stand to sit onto bedside with Rolling walker with CGA/Min A control descent verbal cues for hand placement each trial.   · SPT from bed to bedside chair with Rolling walker with CGA/Min A for safety and balance. Patient demo decrease step length, slow deniz, forward flex trunk.   · Sit to stand from bedside chair with Rolling walker with Min A to elevate hips verbal cues for hand placement and encouragement.     AM-PAC 6 CLICK MOBILITY  Turning over in bed (including adjusting bedclothes, sheets and blankets)?: 3  Sitting down on and standing up from a chair with arms (e.g., wheelchair, bedside commode, etc.): 3  Moving from lying on back to sitting on the side of the bed?: 2  Moving to and from a bed to a chair (including a wheelchair)?: 3  Need to walk in hospital room?: 2  Climbing 3-5 steps with a railing?: 1  Total Score: 14       Therapeutic Activities and Exercises:  Patient had incontinent episode bowel movement and required total A for cleaning.     Patient left up in chair with all lines intact, call button in reach and nurse notified..    GOALS:    Physical Therapy Goals        Problem: Physical Therapy Goal    Goal Priority Disciplines Outcome Goal Variances Interventions   Physical Therapy Goal     PT/OT, PT Ongoing (interventions implemented as appropriate)     Description:  Goals to be met by: 3/15/19     Patient will increase functional independence with mobility by performin. Supine to sit with supervision.   2. Sit to supine with supervision.   3. Sit<>stand transfer with CGA using rolling walker.   4. Gait > 10 feet with CGA using rolling walker.                         Time Tracking:     PT Received On: 18  PT Start Time: 1011     PT Stop Time: 1052  PT Total Time (min): 41 min     Billable Minutes: Therapeutic  Activity 41    Treatment Type: Treatment  PT/PTA: PTA     PTA Visit Number: 3     Kajal Navas, PTA  03/01/2018

## 2018-03-01 NOTE — PLAN OF CARE
03/01/18 1624   Medicare Message   Important Message from Medicare regarding Discharge Appeal Rights Given to patient/caregiver;Explained to patient/caregiver;Signed/date by patient/caregiver   Date IMM was signed 03/01/18   Time IMM was signed 0900

## 2018-03-01 NOTE — PLAN OF CARE
Problem: Physical Therapy Goal  Goal: Physical Therapy Goal  Goals to be met by: 3/15/19     Patient will increase functional independence with mobility by performin. Supine to sit with supervision.   2. Sit to supine with supervision.   3. Sit<>stand transfer with CGA using rolling walker.   4. Gait > 10 feet with CGA using rolling walker.        Outcome: Ongoing (interventions implemented as appropriate)    Patient improved on this day and tolerated sitting in bedside chair. Use of rolling walker

## 2018-03-01 NOTE — PLAN OF CARE
"Problem: Occupational Therapy Goal  Goal: Occupational Therapy Goal  New Goals to be achieved:   3/8/2018    1. toileting max assist for clothes management  2. Standing at sink for 1 grooming task with RW and CGA  3. LB dressing with max assist with Aaptive devices      Goals to be met by: 02/26/18     Patient will increase functional independence with ADLs by performing:    UE Dressing with Minimal Assistance.-not met 2/26/2018  LE Dressing with Maximum Assistance.-not met 2/26/2018  Rolling to Bilateral with Contact Guard Assistance. (MET 02/20/18)  Supine to sit with Minimal Assistance.-not met 2/26/2018  Upper extremity exercise program x10 reps per handout, with assistance as needed.  Assess sit to stand transfers. (MET 02/20/18)  NEW 02/20/18 Sit to stand with RW and Min assist-met 2/27/2018  NEW 02/20/28 SPT to bedside chair/BSC with RW and min assist-goal met 3/1/2018       Outcome: Ongoing (interventions implemented as appropriate)  Pt. Met BSC and Bedside chair and BSc t/f goal.  Min assist with RW. Still c/o pain at 10/10 right mid back with transitional movement ahmet. Sit>supine in bed then pt reported pain subsided to 2/10 when relaxed and "calmed down."Pt. Making steady gains toward goals. Possible t/f to SNF today. Continue with OT POC.       "

## 2018-03-01 NOTE — SUBJECTIVE & OBJECTIVE
Interval History: Tolerating abx. Denies fever or chills.   C/O posterior right rib pain.    Review of Systems   Constitutional: Negative for chills and fever.   All other systems reviewed and are negative.    Objective:     Vital Signs (Most Recent):  Temp: 97.3 °F (36.3 °C) (02/28/18 1957)  Pulse: 66 (02/28/18 1957)  Resp: 16 (02/28/18 1957)  BP: (!) 111/52 (02/28/18 1957)  SpO2: 98 % (02/28/18 1957) Vital Signs (24h Range):  Temp:  [96.7 °F (35.9 °C)-98.2 °F (36.8 °C)] 97.3 °F (36.3 °C)  Pulse:  [56-66] 66  Resp:  [16-18] 16  SpO2:  [95 %-99 %] 98 %  BP: (111-140)/(41-66) 111/52     Weight: 111.5 kg (245 lb 13 oz)  Body mass index is 43.54 kg/m².    Estimated Creatinine Clearance: 16.2 mL/min (A) (based on SCr of 4 mg/dL (H)).    Physical Exam   Constitutional: She is oriented to person, place, and time. She appears well-developed and well-nourished. No distress.   Right catheter in place.   HENT:   Head: Normocephalic and atraumatic.   Eyes: EOM are normal. Pupils are equal, round, and reactive to light.   Cardiovascular: Normal rate.    Murmur heard.  Pulmonary/Chest: Effort normal and breath sounds normal. No respiratory distress. She has no wheezes.   Abdominal: Soft. Bowel sounds are normal. She exhibits no distension. There is no tenderness. There is no guarding.   Musculoskeletal: She exhibits no edema.   Some tenderness over right posterior lower ribs.   Neurological: She is alert and oriented to person, place, and time.   Skin: No rash noted. She is not diaphoretic.   Psychiatric: She has a normal mood and affect. Her behavior is normal. Judgment and thought content normal.   Nursing note and vitals reviewed.      Significant Labs:   Blood Culture:     Recent Labs  Lab 02/16/18  1037 02/17/18  1148 02/17/18  1200 02/19/18  0537 02/22/18  0557   LABBLOO Gram stain aer bottle: Gram positive cocci in clusters resembling Staph   Results called to and read back by:Ynes Steiner RN 02/17/2018  10:07  Gram  stain vicente bottle: Gram positive cocci in clusters resembling Staph   Positive results previously called 02/17/2018  16:42  METHICILLIN RESISTANT STAPHYLOCOCCUS AUREUSID consult required at [facility]. Gram stain aer bottle: Gram positive cocci in clusters resembling Staph   Results called to and read back by: Ynes Herrera RN  02/18/2018  18:31  STAPHYLOCOCCUS AUREUSID consult required at [facility].For susceptibility see order #[id number] Gram stain vicente bottle: Gram positive cocci in clusters resembling Staph   Results called to and read back by:Ynes Steiner RN 02/18/2018  14:40  Gram stain aer bottle: Gram positive cocci in clusters resembling Staph   Positive results previously called 02/18/2018  18:19  STAPHYLOCOCCUS AUREUSID consult required at [facility].For susceptibility see order #[id number] Gram stain aer bottle: Gram positive cocci in clusters resembling Staph   Gram stain vicente bottle: Gram positive cocci in clusters resembling Staph   Results called to and read back by:Alison Oswald RN 02/20/2018    03:01  METHICILLIN RESISTANT STAPHYLOCOCCUS AUREUSID consult required at [facility]. No growth after 5 days.     BMP:     Recent Labs  Lab 02/28/18  0543   GLU 73   *   K 4.5   CL 99   CO2 24   BUN 12   CREATININE 4.0*   CALCIUM 8.5*   MG 1.7     CBC:     Recent Labs  Lab 02/27/18  0451 02/28/18  0543   WBC 6.74 6.60   HGB 8.5* 7.8*   HCT 27.3* 25.8*    196       Significant Imaging: I have reviewed all pertinent imaging results/findings within the past 24 hours.

## 2018-03-01 NOTE — PROCEDURES
Radiology Post-Procedure Note    Pre Op Diagnosis: MRSA  Post Op Diagnosis: Same    Procedure: tunneled PICC    Procedure performed by: Aditya Francis MD    Written Informed Consent Obtained: Yes  Specimen Removed: NO  Estimated Blood Loss: Minimal    Findings:   Successful L IJ tunneled PICC placement.    Patient tolerated procedure well.    @SIG@

## 2018-03-01 NOTE — PROGRESS NOTES
NO changes to yesterdays consult note.  ASA: 3  Mallampati:2  L IJ tunneled PICC.  Moderate sedation

## 2018-03-01 NOTE — PT/OT/SLP PROGRESS
Occupational Therapy   Treatment    Name: Yumiko Proctor  MRN: [id number]  Admitting Diagnosis:  MRSA bacteremia  Day of Surgery    Recommendations:     Discharge Recommendations: nursing facility, skilled  Discharge Equipment Recommendations:  3-in-1 commode, walker, rolling  Barriers to discharge:  None    Subjective     Communicated with: nurse and PTA prior to session.  Pain/Comfort:  · Pain Rating 1: 10/10 (none at rest but 10/10 with transitional movment OOB/back to bed  and chair )  · Location - Side 1: Right  · Location - Orientation 1: midline  · Location 1: back  · Pain Addressed 1: Pre-medicate for activity, Reposition, Distraction, Cessation of Activity, Nurse notified  · Pain Rating Post-Intervention 1: 2/10    Patients cultural, spiritual, Episcopal conflicts given the current situation: na    Objective:     Patient found with: bed alarm    General Precautions: Standard, contact, fall   Orthopedic Precautions:N/A   Braces: N/A     Occupational Performance:    Bed Mobility:    · Patient completed Scooting/Bridging with minimum assistance  ·   · Patient completed Sit to Supine with moderate assistance     Functional Mobility/Transfers:  · Patient completed Sit <> Stand Transfer with minimum assistance  with  rolling walker   · Patient completed Bed <> Chair Transfer using Step Transfer technique with minimum assistance with rolling walker  · Functional Mobility: NA  ·     Activities of Daily Living:  · Grooming: minimum assistance to reach back top of haed; Setup for face and oral hygiene to risnce mouth seated BS chair  · Toileting: total assistance standing incontinent of bowels while transferring from bed>BS chair; stood with RW CGA 1 person and 2nd person performed cleanup from standing  Position total assist    Patient left HOB elevated with all lines intact, call button in reach, bed alarm on and nurse notified    [facility] 6 Click:  [facility] Total Score: 14    Treatment & Education:  BUE AROM: shld shrugs  "10 reps, scapula retraction, towel table top slides 10 reps forward/backward/circles counter clockwise with SBA seated with shld away from chair.  Pt. Up in chair ~1 hr when c/o "seeing white spots before eyes."  BP checked:  94/44; HR 50; legs elevated and instructed pt in ankle pumps.  BP rechecked:  101/449 HR 49.  Pt. Assisted back to bed with min assist with RW.  Nurse notified.  Education:    Assessment:     Yumiko Proctor is a 68 y.o. female with a medical diagnosis of MRSA bacteremia.  She presents with  1 goal met and goals updated. Low BP after 1 hr sitting up in BS chair today. This is pt.'s first time up in BS chair for extended period. Pt. still c/o pain at 10/10 right mid back with transitional movement ahmet. Sit>supine in bed then pt reported pain subsided to 2/10 when relaxed and "calmed down."Pt. Making steady gains toward goals. Possible t/f to SNF today. Continue with OT POC. Performance deficits affecting function are weakness, impaired functional mobilty, impaired balance, decreased upper extremity function, decreased lower extremity function, gait instability, impaired self care skills, impaired endurance, decreased safety awareness, pain, decreased ROM.      Rehab Prognosis:  good; patient would benefit from acute skilled OT services to address these deficits and reach maximum level of function.       Plan:     Patient to be seen 5 x/week to address the above listed problems via self-care/home management, therapeutic activities, therapeutic exercises  · Plan of Care Expires: 03/15/18  · Plan of Care Reviewed with: patient    This Plan of care has been discussed with the patient who was involved in its development and understands and is in agreement with the identified goals and treatment plan    GOALS:    Occupational Therapy Goals        Problem: Occupational Therapy Goal    Goal Priority Disciplines Outcome Interventions   Occupational Therapy Goal     OT, PT/OT Ongoing (interventions " implemented as appropriate)    Description:  New Goals to be achieved:   3/8/2018    1. toileting max assist for clothes management  2. Standing at sink for 1 grooming task with RW and CGA  3. LB dressing with max assist with Aaptive devices      Goals to be met by: 02/26/18     Patient will increase functional independence with ADLs by performing:    UE Dressing with Minimal Assistance.-not met 2/26/2018  LE Dressing with Maximum Assistance.-not met 2/26/2018  Rolling to Bilateral with Contact Guard Assistance. (MET 02/20/18)  Supine to sit with Minimal Assistance.-not met 2/26/2018  Upper extremity exercise program x10 reps per handout, with assistance as needed.  Assess sit to stand transfers. (MET 02/20/18)  NEW 02/20/18 Sit to stand with RW and Min assist-met 2/27/2018  NEW 02/20/28 SPT to bedside chair/BSC with RW and min assist-goal met 3/1/2018                         Time Tracking:     OT Date of Treatment: 03/01/18  OT Start Time: 1045  OT Stop Time: 1147  OT Total Time (min): 62 min    Billable Minutes:Self Care/Home Management 15  Therapeutic Activity 15  Therapeutic Exercise 32  Total Time 62    Liza Reyes OT  3/1/2018

## 2018-03-01 NOTE — PLAN OF CARE
Martina from [facility] reports patient has been accepted for admit today. Report can be called to [phone]. Transportation set up with [facility] ambulance van. Pickup scheduled for 4pm. Daughter Keira notified. RN Alessanrda aware & to call report      03/01/18 0264   Final Note   Assessment Type Final Discharge Note   Discharge Disposition SNF   What phone number can be called within the next 1-3 days to see how you are doing after discharge? [phone]   Right Care Referral Info   Post Acute Recommendation SNF / Sub-Acute Rehab   Facility Name [facility]

## 2018-03-01 NOTE — PROGRESS NOTES
Renal Progress Note    Admit Date: 2/7/2018   LOS: 22 days        SUBJECTIVE:     Uneventful night.  Discussed with team.  Looks great.  First time I've seen her sitting in chair.  No CP/SOB.  Awaiting PICC then DC.    Scheduled Meds:   apixaban  2.5 mg Oral BID    calcitRIOL  0.25 mcg Oral Daily    carvedilol  25 mg Oral BID    ceftaroline fosamil  200 mg Intravenous Q12H    DAPTOmycin (CUBICIN)  IV  8 mg/kg Intravenous Q48H    docusate sodium  100 mg Oral BID    epoetin davion (PROCRIT) injection  20,000 Units Subcutaneous Every Mon, Wed, Fri    furosemide  80 mg Oral BID    insulin aspart U-100  1-10 Units Subcutaneous TIDWM    polyethylene glycol  17 g Oral Daily    sevelamer carbonate  800 mg Oral TID WM    SITagliptin  25 mg Oral Daily    vitamin D  2,000 Units Oral Daily    vitamin renal formula (B-complex-vitamin c-folic acid)  1 capsule Oral Daily       OBJECTIVE:     Vital Signs Range (Last 24H):  Temp:  [97.3 °F (36.3 °C)-98.5 °F (36.9 °C)]   Pulse:  [57-66]   Resp:  [16-18]   BP: (111-140)/(41-66)   SpO2:  [96 %-98 %]     I & O (Last 24H):    Intake/Output Summary (Last 24 hours) at 03/01/18 1128  Last data filed at 02/28/18 1625   Gross per 24 hour   Intake                0 ml   Output             2500 ml   Net            -2500 ml       Physical Exam:  Constitutional: She is oriented to person, place, and time. Morbidly obese, well-nourished.   Head: Normocephalic.   Eyes: Conjunctivae are normal.    Neck: Normal range of motion. Neck supple.   Cardiovascular: Regular rhythm, normal heart sounds.   Pulmonary/Chest: Effort normal and breath sounds normal. No respiratory distress.   Abdominal: Soft, obese. Bowel sounds are normal. She exhibits no distension. There is no tenderness.   Ext:  no appreciable edema  Neurological: NF  Skin: Skin is warm and dry + pallor  Psychiatric: She has a normal mood and affect. Her speech is normal and behavior is normal. Very pleasant.     Access: R   [place]      Laboratory:  CBC:     Recent Labs  Lab 03/01/18  0509   WBC 6.06   RBC 3.04*   HGB 8.3*   HCT 28.1*      MCV 92   MCH 27.3   MCHC 29.5*     BMP:     Recent Labs  Lab 03/01/18  0508   GLU 95      K 4.6      CO2 26   BUN 8   CREATININE 3.0*   CALCIUM 8.3*   MG 1.7     No results for input(s): CPK, CPKMB, TROPONINI, MB in the last 24 hours.  Impression:   Extensive thrombus throughout the left lower extremity with nonocclusive thrombus in the common femoral vein with occlusive thrombus throughout the femoral, popliteal, and peroneal vein. Anterior and posterior tibial veins are patent.      ASSESSMENT/PLAN:     66 YO WF with ESRD and catheter associated MRSA bacteremia and suspected lumbar foci presents from [facility] for ID and NS services.    1. ESRD:  Dr. Nichols pulled line for holiday, however electrolytes warranted new line for HD on 2/12.  R IJ EULALIO placed 2/12. Continue MWF for now.  Renally dose meds, avoid nephrotoxins, and monitor I/O's closely.  2. Hyperkalemia from dietary choices vs  (E87.5):  Changed diet and consulted dietary for counseling.  Low K bath.  Improved.   3. Rhabdo from Cubicin (M62.82):   CPK normalized then daptomycin resumed. Also on on ceftaroline.  4. Anemia of CKD:   Epo with HD.  Folate and B12 normal.    5. 2HPT/Vit D deficiency:  Vit D3 and calcitriol.  6. DM:  Currently on SSI.  Reduced Januvia to ESRD dosing of 25mg/d. Accuchecks good.  7. Hx of Afib:  On Carvedilol.  Defer to cards.     8. MRSA Bacteremia:   -RANDA negative.  - blood cultures from 2/22 NGTD  - on ceftaroline and daptomycin  - extensive DVT noted and could be endovascular source  - anticipating 8 weeks of IV abx beginning with sterile culture date   -Spoke to Dr. Axel Sharpe ([phone]) ([facility]'s Nephrologist).  They do not allow arm PICC's and we agree as this destroys a potential site for future access.  Spoke to Dr. Francis in IR and he will do neck/SC PICC today.    9.  Occlusive LLE DVT (I82.412):  No one was notified of this study.  Started Heparin drip and transitioned to Eliquis.    See above.   Ok to DC to SNF from Renal.

## 2018-03-01 NOTE — PLAN OF CARE
Problem: Patient Care Overview  Goal: Plan of Care Review  Outcome: Ongoing (interventions implemented as appropriate)  No injuries. Pain managed with PRN medication. Plan of care reviewed with patient. Verbalized understanding. Vitals stable. Will continue to monitor.

## 2018-03-01 NOTE — PLAN OF CARE
"Patient refusing wheelchair van transport. Offered pressure relieving devices due to extended ride & patient continues to refuse. Explained protocol for stretcher transport & patient replied "i'll pay whatever I have to but i'm only going by ambulance." Spoke with [facility] -scheduled ambulance transport   "

## 2018-03-01 NOTE — ASSESSMENT & PLAN NOTE
- complicated due to persistence  - blood cultures from 2/22 NGTD  - on ceftaroline and daptomycin  - extensive DVT noted and could be endovascular source  - RANDA negative for vegetation although MV sclerotic with small calcified nodule   - anticipating 8 weeks of IV abx beginning with sterile culture date (2/22)  - CPK normal 2/26  - will need weekly CBC, CMP, CPK weekly  -consider xr right posterior ribs

## 2018-03-01 NOTE — DISCHARGE SUMMARY
[facility] Hospital Medicine  Discharge Summary      Patient Name: Yumiko Proctor  MRN: [id number]  Admission Date: 2/7/2018  Hospital Length of Stay: 22 days  Discharge Date and Time:  03/01/2018 3:53 PM  Attending Physician: Patricia Lu MD   Discharging Provider: Patricia Lu MD  Primary Care Provider: [facility]      HPI:   The patient is a 66 yo female with known MRSA bacteremia who was transferred here for ID and Neurosurgery services.  She has a history of ESRD, Afib, cardiomyopathy, and DM.  She has been hospitalized with bacteremia from a vascular access which was taken out and replaced today with a lt femoral vas cath.  Blood cultures remained positive. She had a RANDA which was normal, MRI of thoracic and lumbar spine showed a abnormal foci in T( that was probably a hemangioma or metastasis.    Procedure(s) (LRB):  [id number]-XUSS-W-[id number] (N/A)      Hospital Course:   Blood cultures from 02/08/18 2/4 (+) MRSA.  Patient was initially started on Daptomycin upon arrival to this facility.  Cultures were repeated again on 02/10/2018 and 2/2 bottles were positive for MRSA.  ID on board, concerned there is endovascular source.  Left prince catheter was removed and a right IJ tunneled dialysis catheter placed 2/12. Repeat blood cultures on 2/13 remained negative, but one bottle obtained 2/16 growing MRSA and 2/17 blood cultures were also positive for MRSA.  Patient had some myositis from Daptomycin with an elevation in CPK to 5175, the medication was held, and CPK levels normalized.  Vancomycin was given after dialysis treatments.  PT/OT was consulted and recommended skilled nursing upon discharge. Blood cultures remained positive for MRSA.  The patient did not have any further line holidays, but blood cultures drawn on 02/22 remained negative. RANDA was done 02/26 and negative for any vegetations.  The patient did have a DVT in the left leg which was present  when she arrived to this facility, and ID suspected this was the possible endovascular source of the infection since it was so difficult to clear her blood cultures.   Vascular surgery was consulted and did not recommend thrombectomy, and the patient will continue anticoagulation with Apixaban.  ID resumed Daptomycin and started Ceftaroline.  CPK levels have been monitored and there has not been an increase in levels.  The patient will continue this therapy for 8 weeks starting from 2/22/2018, with an end date on 4/4/2018.  The patient had a tunneled line placement in the neck which placed by IR 3/1/2018, so she can continue her antibiotics.  The patient will transfer to a NH facility for SNF placement in [place], [place].     Consults:   Consults         Status Ordering Provider     Inpatient consult to Cardiology  Once     Provider:  Rikki Gustafson MD    Completed CARMEL STEVE     Inpatient consult to General Surgery  Once     Provider:  Gildardo Nichols Jr., MD    Acknowledged MALINI OH     Inpatient consult to General Surgery  Once     Provider:  Gildardo Nichols Jr., MD    Acknowledged MALINI OH     Inpatient consult to Infectious Diseases  Once     Provider:  Andrew Fernandez MD    Completed RIK GARCIA     Inpatient consult to Interventional Radiology  Once     Provider:  Aditya Francis MD    Completed CARLIE PEREZ     Inpatient consult to Nephrology-[facility]  Once     Provider:  Carlie Perez NP    Completed RIK GARCIA     Inpatient consult to Registered Dietitian/Nutritionist  Once     Provider:  (Not yet assigned)    CARLIE Marroquin          No new Assessment & Plan notes have been filed under this hospital service since the last note was generated.  Service: Hospital Medicine    Final Active Diagnoses:    Diagnosis Date Noted POA    PRINCIPAL PROBLEM:  MRSA bacteremia [R78.81] 02/07/2018 Yes    ESRD (end stage renal disease) [N18.6]  02/08/2018 Yes    Chronic atrial fibrillation [I48.2] 02/08/2018 Yes    Type 2 diabetes mellitus with chronic kidney disease on chronic dialysis, without long-term current use of insulin [E11.22, N18.6, Z99.2] 02/08/2018 Not Applicable    DVT of deep femoral vein, left [I82.412] 02/21/2018 Yes    Drug-induced muscle inflammation [M60.9, T50.905A] 02/16/2018 No      Problems Resolved During this Admission:    Diagnosis Date Noted Date Resolved POA    Abnormal liver enzymes [R74.8] 02/12/2018 02/25/2018 No       Discharged Condition: good    Disposition: Skilled Nursing Facility    Follow Up:  Follow-up Information     [facility].    Specialties:  Nursing Home Agency, Rehabilitation  Why:  SNF  Contact information:  [address]  [place] [address] [phone]                 Patient Instructions:     Diet diabetic   Order Comments: Renal     Diet diabetic   Order Comments: Renal     Activity as tolerated     Activity as tolerated         Significant Diagnostic Studies: Labs:   CMP   Recent Labs  Lab 02/28/18  0543 03/01/18  0508   * 136   K 4.5 4.6   CL 99 101   CO2 24 26   GLU 73 95   BUN 12 8   CREATININE 4.0* 3.0*   CALCIUM 8.5* 8.3*   PROT 6.3 6.2   ALBUMIN 1.9* 1.9*   BILITOT 0.2 0.2   ALKPHOS 77 76   AST 23 18   ALT 22 19   ANIONGAP 10 9   ESTGFRAFRICA 13* 18*   EGFRNONAA 11* 15*    and CBC   Recent Labs  Lab 02/28/18  0543 03/01/18  0509   WBC 6.60 6.06   HGB 7.8* 8.3*   HCT 25.8* 28.1*    227     Microbiology:   Blood Culture   Lab Results   Component Value Date    LABBLOO No growth after 5 days. 02/22/2018       Pending Diagnostic Studies:     Procedure Component Value Units Date/Time    IR Tunneled Catheter Insert w/o Port [[id number]] Resulted:  03/01/18 1535    Order Status:  Sent Lab Status:  In process Updated:  03/01/18 1536         Medications:  Reconciled Home Medications:   Current Discharge Medication List      START taking these medications     Details   apixaban 2.5 mg Tab Take 1 tablet (2.5 mg total) by mouth 2 (two) times daily.  Qty: 60 tablet, Refills: 6      calcitRIOL (ROCALTROL) 0.25 MCG Cap Take 1 capsule (0.25 mcg total) by mouth once daily.  Qty: 30 capsule, Refills: 3      dextrose 5 % SolP 50 mL with ceftaroline fosamil 600 mg SolR 200 mg Inject 200 mg into the vein every 12 (twelve) hours.  Qty: 50 each, Refills: 1      docusate sodium (COLACE) 100 MG capsule Take 1 capsule (100 mg total) by mouth 2 (two) times daily.  Refills: 0      epoetin davion (PROCRIT) 20,000 unit/mL injection Inject 1 mL (20,000 Units total) into the skin every Mon, Wed, Fri.      furosemide (LASIX) 80 MG tablet Take 1 tablet (80 mg total) by mouth 2 (two) times daily.  Qty: 60 tablet, Refills: 3      oxyCODONE-acetaminophen (PERCOCET) 5-325 mg per tablet Take 1 tablet by mouth every 4 (four) hours as needed.  Qty: 28 tablet, Refills: 0      sevelamer carbonate (RENVELA) 800 mg Tab Take 1 tablet (800 mg total) by mouth 3 (three) times daily with meals.  Qty: 90 tablet, Refills: 3      SITagliptin (JANUVIA) 25 MG Tab Take 1 tablet (25 mg total) by mouth once daily.  Qty: 30 tablet, Refills: 3      sodium chloride 0.9% SolP 50 mL with DAPTOmycin 500 mg SolR 865 mg Inject 865 mg into the vein every Mon, Wed, Fri. After dialysis treatments.      vitamin D 1000 units Tab Take 2 tablets (2,000 Units total) by mouth once daily.      vitamin renal formula, B-complex-vitamin c-folic acid, (NEPHROCAP) 1 mg Cap Take 1 capsule by mouth once daily.  Refills: 0         CONTINUE these medications which have NOT CHANGED    Details   amiodarone (PACERONE) 100 MG Tab Take 200 mg by mouth once daily.      carvedilol (COREG) 25 MG tablet Take 25 mg by mouth 2 (two) times daily.      ferric citrate 210 mg iron Tab Take 210 mg by mouth 3 (three) times daily.      hydrALAZINE (APRESOLINE) 100 MG tablet Take 100 mg by mouth 3 (three) times daily.      ISOSORBIDE MONONITRATE ORAL Take 30 mg by  mouth Daily.         STOP taking these medications       rivaroxaban (XARELTO) 10 mg Tab Comments:   Reason for Stopping:         traMADol (ULTRAM) 50 mg tablet Comments:   Reason for Stopping:               Indwelling Lines/Drains at time of discharge:   Lines/Drains/Airways     Peripherally Inserted Central Catheter Line                 PICC Double Lumen 03/01/18 1506 other (see comments) less than 1 day          Central Venous Catheter Line                 Hemodialysis Catheter 02/12/18 0200 right internal jugular 17 days          Airway                 Airway - Non-Surgical 02/12/18 1310 Nasal Cannula 17 days          Pressure Ulcer                 Pressure Injury 02/07/18 2074 medial Gluteal cleft Stage 1 21 days                Time spent on the discharge of patient: 35 minutes  Patient was seen and examined on the date of discharge and determined to be suitable for discharge.         Patricia Lu MD  Department of Hospital Medicine  [facility]

## 2018-03-01 NOTE — PROGRESS NOTES
"[facility]  Infectious Disease  Progress Note    Patient Name: Yumiko Proctor  MRN: [id number]  Admission Date: 2/7/2018  Length of Stay: 21 days  Attending Physician: Patricia Lu MD  Primary Care Provider: [facility]    Isolation Status: Contact  Assessment/Plan:      * MRSA bacteremia    - complicated due to persistence  - blood cultures from 2/22 NGTD  - on ceftaroline and daptomycin  - extensive DVT noted and could be endovascular source  - RANDA negative for vegetation although MV sclerotic with small calcified nodule   - anticipating 8 weeks of IV abx beginning with sterile culture date (2/22)  - CPK normal 2/26  - will need weekly CBC, CMP, CPK weekly  -consider xr right posterior ribs                Thank you for your consult. I will follow-up with patient. Please contact us if you have any additional questions.    Katherine K Baumgarten, MD  Infectious Disease  [facility]    Subjective:     Principal Problem:MRSA bacteremia    HPI: This is a 68 yo woman with ESRD transferred to [facility] for persistent bacteremia due to MRSA. She reportedly had multiple studies performed at [facility] in [place], including MR imaging, CT imaging, a bone scan, and a RANDA, all of which were "negative." An indwelling HD catheter was discontinued and a left groin HD catheter was placed. The patient feels sick but denies any rigors or irving fever. The patient was admitted last night and placed on cefepime and Cipro in addition to the daptomycin. I am consulted for ID evaluation.    Interval History: Tolerating abx. Denies fever or chills.   C/O posterior right rib pain.    Review of Systems   Constitutional: Negative for chills and fever.   All other systems reviewed and are negative.    Objective:     Vital Signs (Most Recent):  Temp: 97.3 °F (36.3 °C) (02/28/18 1957)  Pulse: 66 (02/28/18 1957)  Resp: 16 (02/28/18 1957)  BP: (!) 111/52 (02/28/18 1957)  SpO2: 98 % " (02/28/18 1957) Vital Signs (24h Range):  Temp:  [96.7 °F (35.9 °C)-98.2 °F (36.8 °C)] 97.3 °F (36.3 °C)  Pulse:  [56-66] 66  Resp:  [16-18] 16  SpO2:  [95 %-99 %] 98 %  BP: (111-140)/(41-66) 111/52     Weight: 111.5 kg (245 lb 13 oz)  Body mass index is 43.54 kg/m².    Estimated Creatinine Clearance: 16.2 mL/min (A) (based on SCr of 4 mg/dL (H)).    Physical Exam   Constitutional: She is oriented to person, place, and time. She appears well-developed and well-nourished. No distress.   Right catheter in place.   HENT:   Head: Normocephalic and atraumatic.   Eyes: EOM are normal. Pupils are equal, round, and reactive to light.   Cardiovascular: Normal rate.    Murmur heard.  Pulmonary/Chest: Effort normal and breath sounds normal. No respiratory distress. She has no wheezes.   Abdominal: Soft. Bowel sounds are normal. She exhibits no distension. There is no tenderness. There is no guarding.   Musculoskeletal: She exhibits no edema.   Some tenderness over right posterior lower ribs.   Neurological: She is alert and oriented to person, place, and time.   Skin: No rash noted. She is not diaphoretic.   Psychiatric: She has a normal mood and affect. Her behavior is normal. Judgment and thought content normal.   Nursing note and vitals reviewed.      Significant Labs:   Blood Culture:     Recent Labs  Lab 02/16/18  1037 02/17/18  1148 02/17/18  1200 02/19/18  0537 02/22/18  0557   LABBLOO Gram stain aer bottle: Gram positive cocci in clusters resembling Staph   Results called to and read back by:Ynes Steiner RN 02/17/2018  10:07  Gram stain vicente bottle: Gram positive cocci in clusters resembling Staph   Positive results previously called 02/17/2018  16:42  METHICILLIN RESISTANT STAPHYLOCOCCUS AUREUSID consult required at [facility] and [facility] locations. Gram stain aer bottle: Gram positive cocci in clusters resembling Staph   Results called to and read back by: Ynes Herrera RN  02/18/2018  18:31   STAPHYLOCOCCUS AUREUSID consult required at [facility].For susceptibility see order #[id number] Gram stain vicente bottle: Gram positive cocci in clusters resembling Staph   Results called to and read back by:Ynes Steiner RN 02/18/2018  14:40  Gram stain aer bottle: Gram positive cocci in clusters resembling Staph   Positive results previously called 02/18/2018  18:19  STAPHYLOCOCCUS AUREUSID consult required at [facility].For susceptibility see order #[id number] Gram stain aer bottle: Gram positive cocci in clusters resembling Staph   Gram stain vicente bottle: Gram positive cocci in clusters resembling Staph   Results called to and read back by:Alison Oswald RN 02/20/2018    03:01  METHICILLIN RESISTANT STAPHYLOCOCCUS AUREUSID consult required at [facility]. No growth after 5 days.     BMP:     Recent Labs  Lab 02/28/18  0543   GLU 73   *   K 4.5   CL 99   CO2 24   BUN 12   CREATININE 4.0*   CALCIUM 8.5*   MG 1.7     CBC:     Recent Labs  Lab 02/27/18  0451 02/28/18  0543   WBC 6.74 6.60   HGB 8.5* 7.8*   HCT 27.3* 25.8*    196       Significant Imaging: I have reviewed all pertinent imaging results/findings within the past 24 hours.

## 2018-03-02 ENCOUNTER — PATIENT OUTREACH (OUTPATIENT)
Dept: ADMINISTRATIVE | Facility: CLINIC | Age: 68
End: 2018-03-02

## 2018-03-02 NOTE — PLAN OF CARE
Problem: Patient Care Overview  Goal: Plan of Care Review  Outcome: Ongoing (interventions implemented as appropriate)  Pt had YOHANNES double lumen PICC placed today. JOSE IV removed with catheter intact. TB skin test given to LFA. Vitals signs stable on RA. [facility] transported pt off unit. Report given to nurse Ruslan @ [facility]. [phone], all questions answered.

## 2018-03-02 NOTE — PT/OT/SLP DISCHARGE
Occupational Therapy Discharge Summary    Yumiko Proctor  MRN: [id number]   Principal Problem: MRSA bacteremia      Patient Discharged from acute Occupational Therapy on 03/01/18.  Please refer to prior OT note dated 03/01/18 for functional status.    Assessment:      Patient appropriate for care in another setting.    Objective:     GOALS:    Occupational Therapy Goals        Problem: Occupational Therapy Goal    Goal Priority Disciplines Outcome Interventions   Occupational Therapy Goal     OT, PT/OT Ongoing (interventions implemented as appropriate)    Description:  New Goals to be achieved:   3/8/2018    1. toileting max assist for clothes management  2. Standing at sink for 1 grooming task with RW and CGA  3. LB dressing with max assist with Aaptive devices      Goals to be met by: 02/26/18     Patient will increase functional independence with ADLs by performing:    UE Dressing with Minimal Assistance.-not met 2/26/2018  LE Dressing with Maximum Assistance.-not met 2/26/2018  Rolling to Bilateral with Contact Guard Assistance. (MET 02/20/18)  Supine to sit with Minimal Assistance.-not met 2/26/2018  Upper extremity exercise program x10 reps per handout, with assistance as needed.  Assess sit to stand transfers. (MET 02/20/18)  NEW 02/20/18 Sit to stand with RW and Min assist-met 2/27/2018  NEW 02/20/28 SPT to bedside chair/BSC with RW and min assist-goal met 3/1/2018                         Reasons for Discontinuation of Therapy Services  Transfer to alternate level of care.      Plan:     Patient Discharged to: Skilled Nursing Facility     OT of record unavailable for documentation.    PATTY Alonzo  3/1/2018

## 2018-03-02 NOTE — PT/OT/SLP DISCHARGE
Physical Therapy Discharge Summary    Name: Yumiko Proctor  MRN: [id number]   Principal Problem: MRSA bacteremia     Patient Discharged from acute Physical Therapy on 3/1/18.  Please refer to prior PT noted date on 3/1/18 for functional status.     Assessment:     Goals partially met.    Objective:     GOALS:    Physical Therapy Goals        Problem: Physical Therapy Goal    Goal Priority Disciplines Outcome Goal Variances Interventions   Physical Therapy Goal     PT/OT, PT Ongoing (interventions implemented as appropriate)     Description:  Goals to be met by: 3/15/19     Patient will increase functional independence with mobility by performin. Supine to sit with supervision.   2. Sit to supine with supervision.   3. Sit<>stand transfer with CGA using rolling walker.   4. Gait > 10 feet with CGA using rolling walker.                         Reasons for Discontinuation of Therapy Services  Transfer to alternate level of care.      Plan:     Patient Discharged to: [facility].    Yudi Collier, PT  3/2/2018

## 2018-04-18 ENCOUNTER — HOSPITAL ENCOUNTER (INPATIENT)
Facility: OTHER | Age: 68
LOS: 16 days | Discharge: NURSING, REHAB OR LONG-TERM CARE | DRG: 871 | End: 2018-05-04
Attending: INTERNAL MEDICINE | Admitting: INTERNAL MEDICINE
Payer: COMMERCIAL

## 2018-04-18 DIAGNOSIS — N18.6 END STAGE RENAL DISEASE: ICD-10-CM

## 2018-04-18 DIAGNOSIS — R00.1 BRADYCARDIA: ICD-10-CM

## 2018-04-18 DIAGNOSIS — B95.62 MRSA BACTEREMIA: Primary | ICD-10-CM

## 2018-04-18 DIAGNOSIS — I82.412 DVT OF DEEP FEMORAL VEIN, LEFT: ICD-10-CM

## 2018-04-18 DIAGNOSIS — I48.20 CHRONIC ATRIAL FIBRILLATION: ICD-10-CM

## 2018-04-18 DIAGNOSIS — R78.81 BACTEREMIA: ICD-10-CM

## 2018-04-18 DIAGNOSIS — R78.81 MRSA BACTEREMIA: Primary | ICD-10-CM

## 2018-04-18 DIAGNOSIS — B95.62 BACTEREMIA DUE TO METHICILLIN RESISTANT STAPHYLOCOCCUS AUREUS: ICD-10-CM

## 2018-04-18 DIAGNOSIS — R53.81 DEBILITY: ICD-10-CM

## 2018-04-18 DIAGNOSIS — E87.1 HYPONATREMIA: ICD-10-CM

## 2018-04-18 DIAGNOSIS — R78.81 BACTEREMIA DUE TO METHICILLIN RESISTANT STAPHYLOCOCCUS AUREUS: ICD-10-CM

## 2018-04-18 PROBLEM — J18.9 PNEUMONIA DUE TO INFECTIOUS ORGANISM: Status: ACTIVE | Noted: 2018-04-18

## 2018-04-18 LAB
ABO + RH BLD: NORMAL
ALBUMIN SERPL BCP-MCNC: 1.7 G/DL
ALP SERPL-CCNC: 53 U/L
ALT SERPL W/O P-5'-P-CCNC: 8 U/L
ANION GAP SERPL CALC-SCNC: 8 MMOL/L
ANION GAP SERPL CALC-SCNC: 8 MMOL/L
AST SERPL-CCNC: 11 U/L
BASOPHILS # BLD AUTO: 0.01 K/UL
BASOPHILS NFR BLD: 0.1 %
BILIRUB SERPL-MCNC: 0.3 MG/DL
BLD GP AB SCN CELLS X3 SERPL QL: NORMAL
BNP SERPL-MCNC: 1178 PG/ML
BUN SERPL-MCNC: 28 MG/DL
BUN SERPL-MCNC: 28 MG/DL
CALCIUM SERPL-MCNC: 8.3 MG/DL
CALCIUM SERPL-MCNC: 8.3 MG/DL
CHLORIDE SERPL-SCNC: 98 MMOL/L
CHLORIDE SERPL-SCNC: 98 MMOL/L
CO2 SERPL-SCNC: 25 MMOL/L
CO2 SERPL-SCNC: 25 MMOL/L
CREAT SERPL-MCNC: 4 MG/DL
CREAT SERPL-MCNC: 4 MG/DL
DIFFERENTIAL METHOD: ABNORMAL
EOSINOPHIL # BLD AUTO: 0.2 K/UL
EOSINOPHIL NFR BLD: 2.3 %
ERYTHROCYTE [DISTWIDTH] IN BLOOD BY AUTOMATED COUNT: 16.8 %
EST. GFR  (AFRICAN AMERICAN): 13 ML/MIN/1.73 M^2
EST. GFR  (AFRICAN AMERICAN): 13 ML/MIN/1.73 M^2
EST. GFR  (NON AFRICAN AMERICAN): 11 ML/MIN/1.73 M^2
EST. GFR  (NON AFRICAN AMERICAN): 11 ML/MIN/1.73 M^2
ESTIMATED AVG GLUCOSE: 74 MG/DL
GLUCOSE SERPL-MCNC: 94 MG/DL
GLUCOSE SERPL-MCNC: 94 MG/DL
HBA1C MFR BLD HPLC: 4.2 %
HCT VFR BLD AUTO: 23.6 %
HGB BLD-MCNC: 7.2 G/DL
LYMPHOCYTES # BLD AUTO: 1.7 K/UL
LYMPHOCYTES NFR BLD: 21.9 %
MAGNESIUM SERPL-MCNC: 1.7 MG/DL
MCH RBC QN AUTO: 26 PG
MCHC RBC AUTO-ENTMCNC: 30.5 G/DL
MCV RBC AUTO: 85 FL
MONOCYTES # BLD AUTO: 0.7 K/UL
MONOCYTES NFR BLD: 9.8 %
NEUTROPHILS # BLD AUTO: 5 K/UL
NEUTROPHILS NFR BLD: 65.8 %
PHOSPHATE SERPL-MCNC: 4.4 MG/DL
PLATELET # BLD AUTO: 325 K/UL
PMV BLD AUTO: 9.5 FL
POCT GLUCOSE: 119 MG/DL (ref 70–110)
POCT GLUCOSE: 126 MG/DL (ref 70–110)
POCT GLUCOSE: 90 MG/DL (ref 70–110)
POTASSIUM SERPL-SCNC: 4.5 MMOL/L
POTASSIUM SERPL-SCNC: 4.5 MMOL/L
PROCALCITONIN SERPL IA-MCNC: 0.2 NG/ML
PROT SERPL-MCNC: 6 G/DL
RBC # BLD AUTO: 2.77 M/UL
SODIUM SERPL-SCNC: 131 MMOL/L
SODIUM SERPL-SCNC: 131 MMOL/L
WBC # BLD AUTO: 7.53 K/UL

## 2018-04-18 PROCEDURE — 99900035 HC TECH TIME PER 15 MIN (STAT)

## 2018-04-18 PROCEDURE — 86850 RBC ANTIBODY SCREEN: CPT

## 2018-04-18 PROCEDURE — 87340 HEPATITIS B SURFACE AG IA: CPT

## 2018-04-18 PROCEDURE — 83880 ASSAY OF NATRIURETIC PEPTIDE: CPT

## 2018-04-18 PROCEDURE — 63600175 PHARM REV CODE 636 W HCPCS: Performed by: NURSE PRACTITIONER

## 2018-04-18 PROCEDURE — 86706 HEP B SURFACE ANTIBODY: CPT

## 2018-04-18 PROCEDURE — 99223 1ST HOSP IP/OBS HIGH 75: CPT | Mod: ,,, | Performed by: PHYSICIAN ASSISTANT

## 2018-04-18 PROCEDURE — 63600175 PHARM REV CODE 636 W HCPCS: Performed by: INTERNAL MEDICINE

## 2018-04-18 PROCEDURE — 83735 ASSAY OF MAGNESIUM: CPT

## 2018-04-18 PROCEDURE — 99223 1ST HOSP IP/OBS HIGH 75: CPT | Mod: ,,, | Performed by: INTERNAL MEDICINE

## 2018-04-18 PROCEDURE — 85025 COMPLETE CBC W/AUTO DIFF WBC: CPT

## 2018-04-18 PROCEDURE — 36415 COLL VENOUS BLD VENIPUNCTURE: CPT

## 2018-04-18 PROCEDURE — 25000003 PHARM REV CODE 250: Performed by: PHYSICIAN ASSISTANT

## 2018-04-18 PROCEDURE — 87186 SC STD MICRODIL/AGAR DIL: CPT

## 2018-04-18 PROCEDURE — 25000003 PHARM REV CODE 250: Performed by: NURSE PRACTITIONER

## 2018-04-18 PROCEDURE — 63600175 PHARM REV CODE 636 W HCPCS: Performed by: PHYSICIAN ASSISTANT

## 2018-04-18 PROCEDURE — 27000221 HC OXYGEN, UP TO 24 HOURS

## 2018-04-18 PROCEDURE — 80053 COMPREHEN METABOLIC PANEL: CPT

## 2018-04-18 PROCEDURE — 84145 PROCALCITONIN (PCT): CPT

## 2018-04-18 PROCEDURE — 11000001 HC ACUTE MED/SURG PRIVATE ROOM

## 2018-04-18 PROCEDURE — 87040 BLOOD CULTURE FOR BACTERIA: CPT

## 2018-04-18 PROCEDURE — 87077 CULTURE AEROBIC IDENTIFY: CPT

## 2018-04-18 PROCEDURE — 83036 HEMOGLOBIN GLYCOSYLATED A1C: CPT

## 2018-04-18 PROCEDURE — 84100 ASSAY OF PHOSPHORUS: CPT

## 2018-04-18 PROCEDURE — 94761 N-INVAS EAR/PLS OXIMETRY MLT: CPT

## 2018-04-18 PROCEDURE — 86920 COMPATIBILITY TEST SPIN: CPT

## 2018-04-18 RX ORDER — POLYETHYLENE GLYCOL 3350 17 G/17G
17 POWDER, FOR SOLUTION ORAL DAILY
Status: DISCONTINUED | OUTPATIENT
Start: 2018-04-18 | End: 2018-05-04 | Stop reason: HOSPADM

## 2018-04-18 RX ORDER — OXYCODONE AND ACETAMINOPHEN 5; 325 MG/1; MG/1
1 TABLET ORAL ONCE
Status: COMPLETED | OUTPATIENT
Start: 2018-04-18 | End: 2018-04-18

## 2018-04-18 RX ORDER — IBUPROFEN 200 MG
24 TABLET ORAL
Status: DISCONTINUED | OUTPATIENT
Start: 2018-04-18 | End: 2018-05-04 | Stop reason: HOSPADM

## 2018-04-18 RX ORDER — CARVEDILOL 12.5 MG/1
25 TABLET ORAL 2 TIMES DAILY
Status: DISCONTINUED | OUTPATIENT
Start: 2018-04-18 | End: 2018-04-23

## 2018-04-18 RX ORDER — ISOSORBIDE MONONITRATE 30 MG/1
30 TABLET, EXTENDED RELEASE ORAL DAILY
Status: DISCONTINUED | OUTPATIENT
Start: 2018-04-18 | End: 2018-05-02

## 2018-04-18 RX ORDER — VANCOMYCIN HCL IN 5 % DEXTROSE 1G/250ML
1000 PLASTIC BAG, INJECTION (ML) INTRAVENOUS
Status: DISCONTINUED | OUTPATIENT
Start: 2018-04-20 | End: 2018-04-19

## 2018-04-18 RX ORDER — GLUCAGON 1 MG
1 KIT INJECTION
Status: DISCONTINUED | OUTPATIENT
Start: 2018-04-18 | End: 2018-05-04 | Stop reason: HOSPADM

## 2018-04-18 RX ORDER — FAMOTIDINE 20 MG/1
20 TABLET, FILM COATED ORAL DAILY
Status: DISCONTINUED | OUTPATIENT
Start: 2018-04-18 | End: 2018-05-04 | Stop reason: HOSPADM

## 2018-04-18 RX ORDER — CALCITRIOL 0.25 UG/1
0.25 CAPSULE ORAL DAILY
Status: DISCONTINUED | OUTPATIENT
Start: 2018-04-18 | End: 2018-05-04 | Stop reason: HOSPADM

## 2018-04-18 RX ORDER — RAMELTEON 8 MG/1
8 TABLET ORAL NIGHTLY PRN
Status: DISCONTINUED | OUTPATIENT
Start: 2018-04-18 | End: 2018-05-04 | Stop reason: HOSPADM

## 2018-04-18 RX ORDER — INSULIN ASPART 100 [IU]/ML
0-5 INJECTION, SOLUTION INTRAVENOUS; SUBCUTANEOUS
Status: DISCONTINUED | OUTPATIENT
Start: 2018-04-18 | End: 2018-05-04 | Stop reason: HOSPADM

## 2018-04-18 RX ORDER — GUAIFENESIN 100 MG/5ML
200 SOLUTION ORAL EVERY 4 HOURS PRN
Status: DISCONTINUED | OUTPATIENT
Start: 2018-04-18 | End: 2018-05-04 | Stop reason: HOSPADM

## 2018-04-18 RX ORDER — ACETAMINOPHEN 325 MG/1
650 TABLET ORAL EVERY 4 HOURS PRN
Status: DISCONTINUED | OUTPATIENT
Start: 2018-04-18 | End: 2018-05-04 | Stop reason: HOSPADM

## 2018-04-18 RX ORDER — IBUPROFEN 200 MG
16 TABLET ORAL
Status: DISCONTINUED | OUTPATIENT
Start: 2018-04-18 | End: 2018-05-04 | Stop reason: HOSPADM

## 2018-04-18 RX ORDER — HEPARIN SODIUM 5000 [USP'U]/ML
5000 INJECTION, SOLUTION INTRAVENOUS; SUBCUTANEOUS
Status: DISCONTINUED | OUTPATIENT
Start: 2018-04-18 | End: 2018-05-04 | Stop reason: HOSPADM

## 2018-04-18 RX ORDER — VANCOMYCIN HYDROCHLORIDE
1500 ONCE
Status: COMPLETED | OUTPATIENT
Start: 2018-04-18 | End: 2018-04-18

## 2018-04-18 RX ORDER — SODIUM CHLORIDE 0.9 % (FLUSH) 0.9 %
5 SYRINGE (ML) INJECTION
Status: DISCONTINUED | OUTPATIENT
Start: 2018-04-18 | End: 2018-05-04 | Stop reason: HOSPADM

## 2018-04-18 RX ORDER — AMOXICILLIN 250 MG
1 CAPSULE ORAL DAILY PRN
Status: DISCONTINUED | OUTPATIENT
Start: 2018-04-18 | End: 2018-05-04 | Stop reason: HOSPADM

## 2018-04-18 RX ORDER — ONDANSETRON 2 MG/ML
4 INJECTION INTRAMUSCULAR; INTRAVENOUS EVERY 8 HOURS PRN
Status: DISCONTINUED | OUTPATIENT
Start: 2018-04-18 | End: 2018-05-04 | Stop reason: HOSPADM

## 2018-04-18 RX ORDER — DOCUSATE SODIUM 100 MG/1
200 CAPSULE, LIQUID FILLED ORAL 2 TIMES DAILY
Status: DISCONTINUED | OUTPATIENT
Start: 2018-04-18 | End: 2018-05-04 | Stop reason: HOSPADM

## 2018-04-18 RX ORDER — FUROSEMIDE 40 MG/1
80 TABLET ORAL 2 TIMES DAILY
Status: DISCONTINUED | OUTPATIENT
Start: 2018-04-18 | End: 2018-05-04 | Stop reason: HOSPADM

## 2018-04-18 RX ORDER — GABAPENTIN 300 MG/1
300 CAPSULE ORAL 3 TIMES DAILY
Status: DISCONTINUED | OUTPATIENT
Start: 2018-04-18 | End: 2018-04-19

## 2018-04-18 RX ORDER — CHOLECALCIFEROL (VITAMIN D3) 25 MCG
2000 TABLET ORAL DAILY
Status: DISCONTINUED | OUTPATIENT
Start: 2018-04-18 | End: 2018-05-04 | Stop reason: HOSPADM

## 2018-04-18 RX ORDER — AMIODARONE HYDROCHLORIDE 200 MG/1
200 TABLET ORAL DAILY
Status: DISCONTINUED | OUTPATIENT
Start: 2018-04-18 | End: 2018-05-02

## 2018-04-18 RX ORDER — SEVELAMER CARBONATE 800 MG/1
800 TABLET, FILM COATED ORAL
Status: DISCONTINUED | OUTPATIENT
Start: 2018-04-18 | End: 2018-05-04 | Stop reason: HOSPADM

## 2018-04-18 RX ORDER — SODIUM CHLORIDE 9 MG/ML
INJECTION, SOLUTION INTRAVENOUS ONCE
Status: COMPLETED | OUTPATIENT
Start: 2018-04-18 | End: 2018-04-18

## 2018-04-18 RX ORDER — OXYCODONE HYDROCHLORIDE 5 MG/1
5 TABLET ORAL EVERY 4 HOURS PRN
Status: DISCONTINUED | OUTPATIENT
Start: 2018-04-18 | End: 2018-04-20

## 2018-04-18 RX ADMIN — GABAPENTIN 300 MG: 300 CAPSULE ORAL at 09:04

## 2018-04-18 RX ADMIN — ISOSORBIDE MONONITRATE 30 MG: 30 TABLET, EXTENDED RELEASE ORAL at 06:04

## 2018-04-18 RX ADMIN — ERYTHROPOIETIN 20000 UNITS: 20000 INJECTION, SOLUTION INTRAVENOUS; SUBCUTANEOUS at 01:04

## 2018-04-18 RX ADMIN — VITAMIN D, TAB 1000IU (100/BT) 2000 UNITS: 25 TAB at 09:04

## 2018-04-18 RX ADMIN — SEVELAMER CARBONATE 800 MG: 800 TABLET, FILM COATED ORAL at 04:04

## 2018-04-18 RX ADMIN — PIPERACILLIN AND TAZOBACTAM 3.38 G: 3; .375 INJECTION, POWDER, LYOPHILIZED, FOR SOLUTION INTRAVENOUS; PARENTERAL at 09:04

## 2018-04-18 RX ADMIN — GABAPENTIN 300 MG: 300 CAPSULE ORAL at 04:04

## 2018-04-18 RX ADMIN — FUROSEMIDE 80 MG: 40 TABLET ORAL at 06:04

## 2018-04-18 RX ADMIN — OXYCODONE HYDROCHLORIDE 5 MG: 5 TABLET ORAL at 09:04

## 2018-04-18 RX ADMIN — OXYCODONE HYDROCHLORIDE 5 MG: 5 TABLET ORAL at 04:04

## 2018-04-18 RX ADMIN — FAMOTIDINE 20 MG: 20 TABLET ORAL at 10:04

## 2018-04-18 RX ADMIN — CARVEDILOL 25 MG: 12.5 TABLET, FILM COATED ORAL at 09:04

## 2018-04-18 RX ADMIN — APIXABAN 2.5 MG: 2.5 TABLET, FILM COATED ORAL at 09:04

## 2018-04-18 RX ADMIN — SEVELAMER CARBONATE 800 MG: 800 TABLET, FILM COATED ORAL at 10:04

## 2018-04-18 RX ADMIN — AMIODARONE HYDROCHLORIDE 200 MG: 200 TABLET ORAL at 09:04

## 2018-04-18 RX ADMIN — SODIUM CHLORIDE: 0.9 INJECTION, SOLUTION INTRAVENOUS at 01:04

## 2018-04-18 RX ADMIN — DOCUSATE SODIUM 200 MG: 100 CAPSULE, LIQUID FILLED ORAL at 10:04

## 2018-04-18 RX ADMIN — OXYCODONE AND ACETAMINOPHEN 1 TABLET: 5; 325 TABLET ORAL at 04:04

## 2018-04-18 RX ADMIN — FUROSEMIDE 80 MG: 40 TABLET ORAL at 09:04

## 2018-04-18 RX ADMIN — CALCITRIOL 0.25 MCG: 0.25 CAPSULE, LIQUID FILLED ORAL at 09:04

## 2018-04-18 RX ADMIN — POLYETHYLENE GLYCOL 3350 17 G: 17 POWDER, FOR SOLUTION ORAL at 09:04

## 2018-04-18 RX ADMIN — DOCUSATE SODIUM 200 MG: 100 CAPSULE, LIQUID FILLED ORAL at 09:04

## 2018-04-18 RX ADMIN — Medication 1500 MG: at 04:04

## 2018-04-18 NOTE — HPI
"Per Nat Woodward, "Ms Hoa Proctor is a 68 y.o. female, with PMH of HTN, NIDDM-2, CHF (EF 35%), ESRD on dialysis ([facility], Dr. Prather), A. Fib, LLE DVT, cardiomyopathy, and recent treatment in this facility for MRSA bacteremia from her vascular access site, who returns 2/2 need for ID consultation. In the past week the patient developed a fever and was diagnosed with RUL PNA, and started on Levaquin 3/week after dialysis. Associated symptoms include non-productive cough, fevers, and SOB with wheeze. She was started on vancymycin and zosyn on 4/15/18. Blood cultures taken on 4/16/18 were the second set, first after antibiotics, that grew staph. Other associated symptoms include b/l hip pain, and constipation.  She denies fever, chills, sweats, chest pain, SOB, N/V, diarrhea.     During the patient's previous stay at this facility she had blood cultures positive for MRSA on 2/8/18, and she was started on Daptomycin. On 2/10/18 a second set of blood cultures were positive for MRSA. At the time she had her left Jaspreet catheter removed and a right IJ tunneled dialysis cath was placed on 2/12/18. Repeat cultures on 2/13/18 were negative for MRSA. She was positive in 1/2 bottles on 2/16/18 and both blood cultures bottles for MRSA on 2/16/18. At that time she was started on vancomycin, and Daptomycin was stopped 2/2 myositis. On 2/22/18 blood cultures were negative. A RANDA on 2/26/18 was negative for vegetations, but the patient did have a LLE DVT discovered 2/20/18, and Apixaban was started. After CPK levels were normal, she was started back on Daptomycin and Ceftaroline. She was to continue this treatment until 4/4/18, and she was transferred to a NH facility in [place], [place]. She states she was transferred from the Nursing facility to [facility] on 4/15/18."  "

## 2018-04-18 NOTE — ASSESSMENT & PLAN NOTE
- [facility] consulted for DIalysis today   - MWF dialysis schedule   - Dr. Prather @ [facility] in [place].

## 2018-04-18 NOTE — PLAN OF CARE
Problem: Patient Care Overview  Goal: Plan of Care Review  Outcome: Ongoing (interventions implemented as appropriate)  Patient oriented to room.  VSS.  Patient placed on telemetry.  Pain managed with PRN medication.  Purposeful rounding completed, call bell within reach, no needs at this time.  Will continue to monitor.

## 2018-04-18 NOTE — HPI
"67 yo female with ESRD who was discharged on IV ceftaroline and daptomycin for MRSA bacteremia on 3/1/18. She was supposed to receive these antibiotics for 8 weeks from 2/22/18, the last negative blood culture. This would have put her end of care at 4/19/18. However, on examination of the records, it appears that she may have stopped them on 4/4/18, 2 weeks early. The PICC appears to have been left in place as well.    She reportedly began having "low grade" fever, cough, and SOB with an elevated BNP of 900. She was started on Levaquin 250 mg orally three times per week after dialysis. Chest xray showed a right upper lobe infiltrate.It is unclear specifically why she was admitted - she says that she had a cough and a slight fever. She was admitted to [facility] on 4/15, with a normal WBC. She was placed on vancomycin and zosyn empirically. Blood cultures taken on 4/15 grew Staph aureus, as did blood cultures on 4/16. Sensitivities were reported verbally as MRSA, but I have not received the culture report to confirm this or compare to previous cultures. PICC was removed on 4/19 - culture from the tip is negative. Blood cultures from 4/22 are now growing Staph sp. Blood cultures from 4/25 are negative so far.  "

## 2018-04-18 NOTE — ASSESSMENT & PLAN NOTE
- last A1C:   Lab Results   Component Value Date    HGBA1C 6.5 (H) 02/08/2018      - A1C pending for AM   - hold oral antidiabetic meds   - Diabetic diet   - SSI with accuchekcs AC/HS

## 2018-04-18 NOTE — H&P
[facility] Medicine  History & Physical    Patient Name: Yumiko Proctor  MRN: [id number]  Admission Date: 4/18/2018  Attending Physician: Cherelle Echevarria, *   Primary Care Provider: [facility]         Patient information was obtained from patient, past medical records and ER records.     Subjective:     Principal Problem:Bacteremia    Chief Complaint: No chief complaint on file.       HPI: Ms Hoa Proctor is a 68 y.o. female, with PMH of HTN, NIDDM-2, CHF (EF 35%), ESRD on dialysis ([facility], [place], Dr. Prather), A. Fib, LLE DVT, cardiomyopathy, and recent treatment in this facility for MRSA bacteremia from her vascular access site, who returns 2/2 need for ID consultation. In the past week the patient developed a fever and was diagnosed with RUL PNA, and started on Levaquin 3/week after dialysis. Associated symptoms include non-productive cough, fevers, and SOB with wheeze. She was started on vancymycin and zosyn on 4/15/18. Blood cultures taken on 4/16/18 were the second set, first after antibiotics, that grew staph. Other associated symptoms include b/l hip pain, and constipation.  She denies fever, chills, sweats, chest pain, SOB, N/V, diarrhea.     During the patient's previous stay at this facility she had blood cultures positive for MRSA on 2/8/18, and she was started on Daptomycin. On 2/10/18 a second set of blood cultures were positive for MRSA. At the time she had her left Jaspreet catheter removed and a right IJ tunneled dialysis cath was placed on 2/12/18. Repeat cultures on 2/13/18 were negative for MRSA. She was positive in 1/2 bottles on 2/16/18 and both blood cultures bottles for MRSA on 2/16/18. At that time she was started on vancomycin, and Daptomycin was stopped 2/2 myositis. On 2/22/18 blood cultures were negative. A RANDA on 2/26/18 was negative for vegetations, but the patient did have a LLE DVT discovered 2/20/18, and Apixaban was  started. After CPK levels were normal, she was started back on Daptomycin and Ceftaroline. She was to continue this treatment until 4/4/18, and she was transferred to a NH facility in [place], [place]. She states she was transferred from the Nursing facility to [facility] on 4/15/18.     Past Medical History:   Diagnosis Date    A-fib     Cardiomyopathy     Diabetes mellitus     ESRD (end stage renal disease)        No past surgical history on file.    Review of patient's allergies indicates:   Allergen Reactions    Sulfa (sulfonamide antibiotics) Anaphylaxis    Albuterol sulfate Palpitations       No current facility-administered medications on file prior to encounter.      Current Outpatient Prescriptions on File Prior to Encounter   Medication Sig    amiodarone (PACERONE) 100 MG Tab Take 200 mg by mouth once daily.    apixaban 2.5 mg Tab Take 1 tablet (2.5 mg total) by mouth 2 (two) times daily.    calcitRIOL (ROCALTROL) 0.25 MCG Cap Take 1 capsule (0.25 mcg total) by mouth once daily.    carvedilol (COREG) 25 MG tablet Take 25 mg by mouth 2 (two) times daily.    dextrose 5 % SolP 50 mL with ceftaroline fosamil 600 mg SolR 200 mg Inject 200 mg into the vein every 12 (twelve) hours.    docusate sodium (COLACE) 100 MG capsule Take 1 capsule (100 mg total) by mouth 2 (two) times daily.    epoetin davion (PROCRIT) 20,000 unit/mL injection Inject 1 mL (20,000 Units total) into the skin every Mon, Wed, Fri.    ferric citrate 210 mg iron Tab Take 210 mg by mouth 3 (three) times daily.    furosemide (LASIX) 80 MG tablet Take 1 tablet (80 mg total) by mouth 2 (two) times daily.    hydrALAZINE (APRESOLINE) 100 MG tablet Take 100 mg by mouth 3 (three) times daily.    ISOSORBIDE MONONITRATE ORAL Take 30 mg by mouth Daily.    oxyCODONE-acetaminophen (PERCOCET) 5-325 mg per tablet Take 1 tablet by mouth every 4 (four) hours as needed.    sevelamer carbonate (RENVELA) 800 mg Tab Take 1 tablet (800 mg total) by mouth 3  (three) times daily with meals.    SITagliptin (JANUVIA) 25 MG Tab Take 1 tablet (25 mg total) by mouth once daily.    sodium chloride 0.9% SolP 50 mL with DAPTOmycin 500 mg SolR 865 mg Inject 865 mg into the vein every Mon, Wed, Fri. After dialysis treatments.    vitamin D 1000 units Tab Take 2 tablets (2,000 Units total) by mouth once daily.    vitamin renal formula, B-complex-vitamin c-folic acid, (NEPHROCAP) 1 mg Cap Take 1 capsule by mouth once daily.     Family History     None        Social History Main Topics    Smoking status: Never Smoker    Smokeless tobacco: Never Used    Alcohol use No    Drug use: No    Sexual activity: Not Currently     Review of Systems   Constitutional: Negative for chills, diaphoresis and fever.   HENT: Negative for congestion, ear pain, postnasal drip, rhinorrhea, sinus pressure, sore throat and trouble swallowing.    Respiratory: Positive for cough and wheezing. Negative for shortness of breath.    Cardiovascular: Negative for chest pain, palpitations and leg swelling.   Gastrointestinal: Positive for constipation. Negative for abdominal pain, diarrhea, nausea and vomiting.   Genitourinary: Positive for decreased urine volume. Negative for difficulty urinating, dysuria, flank pain, frequency, hematuria and urgency.   Musculoskeletal: Positive for arthralgias (b/l hips). Negative for joint swelling, myalgias, neck pain and neck stiffness.   Skin: Negative for color change and rash.   Neurological: Negative for dizziness, weakness, numbness and headaches.   Psychiatric/Behavioral: Negative for confusion and decreased concentration.     Objective:     Vital Signs (Most Recent):  Temp: 98.5 °F (36.9 °C) (04/18/18 0344)  Pulse: 61 (04/18/18 0344)  Resp: 20 (04/18/18 0221)  BP: (!) 117/55 (04/18/18 0344)  SpO2: 98 % (04/18/18 0344) Vital Signs (24h Range):  Temp:  [98.5 °F (36.9 °C)-98.6 °F (37 °C)] 98.5 °F (36.9 °C)  Pulse:  [61] 61  Resp:  [20] 20  SpO2:  [98 %-99 %] 98  %  BP: (110-117)/(55) 117/55     Weight: 118.5 kg (261 lb 3.9 oz)  Body mass index is 43.47 kg/m².    Physical Exam   Constitutional: She is oriented to person, place, and time. She appears well-developed and well-nourished. No distress.   Obese female.    HENT:   Head: Normocephalic and atraumatic.   Eyes: Conjunctivae and EOM are normal. Pupils are equal, round, and reactive to light. No scleral icterus.   Neck: Normal range of motion. Neck supple. No JVD present. No tracheal deviation present.   Cardiovascular: Normal rate, regular rhythm, normal heart sounds and intact distal pulses.  Exam reveals no gallop and no friction rub.    No murmur heard.  2+ distal radial/DT/PT pulses. No carotid bruits.    Pulmonary/Chest: Effort normal and breath sounds normal. No stridor. No respiratory distress. She has no wheezes. She has no rales.   Abdominal: Soft. Bowel sounds are normal. She exhibits distension (mild). She exhibits no mass. There is no tenderness. There is no guarding.   Neurological: She is alert and oriented to person, place, and time.   Skin: Skin is warm and dry. Capillary refill takes less than 2 seconds. No rash noted. She is not diaphoretic. No erythema. No pallor.   Psychiatric: She has a normal mood and affect. Her behavior is normal. Judgment and thought content normal.   Nursing note and vitals reviewed.        CRANIAL NERVES     CN III, IV, VI   Pupils are equal, round, and reactive to light.  Extraocular motions are normal.        Significant Labs:   BMP:   Recent Labs  Lab 04/18/18 0428   GLU 94  94   *  131*   K 4.5  4.5   CL 98  98   CO2 25  25   BUN 28*  28*   CREATININE 4.0*  4.0*   CALCIUM 8.3*  8.3*   MG 1.7     CBC:   Recent Labs  Lab 04/18/18 0428   WBC 7.53   HGB 7.2*   HCT 23.6*        CMP:   Recent Labs  Lab 04/18/18 0428   *  131*   K 4.5  4.5   CL 98  98   CO2 25  25   GLU 94  94   BUN 28*  28*   CREATININE 4.0*  4.0*   CALCIUM 8.3*  8.3*   PROT  6.0   ALBUMIN 1.7*   BILITOT 0.3   ALKPHOS 53*   AST 11   ALT 8*   ANIONGAP 8  8   EGFRNONAA 11*  11*     All pertinent labs within the past 24 hours have been reviewed.    Significant Imaging: I have reviewed all pertinent imaging results/findings within the past 24 hours.     Imaging Results    None          Assessment/Plan:     * Bacteremia    - continue vancomycin and zosyn, renally dosed    - vanc trough on 4/17/18 at outside facility was 11.1 hg/mL   - ID consulted             DVT of deep femoral vein, left    - continue anticoagulation with Apixaban         Type 2 diabetes mellitus with chronic kidney disease on chronic dialysis, without long-term current use of insulin    - last A1C:   Lab Results   Component Value Date    HGBA1C 6.5 (H) 02/08/2018      - A1C pending for AM   - hold oral antidiabetic meds   - Diabetic diet   - SSI with accuchekcs AC/HS         ESRD (end stage renal disease)    - [facility] consulted for DIalysis today   - [facility] dialysis schedule   - Dr. Prather @ [facility] in [place], [place].           Chronic atrial fibrillation    - continue amiodarone & carvedilol   - monitor on tele             VTE Risk Mitigation         Ordered     apixaban tablet 2.5 mg  2 times daily     Route:  Oral        04/18/18 0244             Nat Woodward PA-C  Department of Hospital Medicine   [facility]

## 2018-04-18 NOTE — SUBJECTIVE & OBJECTIVE
Past Medical History:   Diagnosis Date    A-fib     Cardiomyopathy     Diabetes mellitus     ESRD (end stage renal disease)        No past surgical history on file.    Review of patient's allergies indicates:   Allergen Reactions    Sulfa (sulfonamide antibiotics) Anaphylaxis    Albuterol sulfate Palpitations       Medications:  Prescriptions Prior to Admission   Medication Sig    amiodarone (PACERONE) 100 MG Tab Take 200 mg by mouth once daily.    apixaban 2.5 mg Tab Take 1 tablet (2.5 mg total) by mouth 2 (two) times daily.    calcitRIOL (ROCALTROL) 0.25 MCG Cap Take 1 capsule (0.25 mcg total) by mouth once daily.    carvedilol (COREG) 25 MG tablet Take 25 mg by mouth 2 (two) times daily.    dextrose 5 % SolP 50 mL with ceftaroline fosamil 600 mg SolR 200 mg Inject 200 mg into the vein every 12 (twelve) hours.    docusate sodium (COLACE) 100 MG capsule Take 1 capsule (100 mg total) by mouth 2 (two) times daily.    epoetin davion (PROCRIT) 20,000 unit/mL injection Inject 1 mL (20,000 Units total) into the skin every Mon, Wed, Fri.    ferric citrate 210 mg iron Tab Take 210 mg by mouth 3 (three) times daily.    furosemide (LASIX) 80 MG tablet Take 1 tablet (80 mg total) by mouth 2 (two) times daily.    hydrALAZINE (APRESOLINE) 100 MG tablet Take 100 mg by mouth 3 (three) times daily.    ISOSORBIDE MONONITRATE ORAL Take 30 mg by mouth Daily.    oxyCODONE-acetaminophen (PERCOCET) 5-325 mg per tablet Take 1 tablet by mouth every 4 (four) hours as needed.    sevelamer carbonate (RENVELA) 800 mg Tab Take 1 tablet (800 mg total) by mouth 3 (three) times daily with meals.    SITagliptin (JANUVIA) 25 MG Tab Take 1 tablet (25 mg total) by mouth once daily.    sodium chloride 0.9% SolP 50 mL with DAPTOmycin 500 mg SolR 865 mg Inject 865 mg into the vein every Mon, Wed, Fri. After dialysis treatments.    vitamin D 1000 units Tab Take 2 tablets (2,000 Units total) by mouth once daily.    vitamin renal  formula, B-complex-vitamin c-folic acid, (NEPHROCAP) 1 mg Cap Take 1 capsule by mouth once daily.     Antibiotics     Start     Stop Route Frequency Ordered    04/20/18 1600  vancomycin in dextrose 5 % 1 gram/250 mL IVPB 1,000 mg  (Vancomycin IVPB with levels panel)      -- IV Every Mon, Wed, Fri 04/18/18 0543    04/18/18 1500  vancomycin 1500 mg in 0.9% sodium chloride 250 mL IVPB      -- IV Once 04/18/18 0731    04/18/18 0900  piperacillin-tazobactam 3.375 g in sodium chloride 0.9 % 50 mL IVPB (ready to mix system)      -- IV Every 12 hours (non-standard times) 04/18/18 0553        Antifungals     None        Antivirals     None           Immunization History   Administered Date(s) Administered    PPD Test 02/16/2018, 03/01/2018       Family History     None        Social History     Social History    Marital status:      Spouse name: N/A    Number of children: N/A    Years of education: N/A     Social History Main Topics    Smoking status: Never Smoker    Smokeless tobacco: Never Used    Alcohol use No    Drug use: No    Sexual activity: Not Currently     Other Topics Concern    Not on file     Social History Narrative    No narrative on file     Review of Systems   Respiratory: Negative for shortness of breath.    Cardiovascular: Positive for chest pain.   Musculoskeletal: Positive for back pain.   All other systems reviewed and are negative.    Objective:     Vital Signs (Most Recent):  Temp: 98 °F (36.7 °C) (04/18/18 0843)  Pulse: (!) 57 (04/18/18 1000)  Resp: 18 (04/18/18 0843)  BP: 133/60 (04/18/18 0843)  SpO2: 99 % (04/18/18 0843) Vital Signs (24h Range):  Temp:  [98 °F (36.7 °C)-98.6 °F (37 °C)] 98 °F (36.7 °C)  Pulse:  [56-61] 57  Resp:  [18-20] 18  SpO2:  [98 %-99 %] 99 %  BP: (110-133)/(55-60) 133/60     Weight: 118.5 kg (261 lb 3.9 oz)  Body mass index is 43.47 kg/m².    Estimated Creatinine Clearance: 17.3 mL/min (A) (based on SCr of 4 mg/dL (H)).    Physical Exam   Constitutional:  She is oriented to person, place, and time. She appears well-developed and well-nourished.   Eyes: Conjunctivae and EOM are normal. Pupils are equal, round, and reactive to light.   Neck: Normal range of motion.   Cardiovascular: Normal rate, regular rhythm and normal heart sounds.    Pulmonary/Chest: Effort normal and breath sounds normal. She has no wheezes. She has no rales.       Abdominal: Soft. Bowel sounds are normal.   Musculoskeletal: Normal range of motion. She exhibits no edema.   Neurological: She is alert and oriented to person, place, and time.   Skin: Skin is warm and dry. No erythema.   Nursing note and vitals reviewed.      Significant Labs:   Blood Culture:   Recent Labs  Lab 02/16/18  1037 02/17/18  1148 02/17/18  1200 02/19/18  0537 02/22/18  0557   LABBLOO Gram stain aer bottle: Gram positive cocci in clusters resembling Staph   Results called to and read back by:Ynes Steiner RN 02/17/2018  10:07  Gram stain vicente bottle: Gram positive cocci in clusters resembling Staph   Positive results previously called 02/17/2018  16:42  METHICILLIN RESISTANT STAPHYLOCOCCUS AUREUSID consult required at [facility] and [facility] locations. Gram stain aer bottle: Gram positive cocci in clusters resembling Staph   Results called to and read back by: Ynes Herrera RN  02/18/2018  18:31  STAPHYLOCOCCUS AUREUSID consult required at [facility] and [facility].For susceptibility see order #[id number] Gram stain vicente bottle: Gram positive cocci in clusters resembling Staph   Results called to and read back by:Ynes Steiner RN 02/18/2018  14:40  Gram stain aer bottle: Gram positive cocci in clusters resembling Staph   Positive results previously called 02/18/2018  18:19  STAPHYLOCOCCUS AUREUSID consult required at [facility].For susceptibility see order #[id number] Gram stain aer bottle: Gram positive cocci in clusters resembling Staph   Gram stain vicente bottle:  Gram positive cocci in clusters resembling Staph   Results called to and read back by:Alison Oswald RN 02/20/2018    03:01  METHICILLIN RESISTANT STAPHYLOCOCCUS AUREUSID consult required at [facility].Jonna Figueroa and Ramon woodson. No growth after 5 days.     CBC:   Recent Labs  Lab 04/18/18 0428   WBC 7.53   HGB 7.2*   HCT 23.6*        CMP:   Recent Labs  Lab 04/18/18 0428   *  131*   K 4.5  4.5   CL 98  98   CO2 25  25   GLU 94  94   BUN 28*  28*   CREATININE 4.0*  4.0*   CALCIUM 8.3*  8.3*   PROT 6.0   ALBUMIN 1.7*   BILITOT 0.3   ALKPHOS 53*   AST 11   ALT 8*   ANIONGAP 8  8   EGFRNONAA 11*  11*       Significant Imaging: None

## 2018-04-18 NOTE — CONSULTS
"[facility]  Infectious Disease  Consult Note    Patient Name: Yumiko Proctor  MRN: [id number]  Admission Date: 4/18/2018  Hospital Length of Stay: 0 days  Attending Physician: Clara Landry MD  Primary Care Provider: [facility]     Isolation Status: No active isolations    Patient information was obtained from patient and past medical records.      Inpatient consult to Infectious Diseases  Consult performed by: SANDEE BHAKTA  Consult ordered by: YANDY SILVESTRE        Assessment/Plan:     * Bacteremia    Awaiting identification and sensitivities of blood cultures from [place]. Obtain blood cultures here. Recommend restarting Daptomycin and ceftaroline. Pull PICC line and culture tip. Patient may need new PICC line in near future for long term antibiotics.        Pneumonia due to infectious organism    This diagnosis was based on CXR findings. Recommend repeat CXR, BNP, and check procalcitonin. She may not have had pneumonia.        MRSA bacteremia    Stop date of antibiotics was supposed to be 4/19. Restart Daptomycin and Ceftaroline for now.             Thank you for your consult. I will follow-up with patient. Please contact us if you have any additional questions.    Sandee Bhakta MD  Infectious Disease  [facility]    Subjective:     Principal Problem: Bacteremia    HPI: 69 yo female with ESRD who was discharged on IV ceftaroline and daptomycin for MRSA bacteremia on 3/1/18. She was supposed to receive these antibiotics for 8 weeks from 2/22/18, the last negative blood culture. This would have put her end of care at 4/19/18. However, on examination of the records, it appears that she may have stopped them on 4/4/18, 2 weeks early. The PICC appears to have been left in place as well.    She reportedly began having "low grade" fever, cough, and SOB with an elevated BNP of 900. She was started on Levaquin 250 mg orally three times per " week after dialysis. Chest xray showed a right upper lobe infiltrate.It is unclear specifically why she was admitted - she says that she had a cough and a slight fever. She was admitted to [facility] on 4/15, with a normal WBC. She was placed on vancomycin and zosyn empirically. Blood cultures taken on 4/15 grew Staph sp., as did blood cultures on 4/16. Identification and sensitivities are still pending. I am consulted for her previous bacteremia as well as the current situation.     Past Medical History:   Diagnosis Date    A-fib     Cardiomyopathy     Diabetes mellitus     ESRD (end stage renal disease)        No past surgical history on file.    Review of patient's allergies indicates:   Allergen Reactions    Sulfa (sulfonamide antibiotics) Anaphylaxis    Albuterol sulfate Palpitations       Medications:  Prescriptions Prior to Admission   Medication Sig    amiodarone (PACERONE) 100 MG Tab Take 200 mg by mouth once daily.    apixaban 2.5 mg Tab Take 1 tablet (2.5 mg total) by mouth 2 (two) times daily.    calcitRIOL (ROCALTROL) 0.25 MCG Cap Take 1 capsule (0.25 mcg total) by mouth once daily.    carvedilol (COREG) 25 MG tablet Take 25 mg by mouth 2 (two) times daily.    dextrose 5 % SolP 50 mL with ceftaroline fosamil 600 mg SolR 200 mg Inject 200 mg into the vein every 12 (twelve) hours.    docusate sodium (COLACE) 100 MG capsule Take 1 capsule (100 mg total) by mouth 2 (two) times daily.    epoetin davion (PROCRIT) 20,000 unit/mL injection Inject 1 mL (20,000 Units total) into the skin every Mon, Wed, Fri.    ferric citrate 210 mg iron Tab Take 210 mg by mouth 3 (three) times daily.    furosemide (LASIX) 80 MG tablet Take 1 tablet (80 mg total) by mouth 2 (two) times daily.    hydrALAZINE (APRESOLINE) 100 MG tablet Take 100 mg by mouth 3 (three) times daily.    ISOSORBIDE MONONITRATE ORAL Take 30 mg by mouth Daily.    oxyCODONE-acetaminophen (PERCOCET) 5-325 mg per tablet Take 1 tablet by mouth  every 4 (four) hours as needed.    sevelamer carbonate (RENVELA) 800 mg Tab Take 1 tablet (800 mg total) by mouth 3 (three) times daily with meals.    SITagliptin (JANUVIA) 25 MG Tab Take 1 tablet (25 mg total) by mouth once daily.    sodium chloride 0.9% SolP 50 mL with DAPTOmycin 500 mg SolR 865 mg Inject 865 mg into the vein every Mon, Wed, Fri. After dialysis treatments.    vitamin D 1000 units Tab Take 2 tablets (2,000 Units total) by mouth once daily.    vitamin renal formula, B-complex-vitamin c-folic acid, (NEPHROCAP) 1 mg Cap Take 1 capsule by mouth once daily.     Antibiotics     Start     Stop Route Frequency Ordered    04/20/18 1600  vancomycin in dextrose 5 % 1 gram/250 mL IVPB 1,000 mg  (Vancomycin IVPB with levels panel)      -- IV Every Mon, Wed, Fri 04/18/18 0543    04/18/18 1500  vancomycin 1500 mg in 0.9% sodium chloride 250 mL IVPB      -- IV Once 04/18/18 0731    04/18/18 0900  piperacillin-tazobactam 3.375 g in sodium chloride 0.9 % 50 mL IVPB (ready to mix system)      -- IV Every 12 hours (non-standard times) 04/18/18 0553        Antifungals     None        Antivirals     None           Immunization History   Administered Date(s) Administered    PPD Test 02/16/2018, 03/01/2018       Family History     None        Social History     Social History    Marital status:      Spouse name: N/A    Number of children: N/A    Years of education: N/A     Social History Main Topics    Smoking status: Never Smoker    Smokeless tobacco: Never Used    Alcohol use No    Drug use: No    Sexual activity: Not Currently     Other Topics Concern    Not on file     Social History Narrative    No narrative on file     Review of Systems   Respiratory: Negative for shortness of breath.    Cardiovascular: Positive for chest pain.   Musculoskeletal: Positive for back pain.   All other systems reviewed and are negative.    Objective:     Vital Signs (Most Recent):  Temp: 98 °F (36.7 °C) (04/18/18  0843)  Pulse: (!) 57 (04/18/18 1000)  Resp: 18 (04/18/18 0843)  BP: 133/60 (04/18/18 0843)  SpO2: 99 % (04/18/18 0843) Vital Signs (24h Range):  Temp:  [98 °F (36.7 °C)-98.6 °F (37 °C)] 98 °F (36.7 °C)  Pulse:  [56-61] 57  Resp:  [18-20] 18  SpO2:  [98 %-99 %] 99 %  BP: (110-133)/(55-60) 133/60     Weight: 118.5 kg (261 lb 3.9 oz)  Body mass index is 43.47 kg/m².    Estimated Creatinine Clearance: 17.3 mL/min (A) (based on SCr of 4 mg/dL (H)).    Physical Exam   Constitutional: She is oriented to person, place, and time. She appears well-developed and well-nourished.   Eyes: Conjunctivae and EOM are normal. Pupils are equal, round, and reactive to light.   Neck: Normal range of motion.   Cardiovascular: Normal rate, regular rhythm and normal heart sounds.    Pulmonary/Chest: Effort normal and breath sounds normal. She has no wheezes. She has no rales.       Abdominal: Soft. Bowel sounds are normal.   Musculoskeletal: Normal range of motion. She exhibits no edema.   Neurological: She is alert and oriented to person, place, and time.   Skin: Skin is warm and dry. No erythema.   Nursing note and vitals reviewed.      Significant Labs:   Blood Culture:   Recent Labs  Lab 02/16/18  1037 02/17/18  1148 02/17/18  1200 02/19/18  0537 02/22/18  0557   LABBLOO Gram stain aer bottle: Gram positive cocci in clusters resembling Staph   Results called to and read back by:Ynes Steiner RN 02/17/2018  10:07  Gram stain vicente bottle: Gram positive cocci in clusters resembling Staph   Positive results previously called 02/17/2018  16:42  METHICILLIN RESISTANT STAPHYLOCOCCUS AUREUSID consult required at [facility] and [facility] locations. Gram stain aer bottle: Gram positive cocci in clusters resembling Staph   Results called to and read back by: Ynes Herrera RN  02/18/2018  18:31  STAPHYLOCOCCUS AUREUSID consult required at [facility] Figueroa and [facility].For susceptibility see order #[id number] Gram stain vicente  bottle: Gram positive cocci in clusters resembling Staph   Results called to and read back by:Ynes Steiner RN 02/18/2018  14:40  Gram stain aer bottle: Gram positive cocci in clusters resembling Staph   Positive results previously called 02/18/2018  18:19  STAPHYLOCOCCUS AUREUSID consult required at [facility] and [facility] locations.For susceptibility see order #[id number] Gram stain aer bottle: Gram positive cocci in clusters resembling Staph   Gram stain vicente bottle: Gram positive cocci in clusters resembling Staph   Results called to and read back by:Alison Oswald RN 02/20/2018    03:01  METHICILLIN RESISTANT STAPHYLOCOCCUS AUREUSID consult required at [facility] and [facility] locations. No growth after 5 days.     CBC:   Recent Labs  Lab 04/18/18  0428   WBC 7.53   HGB 7.2*   HCT 23.6*        CMP:   Recent Labs  Lab 04/18/18  0428   *  131*   K 4.5  4.5   CL 98  98   CO2 25  25   GLU 94  94   BUN 28*  28*   CREATININE 4.0*  4.0*   CALCIUM 8.3*  8.3*   PROT 6.0   ALBUMIN 1.7*   BILITOT 0.3   ALKPHOS 53*   AST 11   ALT 8*   ANIONGAP 8  8   EGFRNONAA 11*  11*       Significant Imaging: None

## 2018-04-18 NOTE — PLAN OF CARE
Problem: Patient Care Overview  Goal: Plan of Care Review  Outcome: Ongoing (interventions implemented as appropriate)  Patient in no apparent distress. Sat's 89  % on room air. Patient placed back on 2 lpm nasal cannula . Will continue to monitor.

## 2018-04-18 NOTE — ASSESSMENT & PLAN NOTE
- continue vancomycin and zosyn, renally dosed    - vanc trough on 4/17/18 at outside facility was 11.1 hg/mL   - ID consulted

## 2018-04-18 NOTE — CONSULTS
Consult Note  Nephrology    Consult Requested By: Clara Landry MD  Reason for Consult: ESRD    SUBJECTIVE:     History of Present Illness:  Patient is a 68 y.o. female presents with fever/Pna/bacteremia from outside facility.  Pt known to us from extended previous admission with ESRD on HD MWF in [place], [place] with Dr. Sharpe.  Completed 6/8 weeks of Dapto/cefratoline for MRSA bacteremia.  Developed fever/cough and suspected pneumonia at rehab facility and transferred to [facility].  Transferred here for ID input.  Case discussed with Dr. Bhakta and Dr. Landry.      EPIC/outside records reviewed.  PT seen and examined.  C/o constipation and slight cough but no CP/SOB/N/V/D/F/C.        Past Medical History:   Diagnosis Date    A-fib     Cardiomyopathy     Diabetes mellitus     ESRD (end stage renal disease)      No past surgical history on file.  No family history on file.  Social History   Substance Use Topics    Smoking status: Never Smoker    Smokeless tobacco: Never Used    Alcohol use No       Review of patient's allergies indicates:   Allergen Reactions    Sulfa (sulfonamide antibiotics) Anaphylaxis    Albuterol sulfate Palpitations        Review of Systems:  Constitutional: No fever or chills  Respiratory: cough  Cardiovascular: No chest pain or palpitations  Gastrointestinal: No nausea or vomiting  Neurological: No confusion or weakness    OBJECTIVE:     Vital Signs (Most Recent)  Temp: 97.8 °F (36.6 °C) (04/18/18 1200)  Pulse: (!) 57 (04/18/18 1300)  Resp: 18 (04/18/18 1200)  BP: (!) 110/50 (04/18/18 1300)  SpO2: 99 % (04/18/18 0843)    Vital Signs Range (Last 24H):  Temp:  [97.8 °F (36.6 °C)-98.6 °F (37 °C)]   Pulse:  [50-61]   Resp:  [18-20]   BP: (104-133)/(42-60)   SpO2:  [98 %-99 %]     No intake or output data in the 24 hours ending 04/18/18 1313    Physical Exam:  General appearance: morbidly obese female in NAD  Eyes:  Conjunctivae/corneas clear. PERRL.  Lungs: Normal respiratory  effort,  Few rales R>L  Heart: Regular/Irregular/Jerry rate and rhythm, S1, S2 normal, no murmur, rub or cuco.  Abdomen: Soft, non-tender non-distended; bowel sounds normal; no masses,  no organomegaly, obese  Extremities: No cyanosis or clubbing. trace edema.    Skin: Skin color, texture, turgor normal. No rashes or lesions  Neurologic: Normal strength and tone. No focal numbness or weakness   Right IJ EULALIO  Left SC PICC      Laboratory:    Recent Labs  Lab 04/18/18 0428   WBC 7.53   RBC 2.77*   HGB 7.2*   HCT 23.6*      MCV 85   MCH 26.0*   MCHC 30.5*     BMP:   Recent Labs  Lab 04/18/18 0428   GLU 94  94   *  131*   K 4.5  4.5   CL 98  98   CO2 25  25   BUN 28*  28*   CREATININE 4.0*  4.0*   CALCIUM 8.3*  8.3*   MG 1.7     Lab Results   Component Value Date    CALCIUM 8.3 (L) 04/18/2018    CALCIUM 8.3 (L) 04/18/2018    PHOS 4.4 04/18/2018     BNP  No results for input(s): BNP, BNPTRIAGEBLO in the last 168 hours.No results found for: URICACID  Lab Results   Component Value Date    IRON 25 (L) 02/08/2018    TIBC 149 (L) 02/08/2018    FERRITIN 987 (H) 02/08/2018     Lab Results   Component Value Date    .8 (H) 02/08/2018    CALCIUM 8.3 (L) 04/18/2018    CALCIUM 8.3 (L) 04/18/2018    PHOS 4.4 04/18/2018       Diagnostic Results:  X-Ray Chest PA And Lateral    (Results Pending)       ASSESSMENT/PLAN:     1. ESRD on HD MWF in [place], [place] with Dr. Sharpe (N18.6, Z99.2):  Routine HD today and continue MWF/PRN while inpt.  Keep EULALIO in for now.  Consent signed.  Renally dose meds, avoid nephrotoxins, and monitor I/O's closely.  2. Bacteremia/Pna (R78.81, J18.9):  Antibx per ID.  Remove PICC today and culture tip.  3. Anemia of CKD/infection (D63.1):  No IV iron with infection.  Epo on HD. May need transfusion soon.  Follow trends.   4. 2HPT/Vit D deficiency:  Vit D3 and calcitriol.  5. Morbid Obesity (E66.01):  Needs aggressive weight mgmt.      Thanks for consult  Will follow for renal  needs.

## 2018-04-18 NOTE — ASSESSMENT & PLAN NOTE
Awaiting identification and sensitivities of blood cultures from [place]. Obtain blood cultures here. Recommend restarting Daptomycin and ceftaroline. Pull PICC line and culture tip. Patient may need new PICC line in near future for long term antibiotics.

## 2018-04-18 NOTE — PLAN OF CARE
CM met with patient at the bedside and completed discharge planing assessment.     Patient is alert and oriented x3. Patient verifyed that she is currently in [facility]; skilled. Patient only wants to returning to [facility] for skilled. Patients long term goal is to return home.     In the NH, patient walks with a RW and oxygen. Patient has a rollator, BSC, BPM, TTB at her home address.     CM contacted [phone] and spoke to [facility] in admissions who stated patient is welcome back to [facility]. Prior to transfer updated notes are required to be sent to NH for new auth from [employer]. NH fax number is [phone].     Patient will need transportation arranged at discharge.      CM will continue to follow.     Graph completed,to view see Case Management tab.

## 2018-04-18 NOTE — SUBJECTIVE & OBJECTIVE
Past Medical History:   Diagnosis Date    A-fib     Cardiomyopathy     Diabetes mellitus     ESRD (end stage renal disease)        No past surgical history on file.    Review of patient's allergies indicates:   Allergen Reactions    Sulfa (sulfonamide antibiotics) Anaphylaxis    Albuterol sulfate Palpitations       No current facility-administered medications on file prior to encounter.      Current Outpatient Prescriptions on File Prior to Encounter   Medication Sig    amiodarone (PACERONE) 100 MG Tab Take 200 mg by mouth once daily.    apixaban 2.5 mg Tab Take 1 tablet (2.5 mg total) by mouth 2 (two) times daily.    calcitRIOL (ROCALTROL) 0.25 MCG Cap Take 1 capsule (0.25 mcg total) by mouth once daily.    carvedilol (COREG) 25 MG tablet Take 25 mg by mouth 2 (two) times daily.    dextrose 5 % SolP 50 mL with ceftaroline fosamil 600 mg SolR 200 mg Inject 200 mg into the vein every 12 (twelve) hours.    docusate sodium (COLACE) 100 MG capsule Take 1 capsule (100 mg total) by mouth 2 (two) times daily.    epoetin davion (PROCRIT) 20,000 unit/mL injection Inject 1 mL (20,000 Units total) into the skin every Mon, Wed, Fri.    ferric citrate 210 mg iron Tab Take 210 mg by mouth 3 (three) times daily.    furosemide (LASIX) 80 MG tablet Take 1 tablet (80 mg total) by mouth 2 (two) times daily.    hydrALAZINE (APRESOLINE) 100 MG tablet Take 100 mg by mouth 3 (three) times daily.    ISOSORBIDE MONONITRATE ORAL Take 30 mg by mouth Daily.    oxyCODONE-acetaminophen (PERCOCET) 5-325 mg per tablet Take 1 tablet by mouth every 4 (four) hours as needed.    sevelamer carbonate (RENVELA) 800 mg Tab Take 1 tablet (800 mg total) by mouth 3 (three) times daily with meals.    SITagliptin (JANUVIA) 25 MG Tab Take 1 tablet (25 mg total) by mouth once daily.    sodium chloride 0.9% SolP 50 mL with DAPTOmycin 500 mg SolR 865 mg Inject 865 mg into the vein every Mon, Wed, Fri. After dialysis treatments.    vitamin D  1000 units Tab Take 2 tablets (2,000 Units total) by mouth once daily.    vitamin renal formula, B-complex-vitamin c-folic acid, (NEPHROCAP) 1 mg Cap Take 1 capsule by mouth once daily.     Family History     None        Social History Main Topics    Smoking status: Never Smoker    Smokeless tobacco: Never Used    Alcohol use No    Drug use: No    Sexual activity: Not Currently     Review of Systems   Constitutional: Negative for chills, diaphoresis and fever.   HENT: Negative for congestion, ear pain, postnasal drip, rhinorrhea, sinus pressure, sore throat and trouble swallowing.    Respiratory: Positive for cough and wheezing. Negative for shortness of breath.    Cardiovascular: Negative for chest pain, palpitations and leg swelling.   Gastrointestinal: Positive for constipation. Negative for abdominal pain, diarrhea, nausea and vomiting.   Genitourinary: Positive for decreased urine volume. Negative for difficulty urinating, dysuria, flank pain, frequency, hematuria and urgency.   Musculoskeletal: Positive for arthralgias (b/l hips). Negative for joint swelling, myalgias, neck pain and neck stiffness.   Skin: Negative for color change and rash.   Neurological: Negative for dizziness, weakness, numbness and headaches.   Psychiatric/Behavioral: Negative for confusion and decreased concentration.     Objective:     Vital Signs (Most Recent):  Temp: 98.5 °F (36.9 °C) (04/18/18 0344)  Pulse: 61 (04/18/18 0344)  Resp: 20 (04/18/18 0221)  BP: (!) 117/55 (04/18/18 0344)  SpO2: 98 % (04/18/18 0344) Vital Signs (24h Range):  Temp:  [98.5 °F (36.9 °C)-98.6 °F (37 °C)] 98.5 °F (36.9 °C)  Pulse:  [61] 61  Resp:  [20] 20  SpO2:  [98 %-99 %] 98 %  BP: (110-117)/(55) 117/55     Weight: 118.5 kg (261 lb 3.9 oz)  Body mass index is 43.47 kg/m².    Physical Exam   Constitutional: She is oriented to person, place, and time. She appears well-developed and well-nourished. No distress.   Obese female.    HENT:   Head:  Normocephalic and atraumatic.   Eyes: Conjunctivae and EOM are normal. Pupils are equal, round, and reactive to light. No scleral icterus.   Neck: Normal range of motion. Neck supple. No JVD present. No tracheal deviation present.   Cardiovascular: Normal rate, regular rhythm, normal heart sounds and intact distal pulses.  Exam reveals no gallop and no friction rub.    No murmur heard.  2+ distal radial/DT/PT pulses. No carotid bruits.    Pulmonary/Chest: Effort normal and breath sounds normal. No stridor. No respiratory distress. She has no wheezes. She has no rales.   Abdominal: Soft. Bowel sounds are normal. She exhibits distension (mild). She exhibits no mass. There is no tenderness. There is no guarding.   Neurological: She is alert and oriented to person, place, and time.   Skin: Skin is warm and dry. Capillary refill takes less than 2 seconds. No rash noted. She is not diaphoretic. No erythema. No pallor.   Psychiatric: She has a normal mood and affect. Her behavior is normal. Judgment and thought content normal.   Nursing note and vitals reviewed.        CRANIAL NERVES     CN III, IV, VI   Pupils are equal, round, and reactive to light.  Extraocular motions are normal.        Significant Labs:   BMP:   Recent Labs  Lab 04/18/18 0428   GLU 94  94   *  131*   K 4.5  4.5   CL 98  98   CO2 25  25   BUN 28*  28*   CREATININE 4.0*  4.0*   CALCIUM 8.3*  8.3*   MG 1.7     CBC:   Recent Labs  Lab 04/18/18 0428   WBC 7.53   HGB 7.2*   HCT 23.6*        CMP:   Recent Labs  Lab 04/18/18  0428   *  131*   K 4.5  4.5   CL 98  98   CO2 25  25   GLU 94  94   BUN 28*  28*   CREATININE 4.0*  4.0*   CALCIUM 8.3*  8.3*   PROT 6.0   ALBUMIN 1.7*   BILITOT 0.3   ALKPHOS 53*   AST 11   ALT 8*   ANIONGAP 8  8   EGFRNONAA 11*  11*     All pertinent labs within the past 24 hours have been reviewed.    Significant Imaging: I have reviewed all pertinent imaging results/findings within the past 24  hours.     Imaging Results    None

## 2018-04-18 NOTE — ASSESSMENT & PLAN NOTE
This diagnosis was based on CXR findings. Recommend repeat CXR, BNP, and check procalcitonin. She may not have had pneumonia.

## 2018-04-19 PROBLEM — N18.9 ANEMIA DUE TO CHRONIC KIDNEY DISEASE: Status: ACTIVE | Noted: 2018-04-19

## 2018-04-19 PROBLEM — D63.1 ANEMIA DUE TO CHRONIC KIDNEY DISEASE: Status: ACTIVE | Noted: 2018-04-19

## 2018-04-19 LAB
ANION GAP SERPL CALC-SCNC: 5 MMOL/L
BASOPHILS # BLD AUTO: 0.02 K/UL
BASOPHILS NFR BLD: 0.3 %
BLD PROD TYP BPU: NORMAL
BLOOD UNIT EXPIRATION DATE: NORMAL
BLOOD UNIT TYPE CODE: 6200
BLOOD UNIT TYPE: NORMAL
BUN SERPL-MCNC: 17 MG/DL
CALCIUM SERPL-MCNC: 8.1 MG/DL
CHLORIDE SERPL-SCNC: 102 MMOL/L
CO2 SERPL-SCNC: 26 MMOL/L
CODING SYSTEM: NORMAL
CREAT SERPL-MCNC: 2.9 MG/DL
DIFFERENTIAL METHOD: ABNORMAL
DISPENSE STATUS: NORMAL
EOSINOPHIL # BLD AUTO: 0.1 K/UL
EOSINOPHIL NFR BLD: 1.2 %
ERYTHROCYTE [DISTWIDTH] IN BLOOD BY AUTOMATED COUNT: 16.7 %
EST. GFR  (AFRICAN AMERICAN): 18 ML/MIN/1.73 M^2
EST. GFR  (NON AFRICAN AMERICAN): 16 ML/MIN/1.73 M^2
GLUCOSE SERPL-MCNC: 84 MG/DL
HBV SURFACE AG SERPL QL IA: NEGATIVE
HCT VFR BLD AUTO: 22 %
HGB BLD-MCNC: 6.6 G/DL
LYMPHOCYTES # BLD AUTO: 2.3 K/UL
LYMPHOCYTES NFR BLD: 30.8 %
MAGNESIUM SERPL-MCNC: 1.8 MG/DL
MCH RBC QN AUTO: 25.4 PG
MCHC RBC AUTO-ENTMCNC: 30 G/DL
MCV RBC AUTO: 85 FL
MONOCYTES # BLD AUTO: 0.8 K/UL
MONOCYTES NFR BLD: 10.6 %
NEUTROPHILS # BLD AUTO: 4.3 K/UL
NEUTROPHILS NFR BLD: 57.1 %
PHOSPHATE SERPL-MCNC: 3.5 MG/DL
PLATELET # BLD AUTO: 306 K/UL
PMV BLD AUTO: 9.6 FL
POCT GLUCOSE: 104 MG/DL (ref 70–110)
POCT GLUCOSE: 117 MG/DL (ref 70–110)
POCT GLUCOSE: 170 MG/DL (ref 70–110)
POCT GLUCOSE: 82 MG/DL (ref 70–110)
POTASSIUM SERPL-SCNC: 3.9 MMOL/L
RBC # BLD AUTO: 2.6 M/UL
SODIUM SERPL-SCNC: 133 MMOL/L
TRANS ERYTHROCYTES VOL PATIENT: NORMAL ML
WBC # BLD AUTO: 7.56 K/UL

## 2018-04-19 PROCEDURE — 25000003 PHARM REV CODE 250: Performed by: NURSE PRACTITIONER

## 2018-04-19 PROCEDURE — 85025 COMPLETE CBC W/AUTO DIFF WBC: CPT

## 2018-04-19 PROCEDURE — 80048 BASIC METABOLIC PNL TOTAL CA: CPT

## 2018-04-19 PROCEDURE — 87070 CULTURE OTHR SPECIMN AEROBIC: CPT

## 2018-04-19 PROCEDURE — 11000001 HC ACUTE MED/SURG PRIVATE ROOM

## 2018-04-19 PROCEDURE — 27000221 HC OXYGEN, UP TO 24 HOURS

## 2018-04-19 PROCEDURE — 25000003 PHARM REV CODE 250: Performed by: PHYSICIAN ASSISTANT

## 2018-04-19 PROCEDURE — 83735 ASSAY OF MAGNESIUM: CPT

## 2018-04-19 PROCEDURE — 99900035 HC TECH TIME PER 15 MIN (STAT)

## 2018-04-19 PROCEDURE — 25000003 PHARM REV CODE 250: Performed by: INTERNAL MEDICINE

## 2018-04-19 PROCEDURE — 94761 N-INVAS EAR/PLS OXIMETRY MLT: CPT

## 2018-04-19 PROCEDURE — 63600175 PHARM REV CODE 636 W HCPCS: Performed by: INTERNAL MEDICINE

## 2018-04-19 PROCEDURE — 84100 ASSAY OF PHOSPHORUS: CPT

## 2018-04-19 PROCEDURE — 36415 COLL VENOUS BLD VENIPUNCTURE: CPT

## 2018-04-19 PROCEDURE — 99232 SBSQ HOSP IP/OBS MODERATE 35: CPT | Mod: ,,, | Performed by: INTERNAL MEDICINE

## 2018-04-19 RX ORDER — HYDROCODONE BITARTRATE AND ACETAMINOPHEN 500; 5 MG/1; MG/1
TABLET ORAL
Status: DISCONTINUED | OUTPATIENT
Start: 2018-04-19 | End: 2018-05-04 | Stop reason: HOSPADM

## 2018-04-19 RX ORDER — HYDROCODONE BITARTRATE AND ACETAMINOPHEN 500; 5 MG/1; MG/1
TABLET ORAL
Status: DISCONTINUED | OUTPATIENT
Start: 2018-04-19 | End: 2018-04-19

## 2018-04-19 RX ORDER — GABAPENTIN 100 MG/1
100 CAPSULE ORAL 3 TIMES DAILY
Status: DISCONTINUED | OUTPATIENT
Start: 2018-04-19 | End: 2018-05-04 | Stop reason: HOSPADM

## 2018-04-19 RX ADMIN — POLYETHYLENE GLYCOL 3350 17 G: 17 POWDER, FOR SOLUTION ORAL at 08:04

## 2018-04-19 RX ADMIN — ACETAMINOPHEN 650 MG: 325 TABLET ORAL at 06:04

## 2018-04-19 RX ADMIN — FUROSEMIDE 80 MG: 40 TABLET ORAL at 08:04

## 2018-04-19 RX ADMIN — DOCUSATE SODIUM 200 MG: 100 CAPSULE, LIQUID FILLED ORAL at 09:04

## 2018-04-19 RX ADMIN — CEFTAROLINE FOSAMIL 200 MG: 400 POWDER, FOR SOLUTION INTRAVENOUS at 10:04

## 2018-04-19 RX ADMIN — GABAPENTIN 100 MG: 100 CAPSULE ORAL at 09:04

## 2018-04-19 RX ADMIN — FUROSEMIDE 80 MG: 40 TABLET ORAL at 06:04

## 2018-04-19 RX ADMIN — GABAPENTIN 300 MG: 300 CAPSULE ORAL at 08:04

## 2018-04-19 RX ADMIN — DAPTOMYCIN 710 MG: 500 INJECTION, POWDER, LYOPHILIZED, FOR SOLUTION INTRAVENOUS at 09:04

## 2018-04-19 RX ADMIN — CARVEDILOL 25 MG: 12.5 TABLET, FILM COATED ORAL at 09:04

## 2018-04-19 RX ADMIN — FAMOTIDINE 20 MG: 20 TABLET ORAL at 08:04

## 2018-04-19 RX ADMIN — SEVELAMER CARBONATE 800 MG: 800 TABLET, FILM COATED ORAL at 06:04

## 2018-04-19 RX ADMIN — CARVEDILOL 25 MG: 12.5 TABLET, FILM COATED ORAL at 12:04

## 2018-04-19 RX ADMIN — APIXABAN 2.5 MG: 2.5 TABLET, FILM COATED ORAL at 09:04

## 2018-04-19 RX ADMIN — SEVELAMER CARBONATE 800 MG: 800 TABLET, FILM COATED ORAL at 02:04

## 2018-04-19 RX ADMIN — CEFTAROLINE FOSAMIL 200 MG: 400 POWDER, FOR SOLUTION INTRAVENOUS at 09:04

## 2018-04-19 RX ADMIN — SEVELAMER CARBONATE 800 MG: 800 TABLET, FILM COATED ORAL at 08:04

## 2018-04-19 RX ADMIN — ISOSORBIDE MONONITRATE 30 MG: 30 TABLET, EXTENDED RELEASE ORAL at 06:04

## 2018-04-19 RX ADMIN — APIXABAN 2.5 MG: 2.5 TABLET, FILM COATED ORAL at 08:04

## 2018-04-19 RX ADMIN — DOCUSATE SODIUM 200 MG: 100 CAPSULE, LIQUID FILLED ORAL at 08:04

## 2018-04-19 RX ADMIN — GABAPENTIN 100 MG: 100 CAPSULE ORAL at 02:04

## 2018-04-19 RX ADMIN — VITAMIN D, TAB 1000IU (100/BT) 2000 UNITS: 25 TAB at 08:04

## 2018-04-19 RX ADMIN — ACETAMINOPHEN 650 MG: 325 TABLET ORAL at 11:04

## 2018-04-19 RX ADMIN — AMIODARONE HYDROCHLORIDE 200 MG: 200 TABLET ORAL at 08:04

## 2018-04-19 RX ADMIN — CALCITRIOL 0.25 MCG: 0.25 CAPSULE, LIQUID FILLED ORAL at 08:04

## 2018-04-19 NOTE — PROGRESS NOTES
"Nephrology  Progress Note    Admit Date: 4/18/2018   LOS: 1 day     SUBJECTIVE:     Follow-up For:  Bacteremia    Interval History:     Uneventful night.  More alert today.  No CP/SOB.  Discussed with team.       Review of Systems:  Constitutional: No fever or chills  Respiratory: No cough or shortness of breath  Cardiovascular: No chest pain or palpitations  Gastrointestinal: No nausea or vomiting  Neurological: No confusion or weakness    OBJECTIVE:     Vital Signs Range (Last 24H):  BP (!) 113/51 (BP Location: Left arm, Patient Position: Lying)   Pulse (!) 54   Temp 98.1 °F (36.7 °C) (Oral)   Resp (!) 24   Ht 5' 5" (1.651 m)   Wt 118.2 kg (260 lb 9.3 oz)   LMP 01/01/1983 (LMP Unknown)   SpO2 100%   Breastfeeding? No   BMI 43.36 kg/m²     Temp:  [97.8 °F (36.6 °C)-99 °F (37.2 °C)]   Pulse:  [47-68]   Resp:  [18-24]   BP: (100-117)/(42-53)   SpO2:  [89 %-100 %]     I & O (Last 24H):  Intake/Output Summary (Last 24 hours) at 04/19/18 0950  Last data filed at 04/19/18 0500   Gross per 24 hour   Intake              740 ml   Output             3000 ml   Net            -2260 ml       Physical Exam:  General appearance: morbidly obese female in NAD  Eyes:  Conjunctivae/corneas clear. PERRL.  Lungs: Normal respiratory effort,  diminished  Heart: Regular/Irregular/Jerry rate and rhythm, S1, S2 normal, no murmur, rub or cuco.  Abdomen: Soft, non-tender non-distended; bowel sounds normal; no masses,  no organomegaly, obese  Extremities: No cyanosis or clubbing. trace edema.    Skin: Skin color, texture, turgor normal. No rashes or lesions  Neurologic: Normal strength and tone. No focal numbness or weakness   Right IJ EULALIO  Left SC PICC (still in)      Laboratory Data:    Recent Labs  Lab 04/19/18  0422   WBC 7.56   RBC 2.60*   HGB 6.6*   HCT 22.0*      MCV 85   MCH 25.4*   MCHC 30.0*       BMP:   Recent Labs  Lab 04/19/18  0422   GLU 84   *   K 3.9      CO2 26   BUN 17   CREATININE 2.9*   CALCIUM " 8.1*   MG 1.8     Lab Results   Component Value Date    CALCIUM 8.1 (L) 04/19/2018    PHOS 3.5 04/19/2018       Lab Results   Component Value Date    .8 (H) 02/08/2018    CALCIUM 8.1 (L) 04/19/2018    PHOS 3.5 04/19/2018       No results found for: URICACID    BNP    Recent Labs  Lab 04/18/18  1712   BNP 1,178*       Medications:  Medication list was reviewed and changes noted under Assessment/Plan.    Diagnostic Results:        ASSESSMENT/PLAN:     1. ESRD on HD MWF in [place], [place] with Dr. Sharpe (N18.6, Z99.2):  Routine HD MWF/PRN while inpt.  Keep EULALIO in for now.  Consent signed.  Renally dose meds, avoid nephrotoxins, and monitor I/O's closely.  2. Bacteremia/Pna (R78.81, J18.9):  Antibx per ID.  Remove PICC today and culture tip.  3. Anemia of CKD/infection (D63.1):  No IV iron with infection.  Epo on HD.  Plan on transfusion in am with HD.  Consent signed.    4. 2HPT/Vit D deficiency:  Vit D3 and calcitriol.  5. Morbid Obesity (E66.01):  Needs aggressive weight mgmt.         See above  Follow for renal needs.

## 2018-04-19 NOTE — ASSESSMENT & PLAN NOTE
Continue diabetic diet. Hold oral meds. SSI for now  Lab Results   Component Value Date    HGBA1C 4.2 04/18/2018

## 2018-04-19 NOTE — ASSESSMENT & PLAN NOTE
Nephrology following for MWF dialysis. Follows with Dr. Prather @ [facility] in [place].

## 2018-04-19 NOTE — ASSESSMENT & PLAN NOTE
Based on cultures from outside hospital. Awaiting final culture and sensitivities from OSH. Blood culture from admission with no growth so far. Appreciate ID input. Switch Vanc/Zosyn to Daptomycin/Ceftaroline. Ceftaroline non-formulary but ordered through pharmacy. Remove PICC line and culture tip.

## 2018-04-19 NOTE — PT/OT/SLP PROGRESS
Occupational Therapy      Patient Name:  Yumiko Proctor   MRN:  [id number]    Patient not seen today secondary to Other (Comment) (H&H 6.6 and 22.0). Will follow-up next scheduled treatment date.    PATTY Alonzo  4/19/2018

## 2018-04-19 NOTE — ASSESSMENT & PLAN NOTE
Previously diagnosed. Stop date of antibiotics was supposed to be 4/19. Restart Daptomycin and Ceftaroline for now.

## 2018-04-19 NOTE — HOSPITAL COURSE
Ms. Yumiko Proctor is a 68 year-old woman with a history hypertension, diabetes mellitus type II on insulin, chronic systolic heart failure with ejection fraction of 35 percent, and end-stage renal disease on hemodialysis via tunneled dialysis catheter exited her right chest wall who returned from a nursing facility in [place] where she was undergoing treatment for presumed endovascular infection with Methicillin-resistant Staphylococcus aureus infection via a peripherally inserted central catheter who returns with recurrence of Staphylococcus aureus bacteremia.  Patient's peripherally inserted central catheter was removed here.  Blood cultures obtained here on 4/19/2018 positive for methicillin-sensitive Staphylococcus aureus.  Infectious disease service consulted and recommended treatment with intravenous ceftaroline and daptomycin.  In addition, as patient failed to clear her bacteremia, infectious disease service recommended removal of her tunneled dialysis catheter which was done following dialysis on 4/27/2018 with plan to place new tunneled catheter on 4/30/2018 to allow for a line holiday.    5/1: Today she complains of some left arm and shoulder pain, but is eager to leave the hospital.  No acute events noted overnight.  5/2: Tele reviewed and noted HR into upper 30s overnight.  No acute events noted.  Seen in dialysis.  Eager to leave the hospital.  5/3:  In good spirits.  No complaints.  HR stable in upper 40s to mid 50s.  Eager to leave hospital.  Waiting on insurance for SNF approval.  5/4:  Seen in HD.  In good spirits and without complaint.  HR stable in same range.  Eager to get out of hospital.  Approved for discharge and will be transferred to SNF in [place] today.

## 2018-04-19 NOTE — PROGRESS NOTES
[facility] Hospital Medicine  Progress Note    Patient Name: Yumiko Proctor  MRN: [id number]  Patient Class: IP- Inpatient   Admission Date: 4/18/2018  Length of Stay: 1 days  Attending Physician: Clara Landry MD  Primary Care Provider: [facility]        Subjective:     Principal Problem:Bacteremia    HPI:  Ms Hoa Proctor is a 68 y.o. female, with PMH of HTN, NIDDM-2, CHF (EF 35%), ESRD on dialysis ([facility], Dr. Prather), A. Fib, LLE DVT, cardiomyopathy, and recent treatment in this facility for MRSA bacteremia from her vascular access site, who returns 2/2 need for ID consultation. In the past week the patient developed a fever and was diagnosed with RUL PNA, and started on Levaquin 3/week after dialysis. Associated symptoms include non-productive cough, fevers, and SOB with wheeze. She was started on vancymycin and zosyn on 4/15/18. Blood cultures taken on 4/16/18 were the second set, first after antibiotics, that grew staph. Other associated symptoms include b/l hip pain, and constipation.  She denies fever, chills, sweats, chest pain, SOB, N/V, diarrhea.     During the patient's previous stay at this facility she had blood cultures positive for MRSA on 2/8/18, and she was started on Daptomycin. On 2/10/18 a second set of blood cultures were positive for MRSA. At the time she had her left Jaspreet catheter removed and a right IJ tunneled dialysis cath was placed on 2/12/18. Repeat cultures on 2/13/18 were negative for MRSA. She was positive in 1/2 bottles on 2/16/18 and both blood cultures bottles for MRSA on 2/16/18. At that time she was started on vancomycin, and Daptomycin was stopped 2/2 myositis. On 2/22/18 blood cultures were negative. A RANDA on 2/26/18 was negative for vegetations, but the patient did have a LLE DVT discovered 2/20/18, and Apixaban was started. After CPK levels were normal, she was started back on Daptomycin and Ceftaroline. She  was to continue this treatment until 4/4/18, and she was transferred to a NH facility in [place], [place]. She states she was transferred from the Nursing facility to [facility] on 4/15/18.     Hospital Course:  Ms Hoa Proctor is a 68 y.o. female, with PMH of HTN, NIDDM-2, CHF (EF 35%), ESRD on dialysis ([facility], [facility], Dr. Prather), A. Fib, LLE DVT, cardiomyopathy, and recent treatment in this facility for MRSA bacteremia from her vascular access site, who returns 2/2 need for ID consultation. She was started on empiric Daptomycin + Ceftaroline. Blood cultures are so far negative. PICC line was removed and tip culture sent.       Interval History: Seen and examined at bedside. D/w RN and CM. Pt reports some improvement in symptoms today. Denies fever, chills. C/o constipation without BM > 1 week without N/V.     Review of Systems   Constitutional: Negative for chills, fatigue and fever.   HENT: Negative.    Respiratory: Negative for cough and shortness of breath.    Cardiovascular: Negative for chest pain and leg swelling.   Gastrointestinal: Positive for constipation. Negative for nausea and vomiting.   Genitourinary: Negative.    Musculoskeletal: Positive for back pain.   Neurological: Negative.    Psychiatric/Behavioral: Negative.    All other systems reviewed and are negative.    Objective:     Vital Signs (Most Recent):  Temp: 98.3 °F (36.8 °C) (04/19/18 1133)  Pulse: (!) 56 (04/19/18 1200)  Resp: 16 (04/19/18 1133)  BP: (!) 89/42 (04/19/18 1133)  SpO2: (!) 88 % (04/19/18 1133) Vital Signs (24h Range):  Temp:  [98.1 °F (36.7 °C)-99 °F (37.2 °C)] 98.3 °F (36.8 °C)  Pulse:  [47-68] 56  Resp:  [16-24] 16  SpO2:  [88 %-100 %] 88 %  BP: ()/(42-53) 89/42     Weight: 118.2 kg (260 lb 9.3 oz)  Body mass index is 43.36 kg/m².    Intake/Output Summary (Last 24 hours) at 04/19/18 1226  Last data filed at 04/19/18 0500   Gross per 24 hour   Intake              740 ml   Output             3000 ml   Net            -2260 ml       Physical Exam   Constitutional: She is oriented to person, place, and time. She appears well-developed and well-nourished.   Obese female.    HENT:   Head: Normocephalic and atraumatic.   Cardiovascular: Normal rate, regular rhythm and intact distal pulses.    Pulmonary/Chest: Effort normal and breath sounds normal. No respiratory distress. She has no wheezes. She has no rales.   Abdominal: Soft. Bowel sounds are normal. She exhibits no distension (mild). There is no tenderness.   Neurological: She is alert and oriented to person, place, and time.   Skin: Skin is warm and dry. Capillary refill takes less than 2 seconds. No rash noted. No erythema. No pallor.   Psychiatric: She has a normal mood and affect.   Nursing note and vitals reviewed.      Significant Labs:   Blood Culture:   Recent Labs  Lab 04/18/18  1831   LABBLOO No Growth to date     BMP:   Recent Labs  Lab 04/19/18  0422   GLU 84   *   K 3.9      CO2 26   BUN 17   CREATININE 2.9*   CALCIUM 8.1*   MG 1.8     CBC:   Recent Labs  Lab 04/18/18  0428 04/19/18  0422   WBC 7.53 7.56   HGB 7.2* 6.6*   HCT 23.6* 22.0*    306     All pertinent labs within the past 24 hours have been reviewed.    Significant Imaging: I have reviewed all pertinent imaging results/findings within the past 24 hours.    Assessment/Plan:      * Bacteremia    Based on cultures from outside hospital. Awaiting final culture and sensitivities from OSH. Blood culture from admission with no growth so far. Appreciate ID input. Switch Vanc/Zosyn to Daptomycin/Ceftaroline. Ceftaroline non-formulary but ordered through pharmacy. Remove PICC line and culture tip.             Anemia due to chronic kidney disease    Hb down to 6.6. She is asymptomatic. Will transfuse PRBC tomorrow with HD          Pneumonia due to infectious organism    Diagnosed via CXR at OSH. Repeat CXR mild bibasilar consolidation. Received Levaquin outpatient        DVT of deep femoral vein, left     Continue anticoagulation with Apixaban         Type 2 diabetes mellitus with chronic kidney disease on chronic dialysis, without long-term current use of insulin    Continue diabetic diet. Hold oral meds. SSI for now  Lab Results   Component Value Date    HGBA1C 4.2 04/18/2018              ESRD (end stage renal disease)    Nephrology following for MWF dialysis. Follows with Dr. Prather @ [facility] in [place], [place].           Chronic atrial fibrillation    Continue amiodarone & carvedilol             MRSA bacteremia    Previously diagnosed. Stop date of antibiotics was supposed to be 4/19. Restart Daptomycin and Ceftaroline for now.           VTE Risk Mitigation         Ordered     apixaban tablet 2.5 mg  2 times daily      04/18/18 0244     heparin (porcine) injection 5,000 Units  As needed (PRN)      04/18/18 1830              Clara Landry MD  Department of Hospital Medicine   [facility]

## 2018-04-19 NOTE — NURSING
Removed PICC per policy and procedure. Sent PICC line tip to lab as ordered. Patient tolerated well. No adverse events noted.

## 2018-04-19 NOTE — SUBJECTIVE & OBJECTIVE
Interval History: Seen and examined at bedside. D/w RN and CM. Pt reports some improvement in symptoms today. Denies fever, chills. C/o constipation without BM > 1 week without N/V.     Review of Systems   Constitutional: Negative for chills, fatigue and fever.   HENT: Negative.    Respiratory: Negative for cough and shortness of breath.    Cardiovascular: Negative for chest pain and leg swelling.   Gastrointestinal: Positive for constipation. Negative for nausea and vomiting.   Genitourinary: Negative.    Musculoskeletal: Positive for back pain.   Neurological: Negative.    Psychiatric/Behavioral: Negative.    All other systems reviewed and are negative.    Objective:     Vital Signs (Most Recent):  Temp: 98.3 °F (36.8 °C) (04/19/18 1133)  Pulse: (!) 56 (04/19/18 1200)  Resp: 16 (04/19/18 1133)  BP: (!) 89/42 (04/19/18 1133)  SpO2: (!) 88 % (04/19/18 1133) Vital Signs (24h Range):  Temp:  [98.1 °F (36.7 °C)-99 °F (37.2 °C)] 98.3 °F (36.8 °C)  Pulse:  [47-68] 56  Resp:  [16-24] 16  SpO2:  [88 %-100 %] 88 %  BP: ()/(42-53) 89/42     Weight: 118.2 kg (260 lb 9.3 oz)  Body mass index is 43.36 kg/m².    Intake/Output Summary (Last 24 hours) at 04/19/18 1226  Last data filed at 04/19/18 0500   Gross per 24 hour   Intake              740 ml   Output             3000 ml   Net            -2260 ml      Physical Exam   Constitutional: She is oriented to person, place, and time. She appears well-developed and well-nourished.   Obese female.    HENT:   Head: Normocephalic and atraumatic.   Cardiovascular: Normal rate, regular rhythm and intact distal pulses.    Pulmonary/Chest: Effort normal and breath sounds normal. No respiratory distress. She has no wheezes. She has no rales.   Abdominal: Soft. Bowel sounds are normal. She exhibits no distension (mild). There is no tenderness.   Neurological: She is alert and oriented to person, place, and time.   Skin: Skin is warm and dry. Capillary refill takes less than 2 seconds.  No rash noted. No erythema. No pallor.   Psychiatric: She has a normal mood and affect.   Nursing note and vitals reviewed.      Significant Labs:   Blood Culture:   Recent Labs  Lab 04/18/18  1831   LABBLOO No Growth to date     BMP:   Recent Labs  Lab 04/19/18  0422   GLU 84   *   K 3.9      CO2 26   BUN 17   CREATININE 2.9*   CALCIUM 8.1*   MG 1.8     CBC:   Recent Labs  Lab 04/18/18  0428 04/19/18  0422   WBC 7.53 7.56   HGB 7.2* 6.6*   HCT 23.6* 22.0*    306     All pertinent labs within the past 24 hours have been reviewed.    Significant Imaging: I have reviewed all pertinent imaging results/findings within the past 24 hours.

## 2018-04-20 PROBLEM — I50.22 CHRONIC SYSTOLIC CHF (CONGESTIVE HEART FAILURE): Status: ACTIVE | Noted: 2018-04-20

## 2018-04-20 PROBLEM — K59.01 SLOW TRANSIT CONSTIPATION: Status: ACTIVE | Noted: 2018-04-20

## 2018-04-20 PROBLEM — I50.22 CHRONIC SYSTOLIC CHF (CONGESTIVE HEART FAILURE): Status: RESOLVED | Noted: 2018-04-20 | Resolved: 2018-04-20

## 2018-04-20 LAB
ANION GAP SERPL CALC-SCNC: 7 MMOL/L
BASOPHILS # BLD AUTO: 0.02 K/UL
BASOPHILS NFR BLD: 0.2 %
BLD PROD TYP BPU: NORMAL
BLOOD UNIT EXPIRATION DATE: NORMAL
BLOOD UNIT TYPE CODE: 6200
BLOOD UNIT TYPE: NORMAL
BUN SERPL-MCNC: 25 MG/DL
CALCIUM SERPL-MCNC: 8.8 MG/DL
CHLORIDE SERPL-SCNC: 102 MMOL/L
CK SERPL-CCNC: 20 U/L
CO2 SERPL-SCNC: 25 MMOL/L
CODING SYSTEM: NORMAL
CREAT SERPL-MCNC: 4.1 MG/DL
DIFFERENTIAL METHOD: ABNORMAL
DISPENSE STATUS: NORMAL
EOSINOPHIL # BLD AUTO: 0.2 K/UL
EOSINOPHIL NFR BLD: 2.2 %
ERYTHROCYTE [DISTWIDTH] IN BLOOD BY AUTOMATED COUNT: 16.8 %
EST. GFR  (AFRICAN AMERICAN): 12 ML/MIN/1.73 M^2
EST. GFR  (NON AFRICAN AMERICAN): 11 ML/MIN/1.73 M^2
GLUCOSE SERPL-MCNC: 123 MG/DL
HCT VFR BLD AUTO: 24.5 %
HEP. B SURF AB, QUAL: NEGATIVE
HEP. B SURF AB, QUANT.: <3 MIU/ML
HGB BLD-MCNC: 7.4 G/DL
LYMPHOCYTES # BLD AUTO: 2.1 K/UL
LYMPHOCYTES NFR BLD: 24.3 %
MAGNESIUM SERPL-MCNC: 1.8 MG/DL
MCH RBC QN AUTO: 25.5 PG
MCHC RBC AUTO-ENTMCNC: 30.2 G/DL
MCV RBC AUTO: 85 FL
MONOCYTES # BLD AUTO: 0.8 K/UL
MONOCYTES NFR BLD: 9 %
NEUTROPHILS # BLD AUTO: 5.6 K/UL
NEUTROPHILS NFR BLD: 64.1 %
PHOSPHATE SERPL-MCNC: 3.9 MG/DL
PLATELET # BLD AUTO: 340 K/UL
PMV BLD AUTO: 9.7 FL
POCT GLUCOSE: 119 MG/DL (ref 70–110)
POCT GLUCOSE: 122 MG/DL (ref 70–110)
POCT GLUCOSE: 140 MG/DL (ref 70–110)
POTASSIUM SERPL-SCNC: 4.2 MMOL/L
RBC # BLD AUTO: 2.9 M/UL
SODIUM SERPL-SCNC: 134 MMOL/L
TRANS ERYTHROCYTES VOL PATIENT: NORMAL ML
WBC # BLD AUTO: 8.73 K/UL

## 2018-04-20 PROCEDURE — 25000003 PHARM REV CODE 250: Performed by: NURSE PRACTITIONER

## 2018-04-20 PROCEDURE — 80048 BASIC METABOLIC PNL TOTAL CA: CPT

## 2018-04-20 PROCEDURE — 25000003 PHARM REV CODE 250: Performed by: INTERNAL MEDICINE

## 2018-04-20 PROCEDURE — 27000221 HC OXYGEN, UP TO 24 HOURS

## 2018-04-20 PROCEDURE — 82550 ASSAY OF CK (CPK): CPT

## 2018-04-20 PROCEDURE — 25000003 PHARM REV CODE 250: Performed by: PHYSICIAN ASSISTANT

## 2018-04-20 PROCEDURE — 83735 ASSAY OF MAGNESIUM: CPT

## 2018-04-20 PROCEDURE — 99233 SBSQ HOSP IP/OBS HIGH 50: CPT | Mod: ,,, | Performed by: INTERNAL MEDICINE

## 2018-04-20 PROCEDURE — 90935 HEMODIALYSIS ONE EVALUATION: CPT

## 2018-04-20 PROCEDURE — 99900035 HC TECH TIME PER 15 MIN (STAT)

## 2018-04-20 PROCEDURE — P9021 RED BLOOD CELLS UNIT: HCPCS

## 2018-04-20 PROCEDURE — 36415 COLL VENOUS BLD VENIPUNCTURE: CPT

## 2018-04-20 PROCEDURE — 99232 SBSQ HOSP IP/OBS MODERATE 35: CPT | Mod: ,,, | Performed by: INTERNAL MEDICINE

## 2018-04-20 PROCEDURE — 94761 N-INVAS EAR/PLS OXIMETRY MLT: CPT

## 2018-04-20 PROCEDURE — 63600175 PHARM REV CODE 636 W HCPCS: Performed by: NURSE PRACTITIONER

## 2018-04-20 PROCEDURE — 84100 ASSAY OF PHOSPHORUS: CPT

## 2018-04-20 PROCEDURE — 85025 COMPLETE CBC W/AUTO DIFF WBC: CPT

## 2018-04-20 PROCEDURE — 11000001 HC ACUTE MED/SURG PRIVATE ROOM

## 2018-04-20 PROCEDURE — 63600175 PHARM REV CODE 636 W HCPCS: Performed by: INTERNAL MEDICINE

## 2018-04-20 RX ORDER — OXYCODONE HYDROCHLORIDE 5 MG/1
5 TABLET ORAL EVERY 6 HOURS PRN
Status: DISCONTINUED | OUTPATIENT
Start: 2018-04-20 | End: 2018-05-04 | Stop reason: HOSPADM

## 2018-04-20 RX ORDER — OXYCODONE AND ACETAMINOPHEN 5; 325 MG/1; MG/1
1 TABLET ORAL EVERY 4 HOURS PRN
Status: DISCONTINUED | OUTPATIENT
Start: 2018-04-20 | End: 2018-05-04 | Stop reason: HOSPADM

## 2018-04-20 RX ADMIN — VITAMIN D, TAB 1000IU (100/BT) 2000 UNITS: 25 TAB at 08:04

## 2018-04-20 RX ADMIN — GABAPENTIN 100 MG: 100 CAPSULE ORAL at 08:04

## 2018-04-20 RX ADMIN — CALCITRIOL 0.25 MCG: 0.25 CAPSULE, LIQUID FILLED ORAL at 08:04

## 2018-04-20 RX ADMIN — CEFTAROLINE FOSAMIL 200 MG: 400 POWDER, FOR SOLUTION INTRAVENOUS at 08:04

## 2018-04-20 RX ADMIN — GABAPENTIN 100 MG: 100 CAPSULE ORAL at 02:04

## 2018-04-20 RX ADMIN — SEVELAMER CARBONATE 800 MG: 800 TABLET, FILM COATED ORAL at 04:04

## 2018-04-20 RX ADMIN — OXYCODONE HYDROCHLORIDE 5 MG: 5 TABLET ORAL at 02:04

## 2018-04-20 RX ADMIN — OXYCODONE AND ACETAMINOPHEN 1 TABLET: 5; 325 TABLET ORAL at 03:04

## 2018-04-20 RX ADMIN — DOCUSATE SODIUM 200 MG: 100 CAPSULE, LIQUID FILLED ORAL at 08:04

## 2018-04-20 RX ADMIN — ERYTHROPOIETIN 20000 UNITS: 20000 INJECTION, SOLUTION INTRAVENOUS; SUBCUTANEOUS at 10:04

## 2018-04-20 RX ADMIN — SEVELAMER CARBONATE 800 MG: 800 TABLET, FILM COATED ORAL at 08:04

## 2018-04-20 RX ADMIN — CARVEDILOL 25 MG: 12.5 TABLET, FILM COATED ORAL at 03:04

## 2018-04-20 RX ADMIN — POLYETHYLENE GLYCOL 3350 17 G: 17 POWDER, FOR SOLUTION ORAL at 08:04

## 2018-04-20 RX ADMIN — ISOSORBIDE MONONITRATE 30 MG: 30 TABLET, EXTENDED RELEASE ORAL at 04:04

## 2018-04-20 RX ADMIN — FUROSEMIDE 80 MG: 40 TABLET ORAL at 06:04

## 2018-04-20 RX ADMIN — FUROSEMIDE 80 MG: 40 TABLET ORAL at 08:04

## 2018-04-20 RX ADMIN — AMIODARONE HYDROCHLORIDE 200 MG: 200 TABLET ORAL at 03:04

## 2018-04-20 RX ADMIN — HEPARIN SODIUM 5000 UNITS: 5000 INJECTION, SOLUTION INTRAVENOUS; SUBCUTANEOUS at 01:04

## 2018-04-20 RX ADMIN — APIXABAN 2.5 MG: 2.5 TABLET, FILM COATED ORAL at 08:04

## 2018-04-20 RX ADMIN — OXYCODONE HYDROCHLORIDE 5 MG: 5 TABLET ORAL at 09:04

## 2018-04-20 RX ADMIN — STANDARDIZED SENNA CONCENTRATE AND DOCUSATE SODIUM 1 TABLET: 8.6; 5 TABLET, FILM COATED ORAL at 02:04

## 2018-04-20 NOTE — ASSESSMENT & PLAN NOTE
Diagnosed via CXR at OSH. Repeat CXR mild bibasilar consolidation, likely resolving pna. Received Levaquin outpatient

## 2018-04-20 NOTE — PT/OT/SLP PROGRESS
Occupational Therapy  Not Seen Note    Patient Name:  Yumiko Proctor   MRN:  [id number]    Patient not seen today secondary to Dialysis. Will follow-up Later in the day as time and patient availability permit..    PATTY Whitten  4/20/2018

## 2018-04-20 NOTE — SUBJECTIVE & OBJECTIVE
NO complaints. PICC removed - tip negative so far. No information on outside cultures.     Review of Systems   All other systems reviewed and are negative.    Objective:     Vital Signs (Most Recent):  Temp: 97.7 °F (36.5 °C) (04/20/18 1335)  Pulse: 60 (04/20/18 1335)  Resp: (!) 24 (04/20/18 1335)  BP: (!) 140/94 (04/20/18 1335)  SpO2: 97 % (04/20/18 0800) Vital Signs (24h Range):  Temp:  [97.5 °F (36.4 °C)-98 °F (36.7 °C)] 97.7 °F (36.5 °C)  Pulse:  [48-62] 60  Resp:  [16-24] 24  SpO2:  [96 %-100 %] 97 %  BP: ()/(44-95) 140/94     Weight: 118.2 kg (260 lb 9.3 oz)  Body mass index is 43.36 kg/m².    Estimated Creatinine Clearance: 16.9 mL/min (A) (based on SCr of 4.1 mg/dL (H)).    Physical Exam   Constitutional: She is oriented to person, place, and time. She appears well-developed and well-nourished.   Eyes: Conjunctivae and EOM are normal. Pupils are equal, round, and reactive to light.   Cardiovascular: Normal rate, regular rhythm and normal heart sounds.    Pulmonary/Chest: Effort normal and breath sounds normal.   Abdominal: Soft. Bowel sounds are normal.   Musculoskeletal: Normal range of motion. She exhibits no edema.   Neurological: She is alert and oriented to person, place, and time.   Skin: Skin is warm and dry.   Nursing note and vitals reviewed.      Significant Labs:   Blood Culture:   Recent Labs  Lab 02/17/18  1148 02/17/18  1200 02/19/18  0537 02/22/18  0557 04/18/18  1831   LABBLOO Gram stain aer bottle: Gram positive cocci in clusters resembling Staph   Results called to and read back by: Ynes Herrera RN  02/18/2018  18:31  STAPHYLOCOCCUS AUREUSID consult required at [facility] and [facility].For susceptibility see order #[id number] Gram stain vicente bottle: Gram positive cocci in clusters resembling Staph   Results called to and read back by:Ynes Steiner RN 02/18/2018  14:40  Gram stain aer bottle: Gram positive cocci in clusters resembling Staph   Positive results  previously called 02/18/2018  18:19  STAPHYLOCOCCUS AUREUSID consult required at [facility] and [facility] locations.For susceptibility see order #[id number] Gram stain aer bottle: Gram positive cocci in clusters resembling Staph   Gram stain vicente bottle: Gram positive cocci in clusters resembling Staph   Results called to and read back by:Alison Oswald RN 02/20/2018    03:01  METHICILLIN RESISTANT STAPHYLOCOCCUS AUREUSID consult required at [facility] and [facility]. No growth after 5 days. No Growth to date  No Growth to date     CBC:   Recent Labs  Lab 04/19/18  0422 04/20/18  0545   WBC 7.56 8.73   HGB 6.6* 7.4*   HCT 22.0* 24.5*    340     CMP:   Recent Labs  Lab 04/19/18  0422 04/20/18  0545   * 134*   K 3.9 4.2    102   CO2 26 25   GLU 84 123*   BUN 17 25*   CREATININE 2.9* 4.1*   CALCIUM 8.1* 8.8   ANIONGAP 5* 7*   EGFRNONAA 16* 11*       Significant ImaginSome consolidation in both lungs, more at the left lung base with small pleural effusion.CXR: I have reviewed all pertinent results/findings within the past 24 hours:  Some consolidation in both lungs, more at the left lung base with small pleural effusion.

## 2018-04-20 NOTE — ASSESSMENT & PLAN NOTE
This diagnosis was based on CXR findings. CXR and procalcitonin suggests that patient does not have pneumonia. Elevated BNP suggests that cough and CXR findings are related to pulmonary volume. Continue dialysis with volume removal for pulmonary edema. No treatment for pneumonia at this time - continue treatment for possible bacteremia.

## 2018-04-20 NOTE — PROGRESS NOTES
"Nephrology  Progress Note    Admit Date: 4/18/2018   LOS: 2 days     SUBJECTIVE:     Follow-up For:  Bacteremia    Interval History:     Uneventful night.  Awaiting HD.  No CP/SOB.         Review of Systems:  Constitutional: No fever or chills  Respiratory: No cough or shortness of breath  Cardiovascular: No chest pain or palpitations  Gastrointestinal: No nausea or vomiting  Neurological: No confusion or weakness    OBJECTIVE:     Vital Signs Range (Last 24H):  BP (!) 99/48 (BP Location: Right arm, Patient Position: Lying)   Pulse (!) 53   Temp 98 °F (36.7 °C) (Oral)   Resp 18   Ht 5' 5" (1.651 m)   Wt 118.2 kg (260 lb 9.3 oz)   LMP 01/01/1983 (LMP Unknown)   SpO2 96%   Breastfeeding? No   BMI 43.36 kg/m²     Temp:  [97.5 °F (36.4 °C)-98.3 °F (36.8 °C)]   Pulse:  [48-62]   Resp:  [16-24]   BP: ()/(42-57)   SpO2:  [88 %-100 %]     I & O (Last 24H):No intake or output data in the 24 hours ending 04/20/18 0833    Physical Exam:  General appearance: morbidly obese female in NAD  Eyes:  Conjunctivae/corneas clear. PERRL.  Lungs: Normal respiratory effort,  diminished  Heart: Regular/Irregular/Jerry rate and rhythm, S1, S2 normal, no murmur, rub or cuco.  Abdomen: Soft, non-tender non-distended; bowel sounds normal; no masses,  no organomegaly, obese  Extremities: No cyanosis or clubbing. trace edema.    Skin: Skin color, texture, turgor normal. No rashes or lesions  Neurologic: Normal strength and tone. No focal numbness or weakness   Right IJ EULALIO        Laboratory Data:    Recent Labs  Lab 04/20/18  0545   WBC 8.73   RBC 2.90*   HGB 7.4*   HCT 24.5*      MCV 85   MCH 25.5*   MCHC 30.2*       BMP:     Recent Labs  Lab 04/20/18  0545   *   *   K 4.2      CO2 25   BUN 25*   CREATININE 4.1*   CALCIUM 8.8   MG 1.8     Lab Results   Component Value Date    CALCIUM 8.8 04/20/2018    PHOS 3.9 04/20/2018       Lab Results   Component Value Date    .8 (H) 02/08/2018    CALCIUM " 8.8 04/20/2018    PHOS 3.9 04/20/2018       No results found for: URICACID    BNP    Recent Labs  Lab 04/18/18  1712   BNP 1,178*       Medications:  Medication list was reviewed and changes noted under Assessment/Plan.    Diagnostic Results:        ASSESSMENT/PLAN:     1. ESRD on HD MWF in [place], [place] with Dr. Sharpe (N18.6, Z99.2):  Routine HD MWF/PRN while inpt.  Keep EULALIO in for now.  Consent signed.  Renally dose meds, avoid nephrotoxins, and monitor I/O's closely.  2. Bacteremia/Pna (R78.81, J18.9):  Antibx per ID.  Removed PICC and cultured tip. NGTD.   3. Anemia of CKD/infection (D63.1):  No IV iron with infection.  Epo on HD.  Plan on transfusion with HD.  Consent signed.    4. 2HPT/Vit D deficiency:  Vit D3 and calcitriol.  5. Morbid Obesity (E66.01):  Needs aggressive weight mgmt.         See above  Follow for renal needs.

## 2018-04-20 NOTE — PT/OT/SLP PROGRESS
Occupational Therapy  Not seen Note    Patient Name:  Yumiko Proctor   MRN:  [id number]    Two attempts made to see the patient for OT evaluation this afternoon. After returning from dialysis, she was not available due to nursing care and on a return attempt she reported being too tired from the days activities to participate in therapy.  Patient not seen today secondary to Patient fatigue. Will follow-up Saturday, 4/21/18.    PATTY Whitten  4/20/2018

## 2018-04-20 NOTE — ASSESSMENT & PLAN NOTE
Based on cultures from outside hospital. Awaiting final culture and sensitivities from OSH. Blood culture from admission with no growth so far. Appreciate ID input. Continue Daptomycin/Ceftaroline. PICC removed, culture tip negative so far.

## 2018-04-20 NOTE — ASSESSMENT & PLAN NOTE
Awaiting identification and sensitivities of blood cultures from [place]. Recommend continue Daptomycin and ceftaroline for now.

## 2018-04-20 NOTE — PROGRESS NOTES
[facility] Hospital Medicine  Progress Note    Patient Name: Yumiko Proctor  MRN: [id number]  Patient Class: IP- Inpatient   Admission Date: 4/18/2018  Length of Stay: 2 days  Attending Physician: Clara Landry MD  Primary Care Provider: [facility]        Subjective:     Principal Problem:Bacteremia    HPI:  Ms Hoa Proctor is a 68 y.o. female, with PMH of HTN, NIDDM-2, CHF (EF 35%), ESRD on dialysis ([facility], Dr. Prather), A. Fib, LLE DVT, cardiomyopathy, and recent treatment in this facility for MRSA bacteremia from her vascular access site, who returns 2/2 need for ID consultation. In the past week the patient developed a fever and was diagnosed with RUL PNA, and started on Levaquin 3/week after dialysis. Associated symptoms include non-productive cough, fevers, and SOB with wheeze. She was started on vancymycin and zosyn on 4/15/18. Blood cultures taken on 4/16/18 were the second set, first after antibiotics, that grew staph. Other associated symptoms include b/l hip pain, and constipation.  She denies fever, chills, sweats, chest pain, SOB, N/V, diarrhea.     During the patient's previous stay at this facility she had blood cultures positive for MRSA on 2/8/18, and she was started on Daptomycin. On 2/10/18 a second set of blood cultures were positive for MRSA. At the time she had her left Jaspreet catheter removed and a right IJ tunneled dialysis cath was placed on 2/12/18. Repeat cultures on 2/13/18 were negative for MRSA. She was positive in 1/2 bottles on 2/16/18 and both blood cultures bottles for MRSA on 2/16/18. At that time she was started on vancomycin, and Daptomycin was stopped 2/2 myositis. On 2/22/18 blood cultures were negative. A RANDA on 2/26/18 was negative for vegetations, but the patient did have a LLE DVT discovered 2/20/18, and Apixaban was started. After CPK levels were normal, she was started back on Daptomycin and Ceftaroline. She  was to continue this treatment until 4/4/18, and she was transferred to a NH facility in [place], [place]. She states she was transferred from the Nursing facility to [facility] on 4/15/18.     Hospital Course:  Ms Hoa Proctor is a 68 y.o. female, with PMH of HTN, NIDDM-2, CHF (EF 35%), ESRD on dialysis ([facility], [facility], Dr. Prather), A. Fib, LLE DVT, cardiomyopathy, and recent treatment in this facility for MRSA bacteremia from her vascular access site, who returns 2/2 need for ID consultation. She was started on empiric Daptomycin + Ceftaroline. Blood cultures are so far negative. PICC line was removed and tip culture sent.       No new subjective & objective note has been filed under this hospital service since the last note was generated.    Assessment/Plan:      * Bacteremia    Based on cultures from outside hospital. Awaiting final culture and sensitivities from OSH. Blood culture from admission with no growth so far. Appreciate ID input. Continue Daptomycin/Ceftaroline. PICC removed, culture tip negative so far.             Slow transit constipation    Improved with bowel regimen. Continue current management.          Anemia due to chronic kidney disease    Asymptomatic but Hb low. Will transfuse PRBC with HD today.           Pneumonia due to infectious organism    Diagnosed via CXR at OSH. Repeat CXR mild bibasilar consolidation, likely resolving pna. Received Levaquin outpatient        DVT of deep femoral vein, left    Continue anticoagulation with Apixaban         Type 2 diabetes mellitus with chronic kidney disease on chronic dialysis, without long-term current use of insulin    Continue diabetic diet. Hold oral meds. SSI for now  Lab Results   Component Value Date    HGBA1C 4.2 04/18/2018              ESRD (end stage renal disease)    Nephrology following for [facility] dialysis. Follows with Dr. Prather @ [facility] in [place], [place].           Chronic atrial fibrillation    Continue amiodarone & carvedilol             MRSA  bacteremia    Previously diagnosed. Stop date of antibiotics was supposed to be 4/19. Restart Daptomycin and Ceftaroline for now.           VTE Risk Mitigation         Ordered     apixaban tablet 2.5 mg  2 times daily      04/18/18 0244     heparin (porcine) injection 5,000 Units  As needed (PRN)      04/18/18 0869              Clara Landry MD  Department of Hospital Medicine   [facility]

## 2018-04-20 NOTE — PLAN OF CARE
Problem: Patient Care Overview  Goal: Plan of Care Review  Outcome: Ongoing (interventions implemented as appropriate)  Plan of care reviewed with patient.  IV antibiotics administered.  Pain managed with PRN medication.  Patient assisted with frequent turns/weight changes.  Patient had a bowel movement but still complains of constipation.  Call bell within reach, no needs at this time, will continue to monitor.

## 2018-04-20 NOTE — PROGRESS NOTES
"[facility]  Infectious Disease  Progress Note    Patient Name: Yumiko Proctor  MRN: [id number]  Admission Date: 4/18/2018  Length of Stay: 2 days  Attending Physician: Clara Landry MD  Primary Care Provider: [facility]    Isolation Status: No active isolations  Assessment/Plan:      * Bacteremia    Awaiting identification and sensitivities of blood cultures from [place]. Recommend continue Daptomycin and ceftaroline for now.          Pneumonia due to infectious organism    This diagnosis was based on CXR findings. CXR and procalcitonin suggests that patient does not have pneumonia. Elevated BNP suggests that cough and CXR findings are related to pulmonary volume. Continue dialysis with volume removal for pulmonary edema. No treatment for pneumonia at this time - continue treatment for possible bacteremia.        MRSA bacteremia    Continue Daptomycin and Ceftaroline for now. Awaiting cultures from outside hospital. Awaiting recent cultures here.            Anticipated Disposition: Home soon. Consider IV antibiotics until culture results return.    Thank you for your consult. I will follow-up with patient. Please contact us if you have any additional questions.    Shahid Bhakta MD  Infectious Disease  [facility]    Subjective:     Principal Problem:Bacteremia    HPI: 69 yo female with ESRD who was discharged on IV ceftaroline and daptomycin for MRSA bacteremia on 3/1/18. She was supposed to receive these antibiotics for 8 weeks from 2/22/18, the last negative blood culture. This would have put her end of care at 4/19/18. However, on examination of the records, it appears that she may have stopped them on 4/4/18, 2 weeks early. The PICC appears to have been left in place as well.    She reportedly began having "low grade" fever, cough, and SOB with an elevated BNP of 900. She was started on Levaquin 250 mg orally three times per week after " dialysis. Chest xray showed a right upper lobe infiltrate.It is unclear specifically why she was admitted - she says that she had a cough and a slight fever. She was admitted to [facility] on 4/15, with a normal WBC. She was placed on vancomycin and zosyn empirically. Blood cultures taken on 4/15 grew Staph sp., as did blood cultures on 4/16. Identification and sensitivities are still pending. I am consulted for her previous bacteremia as well as the current situation.   NO complaints. PICC removed - tip negative so far. No information on outside cultures.     Review of Systems   All other systems reviewed and are negative.    Objective:     Vital Signs (Most Recent):  Temp: 97.7 °F (36.5 °C) (04/20/18 1335)  Pulse: 60 (04/20/18 1335)  Resp: (!) 24 (04/20/18 1335)  BP: (!) 140/94 (04/20/18 1335)  SpO2: 97 % (04/20/18 0800) Vital Signs (24h Range):  Temp:  [97.5 °F (36.4 °C)-98 °F (36.7 °C)] 97.7 °F (36.5 °C)  Pulse:  [48-62] 60  Resp:  [16-24] 24  SpO2:  [96 %-100 %] 97 %  BP: ()/(44-95) 140/94     Weight: 118.2 kg (260 lb 9.3 oz)  Body mass index is 43.36 kg/m².    Estimated Creatinine Clearance: 16.9 mL/min (A) (based on SCr of 4.1 mg/dL (H)).    Physical Exam   Constitutional: She is oriented to person, place, and time. She appears well-developed and well-nourished.   Eyes: Conjunctivae and EOM are normal. Pupils are equal, round, and reactive to light.   Cardiovascular: Normal rate, regular rhythm and normal heart sounds.    Pulmonary/Chest: Effort normal and breath sounds normal.   Abdominal: Soft. Bowel sounds are normal.   Musculoskeletal: Normal range of motion. She exhibits no edema.   Neurological: She is alert and oriented to person, place, and time.   Skin: Skin is warm and dry.   Nursing note and vitals reviewed.      Significant Labs:   Blood Culture:   Recent Labs  Lab 02/17/18  1148 02/17/18  1200 02/19/18  0537 02/22/18  0557 04/18/18  1831   LABBLOO Gram stain aer bottle: Gram positive cocci  in clusters resembling Staph   Results called to and read back by: Ynes Herrera RN  02/18/2018  18:31  STAPHYLOCOCCUS AUREUSID consult required at [facility].For susceptibility see order #[id number] Gram stain vicente bottle: Gram positive cocci in clusters resembling Staph   Results called to and read back by:Ynes Steiner RN 02/18/2018  14:40  Gram stain aer bottle: Gram positive cocci in clusters resembling Staph   Positive results previously called 02/18/2018  18:19  STAPHYLOCOCCUS AUREUSID consult required at [facility].For susceptibility see order #[id number] Gram stain aer bottle: Gram positive cocci in clusters resembling Staph   Gram stain vicente bottle: Gram positive cocci in clusters resembling Staph   Results called to and read back by:Alison Oswald RN 02/20/2018    03:01  METHICILLIN RESISTANT STAPHYLOCOCCUS AUREUSID consult required at [facility]. No growth after 5 days. No Growth to date  No Growth to date     CBC:   Recent Labs  Lab 04/19/18  0422 04/20/18  0545   WBC 7.56 8.73   HGB 6.6* 7.4*   HCT 22.0* 24.5*    340     CMP:   Recent Labs  Lab 04/19/18  0422 04/20/18  0545   * 134*   K 3.9 4.2    102   CO2 26 25   GLU 84 123*   BUN 17 25*   CREATININE 2.9* 4.1*   CALCIUM 8.1* 8.8   ANIONGAP 5* 7*   EGFRNONAA 16* 11*       Significant ImaginSome consolidation in both lungs, more at the left lung base with small pleural effusion.CXR: I have reviewed all pertinent results/findings within the past 24 hours:  Some consolidation in both lungs, more at the left lung base with small pleural effusion.

## 2018-04-20 NOTE — PHYSICIAN QUERY
"PT Name: Yumiko Proctor  MR #: [id number]    Physician Query Form - Heart  Condition Clarification     CDS: Nat Degroot RN, CCDS         Contact information :[phone]  [email or web address]     This form is a permanent document in the medical record.     Query Date: April 20, 2018    By submitting this query, we are merely seeking further clarification of documentation. Please utilize your independent clinical judgment when addressing the question(s) below.    The medical record contains the following   Indicators     Supporting Clinical Findings Location in Medical Record   x BNP BNP 1,178 Lab 4/18/18    EF     x Radiology findings "Some consolidation in both lungs, more at the left lung base with small pleural effusion." CXR 4/18/18    Echo Results      "Ascites" documented      "SOB" or "PEREZ" documented      "Hypoxia" documented     x Heart Failure documented "CHF (EF 35%)" H&P 4/18/18    "Edema" documented     x Diuretics/Meds Lasix 80 mg po 2 x daily Home meds per H&P 4/18/18    Treatment:     x Other:  " In the past week the patient developed a fever and was diagnosed with RUL PNA, and started on Levaquin 3/week after dialysis. Associated symptoms include non-productive cough, fevers, and SOB with wheeze."  "Chronic atrial fibrillation     - continue amiodarone & carvedilol   - monitor on tele     H&P 4/18/18       Provider, please specify diagnosis or diagnoses associated with above clinical findings.                                 [  ] Chronic Systolic Heart Failure (EF < 40)*    [  ] Chronic Combined Systolic and Diastolic Heart Failure    [  ] Chronic Diastolic Heart Failure (EF > 40)*    [  ] Other Type of Heart Failure (please specify type): _________________________    [  ] Heart Failure Ruled Out    [  ] Other (please specify): Most recent echo from 2/2018 with normal EF and normal LV systolic function, normal diastolic function    [  ] Clinically Undetermined            *American Heart " Association                                                                                                          Please document in your progress notes daily for the duration of treatment until resolved and include in your discharge summary.

## 2018-04-20 NOTE — ASSESSMENT & PLAN NOTE
Continue Daptomycin and Ceftaroline for now. Awaiting cultures from outside hospital. Awaiting recent cultures here.

## 2018-04-20 NOTE — PT/OT/SLP PROGRESS
Physical Therapy      Patient Name:  Yumiko Proctor   MRN:  [id number]    Patient not seen today secondary to Dialysis in am.   Will follow-up as time allows today.  OT went to see pt after returning from dialysis, she was not available due to nursing care and on a return attempt she reported being too tired from the days activities to participate in therapy.  Will follow up Sat 4/21/18    Angeline Curiel, PT

## 2018-04-20 NOTE — SUBJECTIVE & OBJECTIVE
Interval History: Patient seen and examined in dialysis. She reports improvement in back pain and abdominal pain. Had 2 BM yesterday. Denies SOB, fever    Review of Systems   Constitutional: Negative for chills, fatigue and fever.   HENT: Negative.    Respiratory: Negative for cough and shortness of breath.    Cardiovascular: Negative for chest pain and leg swelling.   Gastrointestinal: Negative for constipation, nausea and vomiting.   Genitourinary: Negative.    Musculoskeletal: Positive for back pain.   Neurological: Negative.    Psychiatric/Behavioral: Negative.    All other systems reviewed and are negative.    Objective:     Vital Signs (Most Recent):  Temp: 97.9 °F (36.6 °C) (04/20/18 1045)  Pulse: (!) 51 (04/20/18 1045)  Resp: (!) 24 (04/20/18 1030)  BP: 135/62 (04/20/18 1030)  SpO2: 97 % (04/20/18 0800) Vital Signs (24h Range):  Temp:  [97.5 °F (36.4 °C)-98.3 °F (36.8 °C)] 97.9 °F (36.6 °C)  Pulse:  [48-62] 51  Resp:  [16-24] 24  SpO2:  [88 %-100 %] 97 %  BP: ()/(42-77) 135/62     Weight: 118.2 kg (260 lb 9.3 oz)  Body mass index is 43.36 kg/m².  No intake or output data in the 24 hours ending 04/20/18 1101   Physical Exam   Constitutional: She is oriented to person, place, and time. She appears well-developed and well-nourished.   Obese female.    Cardiovascular: Normal rate, regular rhythm and intact distal pulses.    Pulmonary/Chest: Effort normal and breath sounds normal. No respiratory distress. She has no wheezes. She has no rales.   Abdominal: Soft. Bowel sounds are normal. She exhibits no distension. There is no tenderness.   Neurological: She is alert and oriented to person, place, and time.   Skin: Skin is warm and dry. Capillary refill takes less than 2 seconds. No rash noted. No erythema. No pallor.   Psychiatric: She has a normal mood and affect.   Nursing note and vitals reviewed.      Significant Labs:   BMP:   Recent Labs  Lab 04/20/18  0545   *   *   K 4.2      CO2  25   BUN 25*   CREATININE 4.1*   CALCIUM 8.8   MG 1.8     CBC:   Recent Labs  Lab 04/19/18  0422 04/20/18  0545   WBC 7.56 8.73   HGB 6.6* 7.4*   HCT 22.0* 24.5*    340     All pertinent labs within the past 24 hours have been reviewed.    Significant Imaging: I have reviewed all pertinent imaging results/findings within the past 24 hours.

## 2018-04-21 LAB
ANION GAP SERPL CALC-SCNC: 9 MMOL/L
ANISOCYTOSIS BLD QL SMEAR: SLIGHT
BACTERIA CATH TIP CULT: NO GROWTH
BASOPHILS # BLD AUTO: 0.02 K/UL
BASOPHILS NFR BLD: 0.2 %
BUN SERPL-MCNC: 19 MG/DL
CALCIUM SERPL-MCNC: 8.7 MG/DL
CHLORIDE SERPL-SCNC: 100 MMOL/L
CK SERPL-CCNC: 56 U/L
CO2 SERPL-SCNC: 25 MMOL/L
CREAT SERPL-MCNC: 3 MG/DL
DIFFERENTIAL METHOD: ABNORMAL
EOSINOPHIL # BLD AUTO: 0.2 K/UL
EOSINOPHIL NFR BLD: 1.9 %
ERYTHROCYTE [DISTWIDTH] IN BLOOD BY AUTOMATED COUNT: 16.9 %
EST. GFR  (AFRICAN AMERICAN): 18 ML/MIN/1.73 M^2
EST. GFR  (NON AFRICAN AMERICAN): 15 ML/MIN/1.73 M^2
GIANT PLATELETS BLD QL SMEAR: PRESENT
GLUCOSE SERPL-MCNC: 78 MG/DL
HCT VFR BLD AUTO: 28.4 %
HGB BLD-MCNC: 8.7 G/DL
HYPOCHROMIA BLD QL SMEAR: ABNORMAL
LYMPHOCYTES # BLD AUTO: 2.3 K/UL
LYMPHOCYTES NFR BLD: 25.4 %
MAGNESIUM SERPL-MCNC: 1.7 MG/DL
MCH RBC QN AUTO: 26.4 PG
MCHC RBC AUTO-ENTMCNC: 30.6 G/DL
MCV RBC AUTO: 86 FL
MONOCYTES # BLD AUTO: 0.7 K/UL
MONOCYTES NFR BLD: 7.9 %
NEUTROPHILS # BLD AUTO: 5.7 K/UL
NEUTROPHILS NFR BLD: 64.6 %
PHOSPHATE SERPL-MCNC: 2.9 MG/DL
PLATELET # BLD AUTO: 325 K/UL
PLATELET BLD QL SMEAR: ABNORMAL
PMV BLD AUTO: 9.7 FL
POCT GLUCOSE: 75 MG/DL (ref 70–110)
POCT GLUCOSE: 85 MG/DL (ref 70–110)
POCT GLUCOSE: 87 MG/DL (ref 70–110)
POCT GLUCOSE: 90 MG/DL (ref 70–110)
POIKILOCYTOSIS BLD QL SMEAR: SLIGHT
POLYCHROMASIA BLD QL SMEAR: ABNORMAL
POTASSIUM SERPL-SCNC: 4 MMOL/L
RBC # BLD AUTO: 3.3 M/UL
SCHISTOCYTES BLD QL SMEAR: ABNORMAL
SODIUM SERPL-SCNC: 134 MMOL/L
WBC # BLD AUTO: 8.9 K/UL

## 2018-04-21 PROCEDURE — 27000221 HC OXYGEN, UP TO 24 HOURS

## 2018-04-21 PROCEDURE — 80048 BASIC METABOLIC PNL TOTAL CA: CPT

## 2018-04-21 PROCEDURE — 11000001 HC ACUTE MED/SURG PRIVATE ROOM

## 2018-04-21 PROCEDURE — 97162 PT EVAL MOD COMPLEX 30 MIN: CPT

## 2018-04-21 PROCEDURE — 99900035 HC TECH TIME PER 15 MIN (STAT)

## 2018-04-21 PROCEDURE — 83735 ASSAY OF MAGNESIUM: CPT

## 2018-04-21 PROCEDURE — 85025 COMPLETE CBC W/AUTO DIFF WBC: CPT

## 2018-04-21 PROCEDURE — 25000003 PHARM REV CODE 250: Performed by: INTERNAL MEDICINE

## 2018-04-21 PROCEDURE — 84100 ASSAY OF PHOSPHORUS: CPT

## 2018-04-21 PROCEDURE — 97165 OT EVAL LOW COMPLEX 30 MIN: CPT

## 2018-04-21 PROCEDURE — 36415 COLL VENOUS BLD VENIPUNCTURE: CPT

## 2018-04-21 PROCEDURE — 97535 SELF CARE MNGMENT TRAINING: CPT

## 2018-04-21 PROCEDURE — 94761 N-INVAS EAR/PLS OXIMETRY MLT: CPT

## 2018-04-21 PROCEDURE — 99232 SBSQ HOSP IP/OBS MODERATE 35: CPT | Mod: ,,, | Performed by: INTERNAL MEDICINE

## 2018-04-21 PROCEDURE — 82550 ASSAY OF CK (CPK): CPT

## 2018-04-21 PROCEDURE — 25000003 PHARM REV CODE 250: Performed by: NURSE PRACTITIONER

## 2018-04-21 PROCEDURE — 25000003 PHARM REV CODE 250: Performed by: PHYSICIAN ASSISTANT

## 2018-04-21 PROCEDURE — 63600175 PHARM REV CODE 636 W HCPCS: Performed by: INTERNAL MEDICINE

## 2018-04-21 RX ADMIN — GABAPENTIN 100 MG: 100 CAPSULE ORAL at 09:04

## 2018-04-21 RX ADMIN — CEFTAROLINE FOSAMIL 200 MG: 400 POWDER, FOR SOLUTION INTRAVENOUS at 09:04

## 2018-04-21 RX ADMIN — GABAPENTIN 100 MG: 100 CAPSULE ORAL at 08:04

## 2018-04-21 RX ADMIN — FAMOTIDINE 20 MG: 20 TABLET ORAL at 09:04

## 2018-04-21 RX ADMIN — ISOSORBIDE MONONITRATE 30 MG: 30 TABLET, EXTENDED RELEASE ORAL at 05:04

## 2018-04-21 RX ADMIN — CEFTAROLINE FOSAMIL 200 MG: 400 POWDER, FOR SOLUTION INTRAVENOUS at 08:04

## 2018-04-21 RX ADMIN — OXYCODONE AND ACETAMINOPHEN 1 TABLET: 5; 325 TABLET ORAL at 02:04

## 2018-04-21 RX ADMIN — DAPTOMYCIN 710 MG: 500 INJECTION, POWDER, LYOPHILIZED, FOR SOLUTION INTRAVENOUS at 04:04

## 2018-04-21 RX ADMIN — DOCUSATE SODIUM 200 MG: 100 CAPSULE, LIQUID FILLED ORAL at 08:04

## 2018-04-21 RX ADMIN — CALCITRIOL 0.25 MCG: 0.25 CAPSULE, LIQUID FILLED ORAL at 09:04

## 2018-04-21 RX ADMIN — SEVELAMER CARBONATE 800 MG: 800 TABLET, FILM COATED ORAL at 05:04

## 2018-04-21 RX ADMIN — APIXABAN 2.5 MG: 2.5 TABLET, FILM COATED ORAL at 09:04

## 2018-04-21 RX ADMIN — VITAMIN D, TAB 1000IU (100/BT) 2000 UNITS: 25 TAB at 09:04

## 2018-04-21 RX ADMIN — OXYCODONE AND ACETAMINOPHEN 1 TABLET: 5; 325 TABLET ORAL at 04:04

## 2018-04-21 RX ADMIN — DOCUSATE SODIUM 200 MG: 100 CAPSULE, LIQUID FILLED ORAL at 09:04

## 2018-04-21 RX ADMIN — AMIODARONE HYDROCHLORIDE 200 MG: 200 TABLET ORAL at 09:04

## 2018-04-21 RX ADMIN — FUROSEMIDE 80 MG: 40 TABLET ORAL at 05:04

## 2018-04-21 RX ADMIN — POLYETHYLENE GLYCOL 3350 17 G: 17 POWDER, FOR SOLUTION ORAL at 09:04

## 2018-04-21 RX ADMIN — APIXABAN 2.5 MG: 2.5 TABLET, FILM COATED ORAL at 08:04

## 2018-04-21 RX ADMIN — FUROSEMIDE 80 MG: 40 TABLET ORAL at 09:04

## 2018-04-21 RX ADMIN — GABAPENTIN 100 MG: 100 CAPSULE ORAL at 02:04

## 2018-04-21 RX ADMIN — SEVELAMER CARBONATE 800 MG: 800 TABLET, FILM COATED ORAL at 09:04

## 2018-04-21 RX ADMIN — OXYCODONE AND ACETAMINOPHEN 1 TABLET: 5; 325 TABLET ORAL at 09:04

## 2018-04-21 NOTE — PROGRESS NOTES
[facility] Hospital Medicine  Progress Note    Patient Name: Yumiko Proctor  MRN: [id number]  Patient Class: IP- Inpatient   Admission Date: 4/18/2018  Length of Stay: 3 days  Attending Physician: Clara Landry MD  Primary Care Provider: [facility]        Subjective:     Principal Problem:Bacteremia    HPI:  Ms Hoa Proctor is a 68 y.o. female, with PMH of HTN, NIDDM-2, CHF (EF 35%), ESRD on dialysis ([facility], Dr. Prather), A. Fib, LLE DVT, cardiomyopathy, and recent treatment in this facility for MRSA bacteremia from her vascular access site, who returns 2/2 need for ID consultation. In the past week the patient developed a fever and was diagnosed with RUL PNA, and started on Levaquin 3/week after dialysis. Associated symptoms include non-productive cough, fevers, and SOB with wheeze. She was started on vancymycin and zosyn on 4/15/18. Blood cultures taken on 4/16/18 were the second set, first after antibiotics, that grew staph. Other associated symptoms include b/l hip pain, and constipation.  She denies fever, chills, sweats, chest pain, SOB, N/V, diarrhea.     During the patient's previous stay at this facility she had blood cultures positive for MRSA on 2/8/18, and she was started on Daptomycin. On 2/10/18 a second set of blood cultures were positive for MRSA. At the time she had her left Jaspreet catheter removed and a right IJ tunneled dialysis cath was placed on 2/12/18. Repeat cultures on 2/13/18 were negative for MRSA. She was positive in 1/2 bottles on 2/16/18 and both blood cultures bottles for MRSA on 2/16/18. At that time she was started on vancomycin, and Daptomycin was stopped 2/2 myositis. On 2/22/18 blood cultures were negative. A RANDA on 2/26/18 was negative for vegetations, but the patient did have a LLE DVT discovered 2/20/18, and Apixaban was started. After CPK levels were normal, she was started back on Daptomycin and Ceftaroline. She  was to continue this treatment until 4/4/18, and she was transferred to a NH facility in [place], [place]. She states she was transferred from the Nursing facility to [facility] on 4/15/18.     Hospital Course:  Ms Hoa Proctor is a 68 y.o. female, with PMH of HTN, NIDDM-2, CHF (EF 35%), ESRD on dialysis ([facility], [facility], Dr. Prather), A. Fib, LLE DVT, cardiomyopathy, and recent treatment in this facility for MRSA bacteremia from her vascular access site, who returns 2/2 need for ID consultation. She was started on empiric Daptomycin + Ceftaroline. Blood cultures are so far negative. PICC line was removed and tip culture sent.       Interval History:  Pt seen and examined at bedside. No events overnight. Pt denies fever, chills. Back pain controlled on current regimen.      Review of Systems   Constitutional: Negative for chills, fatigue and fever.   Respiratory: Negative for cough and shortness of breath.    Cardiovascular: Negative for chest pain and leg swelling.   Gastrointestinal: Negative for constipation, nausea and vomiting.   Genitourinary: Negative.    Musculoskeletal: Positive for back pain.   Neurological: Negative.    Psychiatric/Behavioral: Negative.    All other systems reviewed and are negative.    Objective:     Vital Signs (Most Recent):  Temp: 97.9 °F (36.6 °C) (04/21/18 0439)  Pulse: (!) 46 (04/21/18 0800)  Resp: 16 (04/20/18 1945)  BP: (!) 110/51 (04/21/18 0439)  SpO2: 100 % (04/21/18 0824) Vital Signs (24h Range):  Temp:  [97.7 °F (36.5 °C)-98.4 °F (36.9 °C)] 97.9 °F (36.6 °C)  Pulse:  [45-61] 46  Resp:  [16-24] 16  SpO2:  [92 %-100 %] 100 %  BP: (110-148)/(51-95) 110/51     Weight: 116 kg (255 lb 11.7 oz)  Body mass index is 42.56 kg/m².    Intake/Output Summary (Last 24 hours) at 04/21/18 1126  Last data filed at 04/20/18 1315   Gross per 24 hour   Intake           368.75 ml   Output             2800 ml   Net         -2431.25 ml      Physical Exam   Constitutional: She is oriented to person, place,  and time. She appears well-developed and well-nourished.   Obese female.    Cardiovascular: Normal rate, regular rhythm and intact distal pulses.    Pulmonary/Chest: Effort normal and breath sounds normal. No respiratory distress. She has no wheezes. She has no rales.   Abdominal: Soft. Bowel sounds are normal. She exhibits no distension. There is no tenderness.   Musculoskeletal: She exhibits edema.   Neurological: She is alert and oriented to person, place, and time.   Skin: Skin is warm and dry. Capillary refill takes less than 2 seconds. No rash noted. No erythema. No pallor.   Psychiatric: She has a normal mood and affect.   Nursing note and vitals reviewed.      Significant Labs:   BMP:   Recent Labs  Lab 04/21/18  0443   GLU 78   *   K 4.0      CO2 25   BUN 19   CREATININE 3.0*   CALCIUM 8.7   MG 1.7     CBC:   Recent Labs  Lab 04/20/18  0545 04/21/18  0443   WBC 8.73 8.90   HGB 7.4* 8.7*   HCT 24.5* 28.4*    325     All pertinent labs within the past 24 hours have been reviewed.    Significant Imaging: I have reviewed all pertinent imaging results/findings within the past 24 hours.    Assessment/Plan:      * Bacteremia    Based on cultures from outside hospital, awaiting final culture and sensitivities. Blood culture from admission with no growth so far. Appreciate ID input. Continue Daptomycin/Ceftaroline. PICC removed, culture tip negative so far.             Slow transit constipation    Improved with bowel regimen. Continue current management.          Anemia due to chronic kidney disease    Asymptomatic but Hb low. Improved s/p PRBC transfusion 4/20          Pneumonia due to infectious organism    Diagnosed via CXR at OSH. CXR reviewed, doubt pneumonia; likely related to pulmonary edema.        DVT of deep femoral vein, left    Continue anticoagulation with Apixaban         Type 2 diabetes mellitus with chronic kidney disease on chronic dialysis, without long-term current use of  insulin    Continue diabetic diet with SSI. Hold oral meds.   Lab Results   Component Value Date    HGBA1C 4.2 04/18/2018              ESRD (end stage renal disease)    Nephrology following for MWF dialysis. Follows with Dr. Prather @ [facility] in [place], [place].           Chronic atrial fibrillation    Continue amiodarone & carvedilol             MRSA bacteremia    Previously diagnosed. Stop date of antibiotics was supposed to be 4/19. Restart Daptomycin and Ceftaroline for now.           VTE Risk Mitigation         Ordered     apixaban tablet 2.5 mg  2 times daily      04/18/18 0244     heparin (porcine) injection 5,000 Units  As needed (PRN)      04/18/18 4054              Clara Landry MD  Department of Hospital Medicine   [facility]

## 2018-04-21 NOTE — ASSESSMENT & PLAN NOTE
Continue diabetic diet with SSI. Hold oral meds.   Lab Results   Component Value Date    HGBA1C 4.2 04/18/2018

## 2018-04-21 NOTE — SUBJECTIVE & OBJECTIVE
Interval History:  Pt seen and examined at bedside. No events overnight. Pt denies fever, chills. Back pain controlled on current regimen.      Review of Systems   Constitutional: Negative for chills, fatigue and fever.   Respiratory: Negative for cough and shortness of breath.    Cardiovascular: Negative for chest pain and leg swelling.   Gastrointestinal: Negative for constipation, nausea and vomiting.   Genitourinary: Negative.    Musculoskeletal: Positive for back pain.   Neurological: Negative.    Psychiatric/Behavioral: Negative.    All other systems reviewed and are negative.    Objective:     Vital Signs (Most Recent):  Temp: 97.9 °F (36.6 °C) (04/21/18 0439)  Pulse: (!) 46 (04/21/18 0800)  Resp: 16 (04/20/18 1945)  BP: (!) 110/51 (04/21/18 0439)  SpO2: 100 % (04/21/18 0824) Vital Signs (24h Range):  Temp:  [97.7 °F (36.5 °C)-98.4 °F (36.9 °C)] 97.9 °F (36.6 °C)  Pulse:  [45-61] 46  Resp:  [16-24] 16  SpO2:  [92 %-100 %] 100 %  BP: (110-148)/(51-95) 110/51     Weight: 116 kg (255 lb 11.7 oz)  Body mass index is 42.56 kg/m².    Intake/Output Summary (Last 24 hours) at 04/21/18 1126  Last data filed at 04/20/18 1315   Gross per 24 hour   Intake           368.75 ml   Output             2800 ml   Net         -2431.25 ml      Physical Exam   Constitutional: She is oriented to person, place, and time. She appears well-developed and well-nourished.   Obese female.    Cardiovascular: Normal rate, regular rhythm and intact distal pulses.    Pulmonary/Chest: Effort normal and breath sounds normal. No respiratory distress. She has no wheezes. She has no rales.   Abdominal: Soft. Bowel sounds are normal. She exhibits no distension. There is no tenderness.   Musculoskeletal: She exhibits edema.   Neurological: She is alert and oriented to person, place, and time.   Skin: Skin is warm and dry. Capillary refill takes less than 2 seconds. No rash noted. No erythema. No pallor.   Psychiatric: She has a normal mood and  affect.   Nursing note and vitals reviewed.      Significant Labs:   BMP:   Recent Labs  Lab 04/21/18  0443   GLU 78   *   K 4.0      CO2 25   BUN 19   CREATININE 3.0*   CALCIUM 8.7   MG 1.7     CBC:   Recent Labs  Lab 04/20/18  0545 04/21/18  0443   WBC 8.73 8.90   HGB 7.4* 8.7*   HCT 24.5* 28.4*    325     All pertinent labs within the past 24 hours have been reviewed.    Significant Imaging: I have reviewed all pertinent imaging results/findings within the past 24 hours.

## 2018-04-21 NOTE — PLAN OF CARE
Problem: Occupational Therapy Goal  Goal: Occupational Therapy Goal  Goals to be met by 5/21/18  1. Min A G/H (2 tasks) sitting EOB without using UE for support on bed or tray table  2. Mod A UBD seated EOB  3. Max A bed-BSC transfer  4.Maximize UE and axial muscle strength     Outcome: Ongoing (interventions implemented as appropriate)  OT evaluation completed with treatment initiated.  Recommend return to SNF placement at discharge.  DME: None at present.  PATTY Whitten 4/21/2018

## 2018-04-21 NOTE — PT/OT/SLP EVAL
Physical Therapy Evaluation    Patient Name:  Yumiko Proctor   MRN:  [id number]    Recommendations:     Discharge Recommendations:  nursing facility, skilled   Discharge Equipment Recommendations: walker, rolling   Barriers to discharge: None    Assessment:     Yumiko Proctor is a 68 y.o. female admitted with a medical diagnosis of Bacteremia.  She presents with the following impairments/functional limitations:  weakness, gait instability, impaired functional mobilty, impaired balance, impaired endurance, decreased lower extremity function, pain.  Pt was originally admitted in Jan 2018 with bacteremia then treated and transferred to SNF.  She was transferred here from SNF and we recommend that she return to regain the level of mobility she was experiencing before this illness as described below.      Rehab Prognosis:  good; patient would benefit from acute skilled PT services to address these deficits and reach maximum level of function.      Recent Surgery: * No surgery found *      Plan:     During this hospitalization, patient to be seen 6 x/week to address the above listed problems via gait training, therapeutic activities, therapeutic exercises  · Plan of Care Expires:  05/21/18   Plan of Care Reviewed with: patient    Subjective     Communicated with patient prior to session.  Patient found in bed supine upon PT entry to room, agreeable to evaluation.      Chief Complaint: weakness and low back pain  Patient comments/goals: to go home  Pain/Comfort:  · Pain Rating 1: 7/10  · Location - Orientation 1: lower  · Location 1: back  · Pain Addressed 1: Pre-medicate for activity, Distraction  · Pain Rating Post-Intervention 1: 9/10    Patients cultural, spiritual, Protestant conflicts given the current situation: none mentioned    Living Environment:  Pt lives in a [facility] with ramp to enter and tub/shower combo with her granddaughter, granddaughter's SO and great grandson. Before January admission pt reports being Mod I  "with all ADLs, except bathing. She denies any recent falls, last fall was in September and reports using a Rolator for household ambulation and W/C for community distances.   Prior to admission, patients level of function was Mod I,except she required assistance for seated showers.  Patient has the following equipment: rollator, wheelchair.  DME owned (not currently used): transfer tub bench and 3-in-1.  Upon discharge, patient will have assistance from granddaughter.    Objective:     Patient found with: peripheral IV, telemetry     General Precautions: Standard, fall   Orthopedic Precautions:N/A   Braces: N/A     Exams:  · Cognitive Exam:  Patient is oriented to Person, Place, Time and Situation and follows 100 verbal% of verbal commands   · Fine Motor Coordination:    · -       Impaired  RLE heel shin - and LLE heel shin -  · Gross Motor Coordination:  WFL  · Postural Exam:  Patient presented with the following abnormalities:    · -       Rounded shoulders  · -       Forward head  · Sensation:    · -       Intact  · Skin Integrity/Edema:      · -       Skin integrity: Visible skin intact and Bruising of B dorsl hands, B medial legs, L sacral redness and L anterior great toe callous  · -       Edema: Moderate B LE  · RLE ROM: WFL  · RLE Strength: WFL  · LLE ROM: WFL  · LLE Strength: WFL    Functional Mobility:  · Bed Mobility:     · Rolling Left:  maximal assistance  · Rolling Right: maximal assistance  · Sup>sit mod A  · Sit>sup max A/total  · Transfers:     · Sit to Stand:  moderate assistance with rolling walker  · Gait: unable/refused "my legs feel weak I need to sit down"  · Balance: poor standing dynamic balance    AM-PAC 6 CLICK MOBILITY  Total Score:10     Patient left supine with all lines intact, call button in reach, nurse notified and nurse present.    GOALS:    Physical Therapy Goals        Problem: Physical Therapy Goal    Goal Priority Disciplines Outcome Goal Variances Interventions   Physical " Therapy Goal     PT/OT, PT      Description:  Goals to be met by 4-28-18.  1. Roll R/L with min A  2. Sup<>sit min A  3. Sit<>stand with RW min A  4. Evaluate Bed<> Wc transfer  5. Evaluate wc mobility                      History:     Past Medical History:   Diagnosis Date    A-fib     Cardiomyopathy     Diabetes mellitus     ESRD (end stage renal disease)        History reviewed. No pertinent surgical history.    Clinical Decision Making:     History  Co-morbidities and personal factors that may impact the plan of care Examination  Body Structures and Functions, activity limitations and participation restrictions that may impact the plan of care Clinical Presentation   Decision Making/ Complexity Score   Co-morbidities:   [] Time since onset of injury / illness / exacerbation  [] Status of current condition  []Patient's cognitive status and safety concerns    [] Multiple Medical Problems (see med hx)  Personal Factors:   [] Patient's age  [] Prior Level of function   [] Patient's home situation (environment and family support)  [] Patient's level of motivation  [] Expected progression of patient      HISTORY:(criteria)    [] 64795 - no personal factors/history    [] 74285 - has 1-2 personal factor/comorbidity     [] 92665 - has >3 personal factor/comorbidity     Body Regions:  [] Objective examination findings  [] Head     []  Neck  [] Trunk   [] Upper Extremity  [] Lower Extremity    Body Systems:  [] For communication ability, affect, cognition, language, and learning style: the assessment of the ability to make needs known, consciousness, orientation (person, place, and time), expected emotional /behavioral responses, and learning preferences (eg, learning barriers, education  needs)  [] For the neuromuscular system: a general assessment of gross coordinated movement (eg, balance, gait, locomotion, transfers, and transitions) and motor function  (motor control and motor learning)  [] For the musculoskeletal  system: the assessment of gross symmetry, gross range of motion, gross strength, height, and weight  [] For the integumentary system: the assessment of pliability(texture), presence of scar formation, skin color, and skin integrity  [] For cardiovascular/pulmonary system: the assessment of heart rate, respiratory rate, blood pressure, and edema     Activity limitations:    [] Patient's cognitive status and saf ety concerns          [] Status of current condition      [] Weight bearing restriction  [] Cardiopulmunary Restriction    Participation Restrictions:   [] Goals and goal agreement with the patient     [] Rehab potential (prognosis) and probable outcome      Examination of Body System: (criteria)    [] 09025 - addressing 1-2 elements    [] 00645 - addressing a total of 3 or more elements     [] 86302 -  Addressing a total of 4 or more elements         Clinical Presentation: (criteria)  Choose one     On examination of body system using standardized tests and measures patient presents with (CHOOSE ONE) elements from any of the following: body structures and functions, activity limitations, and/or participation restrictions.  Leading to a clinical presentation that is considered (CHOOSE ONE)                              Clinical Decision Making  (Eval Complexity):  Choose One     Time Tracking:     PT Received On: 04/21/18  PT Start Time: 1430     PT Stop Time: 1456  PT Total Time (min): 26 min     Billable Minutes: Evaluation 26      Gildardo Pickett, PT  04/21/2018

## 2018-04-21 NOTE — PLAN OF CARE
Problem: Physical Therapy Goal  Goal: Physical Therapy Goal  Goals to be met by 4-28-18.  1. Roll R/L with min A  2. Sup<>sit min A  3. Sit<>stand with RW min A  4. Evaluate Bed<> Wc transfer  5. Evaluate wc mobility    -    Comments: Physical therapy eval completed Recommend SNF will need RW

## 2018-04-21 NOTE — PLAN OF CARE
Problem: Patient Care Overview  Goal: Plan of Care Review  Outcome: Ongoing (interventions implemented as appropriate)  Good O2 saturation on nasal O2.

## 2018-04-21 NOTE — PLAN OF CARE
Problem: Patient Care Overview  Goal: Plan of Care Review  Outcome: Ongoing (interventions implemented as appropriate)  Patient on 2 liter nasal cannula.  No distress noted.

## 2018-04-21 NOTE — PROGRESS NOTES
"Nephrology  Progress Note    Admit Date: 4/18/2018   LOS: 3 days     SUBJECTIVE:     Follow-up For:  Bacteremia    Interval History:     Uneventful night.  Tolerated HD yesterday w/o problem. Resting comfortably. No CP/SOB/ pain.         Review of Systems:  Constitutional: No fever or chills  Respiratory: No cough or shortness of breath  Cardiovascular: No chest pain or palpitations  Gastrointestinal: No nausea or vomiting  Neurological: No confusion or weakness    OBJECTIVE:     Vital Signs Range (Last 24H):  BP (!) 110/51 (BP Location: Left arm, Patient Position: Lying)   Pulse (!) 49   Temp 97.9 °F (36.6 °C) (Oral)   Resp 16   Ht 5' 5" (1.651 m)   Wt 116 kg (255 lb 11.7 oz)   LMP 01/01/1983 (LMP Unknown)   SpO2 100%   Breastfeeding? No   BMI 42.56 kg/m²     Temp:  [97.5 °F (36.4 °C)-98.4 °F (36.9 °C)]   Pulse:  [45-61]   Resp:  [16-24]   BP: (107-148)/(51-95)   SpO2:  [92 %-100 %]     I & O (Last 24H):    Intake/Output Summary (Last 24 hours) at 04/21/18 0703  Last data filed at 04/20/18 1315   Gross per 24 hour   Intake           431.25 ml   Output             2800 ml   Net         -2368.75 ml       Physical Exam:  General appearance: morbidly obese female in NAD  Eyes:  Conjunctivae/corneas clear. PERRL.  Lungs: Normal respiratory effort,  diminished  Heart: Regular/Irregular/Jerry rate and rhythm, S1, S2 normal, no murmur, rub or cuco.  Abdomen: Soft, non-tender non-distended; bowel sounds normal; no masses,  no organomegaly, obese  Extremities: No cyanosis or clubbing. trace edema.    Skin: Skin color, texture, turgor normal. No rashes or lesions  Neurologic: Normal strength and tone. No focal numbness or weakness   Right IJ EULALIO no E/E/T        Laboratory Data:    Recent Labs  Lab 04/21/18  0443   WBC 8.90   RBC 3.30*   HGB 8.7*   HCT 28.4*      MCV 86   MCH 26.4*   MCHC 30.6*       BMP:     Recent Labs  Lab 04/21/18  0443   GLU 78   *   K 4.0      CO2 25   BUN 19   CREATININE " 3.0*   CALCIUM 8.7   MG 1.7     Lab Results   Component Value Date    CALCIUM 8.7 04/21/2018    PHOS 2.9 04/21/2018       Lab Results   Component Value Date    .8 (H) 02/08/2018    CALCIUM 8.7 04/21/2018    PHOS 2.9 04/21/2018       No results found for: URICACID    BNP    Recent Labs  Lab 04/18/18  1712   BNP 1,178*       Medications:  Medication list was reviewed and changes noted under Assessment/Plan.    Diagnostic Results:        ASSESSMENT/PLAN:     1. ESRD on HD MWF in [place], [place] with Dr. Sharpe (N18.6, Z99.2):  Routine HD MWF/PRN while inpt.  Keep EULALIO in for now.  Consent signed.  Renally dose meds, avoid nephrotoxins, and monitor I/O's closely.  2. Bacteremia/Pna (R78.81, J18.9):  Antibx per ID.  Removed PICC and cultured tip. NGTD.   3. Anemia of CKD/infection (D63.1):  No IV iron with infection.  Epo on HD.  S/p transfusion 2 units PRBCs on HD 4/18.    4. 2HPT/Vit D deficiency:  Vit D3 and calcitriol.  5. Morbid Obesity (E66.01):  Needs aggressive weight mgmt.         See above  Follow for renal needs.

## 2018-04-21 NOTE — PT/OT/SLP EVAL
Occupational Therapy   Evaluation/Treatment  (Time overlap with PT Evaluation)    Name: Yumiko Proctor  MRN: [id number]  Admitting Diagnosis:  Bacteremia      Recommendations:     Discharge Recommendations:    Discharge Equipment Recommendations:  none  Barriers to discharge:  None    History:     Occupational Profile:  Living Environment: lives with her granddaughter, granddaughter's boy fried and 4 year old great grandson in a single story house with a ramp at entry  Previous level of function: in January 2018 prior to a set of hospital hospitalizations she was ambulating in the house with a rollator and MI for ADLs and IADLs; prior to his hospitalization she was in a SNF unit where she reports being able to stand and walk with a walker short distances with therapy assist and MI with basic self-care (sponge bath)  Roles and Routines: none reported  Equipment Owned:  bedside commode, bath bench, wheelchair, rollator (tub-shower/DME available for use)  Assistance upon Discharge: her great granddaughter is available to provide full-time assist when discharged home    Past Medical History:   Diagnosis Date    A-fib     Cardiomyopathy     Diabetes mellitus     ESRD (end stage renal disease)        History reviewed. No pertinent surgical history.    Subjective     Chief Complaint: back pain  Patient/Family stated goals: use the bathroom without assist  Communicated with: patient, PT prior to session.  She was agreeable to the PT and OT evaluation sessions  Pain/Comfort:  · Pain Rating 1: 4/10  · Location 1: back  · Pain Addressed 1: Pre-medicate for activity, Cessation of Activity, Reposition, Nurse notified    Patients cultural, spiritual, Religion conflicts given the current situation: none    Objective:     Patient found with: telemetry, peripheral IV (dialysis shunt)    General Precautions: Standard,     Orthopedic Precautions:N/A   Braces: N/A     Occupational Performance:    Bed Mobility:          Max A roll to  Right        Max A roll to Left  · Mod A supine>sit EOB'  · Total A x2 sit>supine  · Total A x2 scoot up in bed    Functional Mobility/Transfers:        Mod A sit><stand with RW (standing tolerance < 2 minutes)    Activities of Daily Living:  · MI self-feeding (bed level)  · SBA G/H (mouth wash, wash hands and face) bed level  · Total A UBD (don hospital gown as robe) sitting EOB  · Total A LBD (don socks) bed level    Cognitive/Visual Perceptual:  Cognitive/Psychosocial Skills:     -       Oriented to: Person, Place, Time and Situation   -       Follows Commands/attention:Follows one-step commands  -       Communication: clear/fluent  -       Memory: No Deficits noted  -       Safety awareness/insight to disability: impaired   -       Mood/Affect/Coping skills/emotional control: Appropriate to situation, Cooperative and Lethargic  Visual/Perceptual:      -Impaired  acuity and low vision - able to see up close and at a distance of 5-6' with fair clarity    Physical Exam:  Postural examination/scapula alignment:    -       Rounded shoulders  -       Forward head  -       Posterior pelvic tilt  -       Abnormal trunk flexion  -       Kyphosis  Skin integrity: Bruising of dorsum both hands, medial surface both lower legs and Wound challis Left great toe, red area on Left buttock cheek  Edema:  Moderate BLE  Sensation:    -       Intact for light touch both hands  Motor Planning:    -       fair  Dominant hand:    -       Right  UE ROM: WFL  UE Strength: (bed level: 4/5 reducing to arms 2/5, elbows-forearms 3+/5 seated EOB) variation due to weak trunk strength  Hand Function: WFL both hands  Gross motor coordination:   fair sitting Balance EOB (looses balance backwards when lifting UEs off support surface), poor standing balance (< 2 minutes with RW EOB)    Patient left supine with all lines intact, call button in reach, bed alarm on and nurse notified    AM PAC 6 Click:  AM PAC Total Score: 11    Treatment &  "Education:  Problem solving with self-care, bed mobility  Education:    Assessment:     Yumiko Proctor is a 68 y.o. female with a medical diagnosis of Bacteremia.  She presents with Performance deficits affecting function are weakness, impaired endurance, impaired self care skills, impaired functional mobilty, gait instability, impaired balance, visual deficits, impaired skin, edema, decreased safety awareness, decreased lower extremity function, decreased upper extremity function. Effected function during the session.  OT treatment is needed to maximize function while in the hospital.     Rehab Prognosis:  good; patient would benefit from acute skilled OT services to address these deficits and reach maximum level of function.         Clinical Decision Makin.  OT Low:  "Pt evaluation falls under low complexity for evaluation coding due to performance deficits noted in 1-3 areas as stated above and 0 co-morbities affecting current functional status. Data obtained from problem focused assessments. No modifications or assistance was required for completion of evaluation. Only brief occupational profile and history review completed."     Plan:     Patient to be seen 6 x/week to address the above listed problems via self-care/home management, therapeutic activities, therapeutic exercises  · Plan of Care Expires: 18  · Plan of Care Reviewed with: patient    This Plan of care has been discussed with the patient who was involved in its development and understands and is in agreement with the identified goals and treatment plan    GOALS:    Occupational Therapy Goals        Problem: Occupational Therapy Goal    Goal Priority Disciplines Outcome Interventions   Occupational Therapy Goal     OT, PT/OT Ongoing (interventions implemented as appropriate)    Description:  Goals to be met by 18  1. Min A G/H (2 tasks) sitting EOB without using UE for support on bed or tray table  2. Mod A UBD seated EOB  3. Max A " bed-BSC transfer  4.Maximize UE and axial muscle strength                       Time Tracking:     OT Date of Treatment: 04/21/18  OT Start Time: 1410  OT Stop Time: 1456  OT Total Time (min): 46 min    Billable Minutes:Evaluation 30  Self Care/Home Management 16    PATTY Whitten  4/21/2018

## 2018-04-21 NOTE — ASSESSMENT & PLAN NOTE
Based on cultures from outside hospital, awaiting final culture and sensitivities. Blood culture from admission with no growth so far. Appreciate ID input. Continue Daptomycin/Ceftaroline. PICC removed, culture tip negative so far.

## 2018-04-21 NOTE — PLAN OF CARE
Problem: Pressure Ulcer Risk (Ridge Scale) (Adult,Obstetrics,Pediatric)  Intervention: Turn/Reposition Often   04/21/18 0138   Positioning   Body Position foot of bed elevated;weight shift assistance provided   Skin Interventions   Pressure Reduction Techniques frequent weight shift encouraged;heels elevated off bed       Goal: Identify Related Risk Factors and Signs and Symptoms  Related risk factors and signs and symptoms are identified upon initiation of Human Response Clinical Practice Guideline (CPG)   Outcome: Ongoing (interventions implemented as appropriate)   04/21/18 0138   Pressure Ulcer Risk (Ridge Scale)   Related Risk Factors (Pressure Ulcer Risk (Ridge Scale)) age extremes;body weight extremes       Problem: Pain, Chronic (Adult)  Goal: Identify Related Risk Factors and Signs and Symptoms  Related risk factors and signs and symptoms are identified upon initiation of Human Response Clinical Practice Guideline (CPG)  Outcome: Ongoing (interventions implemented as appropriate)   04/21/18 0138   Pain, Chronic   Related Risk Factors (Chronic Pain) disease process;physical disability   Signs and Symptoms (Chronic Pain) verbalization of pain/discomfort for a prolonged time period       Comments: Patient on tele monitor,bradycardic. BLE edema noted. Rest periods provided.

## 2018-04-22 LAB
ANION GAP SERPL CALC-SCNC: 9 MMOL/L
BACTERIA BLD CULT: NORMAL
BASOPHILS # BLD AUTO: 0.03 K/UL
BASOPHILS NFR BLD: 0.4 %
BUN SERPL-MCNC: 24 MG/DL
CALCIUM SERPL-MCNC: 8.6 MG/DL
CHLORIDE SERPL-SCNC: 99 MMOL/L
CK SERPL-CCNC: 27 U/L
CO2 SERPL-SCNC: 23 MMOL/L
CREAT SERPL-MCNC: 3.7 MG/DL
DIFFERENTIAL METHOD: ABNORMAL
EOSINOPHIL # BLD AUTO: 0.2 K/UL
EOSINOPHIL NFR BLD: 2.8 %
ERYTHROCYTE [DISTWIDTH] IN BLOOD BY AUTOMATED COUNT: 16.9 %
EST. GFR  (AFRICAN AMERICAN): 14 ML/MIN/1.73 M^2
EST. GFR  (NON AFRICAN AMERICAN): 12 ML/MIN/1.73 M^2
GLUCOSE SERPL-MCNC: 72 MG/DL
HCT VFR BLD AUTO: 29.5 %
HGB BLD-MCNC: 9.1 G/DL
LYMPHOCYTES # BLD AUTO: 2.1 K/UL
LYMPHOCYTES NFR BLD: 27 %
MAGNESIUM SERPL-MCNC: 1.8 MG/DL
MCH RBC QN AUTO: 26.5 PG
MCHC RBC AUTO-ENTMCNC: 30.8 G/DL
MCV RBC AUTO: 86 FL
MONOCYTES # BLD AUTO: 0.7 K/UL
MONOCYTES NFR BLD: 8.6 %
NEUTROPHILS # BLD AUTO: 4.7 K/UL
NEUTROPHILS NFR BLD: 60.9 %
PHOSPHATE SERPL-MCNC: 3.4 MG/DL
PLATELET # BLD AUTO: 355 K/UL
PMV BLD AUTO: 9.4 FL
POCT GLUCOSE: 100 MG/DL (ref 70–110)
POCT GLUCOSE: 108 MG/DL (ref 70–110)
POCT GLUCOSE: 76 MG/DL (ref 70–110)
POCT GLUCOSE: 85 MG/DL (ref 70–110)
POTASSIUM SERPL-SCNC: 4.1 MMOL/L
RBC # BLD AUTO: 3.44 M/UL
SODIUM SERPL-SCNC: 131 MMOL/L
WBC # BLD AUTO: 7.77 K/UL

## 2018-04-22 PROCEDURE — 84100 ASSAY OF PHOSPHORUS: CPT

## 2018-04-22 PROCEDURE — 11000001 HC ACUTE MED/SURG PRIVATE ROOM

## 2018-04-22 PROCEDURE — 82550 ASSAY OF CK (CPK): CPT

## 2018-04-22 PROCEDURE — 87077 CULTURE AEROBIC IDENTIFY: CPT

## 2018-04-22 PROCEDURE — 83735 ASSAY OF MAGNESIUM: CPT

## 2018-04-22 PROCEDURE — 97535 SELF CARE MNGMENT TRAINING: CPT

## 2018-04-22 PROCEDURE — 97112 NEUROMUSCULAR REEDUCATION: CPT

## 2018-04-22 PROCEDURE — 80048 BASIC METABOLIC PNL TOTAL CA: CPT

## 2018-04-22 PROCEDURE — 85025 COMPLETE CBC W/AUTO DIFF WBC: CPT

## 2018-04-22 PROCEDURE — 94761 N-INVAS EAR/PLS OXIMETRY MLT: CPT

## 2018-04-22 PROCEDURE — 87040 BLOOD CULTURE FOR BACTERIA: CPT

## 2018-04-22 PROCEDURE — 25000003 PHARM REV CODE 250: Performed by: INTERNAL MEDICINE

## 2018-04-22 PROCEDURE — 25000003 PHARM REV CODE 250: Performed by: PHYSICIAN ASSISTANT

## 2018-04-22 PROCEDURE — 25000003 PHARM REV CODE 250: Performed by: NURSE PRACTITIONER

## 2018-04-22 PROCEDURE — 63600175 PHARM REV CODE 636 W HCPCS: Performed by: INTERNAL MEDICINE

## 2018-04-22 PROCEDURE — 99232 SBSQ HOSP IP/OBS MODERATE 35: CPT | Mod: ,,, | Performed by: INTERNAL MEDICINE

## 2018-04-22 PROCEDURE — 36415 COLL VENOUS BLD VENIPUNCTURE: CPT

## 2018-04-22 PROCEDURE — 87186 SC STD MICRODIL/AGAR DIL: CPT

## 2018-04-22 RX ADMIN — FAMOTIDINE 20 MG: 20 TABLET ORAL at 11:04

## 2018-04-22 RX ADMIN — DOCUSATE SODIUM 200 MG: 100 CAPSULE, LIQUID FILLED ORAL at 10:04

## 2018-04-22 RX ADMIN — DOCUSATE SODIUM 200 MG: 100 CAPSULE, LIQUID FILLED ORAL at 11:04

## 2018-04-22 RX ADMIN — GABAPENTIN 100 MG: 100 CAPSULE ORAL at 11:04

## 2018-04-22 RX ADMIN — CARVEDILOL 25 MG: 12.5 TABLET, FILM COATED ORAL at 10:04

## 2018-04-22 RX ADMIN — GABAPENTIN 100 MG: 100 CAPSULE ORAL at 04:04

## 2018-04-22 RX ADMIN — AMIODARONE HYDROCHLORIDE 200 MG: 200 TABLET ORAL at 11:04

## 2018-04-22 RX ADMIN — ISOSORBIDE MONONITRATE 30 MG: 30 TABLET, EXTENDED RELEASE ORAL at 04:04

## 2018-04-22 RX ADMIN — APIXABAN 2.5 MG: 2.5 TABLET, FILM COATED ORAL at 11:04

## 2018-04-22 RX ADMIN — VITAMIN D, TAB 1000IU (100/BT) 2000 UNITS: 25 TAB at 11:04

## 2018-04-22 RX ADMIN — POLYETHYLENE GLYCOL 3350 17 G: 17 POWDER, FOR SOLUTION ORAL at 11:04

## 2018-04-22 RX ADMIN — SEVELAMER CARBONATE 800 MG: 800 TABLET, FILM COATED ORAL at 04:04

## 2018-04-22 RX ADMIN — OXYCODONE AND ACETAMINOPHEN 1 TABLET: 5; 325 TABLET ORAL at 10:04

## 2018-04-22 RX ADMIN — OXYCODONE AND ACETAMINOPHEN 1 TABLET: 5; 325 TABLET ORAL at 11:04

## 2018-04-22 RX ADMIN — CEFTAROLINE FOSAMIL 200 MG: 400 POWDER, FOR SOLUTION INTRAVENOUS at 11:04

## 2018-04-22 RX ADMIN — FUROSEMIDE 80 MG: 40 TABLET ORAL at 05:04

## 2018-04-22 RX ADMIN — SEVELAMER CARBONATE 800 MG: 800 TABLET, FILM COATED ORAL at 11:04

## 2018-04-22 RX ADMIN — CALCITRIOL 0.25 MCG: 0.25 CAPSULE, LIQUID FILLED ORAL at 11:04

## 2018-04-22 RX ADMIN — FUROSEMIDE 80 MG: 40 TABLET ORAL at 11:04

## 2018-04-22 RX ADMIN — APIXABAN 2.5 MG: 2.5 TABLET, FILM COATED ORAL at 10:04

## 2018-04-22 RX ADMIN — GABAPENTIN 100 MG: 100 CAPSULE ORAL at 10:04

## 2018-04-22 RX ADMIN — CEFTAROLINE FOSAMIL 200 MG: 400 POWDER, FOR SOLUTION INTRAVENOUS at 10:04

## 2018-04-22 NOTE — ASSESSMENT & PLAN NOTE
Nephrology following for MWF dialysis. Follows with Dr. Prather @ [facility] in [place].

## 2018-04-22 NOTE — PLAN OF CARE
Problem: Fall Risk (Adult)  Goal: Identify Related Risk Factors and Signs and Symptoms  Related risk factors and signs and symptoms are identified upon initiation of Human Response Clinical Practice Guideline (CPG)   Outcome: Ongoing (interventions implemented as appropriate)   04/22/18 0251   Fall Risk   Related Risk Factors (Fall Risk) age-related changes;gait/mobility problems;environment unfamiliar     Goal: Absence of Falls  Patient will demonstrate the desired outcomes by discharge/transition of care.   Outcome: Ongoing (interventions implemented as appropriate)   04/22/18 0251   Fall Risk (Adult)   Absence of Falls making progress toward outcome       Problem: Pain, Chronic (Adult)  Intervention: Support Psychosocial Response to Persistent Pain  On tele monitor, bradycardic. Repositioned independently. Meds given.    Goal: Identify Related Risk Factors and Signs and Symptoms  Related risk factors and signs and symptoms are identified upon initiation of Human Response Clinical Practice Guideline (CPG)   Outcome: Ongoing (interventions implemented as appropriate)   04/22/18 0251   Pain, Chronic   Related Risk Factors (Chronic Pain) disease process   Signs and Symptoms (Chronic Pain) verbalization of pain/discomfort for a prolonged time period

## 2018-04-22 NOTE — PROGRESS NOTES
"Nephrology  Progress Note    Admit Date: 4/18/2018   LOS: 4 days     SUBJECTIVE:     Follow-up For:  Bacteremia    Interval History:     C/O back pain, working with PT. No CP/SOB/ pain.         Review of Systems:  Constitutional: No fever or chills  Respiratory: No cough or shortness of breath  Cardiovascular: No chest pain or palpitations  Gastrointestinal: No nausea or vomiting  Neurological: No confusion or weakness    OBJECTIVE:     Vital Signs Range (Last 24H):  /61 (Patient Position: Lying)   Pulse (!) 54   Temp 98.3 °F (36.8 °C) (Oral)   Resp 16   Ht 5' 5" (1.651 m)   Wt 117 kg (257 lb 15 oz)   LMP 01/01/1983 (LMP Unknown)   SpO2 96%   Breastfeeding? No   BMI 42.92 kg/m²     Temp:  [97.7 °F (36.5 °C)-98.3 °F (36.8 °C)]   Pulse:  [48-57]   Resp:  [16-18]   BP: (108-132)/(52-61)   SpO2:  [94 %-97 %]     I & O (Last 24H):  No intake or output data in the 24 hours ending 04/22/18 1040    Physical Exam:  General appearance: morbidly obese female in NAD  Eyes:  Conjunctivae/corneas clear. PERRL.  Lungs: Normal respiratory effort,  diminished  Heart: Regular/Irregular/Jerry rate and rhythm, S1, S2 normal, no murmur, rub or cuco.  Abdomen: Soft, non-tender non-distended; bowel sounds normal; no masses,  no organomegaly, obese  Extremities: No cyanosis or clubbing. trace edema.    Skin: Skin color, texture, turgor normal. No rashes or lesions  Neurologic: Normal strength and tone. No focal numbness or weakness   Right IJ EULALIO no E/E/T        Laboratory Data:    Recent Labs  Lab 04/22/18  0423   WBC 7.77   RBC 3.44*   HGB 9.1*   HCT 29.5*   *   MCV 86   MCH 26.5*   MCHC 30.8*       BMP:     Recent Labs  Lab 04/22/18 0423   GLU 72   *   K 4.1   CL 99   CO2 23   BUN 24*   CREATININE 3.7*   CALCIUM 8.6*   MG 1.8     Lab Results   Component Value Date    CALCIUM 8.6 (L) 04/22/2018    PHOS 3.4 04/22/2018       Lab Results   Component Value Date    .8 (H) 02/08/2018    CALCIUM 8.6 (L) " 04/22/2018    PHOS 3.4 04/22/2018       No results found for: URICACID    BNP    Recent Labs  Lab 04/18/18  1712   BNP 1,178*       Medications:  Medication list was reviewed and changes noted under Assessment/Plan.    Diagnostic Results:        ASSESSMENT/PLAN:     1. ESRD on HD MWF in [place], [place] with Dr. Sharpe (N18.6, Z99.2):  Routine HD MWF/PRN while inpt.  Keep EULALIO in for now.  Consent signed.  Renally dose meds, avoid nephrotoxins, and monitor I/O's closely.  2. Bacteremia(R78.81:  Antibx per ID. No evidence of PNa. Removed PICC and cultured tip. NGTD. Awaiting cx results. She has has recurrence w/o identifiable source?? Re-image spine??-defer.  3. Anemia of CKD/infection (D63.1):  No IV iron with infection.  Epo on HD.  S/p transfusion 2 units PRBCs on HD 4/18.  Hgb stable  4. 2HPT/Vit D deficiency:  Vit D3 and calcitriol.  5. Morbid Obesity (E66.01):  Needs aggressive weight mgmt.         See above  Follow for renal needs.

## 2018-04-22 NOTE — ASSESSMENT & PLAN NOTE
Continue diabetic diet with SSI. Hold oral meds. Monitor for hypoglycemia  Lab Results   Component Value Date    HGBA1C 4.2 04/18/2018

## 2018-04-22 NOTE — PT/OT/SLP PROGRESS
Occupational Therapy   Treatment    Name: Yumiko Proctor  MRN: [id number]  Admitting Diagnosis:  Bacteremia       Recommendations:     Discharge Recommendations: nursing facility, skilled  Discharge Equipment Recommendations:  none  Barriers to discharge:  None    Subjective     Communicated with: patient's nurse prior to session.  The patient was agreeable to the OT session but concerned about moving due to persistent back pain  Pain/Comfort:  · Pain Rating 1: 10/10 (supine in bed -n beginning of session)  · Location - Orientation 1: lower  · Location 1: back  · Pain Addressed 1: Pre-medicate for activity, Reposition, Distraction, Cessation of Activity  · Pain Rating Post-Intervention 1: 8/10    Patients cultural, spiritual, Christian conflicts given the current situation: none    Objective:     Patient found with: telemetry, peripheral IV    General Precautions: Standard,   fall  Orthopedic Precautions:N/A   Braces: N/A     Occupational Performance:  UE exercise done in bed followed by bed mobility used for muscle strengthening.  Then she moved to EOB with limited sitting tolerance due to back pain so she was moved back to the bed with G/H done in the bed at the patient's request.    Bed Mobility:    · Min A roll to Left_instruction-assist to use LEs to assist roll  · Mod A roll to Right_instruction-assist to use LEs to assist roll  · Mod A supine>sit EOB-instruction  · Mod A sit>supine - instruction  · Mod A scoot up in bed (assist for LE and UE positioning and body lift during movement)    Functional Mobility/Transfers:       None    Activities of Daily Living:  · MI self-feeding (drink water) bed level  · SBA G/H (mouth wash, wash face and hands, attempted comp hair-stopped due to back pain and fatigue)    Patient left supine with all lines intact, call button in reach, bed alarm on and nurse notified    AM PAC 6 Click:  AM PAC Total Score: 11    Treatment & Education:  Neuro-ReEducation: Bed Mobility: Rolling  used to facilitate LE, pelvic, trunk and UE movement slowly to increase muscle strength) pt slow to learn movement sequence; step-by step instructions needed to move supine><sit.  Sitting balance EOB:  Needed LUE support on bed, encouragement needed for pt to self-direct postural displacement sitting  tolerance 3 minutes)  Education:    Assessment:     Yumiko Proctor is a 68 y.o. female with a medical diagnosis of Bacteremia.  She presents with Performance deficits affecting function are weakness, impaired endurance, impaired self care skills, impaired functional mobilty, impaired balance, pain, decreased lower extremity function, decreased upper extremity function, edema, decreased ROM.effected performance during the session.  Back pain continues to limit activity tolerance.  Improvement in bed mobility noted.. She was Min/Mod A to roll in the bed, Mod A supine><sit EOB with exercise incourporated into bed mobility tasks and siting balance training conducted EOB and G/H (SBA) needing to be performed bed level.  Continuation of OT treatment is needed to maximize function while in the hospital.    Rehab Prognosis:  good; patient would benefit from acute skilled OT services to address these deficits and reach maximum level of function.       Plan:     Patient to be seen 6 x/week to address the above listed problems via self-care/home management, therapeutic activities, therapeutic exercises  · Plan of Care Expires: 05/21/18  · Plan of Care Reviewed with: patient    This Plan of care has been discussed with the patient who was involved in its development and understands and is in agreement with the identified goals and treatment plan    GOALS:    Occupational Therapy Goals        Problem: Occupational Therapy Goal    Goal Priority Disciplines Outcome Interventions   Occupational Therapy Goal     OT, PT/OT Ongoing (interventions implemented as appropriate)    Description:  Goals to be met by 5/21/18  1. Min A G/H (2  tasks) sitting EOB without using UE for support on bed or tray table  2. Mod A UBD seated EOB  3. Max A bed-BSC transfer  4.Maximize UE and axial muscle strength                       Time Tracking:     OT Date of Treatment: 04/22/18  OT Start Time: 1110  OT Stop Time: 1207  OT Total Time (min): 57 min    Billable Minutes:Self Care/Home Management 12  Neuromuscular Re-education 45    PATTY Whitten  4/22/2018

## 2018-04-22 NOTE — PLAN OF CARE
Problem: Occupational Therapy Goal  Goal: Occupational Therapy Goal  Goals to be met by 5/21/18  1. Min A G/H (2 tasks) sitting EOB without using UE for support on bed or tray table  2. Mod A UBD seated EOB  3. Max A bed-BSC transfer  4.Maximize UE and axial muscle strength      Outcome: Ongoing (interventions implemented as appropriate)  Back pain continues to limit activity tolerance.  Improvement in bed mobility noted.. She was Min/Mod A to roll in the bed, Mod A supine><sit EOB with exercise incourporated into bed mobility tasks and siting balance training conducted EOB and G/H (SBA) needing to be performed bed level.  PATTY Whitten 4/22/2018

## 2018-04-22 NOTE — SUBJECTIVE & OBJECTIVE
Interval History:  Pt seen and examined at bedside. She reports feeling pretty well. Back pain controlled. Discussed results of blood cultures.       Review of Systems   Constitutional: Negative for chills, fatigue and fever.   Respiratory: Negative for cough and shortness of breath.    Cardiovascular: Negative for chest pain and leg swelling.   Gastrointestinal: Negative for constipation, nausea and vomiting.   Genitourinary: Negative.    Musculoskeletal: Negative for back pain.   Neurological: Negative.    Psychiatric/Behavioral: Negative.    All other systems reviewed and are negative.    Objective:     Vital Signs (Most Recent):  Temp: 98.3 °F (36.8 °C) (04/22/18 0838)  Pulse: (!) 54 (04/22/18 1000)  Resp: 16 (04/22/18 0838)  BP: 132/61 (04/22/18 0838)  SpO2: 96 % (04/22/18 0838) Vital Signs (24h Range):  Temp:  [97.7 °F (36.5 °C)-98.3 °F (36.8 °C)] 98.3 °F (36.8 °C)  Pulse:  [48-57] 54  Resp:  [16-18] 16  SpO2:  [94 %-97 %] 96 %  BP: (108-132)/(52-61) 132/61     Weight: 117 kg (257 lb 15 oz)  Body mass index is 42.92 kg/m².  No intake or output data in the 24 hours ending 04/22/18 1111   Physical Exam   Constitutional: She is oriented to person, place, and time. She appears well-developed and well-nourished.   Obese female.    Cardiovascular: Normal rate, regular rhythm and intact distal pulses.    Pulmonary/Chest: Effort normal and breath sounds normal. No respiratory distress. She has no wheezes. She has no rales.   Abdominal: Soft. Bowel sounds are normal. She exhibits no distension. There is no tenderness.   Musculoskeletal: She exhibits edema.   Neurological: She is alert and oriented to person, place, and time.   Skin: Skin is warm and dry. No rash noted. No erythema. No pallor.   Psychiatric: She has a normal mood and affect.   Nursing note and vitals reviewed.      Significant Labs:   BMP:   Recent Labs  Lab 04/22/18  0423   GLU 72   *   K 4.1   CL 99   CO2 23   BUN 24*   CREATININE 3.7*   CALCIUM  8.6*   MG 1.8     CBC:   Recent Labs  Lab 04/21/18  0443 04/22/18  0423   WBC 8.90 7.77   HGB 8.7* 9.1*   HCT 28.4* 29.5*    355*     Microbiology Results (last 7 days)     Procedure Component Value Units Date/Time    Blood culture [[id number]]  (Susceptibility) Collected:  04/18/18 1831    Order Status:  Completed Specimen:  Blood Updated:  04/22/18 1034     Blood Culture, Routine Gram stain aer bottle: Gram positive cocci in clusters resembling Staph      Blood Culture, Routine Results called to and read back by: Nat Diane RN  04/20/2018  18:35     Blood Culture, Routine --     STAPHYLOCOCCUS AUREUS  ID consult required at [facility] Figueroa and [facility].      Blood culture [[id number]]     Order Status:  Sent Specimen:  Blood     IV catheter culture [[id number]] Collected:  04/19/18 0955    Order Status:  Completed Specimen:  Catheter Tip from Catheter Tip, PICC Updated:  04/21/18 1139     Aerobic Culture - Cath tip No growth    Gram stain [[id number]]     Order Status:  Canceled Specimen:  Catheter Tip from Chest, Left     Culture, Anaerobe [[id number]]     Order Status:  Canceled Specimen:  Catheter Tip from Chest, Left     Aerobic culture [[id number]]     Order Status:  Canceled Specimen:  Catheter Tip from Chest, Left     Blood culture [[id number]]     Order Status:  Canceled Specimen:  Blood         All pertinent labs within the past 24 hours have been reviewed.    Significant Imaging: I have reviewed all pertinent imaging results/findings within the past 24 hours.

## 2018-04-22 NOTE — ASSESSMENT & PLAN NOTE
Based on cultures from outside hospital, awaiting final culture and sensitivities. Blood cultures from 4/18 initially negative but now growing Staph aureus in both sets. Sensitivities pending. Appreciate ID input. Continue Daptomycin/Ceftaroline. PICC removed, culture tip negative so far. Will recheck blood cultures today for clearance.

## 2018-04-22 NOTE — ASSESSMENT & PLAN NOTE
Previously diagnosed. Stop date of antibiotics was supposed to be 4/19. Continue Daptomycin and Ceftaroline pending ID final recs.

## 2018-04-22 NOTE — PROGRESS NOTES
[facility] Hospital Medicine  Progress Note    Patient Name: Yumiko Proctor  MRN: [id number]  Patient Class: IP- Inpatient   Admission Date: 4/18/2018  Length of Stay: 4 days  Attending Physician: Clara Landry MD  Primary Care Provider: [facility]        Subjective:     Principal Problem:Bacteremia    HPI:  Ms Hoa Proctor is a 68 y.o. female, with PMH of HTN, NIDDM-2, CHF (EF 35%), ESRD on dialysis ([facility], Dr. Prather), A. Fib, LLE DVT, cardiomyopathy, and recent treatment in this facility for MRSA bacteremia from her vascular access site, who returns 2/2 need for ID consultation. In the past week the patient developed a fever and was diagnosed with RUL PNA, and started on Levaquin 3/week after dialysis. Associated symptoms include non-productive cough, fevers, and SOB with wheeze. She was started on vancymycin and zosyn on 4/15/18. Blood cultures taken on 4/16/18 were the second set, first after antibiotics, that grew staph. Other associated symptoms include b/l hip pain, and constipation.  She denies fever, chills, sweats, chest pain, SOB, N/V, diarrhea.     During the patient's previous stay at this facility she had blood cultures positive for MRSA on 2/8/18, and she was started on Daptomycin. On 2/10/18 a second set of blood cultures were positive for MRSA. At the time she had her left Jaspreet catheter removed and a right IJ tunneled dialysis cath was placed on 2/12/18. Repeat cultures on 2/13/18 were negative for MRSA. She was positive in 1/2 bottles on 2/16/18 and both blood cultures bottles for MRSA on 2/16/18. At that time she was started on vancomycin, and Daptomycin was stopped 2/2 myositis. On 2/22/18 blood cultures were negative. A RANDA on 2/26/18 was negative for vegetations, but the patient did have a LLE DVT discovered 2/20/18, and Apixaban was started. After CPK levels were normal, she was started back on Daptomycin and Ceftaroline. She  was to continue this treatment until 4/4/18, and she was transferred to a NH facility in [place], [place]. She states she was transferred from the Nursing facility to [facility] on 4/15/18.     Hospital Course:  Ms Hoa Proctor is a 68 y.o. female, with PMH of HTN, NIDDM-2, CHF (EF 35%), ESRD on dialysis ([facility], [facility], Dr. Prather), A. Fib, LLE DVT, cardiomyopathy, and recent treatment in this facility for MRSA bacteremia from her vascular access site, who returns 2/2 need for ID consultation. She was started on empiric Daptomycin + Ceftaroline. Blood cultures grew Staph aureus. PICC line was removed and tip culture sent with no growth.      Interval History:  Pt seen and examined at bedside. She reports feeling pretty well. Back pain controlled. Discussed results of blood cultures.       Review of Systems   Constitutional: Negative for chills, fatigue and fever.   Respiratory: Negative for cough and shortness of breath.    Cardiovascular: Negative for chest pain and leg swelling.   Gastrointestinal: Negative for constipation, nausea and vomiting.   Genitourinary: Negative.    Musculoskeletal: Negative for back pain.   Neurological: Negative.    Psychiatric/Behavioral: Negative.    All other systems reviewed and are negative.    Objective:     Vital Signs (Most Recent):  Temp: 98.3 °F (36.8 °C) (04/22/18 0838)  Pulse: (!) 54 (04/22/18 1000)  Resp: 16 (04/22/18 0838)  BP: 132/61 (04/22/18 0838)  SpO2: 96 % (04/22/18 0838) Vital Signs (24h Range):  Temp:  [97.7 °F (36.5 °C)-98.3 °F (36.8 °C)] 98.3 °F (36.8 °C)  Pulse:  [48-57] 54  Resp:  [16-18] 16  SpO2:  [94 %-97 %] 96 %  BP: (108-132)/(52-61) 132/61     Weight: 117 kg (257 lb 15 oz)  Body mass index is 42.92 kg/m².  No intake or output data in the 24 hours ending 04/22/18 1111   Physical Exam   Constitutional: She is oriented to person, place, and time. She appears well-developed and well-nourished.   Obese female.    Cardiovascular: Normal rate, regular rhythm and  intact distal pulses.    Pulmonary/Chest: Effort normal and breath sounds normal. No respiratory distress. She has no wheezes. She has no rales.   Abdominal: Soft. Bowel sounds are normal. She exhibits no distension. There is no tenderness.   Musculoskeletal: She exhibits edema.   Neurological: She is alert and oriented to person, place, and time.   Skin: Skin is warm and dry. No rash noted. No erythema. No pallor.   Psychiatric: She has a normal mood and affect.   Nursing note and vitals reviewed.      Significant Labs:   BMP:   Recent Labs  Lab 04/22/18  0423   GLU 72   *   K 4.1   CL 99   CO2 23   BUN 24*   CREATININE 3.7*   CALCIUM 8.6*   MG 1.8     CBC:   Recent Labs  Lab 04/21/18 0443 04/22/18  0423   WBC 8.90 7.77   HGB 8.7* 9.1*   HCT 28.4* 29.5*    355*     Microbiology Results (last 7 days)     Procedure Component Value Units Date/Time    Blood culture [[id number]]  (Susceptibility) Collected:  04/18/18 1831    Order Status:  Completed Specimen:  Blood Updated:  04/22/18 1034     Blood Culture, Routine Gram stain aer bottle: Gram positive cocci in clusters resembling Staph      Blood Culture, Routine Results called to and read back by: Nat Diane RN  04/20/2018  18:35     Blood Culture, Routine --     STAPHYLOCOCCUS AUREUS  ID consult required at [facility] locations.      Blood culture [[id number]]     Order Status:  Sent Specimen:  Blood     IV catheter culture [[id number]] Collected:  04/19/18 0955    Order Status:  Completed Specimen:  Catheter Tip from Catheter Tip, PICC Updated:  04/21/18 1139     Aerobic Culture - Cath tip No growth     All pertinent labs within the past 24 hours have been reviewed.    Significant Imaging: I have reviewed all pertinent imaging results/findings within the past 24 hours.    Assessment/Plan:      * Bacteremia    Based on cultures from outside hospital, awaiting final culture and sensitivities. Blood cultures from 4/18 initially negative  but now growing Staph aureus in both sets. Sensitivities pending. Appreciate ID input. Continue Daptomycin/Ceftaroline. PICC removed, culture tip negative so far. Will recheck blood cultures today for clearance.             Slow transit constipation    Improved with bowel regimen. Continue current management.          Anemia due to chronic kidney disease    Improved s/p PRBC transfusion 4/20          Pneumonia due to infectious organism    Diagnosed via CXR at OSH. CXR reviewed, doubt pneumonia; likely related to pulmonary edema.        DVT of deep femoral vein, left    Continue anticoagulation with Apixaban         Type 2 diabetes mellitus with chronic kidney disease on chronic dialysis, without long-term current use of insulin    Continue diabetic diet with SSI. Hold oral meds. Monitor for hypoglycemia  Lab Results   Component Value Date    HGBA1C 4.2 04/18/2018              ESRD (end stage renal disease)    Nephrology following for MWF dialysis. Follows with Dr. Prather @ [facility] in [place], [place].           Chronic atrial fibrillation    Continue amiodarone & carvedilol. Anticoagulated with Eliquis            MRSA bacteremia    Previously diagnosed. Stop date of antibiotics was supposed to be 4/19. Continue Daptomycin and Ceftaroline pending ID final recs.          VTE Risk Mitigation         Ordered     apixaban tablet 2.5 mg  2 times daily      04/18/18 0244     heparin (porcine) injection 5,000 Units  As needed (PRN)      04/18/18 5336              Clara Landry MD  Department of Hospital Medicine   [facility]

## 2018-04-23 LAB
ANION GAP SERPL CALC-SCNC: 9 MMOL/L
BASOPHILS # BLD AUTO: 0.02 K/UL
BASOPHILS NFR BLD: 0.3 %
BUN SERPL-MCNC: 31 MG/DL
CALCIUM SERPL-MCNC: 8.7 MG/DL
CHLORIDE SERPL-SCNC: 98 MMOL/L
CK SERPL-CCNC: 33 U/L
CO2 SERPL-SCNC: 21 MMOL/L
CREAT SERPL-MCNC: 4.3 MG/DL
DIFFERENTIAL METHOD: ABNORMAL
EOSINOPHIL # BLD AUTO: 0.2 K/UL
EOSINOPHIL NFR BLD: 2.6 %
ERYTHROCYTE [DISTWIDTH] IN BLOOD BY AUTOMATED COUNT: 17.2 %
EST. GFR  (AFRICAN AMERICAN): 11 ML/MIN/1.73 M^2
EST. GFR  (NON AFRICAN AMERICAN): 10 ML/MIN/1.73 M^2
GLUCOSE SERPL-MCNC: 81 MG/DL
HCT VFR BLD AUTO: 29.3 %
HGB BLD-MCNC: 9.1 G/DL
LYMPHOCYTES # BLD AUTO: 2.2 K/UL
LYMPHOCYTES NFR BLD: 28.9 %
MAGNESIUM SERPL-MCNC: 1.9 MG/DL
MCH RBC QN AUTO: 26.3 PG
MCHC RBC AUTO-ENTMCNC: 31.1 G/DL
MCV RBC AUTO: 85 FL
MONOCYTES # BLD AUTO: 0.7 K/UL
MONOCYTES NFR BLD: 9.1 %
NEUTROPHILS # BLD AUTO: 4.4 K/UL
NEUTROPHILS NFR BLD: 58.7 %
PHOSPHATE SERPL-MCNC: 4.3 MG/DL
PLATELET # BLD AUTO: 336 K/UL
PMV BLD AUTO: 9.6 FL
POCT GLUCOSE: 105 MG/DL (ref 70–110)
POCT GLUCOSE: 115 MG/DL (ref 70–110)
POCT GLUCOSE: 87 MG/DL (ref 70–110)
POCT GLUCOSE: 95 MG/DL (ref 70–110)
POTASSIUM SERPL-SCNC: 4.2 MMOL/L
RBC # BLD AUTO: 3.46 M/UL
SODIUM SERPL-SCNC: 128 MMOL/L
WBC # BLD AUTO: 7.44 K/UL

## 2018-04-23 PROCEDURE — 83735 ASSAY OF MAGNESIUM: CPT

## 2018-04-23 PROCEDURE — 85025 COMPLETE CBC W/AUTO DIFF WBC: CPT

## 2018-04-23 PROCEDURE — 63600175 PHARM REV CODE 636 W HCPCS: Performed by: INTERNAL MEDICINE

## 2018-04-23 PROCEDURE — 11000001 HC ACUTE MED/SURG PRIVATE ROOM

## 2018-04-23 PROCEDURE — 25000003 PHARM REV CODE 250: Performed by: INTERNAL MEDICINE

## 2018-04-23 PROCEDURE — 97530 THERAPEUTIC ACTIVITIES: CPT

## 2018-04-23 PROCEDURE — 94761 N-INVAS EAR/PLS OXIMETRY MLT: CPT

## 2018-04-23 PROCEDURE — 36415 COLL VENOUS BLD VENIPUNCTURE: CPT

## 2018-04-23 PROCEDURE — 97535 SELF CARE MNGMENT TRAINING: CPT

## 2018-04-23 PROCEDURE — 80100016 HC MAINTENANCE HEMODIALYSIS

## 2018-04-23 PROCEDURE — 25000003 PHARM REV CODE 250: Performed by: NURSE PRACTITIONER

## 2018-04-23 PROCEDURE — 99233 SBSQ HOSP IP/OBS HIGH 50: CPT | Mod: ,,, | Performed by: INTERNAL MEDICINE

## 2018-04-23 PROCEDURE — 99900035 HC TECH TIME PER 15 MIN (STAT)

## 2018-04-23 PROCEDURE — 80048 BASIC METABOLIC PNL TOTAL CA: CPT

## 2018-04-23 PROCEDURE — 84100 ASSAY OF PHOSPHORUS: CPT

## 2018-04-23 PROCEDURE — 82550 ASSAY OF CK (CPK): CPT

## 2018-04-23 PROCEDURE — 99232 SBSQ HOSP IP/OBS MODERATE 35: CPT | Mod: ,,, | Performed by: INTERNAL MEDICINE

## 2018-04-23 PROCEDURE — 25000003 PHARM REV CODE 250: Performed by: PHYSICIAN ASSISTANT

## 2018-04-23 PROCEDURE — 63600175 PHARM REV CODE 636 W HCPCS: Performed by: NURSE PRACTITIONER

## 2018-04-23 RX ORDER — CARVEDILOL 12.5 MG/1
12.5 TABLET ORAL 2 TIMES DAILY
Status: DISCONTINUED | OUTPATIENT
Start: 2018-04-23 | End: 2018-04-26

## 2018-04-23 RX ADMIN — SEVELAMER CARBONATE 800 MG: 800 TABLET, FILM COATED ORAL at 12:04

## 2018-04-23 RX ADMIN — FUROSEMIDE 80 MG: 40 TABLET ORAL at 08:04

## 2018-04-23 RX ADMIN — POLYETHYLENE GLYCOL 3350 17 G: 17 POWDER, FOR SOLUTION ORAL at 08:04

## 2018-04-23 RX ADMIN — GUAIFENESIN 200 MG: 100 SOLUTION ORAL at 08:04

## 2018-04-23 RX ADMIN — FAMOTIDINE 20 MG: 20 TABLET ORAL at 08:04

## 2018-04-23 RX ADMIN — CEFTAROLINE FOSAMIL 200 MG: 400 POWDER, FOR SOLUTION INTRAVENOUS at 09:04

## 2018-04-23 RX ADMIN — GABAPENTIN 100 MG: 100 CAPSULE ORAL at 08:04

## 2018-04-23 RX ADMIN — CEFTAROLINE FOSAMIL 200 MG: 400 POWDER, FOR SOLUTION INTRAVENOUS at 08:04

## 2018-04-23 RX ADMIN — VITAMIN D, TAB 1000IU (100/BT) 2000 UNITS: 25 TAB at 08:04

## 2018-04-23 RX ADMIN — SEVELAMER CARBONATE 800 MG: 800 TABLET, FILM COATED ORAL at 08:04

## 2018-04-23 RX ADMIN — CALCITRIOL 0.25 MCG: 0.25 CAPSULE, LIQUID FILLED ORAL at 09:04

## 2018-04-23 RX ADMIN — DAPTOMYCIN 710 MG: 500 INJECTION, POWDER, LYOPHILIZED, FOR SOLUTION INTRAVENOUS at 05:04

## 2018-04-23 RX ADMIN — ERYTHROPOIETIN 20000 UNITS: 20000 INJECTION, SOLUTION INTRAVENOUS; SUBCUTANEOUS at 08:04

## 2018-04-23 RX ADMIN — OXYCODONE AND ACETAMINOPHEN 1 TABLET: 5; 325 TABLET ORAL at 09:04

## 2018-04-23 RX ADMIN — AMIODARONE HYDROCHLORIDE 200 MG: 200 TABLET ORAL at 08:04

## 2018-04-23 RX ADMIN — OXYCODONE AND ACETAMINOPHEN 1 TABLET: 5; 325 TABLET ORAL at 08:04

## 2018-04-23 RX ADMIN — DOCUSATE SODIUM 200 MG: 100 CAPSULE, LIQUID FILLED ORAL at 08:04

## 2018-04-23 RX ADMIN — APIXABAN 2.5 MG: 2.5 TABLET, FILM COATED ORAL at 08:04

## 2018-04-23 RX ADMIN — OXYCODONE HYDROCHLORIDE 5 MG: 5 TABLET ORAL at 03:04

## 2018-04-23 RX ADMIN — GABAPENTIN 100 MG: 100 CAPSULE ORAL at 03:04

## 2018-04-23 RX ADMIN — ISOSORBIDE MONONITRATE 30 MG: 30 TABLET, EXTENDED RELEASE ORAL at 08:04

## 2018-04-23 NOTE — PLAN OF CARE
Patient resting in no apparent distress. IV antibiotics infused per order. PRN pain medication given effectively x1. Purposeful rounding performed. Safety measures maintained. Pt instructed to use call light for assistance. Will continue to monitor.

## 2018-04-23 NOTE — SUBJECTIVE & OBJECTIVE
Interval History:  Pt seen and examined at bedside. C/o back pain today. No events overnight.       Review of Systems   Constitutional: Negative for chills, fatigue and fever.   Respiratory: Negative for cough and shortness of breath.    Cardiovascular: Negative for chest pain and leg swelling.   Gastrointestinal: Negative for constipation, nausea and vomiting.   Genitourinary: Negative.    Musculoskeletal: Positive for back pain.   Neurological: Negative.    Psychiatric/Behavioral: Negative.    All other systems reviewed and are negative.    Objective:     Vital Signs (Most Recent):  Temp: 97.3 °F (36.3 °C) (04/23/18 0814)  Pulse: (!) 50 (04/23/18 1000)  Resp: 18 (04/23/18 0814)  BP: (!) 110/53 (04/23/18 0814)  SpO2: 97 % (04/23/18 0814) Vital Signs (24h Range):  Temp:  [97 °F (36.1 °C)-99 °F (37.2 °C)] 97.3 °F (36.3 °C)  Pulse:  [44-56] 50  Resp:  [18-20] 18  SpO2:  [92 %-97 %] 97 %  BP: (110-152)/(53-67) 110/53     Weight: 119 kg (262 lb 5.6 oz)  Body mass index is 43.66 kg/m².    Intake/Output Summary (Last 24 hours) at 04/23/18 1059  Last data filed at 04/23/18 0600   Gross per 24 hour   Intake              400 ml   Output                0 ml   Net              400 ml      Physical Exam   Constitutional: She is oriented to person, place, and time. She appears well-developed and well-nourished.   Obese female.    Cardiovascular: Normal rate, regular rhythm and intact distal pulses.    Pulmonary/Chest: Effort normal and breath sounds normal. No respiratory distress. She has no wheezes. She has no rales.   Abdominal: Soft. Bowel sounds are normal. She exhibits no distension. There is no tenderness.   Musculoskeletal: She exhibits edema.   Neurological: She is alert and oriented to person, place, and time.   Skin: Skin is warm and dry. No rash noted. No erythema. No pallor.   Psychiatric: She has a normal mood and affect.   Nursing note and vitals reviewed.      Significant Labs:   BMP:   Recent Labs  Lab  04/23/18  0434   GLU 81   *   K 4.2   CL 98   CO2 21*   BUN 31*   CREATININE 4.3*   CALCIUM 8.7   MG 1.9     CBC:   Recent Labs  Lab 04/22/18  0423 04/23/18  0434   WBC 7.77 7.44   HGB 9.1* 9.1*   HCT 29.5* 29.3*   * 336     Microbiology Results (last 7 days)     Procedure Component Value Units Date/Time    Blood culture [[id number]] Collected:  04/22/18 1112    Order Status:  Completed Specimen:  Blood Updated:  04/23/18 0315     Blood Culture, Routine No Growth to date    Blood culture [[id number]]  (Susceptibility) Collected:  04/18/18 1831    Order Status:  Completed Specimen:  Blood Updated:  04/22/18 1034     Blood Culture, Routine Gram stain aer bottle: Gram positive cocci in clusters resembling Staph      Blood Culture, Routine Results called to and read back by: Nat Diane RN  04/20/2018  18:35     Blood Culture, Routine --     STAPHYLOCOCCUS AUREUS  ID consult required at [facility]ores and [place] locations.      IV catheter culture [[id number]] Collected:  04/19/18 0955    Order Status:  Completed Specimen:  Catheter Tip from Catheter Tip, PICC Updated:  04/21/18 1139     Aerobic Culture - Cath tip No growth    Gram stain [[id number]]     Order Status:  Canceled Specimen:  Catheter Tip from Chest, Left     Culture, Anaerobe [[id number]]     Order Status:  Canceled Specimen:  Catheter Tip from Chest, Left     Aerobic culture [[id number]]     Order Status:  Canceled Specimen:  Catheter Tip from Chest, Left     Blood culture [[id number]]     Order Status:  Canceled Specimen:  Blood           All pertinent labs within the past 24 hours have been reviewed.    Significant Imaging: I have reviewed all pertinent imaging results/findings within the past 24 hours.

## 2018-04-23 NOTE — PLAN OF CARE
Problem: Patient Care Overview  Goal: Plan of Care Review  Outcome: Ongoing (interventions implemented as appropriate)  Pt remains on RA. No resp distress noted.

## 2018-04-23 NOTE — PROGRESS NOTES
[facility] Hospital Medicine  Progress Note    Patient Name: Yumiko Proctor  MRN: [id number]  Patient Class: IP- Inpatient   Admission Date: 4/18/2018  Length of Stay: 5 days  Attending Physician: Clara Landry MD  Primary Care Provider: [facility]        Subjective:     Principal Problem:Bacteremia    HPI:  Ms Hoa Proctor is a 68 y.o. female, with PMH of HTN, NIDDM-2, CHF (EF 35%), ESRD on dialysis ([facility], Dr. Prather), A. Fib, LLE DVT, cardiomyopathy, and recent treatment in this facility for MRSA bacteremia from her vascular access site, who returns 2/2 need for ID consultation. In the past week the patient developed a fever and was diagnosed with RUL PNA, and started on Levaquin 3/week after dialysis. Associated symptoms include non-productive cough, fevers, and SOB with wheeze. She was started on vancymycin and zosyn on 4/15/18. Blood cultures taken on 4/16/18 were the second set, first after antibiotics, that grew staph. Other associated symptoms include b/l hip pain, and constipation.  She denies fever, chills, sweats, chest pain, SOB, N/V, diarrhea.     During the patient's previous stay at this facility she had blood cultures positive for MRSA on 2/8/18, and she was started on Daptomycin. On 2/10/18 a second set of blood cultures were positive for MRSA. At the time she had her left Jaspreet catheter removed and a right IJ tunneled dialysis cath was placed on 2/12/18. Repeat cultures on 2/13/18 were negative for MRSA. She was positive in 1/2 bottles on 2/16/18 and both blood cultures bottles for MRSA on 2/16/18. At that time she was started on vancomycin, and Daptomycin was stopped 2/2 myositis. On 2/22/18 blood cultures were negative. A RANDA on 2/26/18 was negative for vegetations, but the patient did have a LLE DVT discovered 2/20/18, and Apixaban was started. After CPK levels were normal, she was started back on Daptomycin and Ceftaroline. She  was to continue this treatment until 4/4/18, and she was transferred to a NH facility in [place], [place]. She states she was transferred from the Nursing facility to [facility] on 4/15/18.     Hospital Course:  Ms Hoa Proctor is a 68 y.o. female, with PMH of HTN, NIDDM-2, CHF (EF 35%), ESRD on dialysis ([facility], [facility], Dr. Prather), A. Fib, LLE DVT, cardiomyopathy, and recent treatment in this facility for MRSA bacteremia from her vascular access site, who returns 2/2 need for ID consultation. She was started on empiric Daptomycin + Ceftaroline. Blood cultures grew Staph aureus. PICC line was removed and tip culture sent with no growth.      Interval History:  Pt seen and examined at bedside. C/o back pain today. No events overnight.       Review of Systems   Constitutional: Negative for chills, fatigue and fever.   Respiratory: Negative for cough and shortness of breath.    Cardiovascular: Negative for chest pain and leg swelling.   Gastrointestinal: Negative for constipation, nausea and vomiting.   Genitourinary: Negative.    Musculoskeletal: Positive for back pain.   Neurological: Negative.    Psychiatric/Behavioral: Negative.    All other systems reviewed and are negative.    Objective:     Vital Signs (Most Recent):  Temp: 97.3 °F (36.3 °C) (04/23/18 0814)  Pulse: (!) 50 (04/23/18 1000)  Resp: 18 (04/23/18 0814)  BP: (!) 110/53 (04/23/18 0814)  SpO2: 97 % (04/23/18 0814) Vital Signs (24h Range):  Temp:  [97 °F (36.1 °C)-99 °F (37.2 °C)] 97.3 °F (36.3 °C)  Pulse:  [44-56] 50  Resp:  [18-20] 18  SpO2:  [92 %-97 %] 97 %  BP: (110-152)/(53-67) 110/53     Weight: 119 kg (262 lb 5.6 oz)  Body mass index is 43.66 kg/m².    Intake/Output Summary (Last 24 hours) at 04/23/18 1059  Last data filed at 04/23/18 0600   Gross per 24 hour   Intake              400 ml   Output                0 ml   Net              400 ml      Physical Exam   Constitutional: She is oriented to person, place, and time. She appears well-developed and  well-nourished.   Obese female.    Cardiovascular: Normal rate, regular rhythm and intact distal pulses.    Pulmonary/Chest: Effort normal and breath sounds normal. No respiratory distress. She has no wheezes. She has no rales.   Abdominal: Soft. Bowel sounds are normal. She exhibits no distension. There is no tenderness.   Musculoskeletal: She exhibits edema.   Neurological: She is alert and oriented to person, place, and time.   Skin: Skin is warm and dry. No rash noted. No erythema. No pallor.   Psychiatric: She has a normal mood and affect.   Nursing note and vitals reviewed.      Significant Labs:   BMP:   Recent Labs  Lab 04/23/18  0434   GLU 81   *   K 4.2   CL 98   CO2 21*   BUN 31*   CREATININE 4.3*   CALCIUM 8.7   MG 1.9     CBC:   Recent Labs  Lab 04/22/18  0423 04/23/18  0434   WBC 7.77 7.44   HGB 9.1* 9.1*   HCT 29.5* 29.3*   * 336     Microbiology Results (last 7 days)     Procedure Component Value Units Date/Time    Blood culture [[id number]] Collected:  04/22/18 1112    Order Status:  Completed Specimen:  Blood Updated:  04/23/18 0315     Blood Culture, Routine No Growth to date    Blood culture [[id number]]  (Susceptibility) Collected:  04/18/18 1831    Order Status:  Completed Specimen:  Blood Updated:  04/22/18 1034     Blood Culture, Routine Gram stain aer bottle: Gram positive cocci in clusters resembling Staph      Blood Culture, Routine Results called to and read back by: Nat Diane RN  04/20/2018  18:35     Blood Culture, Routine --     STAPHYLOCOCCUS AUREUS  ID consult required at [facility] Figueroa and [place] locations.      IV catheter culture [[id number]] Collected:  04/19/18 0955    Order Status:  Completed Specimen:  Catheter Tip from Catheter Tip, PICC Updated:  04/21/18 1139     Aerobic Culture - Cath tip No growth    Gram stain [[id number]]     Order Status:  Canceled Specimen:  Catheter Tip from Chest, Left     Culture, Anaerobe [[id number]]     Order Status:  Canceled  Specimen:  Catheter Tip from Chest, Left     Aerobic culture [[id number]]     Order Status:  Canceled Specimen:  Catheter Tip from Chest, Left     Blood culture [[id number]]     Order Status:  Canceled Specimen:  Blood           All pertinent labs within the past 24 hours have been reviewed.    Significant Imaging: I have reviewed all pertinent imaging results/findings within the past 24 hours.    Assessment/Plan:      * Bacteremia    Based on cultures from outside hospital, awaiting final culture and sensitivities. Blood cultures from 4/18 initially negative but now growing Staph aureus in both sets. Sensitivities pending. Appreciate ID input. Continue Daptomycin/Ceftaroline. PICC removed, culture tip negative so far. Repeat blood cultures so far negative            Slow transit constipation    Improved with bowel regimen. Continue current management.          Anemia due to chronic kidney disease    Improved s/p PRBC transfusion 4/20          Pneumonia due to infectious organism    Diagnosed via CXR at OSH. CXR reviewed, doubt pneumonia; likely related to pulmonary edema.        DVT of deep femoral vein, left    Continue anticoagulation with Apixaban         Type 2 diabetes mellitus with chronic kidney disease on chronic dialysis, without long-term current use of insulin    Continue diabetic diet with SSI. Hold oral meds. Monitor for hypoglycemia  Lab Results   Component Value Date    HGBA1C 4.2 04/18/2018              ESRD (end stage renal disease)    Nephrology following for MWF dialysis. Follows with Dr. Prather @ [facility] in [place], [place].           Chronic atrial fibrillation    Continue amiodarone & carvedilol. Anticoagulated with Eliquis            MRSA bacteremia    Previously diagnosed. Stop date of antibiotics was supposed to be 4/19. Continue Daptomycin and Ceftaroline pending ID final recs.          VTE Risk Mitigation         Ordered     apixaban tablet 2.5 mg  2 times daily      04/18/18 0244     heparin  (porcine) injection 5,000 Units  As needed (PRN)      04/18/18 0759              Clara Landry MD  Department of Hospital Medicine   [facility]

## 2018-04-23 NOTE — ASSESSMENT & PLAN NOTE
Based on cultures from outside hospital, awaiting final culture and sensitivities. Blood cultures from 4/18 initially negative but now growing Staph aureus in both sets. Sensitivities pending. Appreciate ID input. Continue Daptomycin/Ceftaroline. PICC removed, culture tip negative so far. Repeat blood cultures so far negative

## 2018-04-23 NOTE — PT/OT/SLP PROGRESS
Occupational Therapy   Treatment    Name: Yumiko Proctor  MRN: [id number]  Admitting Diagnosis:  Bacteremia       Recommendations:     Discharge Recommendations: nursing facility, skilled  Discharge Equipment Recommendations:  none  Barriers to discharge:  None    Subjective     Communicated with: Nursing prior to session.  Pain/Comfort:  · Pain Rating 1: 9/10  · Location - Side 1: Bilateral  · Location - Orientation 1: generalized  · Location 1: back  · Pain Addressed 1: Pre-medicate for activity, Reposition, Distraction, Cessation of Activity, Nurse notified  · Pain Rating Post-Intervention 1: 10/10  · Pain Rating 2: 4/10  · Location - Side 2: Left  · Location - Orientation 2: generalized  · Location 2: leg  · Pain Addressed 2: Pre-medicate for activity, Reposition, Distraction, Cessation of Activity, Nurse notified  · Pain Rating Post-Intervention 2: 4/10    Patients cultural, spiritual, Yarsani conflicts given the current situation: None stated.     Objective:     Patient found with: telemetry, peripheral IV    General Precautions: Standard, fall, other (see comments) (standard )   Orthopedic Precautions:N/A   Braces: N/A     Occupational Performance:    Bed Mobility:    · Patient completed Rolling/Turning to Left with  moderate assistance and with side rail with increased verbal cues for sequencing task, positioning of RLE  · Patient completed Rolling/Turning to Right with moderate assistance and with side rail verbal cues for hand placement  · Patient completed Supine to Sit with maximal assistance  · Patient completed Sit to Supine with total assistance   · Left lateral seated scoots X 4 towards HOB with increased time, skilled instruction for task performance, and increased lift assist to allow for clearing bed    Functional Mobility/Transfers:  · Functional Transfers:  Did not occur this day.  · Functional Mobility: Did not occur this day.    Activities of Daily Living:  · Grooming: minimum assistance  seated EOB with initial assist to untangle knots from and able to comb front left of head and front right with verbal cues for task; required assist with reaching back/middle of head.    · Toileting: total assistance at bed level with B-rolling for back jose g hygiene and fresh pad placement.     Patient left HOB elevated with all lines intact, call button in reach and nursing notified    [facility] 6 Click:  [facility] Total Score: 11    Treatment & Education:  Educated on bed mobility/safety, skilled instruction for grooming task, log roll technique and deep breathing for pain management.  Education:    Assessment:   Pt. Very limited by pain this day.  Reports of 9/10 lower back pain and 4/10 L-leg pain.  Pt. Is MOD assist for B-rolling X 2 while receiving assist with pad placement and back jose g hygiene.  Tolerated sitting EOB X approx. 7-minutes with BUE supported on bed and RUE supported for hair grooming with cues and assist with task.  L-lateral scoots X 4 seated EOB with increased lift assist.  To benefit from continued acute care OT services to increase independence in self-care and functional transfers.  Continue POC.     Yumiko Proctor is a 68 y.o. female with a medical diagnosis of Bacteremia.  She presents with below deficits decreasing independence in self-care and functional transfers.  Performance deficits affecting function are weakness, impaired endurance, impaired self care skills, impaired functional mobilty, gait instability, impaired balance, decreased lower extremity function, decreased upper extremity function, decreased safety awareness, decreased ROM, pain, edema.      Rehab Prognosis:  Good; patient would benefit from acute skilled OT services to address these deficits and reach maximum level of function.       Plan:     Patient to be seen 6 x/week to address the above listed problems via self-care/home management, therapeutic activities, therapeutic exercises  · Plan of Care Expires: 05/21/18  · Plan  of Care Reviewed with: patient    This Plan of care has been discussed with the patient who was involved in its development and understands and is in agreement with the identified goals and treatment plan    GOALS:    Occupational Therapy Goals        Problem: Occupational Therapy Goal    Goal Priority Disciplines Outcome Interventions   Occupational Therapy Goal     OT, PT/OT Ongoing (interventions implemented as appropriate)    Description:  Goals to be met by 5/21/18  1. Min A G/H (2 tasks) sitting EOB without using UE for support on bed or tray table  2. Mod A UBD seated EOB  3. Max A bed-BSC transfer  4.Maximize UE and axial muscle strength                       Time Tracking:     OT Date of Treatment: 04/23/18  OT Start Time: 0926  OT Stop Time: 1010  OT Total Time (min): 44 min    Billable Minutes:Self Care/Home Management 36  Therapeutic Activity 8    Vaishali Michel, OT  4/23/2018

## 2018-04-23 NOTE — SUBJECTIVE & OBJECTIVE
Interval History: Cultures from here growing MSSA. Cultures from [facility] growing MRSA by report - awaiting faxed culture data.  Review of Systems   All other systems reviewed and are negative.    Objective:     Vital Signs (Most Recent):  Temp: 97.6 °F (36.4 °C) (04/23/18 1639)  Pulse: (!) 45 (04/23/18 1639)  Resp: 18 (04/23/18 0814)  BP: (!) 109/52 (04/23/18 1639)  SpO2: (!) 90 % (04/23/18 1639) Vital Signs (24h Range):  Temp:  [96.5 °F (35.8 °C)-99 °F (37.2 °C)] 97.6 °F (36.4 °C)  Pulse:  [44-52] 45  Resp:  [18-20] 18  SpO2:  [90 %-97 %] 90 %  BP: (101-119)/(49-57) 109/52     Weight: 119 kg (262 lb 5.6 oz)  Body mass index is 43.66 kg/m².    Estimated Creatinine Clearance: 16.2 mL/min (A) (based on SCr of 4.3 mg/dL (H)).    Physical Exam   Constitutional: She is oriented to person, place, and time. She appears well-developed and well-nourished.   Eyes: Conjunctivae and EOM are normal. Pupils are equal, round, and reactive to light.   Cardiovascular: Normal rate, regular rhythm and normal heart sounds.    Pulmonary/Chest: Effort normal and breath sounds normal.   Abdominal: Soft. Bowel sounds are normal.   Musculoskeletal: Normal range of motion. She exhibits no edema.   Neurological: She is alert and oriented to person, place, and time.   Skin: Skin is warm and dry.   Nursing note and vitals reviewed.      Significant Labs:   Blood Culture:   Recent Labs  Lab 02/17/18  1200 02/19/18  0537 02/22/18  0557 04/18/18  1831 04/22/18  1112   LABBLOO Gram stain vicente bottle: Gram positive cocci in clusters resembling Staph   Results called to and read back by:Ynes Steiner RN 02/18/2018  14:40  Gram stain aer bottle: Gram positive cocci in clusters resembling Staph   Positive results previously called 02/18/2018  18:19  STAPHYLOCOCCUS AUREUSID consult required at [facility] Figuero[facility] and [place] locations.For susceptibility see order #[id number] Gram stain aer bottle: Gram positive cocci in clusters resembling Staph    Gram stain vicente bottle: Gram positive cocci in clusters resembling Staph   Results called to and read back by:Alison Oswald RN 02/20/2018    03:01  METHICILLIN RESISTANT STAPHYLOCOCCUS AUREUSID consult required at [facility]. No growth after 5 days. Gram stain aer bottle: Gram positive cocci in clusters resembling Staph   Results called to and read back by: Nat Diane RN  04/20/2018  18:35  STAPHYLOCOCCUS AUREUSID consult required at [facility]. No Growth to date     CBC:   Recent Labs  Lab 04/22/18  0423 04/23/18  0434   WBC 7.77 7.44   HGB 9.1* 9.1*   HCT 29.5* 29.3*   * 336     CMP:   Recent Labs  Lab 04/22/18  0423 04/23/18  0434   * 128*   K 4.1 4.2   CL 99 98   CO2 23 21*   GLU 72 81   BUN 24* 31*   CREATININE 3.7* 4.3*   CALCIUM 8.6* 8.7   ANIONGAP 9 9   EGFRNONAA 12* 10*       Significant Imaging: U/S: I have reviewed all pertinent results/findings within the past 24 hours:  Persistent nonocclusive thrombus in the left common femoral and popliteal veins. Questionable new thrombus in the right deep femoral vein, although this is difficult visualize.

## 2018-04-23 NOTE — PT/OT/SLP PROGRESS
Physical Therapy      Patient Name:  Yumiko Proctor   MRN:  [id number]    Patient not seen today secondary to  (Ultrasound). Will follow-up as appropriate.    Lynsey Arnett, PT, DPT

## 2018-04-23 NOTE — PROGRESS NOTES
"Nephrology  Progress Note    Admit Date: 4/18/2018   LOS: 5 days     SUBJECTIVE:     Follow-up For:  Bacteremia    Interval History:     No c/o this am.  Awaiting HD.  Discussed with Dr. Landry.        Review of Systems:  Constitutional: No fever or chills  Respiratory: No cough or shortness of breath  Cardiovascular: No chest pain or palpitations  Gastrointestinal: No nausea or vomiting  Neurological: No confusion or weakness    OBJECTIVE:     Vital Signs Range (Last 24H):  BP (!) 110/53 (BP Location: Left arm, Patient Position: Lying)   Pulse (!) 48   Temp 97.3 °F (36.3 °C) (Oral)   Resp 18   Ht 5' 5" (1.651 m)   Wt 119 kg (262 lb 5.6 oz)   LMP 01/01/1983 (LMP Unknown)   SpO2 97%   Breastfeeding? No   BMI 43.66 kg/m²     Temp:  [97 °F (36.1 °C)-99 °F (37.2 °C)]   Pulse:  [44-56]   Resp:  [18-20]   BP: (110-152)/(53-67)   SpO2:  [92 %-97 %]     I & O (Last 24H):    Intake/Output Summary (Last 24 hours) at 04/23/18 0853  Last data filed at 04/23/18 0600   Gross per 24 hour   Intake              400 ml   Output                0 ml   Net              400 ml       Physical Exam:  General appearance: morbidly obese female in NAD  Eyes:  Conjunctivae/corneas clear. PERRL.  Lungs: Normal respiratory effort,  diminished  Heart: Regular/Irregular/Jerry rate and rhythm, S1, S2 normal, no murmur, rub or cuco.  Abdomen: Soft, non-tender non-distended; bowel sounds normal; no masses,  no organomegaly, obese  Extremities: No cyanosis or clubbing. trace edema.    Skin: Skin color, texture, turgor normal. No rashes or lesions  Neurologic: Normal strength and tone. No focal numbness or weakness   Right IJ EULALIO no E/E/T        Laboratory Data:    Recent Labs  Lab 04/23/18  0434   WBC 7.44   RBC 3.46*   HGB 9.1*   HCT 29.3*      MCV 85   MCH 26.3*   MCHC 31.1*       BMP:     Recent Labs  Lab 04/23/18  0434   GLU 81   *   K 4.2   CL 98   CO2 21*   BUN 31*   CREATININE 4.3*   CALCIUM 8.7   MG 1.9     Lab Results "   Component Value Date    CALCIUM 8.7 04/23/2018    PHOS 4.3 04/23/2018       Lab Results   Component Value Date    .8 (H) 02/08/2018    CALCIUM 8.7 04/23/2018    PHOS 4.3 04/23/2018       No results found for: URICACID    BNP    Recent Labs  Lab 04/18/18  1712   BNP 1,178*     Blood:  MSSA      Medications:  Medication list was reviewed and changes noted under Assessment/Plan.    Diagnostic Results:        ASSESSMENT/PLAN:     1. ESRD on HD MWF in [place], [place] with Dr. Sharpe (N18.6, Z99.2):  Routine HD MWF/PRN while inpt.  Keep EULALIO in for now.  Consent signed.  Renally dose meds, avoid nephrotoxins, and monitor I/O's closely.  2. Bacteremia-MSSA (R78.81):  Antibx per ID. No evidence of PNa. Removed PICC and cultured tip. Now with MSSA.  Last admit only identifiable source was extensive chronic LLE DVT.  Will re-image and keep on Eliquis.  Anticipate SC PICC again for Dapto/Ceftaro.  Will discuss.    3. Anemia of CKD/infection (D63.1):  No IV iron with infection.  Epo on HD.  S/p transfusion 2 units PRBCs on HD 4/18.  Hgb stable  4. 2HPT/Vit D deficiency:  Vit D3 and calcitriol.  5. Morbid Obesity (E66.01):  Needs aggressive weight mgmt.         See above  Follow for renal needs.

## 2018-04-23 NOTE — PLAN OF CARE
Problem: Occupational Therapy Goal  Goal: Occupational Therapy Goal  Goals to be met by 5/21/18  1. Min A G/H (2 tasks) sitting EOB without using UE for support on bed or tray table  2. Mod A UBD seated EOB  3. Max A bed-BSC transfer  4.Maximize UE and axial muscle strength      Outcome: Ongoing (interventions implemented as appropriate)  Pt. Very limited by pain this day.  Reports of 9/10 lower back pain and 4/10 L-leg pain.  Pt. Is MOD assist for B-rolling X 2 while receiving assist with pad placement and back jose g hygiene.  Tolerated sitting EOB X approx. 7-minutes with BUE supported on bed and RUE supported for hair grooming with cues and assist with task.  L-lateral scoots X 4 seated EOB with increased lift assist.  To benefit from continued acute care OT services to increase independence in self-care and functional transfers.  Continue POC.

## 2018-04-23 NOTE — PROGRESS NOTES
"[facility]  Infectious Disease  Progress Note    Patient Name: Yumiko Proctor  MRN: [id number]  Admission Date: 4/18/2018  Length of Stay: 5 days  Attending Physician: Clara Landry MD  Primary Care Provider: [facility]    Isolation Status: No active isolations  Assessment/Plan:      * Bacteremia    Continue Daptomycin and ceftaroline for now, as above.          Pneumonia due to infectious organism    No treatment for pneumonia at this time - continue treatment for possible bacteremia.        MRSA bacteremia    Continue Daptomycin and Ceftaroline. Cultures from here grew MSSA. Cultures from [facility] growing MRSA, by report. Unclear if sensitivities are identical to original organism. Recommend Daptomycin and Ceftaroline for 4 weeks minimum.              Thank you for your consult. I will follow-up with patient. Please contact us if you have any additional questions.    Shahid Bhakta MD  Infectious Disease  [facility]    Subjective:     Principal Problem:Bacteremia    HPI: 67 yo female with ESRD who was discharged on IV ceftaroline and daptomycin for MRSA bacteremia on 3/1/18. She was supposed to receive these antibiotics for 8 weeks from 2/22/18, the last negative blood culture. This would have put her end of care at 4/19/18. However, on examination of the records, it appears that she may have stopped them on 4/4/18, 2 weeks early. The PICC appears to have been left in place as well.    She reportedly began having "low grade" fever, cough, and SOB with an elevated BNP of 900. She was started on Levaquin 250 mg orally three times per week after dialysis. Chest xray showed a right upper lobe infiltrate.It is unclear specifically why she was admitted - she says that she had a cough and a slight fever. She was admitted to [facility] on 4/15, with a normal WBC. She was placed on vancomycin and zosyn empirically. Blood cultures taken on 4/15 grew " Staph sp., as did blood cultures on 4/16. Identification and sensitivities are still pending. I am consulted for her previous bacteremia as well as the current situation.   Interval History: Cultures from here growing MSSA. Cultures from [facility] growing MRSA by report - awaiting faxed culture data.  Review of Systems   All other systems reviewed and are negative.    Objective:     Vital Signs (Most Recent):  Temp: 97.6 °F (36.4 °C) (04/23/18 1639)  Pulse: (!) 45 (04/23/18 1639)  Resp: 18 (04/23/18 0814)  BP: (!) 109/52 (04/23/18 1639)  SpO2: (!) 90 % (04/23/18 1639) Vital Signs (24h Range):  Temp:  [96.5 °F (35.8 °C)-99 °F (37.2 °C)] 97.6 °F (36.4 °C)  Pulse:  [44-52] 45  Resp:  [18-20] 18  SpO2:  [90 %-97 %] 90 %  BP: (101-119)/(49-57) 109/52     Weight: 119 kg (262 lb 5.6 oz)  Body mass index is 43.66 kg/m².    Estimated Creatinine Clearance: 16.2 mL/min (A) (based on SCr of 4.3 mg/dL (H)).    Physical Exam   Constitutional: She is oriented to person, place, and time. She appears well-developed and well-nourished.   Eyes: Conjunctivae and EOM are normal. Pupils are equal, round, and reactive to light.   Cardiovascular: Normal rate, regular rhythm and normal heart sounds.    Pulmonary/Chest: Effort normal and breath sounds normal.   Abdominal: Soft. Bowel sounds are normal.   Musculoskeletal: Normal range of motion. She exhibits no edema.   Neurological: She is alert and oriented to person, place, and time.   Skin: Skin is warm and dry.   Nursing note and vitals reviewed.      Significant Labs:   Blood Culture:   Recent Labs  Lab 02/17/18  1200 02/19/18  0537 02/22/18  0557 04/18/18  1831 04/22/18  1112   LABBLOO Gram stain vicente bottle: Gram positive cocci in clusters resembling Staph   Results called to and read back by:Ynes Steiner RN 02/18/2018  14:40  Gram stain aer bottle: Gram positive cocci in clusters resembling Staph   Positive results previously called 02/18/2018  18:19  STAPHYLOCOCCUS AUREUSID  consult required at [facility].For susceptibility see order #[id number] Gram stain aer bottle: Gram positive cocci in clusters resembling Staph   Gram stain vicente bottle: Gram positive cocci in clusters resembling Staph   Results called to and read back by:Alison Oswald RN 02/20/2018    03:01  METHICILLIN RESISTANT STAPHYLOCOCCUS AUREUSID consult required at [facility]. No growth after 5 days. Gram stain aer bottle: Gram positive cocci in clusters resembling Staph   Results called to and read back by: Nat Diane RN  04/20/2018  18:35  STAPHYLOCOCCUS AUREUSID consult required at [facility]. No Growth to date     CBC:   Recent Labs  Lab 04/22/18  0423 04/23/18  0434   WBC 7.77 7.44   HGB 9.1* 9.1*   HCT 29.5* 29.3*   * 336     CMP:   Recent Labs  Lab 04/22/18 0423 04/23/18  0434   * 128*   K 4.1 4.2   CL 99 98   CO2 23 21*   GLU 72 81   BUN 24* 31*   CREATININE 3.7* 4.3*   CALCIUM 8.6* 8.7   ANIONGAP 9 9   EGFRNONAA 12* 10*       Significant Imaging: U/S: I have reviewed all pertinent results/findings within the past 24 hours:  Persistent nonocclusive thrombus in the left common femoral and popliteal veins. Questionable new thrombus in the right deep femoral vein, although this is difficult visualize.

## 2018-04-23 NOTE — ASSESSMENT & PLAN NOTE
Continue Daptomycin and Ceftaroline. Cultures from here grew MSSA. Cultures from [facility] growing MRSA, by report. Unclear if sensitivities are identical to original organism. Recommend Daptomycin and Ceftaroline for 4 weeks minimum.

## 2018-04-23 NOTE — PLAN OF CARE
SW met patient at the bedside. Patient is in good spirits.     Discharge plan discussed with patient. Patient is agreeable with returning to [facility] for SNF.    CM spoke to tim at [facility] [phone] who confirmed that rightcare information ws received and NH is reaching out to [facility] for the auth. OB note that auth from [facility] may take up to 48 hours.        04/23/18 1210   Discharge Reassessment   Assessment Type Discharge Planning Reassessment   Provided patient/caregiver education on the expected discharge date and the discharge plan Yes   Do you have any problems affording any of your prescribed medications? No   Discharge Plan A Skilled Nursing Facility   Patient choice form signed by patient/caregiver N/A   Can the patient answer the patient profile reliably? Yes, cognitively intact   How does the patient rate their overall health at the present time? Fair

## 2018-04-24 ENCOUNTER — SURGERY (OUTPATIENT)
Age: 68
End: 2018-04-24

## 2018-04-24 PROBLEM — R53.81 DEBILITY: Status: ACTIVE | Noted: 2018-04-24

## 2018-04-24 LAB
DIASTOLIC DYSFUNCTION: NO
ESTIMATED PA SYSTOLIC PRESSURE: 35.95
GLOBAL PERICARDIAL EFFUSION: NORMAL
MITRAL VALVE REGURGITATION: NORMAL
POCT GLUCOSE: 83 MG/DL (ref 70–110)
POCT GLUCOSE: 90 MG/DL (ref 70–110)
POCT GLUCOSE: 92 MG/DL (ref 70–110)
POCT GLUCOSE: 97 MG/DL (ref 70–110)
RETIRED EF AND QEF - SEE NOTES: 60 (ref 55–65)
TRICUSPID VALVE REGURGITATION: NORMAL

## 2018-04-24 PROCEDURE — 99900035 HC TECH TIME PER 15 MIN (STAT)

## 2018-04-24 PROCEDURE — 63600175 PHARM REV CODE 636 W HCPCS: Performed by: INTERNAL MEDICINE

## 2018-04-24 PROCEDURE — 93307 TTE W/O DOPPLER COMPLETE: CPT

## 2018-04-24 PROCEDURE — 25000003 PHARM REV CODE 250

## 2018-04-24 PROCEDURE — 25000003 PHARM REV CODE 250: Performed by: RADIOLOGY

## 2018-04-24 PROCEDURE — 25000003 PHARM REV CODE 250: Performed by: INTERNAL MEDICINE

## 2018-04-24 PROCEDURE — 94761 N-INVAS EAR/PLS OXIMETRY MLT: CPT

## 2018-04-24 PROCEDURE — C1788 PORT, INDWELLING, IMP: HCPCS | Performed by: RADIOLOGY

## 2018-04-24 PROCEDURE — 99233 SBSQ HOSP IP/OBS HIGH 50: CPT | Mod: ,,, | Performed by: HOSPITALIST

## 2018-04-24 PROCEDURE — 25000003 PHARM REV CODE 250: Performed by: PHYSICIAN ASSISTANT

## 2018-04-24 PROCEDURE — 25500020 PHARM REV CODE 255

## 2018-04-24 PROCEDURE — 11000001 HC ACUTE MED/SURG PRIVATE ROOM

## 2018-04-24 PROCEDURE — 63600175 PHARM REV CODE 636 W HCPCS: Performed by: RADIOLOGY

## 2018-04-24 PROCEDURE — 25000003 PHARM REV CODE 250: Performed by: NURSE PRACTITIONER

## 2018-04-24 PROCEDURE — 02HV33Z INSERTION OF INFUSION DEVICE INTO SUPERIOR VENA CAVA, PERCUTANEOUS APPROACH: ICD-10-PCS | Performed by: RADIOLOGY

## 2018-04-24 PROCEDURE — 63600175 PHARM REV CODE 636 W HCPCS

## 2018-04-24 RX ORDER — FENTANYL CITRATE 50 UG/ML
INJECTION, SOLUTION INTRAMUSCULAR; INTRAVENOUS
Status: DISCONTINUED | OUTPATIENT
Start: 2018-04-24 | End: 2018-05-04 | Stop reason: HOSPADM

## 2018-04-24 RX ORDER — LIDOCAINE HYDROCHLORIDE 10 MG/ML
INJECTION, SOLUTION EPIDURAL; INFILTRATION; INTRACAUDAL; PERINEURAL
Status: DISCONTINUED | OUTPATIENT
Start: 2018-04-24 | End: 2018-05-04 | Stop reason: HOSPADM

## 2018-04-24 RX ADMIN — APIXABAN 2.5 MG: 2.5 TABLET, FILM COATED ORAL at 09:04

## 2018-04-24 RX ADMIN — OXYCODONE AND ACETAMINOPHEN 1 TABLET: 5; 325 TABLET ORAL at 09:04

## 2018-04-24 RX ADMIN — VITAMIN D, TAB 1000IU (100/BT) 2000 UNITS: 25 TAB at 09:04

## 2018-04-24 RX ADMIN — CALCITRIOL 0.25 MCG: 0.25 CAPSULE, LIQUID FILLED ORAL at 09:04

## 2018-04-24 RX ADMIN — CEFTAROLINE FOSAMIL 200 MG: 400 POWDER, FOR SOLUTION INTRAVENOUS at 08:04

## 2018-04-24 RX ADMIN — GABAPENTIN 100 MG: 100 CAPSULE ORAL at 08:04

## 2018-04-24 RX ADMIN — GABAPENTIN 100 MG: 100 CAPSULE ORAL at 09:04

## 2018-04-24 RX ADMIN — CEFTAROLINE FOSAMIL 200 MG: 400 POWDER, FOR SOLUTION INTRAVENOUS at 09:04

## 2018-04-24 RX ADMIN — CARVEDILOL 12.5 MG: 12.5 TABLET, FILM COATED ORAL at 09:04

## 2018-04-24 RX ADMIN — LIDOCAINE HYDROCHLORIDE 2 ML: 10 INJECTION, SOLUTION EPIDURAL; INFILTRATION; INTRACAUDAL; PERINEURAL at 02:04

## 2018-04-24 RX ADMIN — FENTANYL CITRATE 50 MCG: 50 INJECTION, SOLUTION INTRAMUSCULAR; INTRAVENOUS at 02:04

## 2018-04-24 RX ADMIN — FUROSEMIDE 80 MG: 40 TABLET ORAL at 05:04

## 2018-04-24 RX ADMIN — APIXABAN 5 MG: 2.5 TABLET, FILM COATED ORAL at 08:04

## 2018-04-24 RX ADMIN — ISOSORBIDE MONONITRATE 30 MG: 30 TABLET, EXTENDED RELEASE ORAL at 05:04

## 2018-04-24 RX ADMIN — FAMOTIDINE 20 MG: 20 TABLET ORAL at 09:04

## 2018-04-24 RX ADMIN — DOCUSATE SODIUM 200 MG: 100 CAPSULE, LIQUID FILLED ORAL at 09:04

## 2018-04-24 RX ADMIN — FUROSEMIDE 80 MG: 40 TABLET ORAL at 09:04

## 2018-04-24 RX ADMIN — AMIODARONE HYDROCHLORIDE 200 MG: 200 TABLET ORAL at 09:04

## 2018-04-24 RX ADMIN — OXYCODONE HYDROCHLORIDE 5 MG: 5 TABLET ORAL at 03:04

## 2018-04-24 RX ADMIN — GABAPENTIN 100 MG: 100 CAPSULE ORAL at 03:04

## 2018-04-24 RX ADMIN — OXYCODONE AND ACETAMINOPHEN 1 TABLET: 5; 325 TABLET ORAL at 08:04

## 2018-04-24 RX ADMIN — SEVELAMER CARBONATE 800 MG: 800 TABLET, FILM COATED ORAL at 09:04

## 2018-04-24 RX ADMIN — DOCUSATE SODIUM 200 MG: 100 CAPSULE, LIQUID FILLED ORAL at 08:04

## 2018-04-24 RX ADMIN — POLYETHYLENE GLYCOL 3350 17 G: 17 POWDER, FOR SOLUTION ORAL at 03:04

## 2018-04-24 RX ADMIN — SEVELAMER CARBONATE 800 MG: 800 TABLET, FILM COATED ORAL at 05:04

## 2018-04-24 RX ADMIN — CARVEDILOL 12.5 MG: 12.5 TABLET, FILM COATED ORAL at 08:04

## 2018-04-24 NOTE — NURSING
Double lumen central line placed by dr. Francis without complication. Pt in transport to return to room

## 2018-04-24 NOTE — ASSESSMENT & PLAN NOTE
Patient follows with Dr. Axel Sharpe with [facility] in [place], [place] for outpatient dialysis on Mondays, Wednesdays, and Fridays. Nephrology consulted here to continue with renal replacement therapy.

## 2018-04-24 NOTE — CONSULTS
Consult/H&P Note  Interventional Radiology    Consult Requested By: nephrology    Reason for Consult: MRSA bacteremia    SUBJECTIVE:     Chief Complaint: bacteremia, ESRD    History of Present Illness: 69 yo F with multiple medical problems.  She has ESRD, and dialyzes through a tunneled R IJ cathter.  She needs additional central access for antibiotic therapy.    Past Medical History:   Diagnosis Date    A-fib     Cardiomyopathy     Diabetes mellitus     ESRD (end stage renal disease)      History reviewed. No pertinent surgical history.  History reviewed. No pertinent family history.  Social History   Substance Use Topics    Smoking status: Never Smoker    Smokeless tobacco: Never Used    Alcohol use No       Review of Systems:  As per primary carlita    OBJECTIVE:     Vital Signs Range (Last 24H):  Temp:  [96.5 °F (35.8 °C)-98 °F (36.7 °C)]   Pulse:  [44-52]   Resp:  [12-20]   BP: (101-139)/(49-74)   SpO2:  [90 %-98 %]     Physical Exam:  General- Patient alert and oriented x3 in NAD  ENT- PERRLA,  Neck- No masses, tunneled HD line R IJ  CV- Regular rate and rhythm  Resp-  No increased WOB  GI- Non tender/non-distended  Neuro-  No focal deficits noted.     Physical Exam  Body mass index is 43.8 kg/m².    Scheduled Meds:    amiodarone  200 mg Oral Daily    apixaban  2.5 mg Oral BID    calcitRIOL  0.25 mcg Oral Daily    carvedilol  12.5 mg Oral BID    ceftaroline (TEFLARO) IVPB  200 mg Intravenous Q12H    DAPTOmycin (CUBICIN)  IV  6 mg/kg Intravenous Q48H    docusate sodium  200 mg Oral BID    epoetin davion (PROCRIT) injection  20,000 Units Intravenous Every Mon, Wed, Fri    famotidine  20 mg Oral Daily    furosemide  80 mg Oral BID    gabapentin  100 mg Oral TID    isosorbide mononitrate  30 mg Oral Daily    polyethylene glycol  17 g Oral Daily    sevelamer carbonate  800 mg Oral TID WM    vitamin D  2,000 Units Oral Daily     Continuous Infusions:   PRN Meds:sodium chloride, acetaminophen,  dextrose 50%, dextrose 50%, glucagon (human recombinant), glucose, glucose, guaifenesin 100 mg/5 ml, heparin (porcine), insulin aspart U-100, ondansetron, oxyCODONE, oxyCODONE-acetaminophen, pneumoc 13-myron conj-dip cr(PF), ramelteon, senna-docusate 8.6-50 mg, sodium chloride 0.9%    Allergies:   Review of patient's allergies indicates:   Allergen Reactions    Sulfa (sulfonamide antibiotics) Anaphylaxis    Albuterol sulfate Palpitations       Labs:  No results for input(s): INR in the last 168 hours.    Invalid input(s):  PT,  PTT    Recent Labs  Lab 04/23/18 0434   WBC 7.44   HGB 9.1*   HCT 29.3*   MCV 85         Recent Labs  Lab 04/18/18 0428 04/23/18  0434   GLU 94  94  < > 81   *  131*  < > 128*   K 4.5  4.5  < > 4.2   CL 98  98  < > 98   CO2 25  25  < > 21*   BUN 28*  28*  < > 31*   CREATININE 4.0*  4.0*  < > 4.3*   CALCIUM 8.3*  8.3*  < > 8.7   MG 1.7  < > 1.9   ALT 8*  --   --    AST 11  --   --    ALBUMIN 1.7*  --   --    BILITOT 0.3  --   --    < > = values in this interval not displayed.    Vitals (Most Recent):  Temp: 97.6 °F (36.4 °C) (04/24/18 0746)  Pulse: (!) 51 (04/24/18 0800)  Resp: 12 (04/24/18 0746)  BP: 137/62 (04/24/18 0746)  SpO2: 96 % (04/24/18 0746)    ASA: 3  Mallampati: 2    Consent obtained    ASSESSMENT/PLAN:     Tunneled PICC placement for antibiotics.  Patient did eat this morning before 9, so may have to defer until this afternoon for moderate sedation.    Active Hospital Problems    Diagnosis  POA    *Bacteremia [R78.81]  Yes    Slow transit constipation [K59.01]  Yes    Anemia due to chronic kidney disease [N18.9, D63.1]  Yes    Pneumonia due to infectious organism [J18.9]  Yes    DVT of deep femoral vein, left [I82.412]  Yes    Chronic atrial fibrillation [I48.2]  Yes    ESRD (end stage renal disease) [N18.6]  Yes    Type 2 diabetes mellitus with chronic kidney disease on chronic dialysis, without long-term current use of insulin [E11.22, N18.6,  Z99.2]  Not Applicable    MRSA bacteremia [R78.81]  Yes      Resolved Hospital Problems    Diagnosis Date Resolved POA   No resolved problems to display.           Aditya Francis MD

## 2018-04-24 NOTE — CONSULTS
"[facility]  Cardiology  Consult Note    Patient Name: Yumiko Proctor  MRN: [id number]  Admission Date: 4/18/2018  Hospital Length of Stay: 6 days  Code Status: Full Code   Attending Provider: Victor Hugo Rogel MD   Consulting Provider: Rikki Delaney MD  Primary Care Physician: [facility]  Principal Problem:Bacteremia due to methicillin resistant Staphylococcus aureus    Patient information was obtained from patient and past medical records.     Inpatient consult to Cardiology  Consult performed by: RIKKI DELANEY  Consult ordered by: CARLIE IBANEZ  Reason for consult: Consider IVC Filter        Subjective:     Chief Complaint:  Recurrent infection.     HPI:     Complex History as per Dr. Harrison and KELLEY Craven:    "Ms. Yumiko Proctor is a 68 y.o. female, with PMH of HTN, NIDDM-2, CHF (EF 35%), ESRD on dialysis ([facility], Dr. Prather), A. Fib, LLE DVT, cardiomyopathy, and recent treatment in this facility for MRSA bacteremia from her vascular access site, who returns 2/2 need for ID consultation. In the past week the patient developed a fever and was diagnosed with RUL PNA, and started on Levaquin 3/week after dialysis. Associated symptoms include non-productive cough, fevers, and SOB with wheeze. She was started on vancymycin and zosyn on 4/15/18. Blood cultures taken on 4/16/18 were the second set, first after antibiotics, that grew staph. Other associated symptoms include b/l hip pain, and constipation.  She denies fever, chills, sweats, chest pain, SOB, N/V, diarrhea.      During the patient's previous stay at this facility she had blood cultures positive for MRSA on 2/8/18, and she was started on Daptomycin. On 2/10/18 a second set of blood cultures were positive for MRSA. At the time she had her left Jaspreet catheter removed and a right IJ tunneled dialysis cath was placed on 2/12/18. Repeat cultures on 2/13/18 were negative for MRSA. She was " "positive in 1/2 bottles on 2/16/18 and both blood cultures bottles for MRSA on 2/16/18. At that time she was started on vancomycin, and Daptomycin was stopped 2/2 myositis. On 2/22/18 blood cultures were negative. A RANDA on 2/26/18 was negative for vegetations, but the patient did have a LLE DVT discovered 2/20/18, and Apixaban was started. After CPK levels were normal, she was started back on Daptomycin and Ceftaroline. She was to continue this treatment until 4/4/18, and she was transferred to a NH facility in [place], [place]. She states she was transferred from the Nursing facility to [facility] on 4/15/18."     Hospital Course:  "Ms. Yumiko Proctor is a 68 year-old woman with a history hypertension, diabetes mellitus type II on insulin, chronic systolic heart failure with ejection fraction of 35 percent, and end-stage renal disease on hemodialysis via tunneled dialysis catheter exited her right chest wall who returned from a nursing facility in [place] where she was undergoing treatment for presumed endovascular infection with Methicillin-resistant Staphylococcus aureus infection via a peripherally inserted central catheter who returns with recurrence of Staphylococcus aureus bacteremia. Patient's peripherally inserted central catheter was removed here.  Blood cultures obtained here on 4/19/2018 positive for methicillin-sensitive Staphylococcus aureus.  Infectious disease service consulted and recommended treatment with intravenous ceftaroline and daptomycin."    She had another Venous Duplex on 4/24/2018 that reveals a "persistent nonocclusive thrombus in the left common femoral and popliteal veins. In addition there is a questionable new thrombus in the right deep femoral vein, although this is difficult visualize". The patient is anticoagulated with apixiban 5 mg Q12. I am asked to see for consideration of IVC Filter.      Past Medical History:   Diagnosis Date    A-fib     Cardiomyopathy     Diabetes mellitus "     ESRD (end stage renal disease)        History reviewed. No pertinent surgical history.    Review of patient's allergies indicates:   Allergen Reactions    Sulfa (sulfonamide antibiotics) Anaphylaxis    Albuterol sulfate Palpitations       No current facility-administered medications on file prior to encounter.      Current Outpatient Prescriptions on File Prior to Encounter   Medication Sig    amiodarone (PACERONE) 100 MG Tab Take 200 mg by mouth once daily.    apixaban 2.5 mg Tab Take 1 tablet (2.5 mg total) by mouth 2 (two) times daily.    calcitRIOL (ROCALTROL) 0.25 MCG Cap Take 1 capsule (0.25 mcg total) by mouth once daily.    carvedilol (COREG) 25 MG tablet Take 25 mg by mouth 2 (two) times daily.    dextrose 5 % SolP 50 mL with ceftaroline fosamil 600 mg SolR 200 mg Inject 200 mg into the vein every 12 (twelve) hours.    docusate sodium (COLACE) 100 MG capsule Take 1 capsule (100 mg total) by mouth 2 (two) times daily.    epoetin davion (PROCRIT) 20,000 unit/mL injection Inject 1 mL (20,000 Units total) into the skin every Mon, Wed, Fri.    ferric citrate 210 mg iron Tab Take 210 mg by mouth 3 (three) times daily.    furosemide (LASIX) 80 MG tablet Take 1 tablet (80 mg total) by mouth 2 (two) times daily.    hydrALAZINE (APRESOLINE) 100 MG tablet Take 100 mg by mouth 3 (three) times daily.    ISOSORBIDE MONONITRATE ORAL Take 30 mg by mouth Daily.    oxyCODONE-acetaminophen (PERCOCET) 5-325 mg per tablet Take 1 tablet by mouth every 4 (four) hours as needed.    sevelamer carbonate (RENVELA) 800 mg Tab Take 1 tablet (800 mg total) by mouth 3 (three) times daily with meals.    SITagliptin (JANUVIA) 25 MG Tab Take 1 tablet (25 mg total) by mouth once daily.    sodium chloride 0.9% SolP 50 mL with DAPTOmycin 500 mg SolR 865 mg Inject 865 mg into the vein every Mon, Wed, Fri. After dialysis treatments.    vitamin D 1000 units Tab Take 2 tablets (2,000 Units total) by mouth once daily.     vitamin renal formula, B-complex-vitamin c-folic acid, (NEPHROCAP) 1 mg Cap Take 1 capsule by mouth once daily.     Family History     None        Social History Main Topics    Smoking status: Never Smoker    Smokeless tobacco: Never Used    Alcohol use No    Drug use: No    Sexual activity: Not Currently     Review of Systems   Constitution: Positive for chills, fever, weakness and malaise/fatigue.   Eyes: Negative for vision loss in left eye and vision loss in right eye.   Cardiovascular: Positive for leg swelling. Negative for orthopnea, palpitations and paroxysmal nocturnal dyspnea.   Respiratory: Negative for shortness of breath.    Gastrointestinal: Negative for hematemesis and hematochezia.   Genitourinary: Negative for hematuria.     Objective:     Vital Signs (Most Recent):  Temp: 97.4 °F (36.3 °C) (04/24/18 1621)  Pulse: (!) 45 (04/24/18 1621)  Resp: 13 (04/24/18 1445)  BP: (!) 130/59 (04/24/18 1621)  SpO2: 97 % (04/24/18 1621) Vital Signs (24h Range):  Temp:  [97.4 °F (36.3 °C)-98 °F (36.7 °C)] 97.4 °F (36.3 °C)  Pulse:  [45-52] 45  Resp:  [12-20] 13  SpO2:  [94 %-100 %] 97 %  BP: (110-161)/(53-74) 130/59     Weight: 119.4 kg (263 lb 3.7 oz)  Body mass index is 43.8 kg/m².    SpO2: 97 %  O2 Device (Oxygen Therapy): room air      Intake/Output Summary (Last 24 hours) at 04/24/18 1848  Last data filed at 04/24/18 1813   Gross per 24 hour   Intake              820 ml   Output             2500 ml   Net            -1680 ml       Lines/Drains/Airways     Central Venous Catheter Line                 Hemodialysis Catheter 02/12/18 0200 right internal jugular 71 days         Tunneled Central Line Insertion/Assessment - Double Lumen  04/24/18 1450 left internal jugular less than 1 day          Airway                 Airway - Non-Surgical 02/12/18 1310 Nasal Cannula 71 days          Pressure Ulcer                 Pressure Injury 02/07/18 2254 medial Gluteal cleft Stage 1 75 days          Peripheral Intravenous  Line                 Peripheral IV - Single Lumen Left Wrist -- days                Physical Exam   Constitutional: She is oriented to person, place, and time. She appears well-developed.   Eyes: Right conjunctiva is not injected. Right conjunctiva has no hemorrhage. Left conjunctiva is not injected. Left conjunctiva has no hemorrhage.   Neck: No JVD present. Carotid bruit is not present.   Cardiovascular: Normal rate, regular rhythm, S1 normal and S2 normal.    Murmur heard.  High-pitched blowing holosystolic murmur is present with a grade of 2/6  at the apex  Pulses:       Dorsalis pedis pulses are 2+ on the right side, and 2+ on the left side.        Posterior tibial pulses are 2+ on the right side, and 2+ on the left side.   Pulmonary/Chest: Effort normal and breath sounds normal.   Abdominal: Soft. Normal appearance. There is no tenderness.   Musculoskeletal:        Right ankle: She exhibits swelling.        Left ankle: She exhibits swelling.   Neurological: She is alert and oriented to person, place, and time.   Skin: Skin is warm and dry. Ecchymosis (arms) noted.   Psychiatric: Her speech is normal. She exhibits a depressed mood.     Current Medications:     amiodarone  200 mg Oral Daily    apixaban  5 mg Oral BID    calcitRIOL  0.25 mcg Oral Daily    carvedilol  12.5 mg Oral BID    ceftaroline (TEFLARO) IVPB  200 mg Intravenous Q12H    DAPTOmycin (CUBICIN)  IV  6 mg/kg Intravenous Q48H    docusate sodium  200 mg Oral BID    epoetin davion (PROCRIT) injection  20,000 Units Intravenous Every Mon, Wed, Fri    famotidine  20 mg Oral Daily    furosemide  80 mg Oral BID    gabapentin  100 mg Oral TID    isosorbide mononitrate  30 mg Oral Daily    polyethylene glycol  17 g Oral Daily    sevelamer carbonate  800 mg Oral TID WM    vitamin D  2,000 Units Oral Daily     Current Laboratory Results:    Recent Results (from the past 24 hour(s))   POCT glucose    Collection Time: 04/23/18  9:04 PM   Result  Value Ref Range    POCT Glucose 115 (H) 70 - 110 mg/dL   POCT glucose    Collection Time: 04/24/18  8:27 AM   Result Value Ref Range    POCT Glucose 97 70 - 110 mg/dL   POCT glucose    Collection Time: 04/24/18  1:19 PM   Result Value Ref Range    POCT Glucose 83 70 - 110 mg/dL   2D echo with color flow doppler    Collection Time: 04/24/18  4:06 PM   Result Value Ref Range    EF 60 55 - 65    Mitral Valve Regurgitation MILD TO MODERATE     Diastolic Dysfunction No     Est. PA Systolic Pressure 35.95     Pericardial Effusion NONE     Tricuspid Valve Regurgitation MILD    POCT glucose    Collection Time: 04/24/18  4:24 PM   Result Value Ref Range    POCT Glucose 92 70 - 110 mg/dL     Current Imaging Results:    Imaging Results    None       HISTORY:  swelling     TECHNIQUE: Duplex and color flow Doppler evaluation of the right and left lower extremity.    COMPARISON: None.    FINDINGS:    Right lower extremity has  no evidence of acute venous thrombosis in the common femoral, superficial femoral, greater saphenous, popliteal, peroneal, anterior tibial, and posterior tibial vein(s).     Left lower extremity has nonocclusive thrombus in the common femoral vein with occlusive thrombus throughout the femoral, popliteal, and peroneal vein. Anterior and posterior tibial veins are patent.   Impression     Extensive thrombus throughout the left lower extremity with nonocclusive thrombus in the common femoral vein with occlusive thrombus throughout the femoral, popliteal, and peroneal vein. Anterior and posterior tibial veins are patent.      Electronically signed by: KYMBERLY WHYTE  Date: 02/20/18  Time: 16:09        EXAMINATION:  US LOWER EXTREMITY VEINS BILATERAL    CLINICAL HISTORY:  DVT;    TECHNIQUE:  Duplex and color flow Doppler and dynamic compression was performed of the bilateral lower extremity veins was performed.    COMPARISON:  None    FINDINGS:  Right thigh veins: The common femoral, femoral, popliteal, upper  greater saphenous, are patent and free of thrombus. The veins are normally compressible and have normal phasic flow and augmentation response.  There is questionable thrombus into the deep femoral vein, although this is difficult to definitively evaluate.    Right calf veins: The visualized calf veins are patent.    Left thigh veins: Persistent nonocclusive thrombus within the left common femoral and popliteal veins.    Left calf veins: The visualized calf veins are patent.    Miscellaneous: None     Impression     Persistent nonocclusive thrombus in the left common femoral and popliteal veins.    Questionable new thrombus in the right deep femoral vein, although this is difficult visualize.      Electronically signed by: Aditya Francis MD  Date: 04/23/2018  Time: 16:26     Date of Procedure: 04/24/2018        TEST DESCRIPTION   Technical Quality: This is a technically adequate study.     Aorta: The aortic root is normal in size, measuring 2.1 cm at sinotubular junction and 2.8 cm at Sinuses of Valsalva.     Left Atrium: The left atrial volume index is normal, measuring 32.87 cc/m2.     Left Ventricle: The left ventricle is normal in size, with an end-diastolic diameter of 5.2 cm, and an end-systolic diameter of 3.2 cm. Wall thickness is mildly increased, with the septum and the posterior wall each measuring 1.3 cm across. Relative wall   thickness was increased at 0.50, and the LV mass index was increased at 152.4 g/m2 consistent with concentric left ventricular hypertrophy. There are no regional wall motion abnormalities. Left ventricular systolic function appears normal. Visually   estimated ejection fraction is 60-65%. The LV Doppler derived stroke volume equals 104.0 ccs.     Diastolic indices: E wave velocity 1.3 m/s, E/A ratio 2.1,  msec., E/e' ratio(avg) 13. Diastolic function is normal.     Right Atrium: The right atrium is normal in size, measuring 5.5 cm in length and 3.9 cm in width in the apical  view.     Right Ventricle: The right ventricle is normal in size. Global right ventricular systolic function appears normal. The estimated PA systolic pressure is 36 mmHg.     Aortic Valve:  The aortic valve is normal in structure.     Mitral Valve:  The mitral valve is normal in structure. There is mild to moderate mitral regurgitation.     Tricuspid Valve:  The tricuspid valve is normal in structure. There is mild tricuspid regurgitation.     Pulmonary Valve:  The pulmonic valve is normal in structure.     Pericardium: There is no evidence of pericardial effusion.      IVC: IVC is normal in size and collapses > 50% with a sniff, suggesting normal right atrial pressure of 3 mmHg.     Intracavitary: There is no evidence of intracavitary mass or thrombus. There is no evidence of vegetation.         CONCLUSIONS     1 - Concentric hypertrophy.     2 - Normal left ventricular systolic function (EF 60-65%).     3 - Mild to moderate mitral regurgitation.             This document has been electronically    SIGNED BY: Rikki Gustafson MD On: 04/24/2018 17:02        Assessment and Plan:     Active Diagnoses:    Diagnosis Date Noted POA    PRINCIPAL PROBLEM:  Bacteremia due to methicillin resistant Staphylococcus aureus [R78.81] 02/07/2018 Yes    Debility [R53.81] 04/24/2018 Yes    Slow transit constipation [K59.01] 04/20/2018 Yes    Anemia due to chronic kidney disease [N18.9, D63.1] 04/19/2018 Yes    DVT of deep femoral vein, left [I82.412] 02/21/2018 Yes    Chronic atrial fibrillation [I48.2] 02/08/2018 Yes    End stage renal disease [N18.6] 02/08/2018 Yes    Type 2 diabetes mellitus with chronic kidney disease on chronic dialysis, without long-term current use of insulin [E11.22, N18.6, Z99.2] 02/08/2018 Not Applicable      Problems Resolved During this Admission:    Diagnosis Date Noted Date Resolved POA     Assessment and Plan:     1. MRSA Bacteremia              2/9/2018: TTE: No vegetations seen.               "2/26/2018: RANDA: Small calcified nodule on atrial side of posterior mitral leaflet. Mild to moderate MR. Aortic valve unremarkable.              No findings to suggest endocarditis.   Now recurrent bacteremia.    2. Deep Venous Trombosis of Lower Extremity   2/20/2018: Venous Duplex: "Extensive thrombus throughout the left lower extremity with nonocclusive thrombus in the common femoral vein with occlusive thrombus throughout the femoral, popliteal, and peroneal vein. Anterior and posterior tibial veins are patent.   2/23/2018: Venous Duplex: "Persistent nonocclusive thrombus in the left common femoral and popliteal veins.Questionable new thrombus in the right deep femoral vein, although this is difficult visualize."   Old left leg DVT and possibly new right leg DVT. No evidence of pulmonary embolism.   She is anticoagulated with apixiban 5 mg Q12.   It appears to me that the best option is continued anticoagulation indefinitely.   I would not favor an IVC Filter as she appears to tolerate anticoagulation. Also would avoid implantation if at all possible in the setting of recurrent infection.      VTE Risk Mitigation         Ordered     apixaban tablet 5 mg  2 times daily      04/24/18 0934     heparin (porcine) injection 5,000 Units  As needed (PRN)      04/18/18 8912          Thank you for your consult.    I will follow-up with patient. Please contact us if you have any additional questions.    Rikki Gustafson MD  Cardiology   [facility]      "

## 2018-04-24 NOTE — ASSESSMENT & PLAN NOTE
Previously diagnosed during prior hospitalization.  Transesophageal echocardiogram negative for vegetation.  Patient with large thrombus burden and left this was the likely source of her Staphylococcus bacteremia.  Per Dr. Sherron Wise's note on 2/28/2018, plan was for 8 weeks of intravenous antibiotics from last date of negative blood culture (2/22/2018) which would call for treatment until 4/19/2018.  It appears antibiotics were discontinued on 4/4/2018 so possible treatment failure due to inadequate treatment course or treatment failure due to inadequate source control.   Most recent positive blood culture obtained here on 4/18/2018 with methicillin sensitive Staphylococcus aureus suggesting possible new infection?  Tunneled peripherally inserted central catheter removed already however patient may also need tunneled dialysis catheter removed as well.  Continue with current antibiotic treatment for now and will discuss with Dr. Shahid Bhakta.

## 2018-04-24 NOTE — ASSESSMENT & PLAN NOTE
Improved status post blood transfusion on 4/20/2018 with stable hemoglobin.  Will check labs tomorrow.

## 2018-04-24 NOTE — PROGRESS NOTES
"[facility] Medicine  Progress Note    Patient Name: Yumiko Proctor  MRN: [id number]  Patient Class: IP- Inpatient   Admission Date: 4/18/2018  Length of Stay: 6 days  Attending Physician: Victor Hugo Rogel MD  Primary Care Provider: [facility]        Subjective:     Principal Problem:Bacteremia due to methicillin resistant Staphylococcus aureus    HPI:  Per Nat Woodward, "Ms Hoa Proctor is a 68 y.o. female, with PMH of HTN, NIDDM-2, CHF (EF 35%), ESRD on dialysis ([facility], Dr. Prather), A. Fib, LLE DVT, cardiomyopathy, and recent treatment in this facility for MRSA bacteremia from her vascular access site, who returns 2/2 need for ID consultation. In the past week the patient developed a fever and was diagnosed with RUL PNA, and started on Levaquin 3/week after dialysis. Associated symptoms include non-productive cough, fevers, and SOB with wheeze. She was started on vancymycin and zosyn on 4/15/18. Blood cultures taken on 4/16/18 were the second set, first after antibiotics, that grew staph. Other associated symptoms include b/l hip pain, and constipation.  She denies fever, chills, sweats, chest pain, SOB, N/V, diarrhea.     During the patient's previous stay at this facility she had blood cultures positive for MRSA on 2/8/18, and she was started on Daptomycin. On 2/10/18 a second set of blood cultures were positive for MRSA. At the time she had her left Jaspreet catheter removed and a right IJ tunneled dialysis cath was placed on 2/12/18. Repeat cultures on 2/13/18 were negative for MRSA. She was positive in 1/2 bottles on 2/16/18 and both blood cultures bottles for MRSA on 2/16/18. At that time she was started on vancomycin, and Daptomycin was stopped 2/2 myositis. On 2/22/18 blood cultures were negative. A RANDA on 2/26/18 was negative for vegetations, but the patient did have a LLE DVT discovered 2/20/18, and Apixaban was started. After CPK " "levels were normal, she was started back on Daptomycin and Ceftaroline. She was to continue this treatment until 4/4/18, and she was transferred to a NH facility in [place], [place]. She states she was transferred from the Nursing facility to [facility] on 4/15/18."    Hospital Course:  Ms. Yumiko Proctor is a 68 year-old woman with a history hypertension, diabetes mellitus type II on insulin, chronic systolic heart failure with ejection fraction of 35 percent, and end-stage renal disease on hemodialysis via tunneled dialysis catheter exited her right chest wall who returned from a nursing facility in [place] where she was undergoing treatment for presumed endovascular infection with Methicillin-resistant Staphylococcus aureus infection via a peripherally inserted central catheter who returns with recurrence of Staphylococcus aureus bacteremia.  Patient's peripherally inserted central catheter was removed here.  Blood cultures obtained here on 4/19/2018 positive for methicillin-sensitive Staphylococcus aureus.  Infectious disease service consulted and recommended treatment with intravenous ceftaroline and daptomycin.    Interval History: No acute events overnight.     Review of Systems   Constitutional: Negative for chills and fever.   Respiratory: Negative for shortness of breath and wheezing.    Cardiovascular: Negative for chest pain.   Gastrointestinal: Negative for abdominal distention, abdominal pain, constipation, diarrhea, nausea and vomiting.   Genitourinary: Negative for dysuria and frequency.   Musculoskeletal: Positive for back pain. Negative for arthralgias and myalgias.   Neurological: Negative for light-headedness.   Psychiatric/Behavioral: Negative for agitation and confusion.     Objective:     Vital Signs (Most Recent):  Temp: 97.6 °F (36.4 °C) (04/24/18 0746)  Pulse: (!) 49 (04/24/18 1000)  Resp: 12 (04/24/18 0746)  BP: 137/62 (04/24/18 0746)  SpO2: 97 % (04/24/18 0800) Vital Signs (24h Range):  Temp: "  [96.5 °F (35.8 °C)-98 °F (36.7 °C)] 97.6 °F (36.4 °C)  Pulse:  [44-52] 49  Resp:  [12-20] 12  SpO2:  [90 %-98 %] 97 %  BP: (101-139)/(49-74) 137/62     Weight: 119.4 kg (263 lb 3.7 oz)  Body mass index is 43.8 kg/m².    Intake/Output Summary (Last 24 hours) at 04/24/18 1130  Last data filed at 04/24/18 0908   Gross per 24 hour   Intake              620 ml   Output             2500 ml   Net            -1880 ml      Physical Exam   Constitutional: She is oriented to person, place, and time. She appears well-developed and well-nourished. No distress.   Morbid obesity   HENT:   Head: Atraumatic.   Eyes: Conjunctivae are normal.   Neck: Neck supple.   Cardiovascular: Normal rate, regular rhythm and normal heart sounds.    No murmur heard.  Pulmonary/Chest: Effort normal and breath sounds normal. She has no wheezes.   Abdominal: Soft. Bowel sounds are normal. She exhibits no distension. There is no tenderness.   Musculoskeletal: Normal range of motion. She exhibits edema. She exhibits no deformity.   Neurological: She is alert and oriented to person, place, and time.   Skin:   Tunneled HD catheter exiting right chest wall without signs of infection.       Significant Labs: All pertinent labs within the past 24 hours have been reviewed.    Significant Imaging: I have reviewed all pertinent imaging results/findings within the past 24 hours.    Assessment/Plan:      * Bacteremia due to methicillin resistant Staphylococcus aureus    Previously diagnosed during prior hospitalization.  Transesophageal echocardiogram negative for vegetation.  Patient with large thrombus burden and left this was the likely source of her Staphylococcus bacteremia.  Per Dr. Sherron Wise's note on 2/28/2018, plan was for 8 weeks of intravenous antibiotics from last date of negative blood culture (2/22/2018) which would call for treatment until 4/19/2018.  It appears antibiotics were discontinued on 4/4/2018 so possible treatment failure  due to inadequate treatment course or treatment failure due to inadequate source control.   Most recent positive blood culture obtained here on 4/18/2018 with methicillin sensitive Staphylococcus aureus suggesting possible new infection?  Tunneled peripherally inserted central catheter removed already however patient may also need tunneled dialysis catheter removed as well.  Continue with current antibiotic treatment for now and will discuss with Dr. Shahid Bhakta.        DVT of deep femoral vein, left    Ultrasound on 2/23/2018 showed persistent nonocclusive thrombus in the left common femoral and popliteal veins and possible new thrombus in the right deep femoral vein.  Continue anticoagulation with apixaban.  Dose adjusted today per nephrology's recommendation.        Chronic atrial fibrillation    Continue amiodarone & carvedilol and anticoagulation with apixaban (with dose adjustment as per nephrology's recommendations).        Type 2 diabetes mellitus with chronic kidney disease on chronic dialysis, without long-term current use of insulin    Well controlled likely due to renal failure.  Continue diabetic diet with sliding scale insulin.         End stage renal disease    Patient follows with Dr. Axel Sharpe with [facility] in [place], [place] for outpatient dialysis on Mondays, Wednesdays, and Fridays. Nephrology consulted here to continue with renal replacement therapy.        Anemia due to chronic kidney disease    Improved status post blood transfusion on 4/20/2018 with stable hemoglobin.  Will check labs tomorrow.        Slow transit constipation    Improved with bowel regimen. Continue current management.          Debility    Consult physical and occupational therapy.            VTE Risk Mitigation         Ordered     apixaban tablet 5 mg  2 times daily      04/24/18 0999     heparin (porcine) injection 5,000 Units  As needed (PRN)      04/18/18 8695              Victor Hugo Rogel MD  Department of  [facility] Medicine   [facility]

## 2018-04-24 NOTE — ASSESSMENT & PLAN NOTE
Ultrasound on 2/23/2018 showed persistent nonocclusive thrombus in the left common femoral and popliteal veins and possible new thrombus in the right deep femoral vein.  Continue anticoagulation with apixaban.  Dose adjusted today per nephrology's recommendation.

## 2018-04-24 NOTE — PHYSICIAN QUERY
"PT Name: Yumiko Proctor  MR #: [id number]     Physician Query Form - Etiology of Condition Clarification      CDS: Nat Degroot RN, CCDS         Contact information :ext [phone])  [email or web address]     This form is a permanent document in the medical record.     Query Date: April 24, 2018    By submitting this query, we are merely seeking further clarification of documentation.  Please utilize your independent clinical judgment when addressing the question(s) below.     The medical record contains the following:    Findings Supporting Clinical Information Location in Medical Record       "Bacteremia"           "recent treatment in this facility for MRSA bacteremia from her vascular access site, who returns 2/2 need for ID consultation. In the past week the patient developed a fever and was diagnosed with RUL PNA, and started on Levaquin 3/week after dialysis."       "Bacteremia-MSSA (R78.81):  Antibx per ID. No evidence of PNa. Removed PICC and cultured tip. Now with MSSA.  Last admit only identifiable source was extensive chronic LLE "    "Pneumonia"  "Diagnosed via CXR at OSH. CXR reviewed, doubt pneumonia; likely related to pulmonary edema."         H&P 4/18/18      H&P 4/18/18                Nephrology progress note 4/23/18        Progress note 4/23/18     Please document your best medical opinion regarding the etiology of  bacteremia for which the primary focus of treatment is/was directed.           Likely endovascular source and not related to pneumonia as her clinical picture is not consistent with pneumonia             [    ]  Clinically Undetermined    Please document in your progress notes daily for the duration of treatment, until resolved, and include in your discharge summary.                                                                                "

## 2018-04-24 NOTE — PT/OT/SLP PROGRESS
Physical Therapy      Patient Name:  Yumiko Proctor   MRN:  [id number]    Patient not seen today secondary to  (Pt in cath lab for line placement per RN report). Will follow-up as appropriate.    Lynsey Arnett, PT, DPT

## 2018-04-24 NOTE — PROGRESS NOTES
"Nephrology  Progress Note    Admit Date: 4/18/2018   LOS: 6 days     SUBJECTIVE:     Follow-up For:  Bacteremia    Interval History:     No c/o this am.  Feels good after BM.  Discussed with Dr. Francis about PICC access.  No CP/SOB.       Review of Systems:  Constitutional: No fever or chills  Respiratory: No cough or shortness of breath  Cardiovascular: No chest pain or palpitations  Gastrointestinal: No nausea or vomiting  Neurological: No confusion or weakness    OBJECTIVE:     Vital Signs Range (Last 24H):  /62 (Patient Position: Lying)   Pulse (!) 51   Temp 97.6 °F (36.4 °C) (Oral)   Resp 12   Ht 5' 5" (1.651 m)   Wt 119.4 kg (263 lb 3.7 oz)   LMP 01/01/1983 (LMP Unknown)   SpO2 96%   Breastfeeding? No   BMI 43.80 kg/m²     Temp:  [96.5 °F (35.8 °C)-98 °F (36.7 °C)]   Pulse:  [44-52]   Resp:  [12-20]   BP: (101-139)/(49-74)   SpO2:  [90 %-98 %]     I & O (Last 24H):    Intake/Output Summary (Last 24 hours) at 04/24/18 0925  Last data filed at 04/24/18 0400   Gross per 24 hour   Intake              420 ml   Output             2500 ml   Net            -2080 ml       Physical Exam:  General appearance: morbidly obese female in NAD  Eyes:  Conjunctivae/corneas clear. PERRL.  Lungs: Normal respiratory effort,  diminished  Heart: Regular/Irregular/Jerry rate and rhythm, S1, S2 normal, no murmur, rub or cuco.  Abdomen: Soft, non-tender non-distended; bowel sounds normal; no masses,  no organomegaly, obese  Extremities: No cyanosis or clubbing. trace edema.    Skin: Skin color, texture, turgor normal. No rashes or lesions  Neurologic: Normal strength and tone. No focal numbness or weakness   Right IJ EULALIO no E/E/T        Laboratory Data:  No results for input(s): WBC, RBC, HGB, HCT, PLT, MCV, MCH, MCHC in the last 24 hours.    BMP:     Recent Labs  Lab 04/23/18  0434   GLU 81   *   K 4.2   CL 98   CO2 21*   BUN 31*   CREATININE 4.3*   CALCIUM 8.7   MG 1.9     Lab Results   Component Value Date    " CALCIUM 8.7 04/23/2018    PHOS 4.3 04/23/2018       Lab Results   Component Value Date    .8 (H) 02/08/2018    CALCIUM 8.7 04/23/2018    PHOS 4.3 04/23/2018       No results found for: URICACID    BNP    Recent Labs  Lab 04/18/18  1712   BNP 1,178*     Blood:  MSSA      Medications:  Medication list was reviewed and changes noted under Assessment/Plan.    Diagnostic Results:    US Lower Extremity Veins Bilateral   Final Result      Persistent nonocclusive thrombus in the left common femoral and popliteal veins.      Questionable new thrombus in the right deep femoral vein, although this is difficult visualize.         Electronically signed by: Aditya Francis MD   Date:    04/23/2018   Time:    16:26      X-Ray Chest AP Portable   Final Result      Bilateral central venous catheters present.      Some consolidation in both lungs, more at the left lung base with small pleural effusion.         Electronically signed by: Ilan Ochoa MD   Date:    04/19/2018   Time:    07:55      IR Tunneled Catheter Insert w/o Port    (Results Pending)       ASSESSMENT/PLAN:     1. ESRD on HD MWF in [place], [place] with Dr. Sharpe (N18.6, Z99.2):  Routine HD MWF/PRN while inpt.  Keep EULALIO in for now.  Consent signed.  Renally dose meds, avoid nephrotoxins, and monitor I/O's closely.  2. Bacteremia-MSSA (R78.81):  Antibx per ID. No evidence of PNa. Removed PICC and cultured tip. Now with MSSA.  Last admit only identifiable source was extensive chronic LLE DVT.  Repeat U/S now with same LLE DVT and new R leg DVT.  Eliquis for now.  May need to consider Coumadin.  Discussed with team.  PICC to be placed today by IR for antibx treatments.  Does she need IVC filter?  Will ask Dr. Gustafson for input.   3. Anemia of CKD/infection (D63.1):  No IV iron with infection.  Epo on HD.  S/p transfusion 2 units PRBCs on HD 4/18.  Hgb stable  4. 2HPT/Vit D deficiency:  Vit D3 and calcitriol.  5. Morbid Obesity (E66.01):  Needs aggressive  weight mgmt.         See above  Follow for renal needs.   Discussed with Dr. Rogel.

## 2018-04-24 NOTE — ASSESSMENT & PLAN NOTE
Continue amiodarone & carvedilol and anticoagulation with apixaban (with dose adjustment as per nephrology's recommendations).

## 2018-04-24 NOTE — PHYSICIAN QUERY
"PT Name: Yumiko Proctor  MR #: [id number]     Physician Query Form - Diagnosis Clarification      CDS: Nat Degroot RN, CCDS         Contact information :ext [phone])  [email or web address]       This form is a permanent document in the medical record.     Query Date: April 24, 2018    By submitting this query, we are merely seeking further clarification of documentation.  Please utilize your independent clinical judgment when addressing the question(s) below.     The medical record contains the following:      Findings Supporting Clinical Information Location in Medical Record     "CHF"       Lasix 80 mg po 2 x daily  Coreg 12.5 mg     "Some consolidation in both lungs, more at the left lung base with small pleural effusion."    "Most recent echo from 2/2018 with normal EF and normal LV systolic function, normal diastolic function"    "Pneumonia-Diagnosed via CXR at OSH. CXR reviewed, doubt pneumonia; likely related to pulmonary edema."   H&P 4/18/18    Home meds per H&P 4/18/18  MAR     CXR 4/18/18      Physician query response 4/20/18      Progress note 4/23/18     Please clarify if the  CHF  diagnosis has been:    [  ] Ruled In    [ X ] Ruled Out    [  ] Clinically undetermined    [  ] Other/Clarification of findings (please specify)_______________________________    Please document in your progress notes daily for the duration of treatment, until resolved, and include in your discharge summary.                                                                                "

## 2018-04-24 NOTE — SUBJECTIVE & OBJECTIVE
Interval History: No acute events overnight.     Review of Systems   Constitutional: Negative for chills and fever.   Respiratory: Negative for shortness of breath and wheezing.    Cardiovascular: Negative for chest pain.   Gastrointestinal: Negative for abdominal distention, abdominal pain, constipation, diarrhea, nausea and vomiting.   Genitourinary: Negative for dysuria and frequency.   Musculoskeletal: Positive for back pain. Negative for arthralgias and myalgias.   Neurological: Negative for light-headedness.   Psychiatric/Behavioral: Negative for agitation and confusion.     Objective:     Vital Signs (Most Recent):  Temp: 97.6 °F (36.4 °C) (04/24/18 0746)  Pulse: (!) 49 (04/24/18 1000)  Resp: 12 (04/24/18 0746)  BP: 137/62 (04/24/18 0746)  SpO2: 97 % (04/24/18 0800) Vital Signs (24h Range):  Temp:  [96.5 °F (35.8 °C)-98 °F (36.7 °C)] 97.6 °F (36.4 °C)  Pulse:  [44-52] 49  Resp:  [12-20] 12  SpO2:  [90 %-98 %] 97 %  BP: (101-139)/(49-74) 137/62     Weight: 119.4 kg (263 lb 3.7 oz)  Body mass index is 43.8 kg/m².    Intake/Output Summary (Last 24 hours) at 04/24/18 1130  Last data filed at 04/24/18 0908   Gross per 24 hour   Intake              620 ml   Output             2500 ml   Net            -1880 ml      Physical Exam   Constitutional: She is oriented to person, place, and time. She appears well-developed and well-nourished. No distress.   Morbid obesity   HENT:   Head: Atraumatic.   Eyes: Conjunctivae are normal.   Neck: Neck supple.   Cardiovascular: Normal rate, regular rhythm and normal heart sounds.    No murmur heard.  Pulmonary/Chest: Effort normal and breath sounds normal. She has no wheezes.   Abdominal: Soft. Bowel sounds are normal. She exhibits no distension. There is no tenderness.   Musculoskeletal: Normal range of motion. She exhibits edema. She exhibits no deformity.   Neurological: She is alert and oriented to person, place, and time.   Skin:   Tunneled HD catheter exiting right chest  wall without signs of infection.       Significant Labs: All pertinent labs within the past 24 hours have been reviewed.    Significant Imaging: I have reviewed all pertinent imaging results/findings within the past 24 hours.

## 2018-04-25 LAB
ANION GAP SERPL CALC-SCNC: 9 MMOL/L
BASOPHILS # BLD AUTO: 0.02 K/UL
BASOPHILS NFR BLD: 0.3 %
BUN SERPL-MCNC: 23 MG/DL
CALCIUM SERPL-MCNC: 8.9 MG/DL
CHLORIDE SERPL-SCNC: 96 MMOL/L
CO2 SERPL-SCNC: 24 MMOL/L
CREAT SERPL-MCNC: 3.8 MG/DL
DIFFERENTIAL METHOD: ABNORMAL
EOSINOPHIL # BLD AUTO: 0.2 K/UL
EOSINOPHIL NFR BLD: 2.7 %
ERYTHROCYTE [DISTWIDTH] IN BLOOD BY AUTOMATED COUNT: 17.5 %
EST. GFR  (AFRICAN AMERICAN): 13 ML/MIN/1.73 M^2
EST. GFR  (NON AFRICAN AMERICAN): 12 ML/MIN/1.73 M^2
GLUCOSE SERPL-MCNC: 70 MG/DL
HCT VFR BLD AUTO: 29.5 %
HGB BLD-MCNC: 9.1 G/DL
LYMPHOCYTES # BLD AUTO: 2.3 K/UL
LYMPHOCYTES NFR BLD: 35.8 %
MAGNESIUM SERPL-MCNC: 2 MG/DL
MCH RBC QN AUTO: 26.1 PG
MCHC RBC AUTO-ENTMCNC: 30.8 G/DL
MCV RBC AUTO: 85 FL
MONOCYTES # BLD AUTO: 0.6 K/UL
MONOCYTES NFR BLD: 9.9 %
NEUTROPHILS # BLD AUTO: 3.2 K/UL
NEUTROPHILS NFR BLD: 51.1 %
PHOSPHATE SERPL-MCNC: 3.9 MG/DL
PLATELET # BLD AUTO: 341 K/UL
PMV BLD AUTO: 9.3 FL
POCT GLUCOSE: 102 MG/DL (ref 70–110)
POCT GLUCOSE: 76 MG/DL (ref 70–110)
POCT GLUCOSE: 82 MG/DL (ref 70–110)
POCT GLUCOSE: 87 MG/DL (ref 70–110)
POTASSIUM SERPL-SCNC: 4.2 MMOL/L
RBC # BLD AUTO: 3.48 M/UL
SODIUM SERPL-SCNC: 129 MMOL/L
WBC # BLD AUTO: 6.34 K/UL

## 2018-04-25 PROCEDURE — 97530 THERAPEUTIC ACTIVITIES: CPT

## 2018-04-25 PROCEDURE — 63600175 PHARM REV CODE 636 W HCPCS: Performed by: INTERNAL MEDICINE

## 2018-04-25 PROCEDURE — 11000001 HC ACUTE MED/SURG PRIVATE ROOM

## 2018-04-25 PROCEDURE — 83735 ASSAY OF MAGNESIUM: CPT

## 2018-04-25 PROCEDURE — 87040 BLOOD CULTURE FOR BACTERIA: CPT | Mod: 59

## 2018-04-25 PROCEDURE — 85025 COMPLETE CBC W/AUTO DIFF WBC: CPT

## 2018-04-25 PROCEDURE — 25000003 PHARM REV CODE 250: Performed by: PHYSICIAN ASSISTANT

## 2018-04-25 PROCEDURE — 94761 N-INVAS EAR/PLS OXIMETRY MLT: CPT

## 2018-04-25 PROCEDURE — 25000003 PHARM REV CODE 250: Performed by: NURSE PRACTITIONER

## 2018-04-25 PROCEDURE — 99900035 HC TECH TIME PER 15 MIN (STAT)

## 2018-04-25 PROCEDURE — 80048 BASIC METABOLIC PNL TOTAL CA: CPT

## 2018-04-25 PROCEDURE — 97110 THERAPEUTIC EXERCISES: CPT

## 2018-04-25 PROCEDURE — 99233 SBSQ HOSP IP/OBS HIGH 50: CPT | Mod: ,,, | Performed by: HOSPITALIST

## 2018-04-25 PROCEDURE — 99233 SBSQ HOSP IP/OBS HIGH 50: CPT | Mod: ,,, | Performed by: INTERNAL MEDICINE

## 2018-04-25 PROCEDURE — 84100 ASSAY OF PHOSPHORUS: CPT

## 2018-04-25 PROCEDURE — 36415 COLL VENOUS BLD VENIPUNCTURE: CPT

## 2018-04-25 PROCEDURE — 80100016 HC MAINTENANCE HEMODIALYSIS

## 2018-04-25 PROCEDURE — 63600175 PHARM REV CODE 636 W HCPCS: Performed by: NURSE PRACTITIONER

## 2018-04-25 PROCEDURE — 25000003 PHARM REV CODE 250: Performed by: INTERNAL MEDICINE

## 2018-04-25 RX ADMIN — DAPTOMYCIN 710 MG: 500 INJECTION, POWDER, LYOPHILIZED, FOR SOLUTION INTRAVENOUS at 05:04

## 2018-04-25 RX ADMIN — OXYCODONE AND ACETAMINOPHEN 1 TABLET: 5; 325 TABLET ORAL at 05:04

## 2018-04-25 RX ADMIN — FUROSEMIDE 80 MG: 40 TABLET ORAL at 05:04

## 2018-04-25 RX ADMIN — OXYCODONE HYDROCHLORIDE 5 MG: 5 TABLET ORAL at 01:04

## 2018-04-25 RX ADMIN — AMIODARONE HYDROCHLORIDE 200 MG: 200 TABLET ORAL at 01:04

## 2018-04-25 RX ADMIN — VITAMIN D, TAB 1000IU (100/BT) 2000 UNITS: 25 TAB at 01:04

## 2018-04-25 RX ADMIN — DOCUSATE SODIUM 200 MG: 100 CAPSULE, LIQUID FILLED ORAL at 09:04

## 2018-04-25 RX ADMIN — ERYTHROPOIETIN 20000 UNITS: 20000 INJECTION, SOLUTION INTRAVENOUS; SUBCUTANEOUS at 09:04

## 2018-04-25 RX ADMIN — ISOSORBIDE MONONITRATE 30 MG: 30 TABLET, EXTENDED RELEASE ORAL at 05:04

## 2018-04-25 RX ADMIN — CALCITRIOL 0.25 MCG: 0.25 CAPSULE, LIQUID FILLED ORAL at 01:04

## 2018-04-25 RX ADMIN — APIXABAN 5 MG: 2.5 TABLET, FILM COATED ORAL at 01:04

## 2018-04-25 RX ADMIN — SEVELAMER CARBONATE 800 MG: 800 TABLET, FILM COATED ORAL at 05:04

## 2018-04-25 RX ADMIN — APIXABAN 5 MG: 2.5 TABLET, FILM COATED ORAL at 09:04

## 2018-04-25 RX ADMIN — GABAPENTIN 100 MG: 100 CAPSULE ORAL at 09:04

## 2018-04-25 RX ADMIN — GABAPENTIN 100 MG: 100 CAPSULE ORAL at 05:04

## 2018-04-25 RX ADMIN — DOCUSATE SODIUM 200 MG: 100 CAPSULE, LIQUID FILLED ORAL at 01:04

## 2018-04-25 RX ADMIN — CEFTAROLINE FOSAMIL 200 MG: 400 POWDER, FOR SOLUTION INTRAVENOUS at 01:04

## 2018-04-25 RX ADMIN — FAMOTIDINE 20 MG: 20 TABLET ORAL at 01:04

## 2018-04-25 NOTE — PT/OT/SLP PROGRESS
"Physical Therapy Treatment    Patient Name:  Yumiko Proctor   MRN:  [id number]    Recommendations:     Discharge Recommendations:  nursing facility, skilled   Discharge Equipment Recommendations:   TBD  Barriers to discharge: Inaccessible home and Decreased caregiver support    Assessment:     Yumiko Proctor is a 68 y.o. female admitted with a medical diagnosis of Bacteremia due to methicillin resistant Staphylococcus aureus.  She presents with the following impairments/functional limitations:  impaired endurance, weakness, impaired functional mobilty, gait instability, impaired cognition, pain, decreased safety awareness, decreased lower extremity function, decreased upper extremity function, impaired balance, impaired self care skills, impaired sensation, edema, impaired skin. Pt fatigued this session secondary to dialysis this day. Severe back pain limiting mobility, will coordinate with RN next session regarding pain medicine. Recommend patient eat all meals seated at EOB with set up assistance to promote mobility, discussed with RN and PCT. Patient remains a high fall risk at this time. Per MD and US results     "Ultrasound on 2/23/2018 showed persistent nonocclusive thrombus in the left common femoral and popliteal veins and possible new thrombus in the right deep femoral vein.  Continue anticoagulation with apixaban.  Dose adjusted 4/24/2018 per nephrology's recommendation."     Patient properly anticoagulated per best PT practice. Will continue to work with therapy towards goals.      Rehab Prognosis:  Good; patient would benefit from acute skilled PT services to address these deficits and reach maximum level of function.      Recent Surgery: Procedure(s) (LRB):  DRAINAGE (Left) 1 Day Post-Op    Plan:     During this hospitalization, patient to be seen 6 x/week to address the above listed problems via therapeutic activities, therapeutic exercises, gait training, neuromuscular re-education, wheelchair " "management/training  · Plan of Care Expires:  05/21/18   Plan of Care Reviewed with: patient    Subjective     Communicated with RN prior to session.  Patient found Supine  upon PT entry to room, agreeable to treatment.      Spoke with therapist at [facility] ([phone]) info in facesheet. Therapist stated Ms. Proctor has been progressing well with therapy. As of 4/15/18 she was performing gait with RW and CGA 75', modified independent with bed mobility, and SBA for transfers. Fair dynamic standing balance also reported as well as tolerated standing x 6 minutes.     Chief Complaint: Severe back pain   Patient comments/goals: "I don't know right now"   Pain/Comfort:  · Pain Rating 1: 10/10  · Location - Side 1: Bilateral  · Location - Orientation 1: generalized  · Location 1: back  · Pain Addressed 1: Reposition, Distraction, Cessation of Activity, Nurse notified (RN present at end of session with pain medicine)  · Pain Rating Post-Intervention 1: 10/10    Patients cultural, spiritual, Synagogue conflicts given the current situation: none mentioned    Objective:     Patient found with: telemetry, peripheral IV     General Precautions: Standard, fall   Orthopedic Precautions:N/A     Functional Mobility:  · Bed Mobility:     · Rolling Right: minimum assistance, verbal cues needed for proper technique, hand placement, and use of bed rail to complete  · Scooting: total assistance and of 3 persons with HOB in trendelenburg position with drawsheet assist. Verbal cues given for patient to perform bridging, but did not perform during mobility    · Supine to Sit: moderate assistance with HOB elevated, pt required verbal cues and increased time needed to complete. Bed rails needed to complete.   · Sit to Supine: total assistance and of 2 persons at trunk and BLE for safety   · Transfers:     · Sit to Stand:  maximal assistance and of 2 persons with rolling walker x 3 trials from elevated " EOB, pt with forward flexed posture requiring verbal, visual, and tactile cues to align pelvis within TRISTAN in standing, toleration of standing improved 2nd trial. Standing tolerance of 5 seconds, 30 seconds, and 8 seconds with 1st-3rd trials. 2nd person for safety.  · Balance: Good static sitting EOB balance, poor static standing balance.     AM-PAC 6 CLICK MOBILITY  Turning over in bed (including adjusting bedclothes, sheets and blankets)?: 2  Sitting down on and standing up from a chair with arms (e.g., wheelchair, bedside commode, etc.): 2  Moving from lying on back to sitting on the side of the bed?: 2  Moving to and from a bed to a chair (including a wheelchair)?: 1  Need to walk in hospital room?: 1  Climbing 3-5 steps with a railing?: 1  Total Score: 9     Therapeutic Activities and Exercises:   PCT present to assist with changing of bed linen, and jose g care secondary to BM in bed.     Patient left supine with all lines intact, call button in reach, bed alarm on, RN notified and foam wedge placed under L side..    GOALS:    Physical Therapy Goals        Problem: Physical Therapy Goal    Goal Priority Disciplines Outcome Goal Variances Interventions   Physical Therapy Goal     PT/OT, PT Ongoing (interventions implemented as appropriate)     Description:  Goals to be met by: 18    Patient will increase functional independence with mobility by performin. Supine to sit with min A.  2. Sit to supine with min A.   3. Rolling R and L with min A.   4. Sitting at edge of bed >20 minutes with SBA.   5. Sit < stand with RW and min A   6. SPT from EOB <> bedside chair with RW and Min A   7. Pt will tolerate sitting at EOB for all meals with set up assist                   Time Tracking:     PT Received On: 18  PT Start Time: 1249     PT Stop Time: 1316  PT Total Time (min): 27 min     Billable Minutes: Therapeutic Activity 27    Treatment Type: Treatment  PT/PTA: PT         Lynsey Arnett, PT,  DPT  04/25/2018

## 2018-04-25 NOTE — PT/OT/SLP PROGRESS
Occupational Therapy   Treatment    Name: Yumiok Proctor  MRN: [id number]  Admitting Diagnosis:  Bacteremia due to methicillin resistant Staphylococcus aureus  1 Day Post-Op    Recommendations:     Discharge Recommendations: nursing facility, skilled  Discharge Equipment Recommendations:  none  Barriers to discharge:  None    Subjective     Communicated with: patient prior to session.  Pain/Comfort:  · Pain Rating 1: 10/10  · Location - Side 1: Left  · Location - Orientation 1: generalized  · Location 1: shoulder  · Pain Addressed 1:  (manual therapy, pain management training)  · Pain Rating Post-Intervention 1: 3/10    Patients cultural, spiritual, Episcopal conflicts given the current situation: None    Objective:     Patient found with: telemetry, peripheral IV    General Precautions: Standard, fall   Orthopedic Precautions:N/A   Braces: N/A     Occupational Performance:    Bed Mobility:    · Min A roll to Right  · Max A scoot up in bed (bed inverted)     Functional Mobility/Transfers:  · None    Activities of Daily Living:  · None    Patient left supine with all lines intact, call button in reach, bed alarm on and nurse and PCT notified    AM PAC 6 Click:  AM PAC Total Score: 11    Treatment & Education:  UE exercise: RUE: orange theraband exercises (10 reps; shoulder flexion, extension, horizontal ABD and ADD, ER, elbow extension), LUE AAROM 10 reps all shoulder and elbow planes of movement after manual therapy was used to relieve shoulder pain (10/10>3/10),  Anxiety and Pain management training:Pt was relaxed and falling to sleep at the end of the session  Education: effect of body weakness on potential of self-injury  Education:    Assessment:     Yumiko Proctor is a 68 y.o. female with a medical diagnosis of Bacteremia due to methicillin resistant Staphylococcus aureus.  She presents with Performance deficits affecting function are impaired endurance, weakness, impaired self care skills, impaired functional  mobilty, pain, decreased lower extremity function, decreased upper extremity function.effected performance during the session.  She reports a desire to get OOB, with fatigue and weakness limiting function.  She was Min A roll to Right, Max A scoot up in bed.  Resistive exercise limited to RUE with ROM to LUE due to shoulder pain (pain reduced from 10/10>3/10 with manual therapy).  Continuation of OT treatment is needed to maximize function while in the hospital.      Rehab Prognosis:  good; patient would benefit from acute skilled OT services to address these deficits and reach maximum level of function.       Plan:     Patient to be seen 6 x/week to address the above listed problems via self-care/home management, therapeutic activities, therapeutic exercises  · Plan of Care Expires: 05/21/18  · Plan of Care Reviewed with: patient    This Plan of care has been discussed with the patient who was involved in its development and understands and is in agreement with the identified goals and treatment plan    GOALS:    Occupational Therapy Goals        Problem: Occupational Therapy Goal    Goal Priority Disciplines Outcome Interventions   Occupational Therapy Goal     OT, PT/OT Ongoing (interventions implemented as appropriate)    Description:  Goals to be met by 5/21/18  1. Min A G/H (2 tasks) sitting EOB without using UE for support on bed or tray table  2. Mod A UBD seated EOB  3. Max A bed-BSC transfer  4.Maximize UE and axial muscle strength                       Time Tracking:     OT Date of Treatment: 04/25/18  OT Start Time: 1631  OT Stop Time: 1658  OT Total Time (min): 27 min    Billable Minutes:Therapeutic Activity 10  Therapeutic Exercise 17    PATTY Whitten  4/25/2018

## 2018-04-25 NOTE — SUBJECTIVE & OBJECTIVE
Interval History: No complaints. Catheter tip negative from 4/19. Blood cultures from 4/22 growing Staph sp. Culture report from outside hospital is still pending. PICC placed yesterday for IV antibiotics.    Review of Systems   Constitutional: Negative for chills and fever.   All other systems reviewed and are negative.    Objective:     Vital Signs (Most Recent):  Temp: 97.8 °F (36.6 °C) (04/25/18 0711)  Pulse: (!) 48 (04/25/18 0711)  Resp: 16 (04/25/18 0711)  BP: 139/60 (04/25/18 0711)  SpO2: 95 % (04/25/18 0711) Vital Signs (24h Range):  Temp:  [97.4 °F (36.3 °C)-98.6 °F (37 °C)] 97.8 °F (36.6 °C)  Pulse:  [42-50] 48  Resp:  [13-16] 16  SpO2:  [93 %-100 %] 95 %  BP: (110-161)/(53-69) 139/60     Weight: 119.4 kg (263 lb 3.7 oz)  Body mass index is 43.8 kg/m².    Estimated Creatinine Clearance: 18.3 mL/min (A) (based on SCr of 3.8 mg/dL (H)).    Physical Exam   Constitutional: She is oriented to person, place, and time. She appears well-developed and well-nourished.   HENT:   Head: Normocephalic and atraumatic.   Eyes: Conjunctivae and EOM are normal. Pupils are equal, round, and reactive to light.   Cardiovascular: Normal rate, regular rhythm and normal heart sounds.    Pulmonary/Chest: Effort normal and breath sounds normal.       Abdominal: Soft. Bowel sounds are normal.   Musculoskeletal: Normal range of motion. She exhibits no edema.   Neurological: She is alert and oriented to person, place, and time.   Skin: Skin is warm and dry.   Nursing note and vitals reviewed.      Significant Labs:   Blood Culture:   Recent Labs  Lab 02/17/18  1200 02/19/18  0537 02/22/18  0557 04/18/18  1831 04/22/18  1112   LABBLOO Gram stain vicente bottle: Gram positive cocci in clusters resembling Staph   Results called to and read back by:Ynes Steiner RN 02/18/2018  14:40  Gram stain aer bottle: Gram positive cocci in clusters resembling Staph   Positive results previously called 02/18/2018  18:19  STAPHYLOCOCCUS AUREUSID consult  required at [facility].For susceptibility see order #[id number] Gram stain aer bottle: Gram positive cocci in clusters resembling Staph   Gram stain vicente bottle: Gram positive cocci in clusters resembling Staph   Results called to and read back by:Alison Oswald RN 02/20/2018    03:01  METHICILLIN RESISTANT STAPHYLOCOCCUS AUREUSID consult required at [facility]. No growth after 5 days. Gram stain aer bottle: Gram positive cocci in clusters resembling Staph   Results called to and read back by: Nat Diane RN  04/20/2018  18:35  STAPHYLOCOCCUS AUREUSID consult required at [facility]. Gram stain vicente bottle: Gram positive cocci   Results called to and read back by: Drea Emerson RN 04/24/2018   14:28  Gram stain aer bottle: Gram positive cocci  04/24/2018  16:23     CBC:   Recent Labs  Lab 04/25/18  0422   WBC 6.34   HGB 9.1*   HCT 29.5*        CMP:   Recent Labs  Lab 04/25/18  0422   *   K 4.2   CL 96   CO2 24   GLU 70   BUN 23   CREATININE 3.8*   CALCIUM 8.9   ANIONGAP 9   EGFRNONAA 12*       Significant Imaging: U/S: I have reviewed all pertinent results/findings within the past 24 hours:  Persistent nonocclusive thrombus in the left common femoral and popliteal veins. Questionable new thrombus in the right deep femoral vein, although this is difficult visualize.

## 2018-04-25 NOTE — PLAN OF CARE
Problem: Physical Therapy Goal  Goal: Physical Therapy Goal  Goals to be met by: 18    Patient will increase functional independence with mobility by performin. Supine to sit with min A.  2. Sit to supine with min A.   3. Rolling R and L with min A.   4. Sitting at edge of bed >20 minutes with SBA.   5. Sit < stand with RW and min A   6. SPT from EOB <> bedside chair with RW and Min A   7. Pt will tolerate sitting at EOB for all meals with set up assist   Outcome: Ongoing (interventions implemented as appropriate)  Pt required max A for transfers and unable to perform gait at this time. Requires mod A for bed mobility. Recommend sitting at EOB for all meals to promote functional mobility and therapy goals (discussed with RN and PCT). Please see progress note for details, POC, and recommendations.

## 2018-04-25 NOTE — ASSESSMENT & PLAN NOTE
Continue Daptomycin and Ceftaroline. Cultures from here grew MSSA. Cultures from [facility] growing MRSA, by report. Unclear if sensitivities are identical to original organism.    Cultures from 4/22 growing Staph sp., after PICC removed on 4/19. Awaiting identification and sensitivities. If S.aureus, then will recommend a line holiday with removal of dialysis catheter on Friday while on antibiotics and replacement on Monday for dialysis.

## 2018-04-25 NOTE — PLAN OF CARE
Problem: Patient Care Overview  Goal: Plan of Care Review  Outcome: Ongoing (interventions implemented as appropriate)  No significant events noted throughout shift. Free of falls/trauma/inury. VSS. Denies any CP, SOB, palpitations, or dizziness. General skin remains intact with no new breakdown. Turning/repositioning independently in bed. C/o pain managed with PRN meds. Plan of care reviewed and updated, verbalizes understanding. No acute distress noted. Will continue to monitor.

## 2018-04-25 NOTE — SUBJECTIVE & OBJECTIVE
Interval History: No acute events overnight.     Review of Systems   Constitutional: Negative for chills and fever.   Respiratory: Negative for shortness of breath and wheezing.    Cardiovascular: Negative for chest pain.   Gastrointestinal: Negative for abdominal distention, abdominal pain, constipation, diarrhea, nausea and vomiting.   Genitourinary: Negative for dysuria and frequency.   Musculoskeletal: Negative for arthralgias, back pain and myalgias.   Neurological: Negative for light-headedness.   Psychiatric/Behavioral: Negative for agitation and confusion.     Objective:     Vital Signs (Most Recent):  Temp: 97.8 °F (36.6 °C) (04/25/18 0711)  Pulse: (!) 48 (04/25/18 0711)  Resp: 16 (04/25/18 0711)  BP: 139/60 (04/25/18 0711)  SpO2: 95 % (04/25/18 0711) Vital Signs (24h Range):  Temp:  [97.4 °F (36.3 °C)-98.6 °F (37 °C)] 97.8 °F (36.6 °C)  Pulse:  [42-50] 48  Resp:  [13-16] 16  SpO2:  [93 %-100 %] 95 %  BP: (110-161)/(53-69) 139/60     Weight: 119.4 kg (263 lb 3.7 oz)  Body mass index is 43.8 kg/m².    Intake/Output Summary (Last 24 hours) at 04/25/18 0825  Last data filed at 04/24/18 1813   Gross per 24 hour   Intake              400 ml   Output                0 ml   Net              400 ml      Physical Exam   Constitutional: She is oriented to person, place, and time. She appears well-developed and well-nourished. No distress.   Morbid obesity   HENT:   Head: Atraumatic.   Eyes: Conjunctivae are normal.   Neck: Neck supple.   Cardiovascular: Normal rate, regular rhythm and normal heart sounds.    No murmur heard.  Pulmonary/Chest: Effort normal and breath sounds normal. She has no wheezes.   Abdominal: Soft. Bowel sounds are normal. She exhibits no distension. There is no tenderness.   Musculoskeletal: Normal range of motion. She exhibits edema. She exhibits no deformity.   Neurological: She is alert and oriented to person, place, and time.   Skin:   Tunneled HD catheter exiting right chest wall without  signs of infection.  New tunneled venous line exiting left chest wall.       Significant Labs: All pertinent labs within the past 24 hours have been reviewed.    Significant Imaging: I have reviewed all pertinent imaging results/findings within the past 24 hours.

## 2018-04-25 NOTE — ASSESSMENT & PLAN NOTE
Ultrasound on 2/23/2018 showed persistent nonocclusive thrombus in the left common femoral and popliteal veins and possible new thrombus in the right deep femoral vein.  Continue anticoagulation with apixaban.  Dose adjusted 4/24/2018 per nephrology's recommendation.

## 2018-04-25 NOTE — ASSESSMENT & PLAN NOTE
Patient follows with Dr. Axel Sharpe with [facility] in [place], [place] for outpatient dialysis on Mondays, Wednesdays, and Fridays. Nephrology consulted here to continue with renal replacement therapy.

## 2018-04-25 NOTE — PLAN OF CARE
Problem: Occupational Therapy Goal  Goal: Occupational Therapy Goal  Goals to be met by 5/21/18  1. Min A G/H (2 tasks) sitting EOB without using UE for support on bed or tray table  2. Mod A UBD seated EOB  3. Max A bed-BSC transfer  4.Maximize UE and axial muscle strength      Outcome: Ongoing (interventions implemented as appropriate)  The patient reports a desire to get OOB, with fatigue and weakness limiting function.  She was Min A roll to Right, Max A scoot up in bed.  Resistive exercise limited to RUE with ROM to LUE due to shoulder pain (pain reduced from 10/10>3/10 with manual therapy).  PATTY Whitten 4/25/2018

## 2018-04-25 NOTE — PT/OT/SLP PROGRESS
Occupational Therapy  Not Seen Note    Patient Name:  Yumiko Proctor   MRN:  [id number]    Patient not seen today secondary to Dialysis. Will follow-up later in the day as time and patient availability permit.    PATTY Whitten  4/25/2018

## 2018-04-25 NOTE — PROGRESS NOTES
"[facility]  Infectious Disease  Progress Note    Patient Name: Yumiko Proctor  MRN: [id number]  Admission Date: 4/18/2018  Length of Stay: 7 days  Attending Physician: Victor Hugo Rogel MD  Primary Care Provider: [facility]    Isolation Status: No active isolations  Assessment/Plan:      * Bacteremia due to methicillin resistant Staphylococcus aureus    Continue Daptomycin and Ceftaroline. Cultures from here grew MSSA. Cultures from [facility] growing MRSA, by report. Unclear if sensitivities are identical to original organism.    Cultures from 4/22 growing Staph sp., after PICC removed on 4/19. Awaiting identification and sensitivities. If S.aureus, then will recommend a line holiday with removal of dialysis catheter on Friday while on antibiotics and replacement on Monday for dialysis.            Thank you for your consult. I will follow-up with patient. Please contact us if you have any additional questions.    Shahid Bhakta MD  Infectious Disease  [facility]    Subjective:     Principal Problem:Bacteremia due to methicillin resistant Staphylococcus aureus    HPI: 69 yo female with ESRD who was discharged on IV ceftaroline and daptomycin for MRSA bacteremia on 3/1/18. She was supposed to receive these antibiotics for 8 weeks from 2/22/18, the last negative blood culture. This would have put her end of care at 4/19/18. However, on examination of the records, it appears that she may have stopped them on 4/4/18, 2 weeks early. The PICC appears to have been left in place as well.    She reportedly began having "low grade" fever, cough, and SOB with an elevated BNP of 900. She was started on Levaquin 250 mg orally three times per week after dialysis. Chest xray showed a right upper lobe infiltrate.It is unclear specifically why she was admitted - she says that she had a cough and a slight fever. She was admitted to [facility] on 4/15, with a " normal WBC. She was placed on vancomycin and zosyn empirically. Blood cultures taken on 4/15 grew Staph aureus, as did blood cultures on 4/16. Sensitivities were reported verbally as MRSA, but I have not received the culture report to confirm this or compare to previous cultures. PICC was removed on 4/19 - culture from the tip is negative. Blood cultures from 4/22 are now growing Staph sp.  Interval History: No complaints. Catheter tip negative from 4/19. Blood cultures from 4/22 growing Staph sp. Culture report from outside hospital is still pending. PICC placed yesterday for IV antibiotics.    Review of Systems   Constitutional: Negative for chills and fever.   All other systems reviewed and are negative.    Objective:     Vital Signs (Most Recent):  Temp: 97.8 °F (36.6 °C) (04/25/18 0711)  Pulse: (!) 48 (04/25/18 0711)  Resp: 16 (04/25/18 0711)  BP: 139/60 (04/25/18 0711)  SpO2: 95 % (04/25/18 0711) Vital Signs (24h Range):  Temp:  [97.4 °F (36.3 °C)-98.6 °F (37 °C)] 97.8 °F (36.6 °C)  Pulse:  [42-50] 48  Resp:  [13-16] 16  SpO2:  [93 %-100 %] 95 %  BP: (110-161)/(53-69) 139/60     Weight: 119.4 kg (263 lb 3.7 oz)  Body mass index is 43.8 kg/m².    Estimated Creatinine Clearance: 18.3 mL/min (A) (based on SCr of 3.8 mg/dL (H)).    Physical Exam   Constitutional: She is oriented to person, place, and time. She appears well-developed and well-nourished.   HENT:   Head: Normocephalic and atraumatic.   Eyes: Conjunctivae and EOM are normal. Pupils are equal, round, and reactive to light.   Cardiovascular: Normal rate, regular rhythm and normal heart sounds.    Pulmonary/Chest: Effort normal and breath sounds normal.       Abdominal: Soft. Bowel sounds are normal.   Musculoskeletal: Normal range of motion. She exhibits no edema.   Neurological: She is alert and oriented to person, place, and time.   Skin: Skin is warm and dry.   Nursing note and vitals reviewed.      Significant Labs:   Blood Culture:   Recent  Labs  Lab 02/17/18  1200 02/19/18  0537 02/22/18  0557 04/18/18  1831 04/22/18  1112   LABBLOO Gram stain vicente bottle: Gram positive cocci in clusters resembling Staph   Results called to and read back by:Ynes Steiner RN 02/18/2018  14:40  Gram stain aer bottle: Gram positive cocci in clusters resembling Staph   Positive results previously called 02/18/2018  18:19  STAPHYLOCOCCUS AUREUSID consult required at [facility].For susceptibility see order #[id number] Gram stain aer bottle: Gram positive cocci in clusters resembling Staph   Gram stain vicente bottle: Gram positive cocci in clusters resembling Staph   Results called to and read back by:Alison Oswald RN 02/20/2018    03:01  METHICILLIN RESISTANT STAPHYLOCOCCUS AUREUSID consult required at [facility] and [facility] locations. No growth after 5 days. Gram stain aer bottle: Gram positive cocci in clusters resembling Staph   Results called to and read back by: Nat Diane RN  04/20/2018  18:35  STAPHYLOCOCCUS AUREUSID consult required at [facility] and [facility] locations. Gram stain vicente bottle: Gram positive cocci   Results called to and read back by: Drea Emerson RN 04/24/2018   14:28  Gram stain aer bottle: Gram positive cocci  04/24/2018  16:23     CBC:   Recent Labs  Lab 04/25/18  0422   WBC 6.34   HGB 9.1*   HCT 29.5*        CMP:   Recent Labs  Lab 04/25/18  0422   *   K 4.2   CL 96   CO2 24   GLU 70   BUN 23   CREATININE 3.8*   CALCIUM 8.9   ANIONGAP 9   EGFRNONAA 12*       Significant Imaging: U/S: I have reviewed all pertinent results/findings within the past 24 hours:  Persistent nonocclusive thrombus in the left common femoral and popliteal veins. Questionable new thrombus in the right deep femoral vein, although this is difficult visualize.

## 2018-04-25 NOTE — ASSESSMENT & PLAN NOTE
Previously diagnosed during prior hospitalization.  Transesophageal echocardiogram negative for vegetation.  Patient with large thrombus burden and left this was the likely source of her Staphylococcus bacteremia.  Per Dr. Sherron Wise's note on 2/28/2018, plan was for 8 weeks of intravenous antibiotics from last date of negative blood culture (2/22/2018) which would call for treatment until 4/19/2018.  It appears antibiotics were discontinued on 4/4/2018 so possible treatment failure due to inadequate treatment course or treatment failure due to inadequate source control.   Most recent positive blood culture obtained here on 4/18/2018 with methicillin sensitive Staphylococcus aureus suggesting possible new infection?  Tunneled peripherally inserted central catheter removed and tunneled dialysis catheter recently exchanged as well during last hospitalization.  New tunneled venous line placed yesterday on 4/24/2108.  Most recent blood cultures collected on 4/22/2018 now also positive for gram positive cocci.  Continue with current antibiotic treatment for now and will discuss with Dr. Shahid Bhakta.

## 2018-04-25 NOTE — PROGRESS NOTES
"Nephrology  Progress Note    Admit Date: 4/18/2018   LOS: 7 days     SUBJECTIVE:     Follow-up For:  Bacteremia due to methicillin resistant Staphylococcus aureus    Interval History:     No c/o this am.  Cultures noted.  No CP/SOB.  Awaiting HD this am.        Review of Systems:  Constitutional: No fever or chills  Respiratory: No cough or shortness of breath  Cardiovascular: No chest pain or palpitations  Gastrointestinal: No nausea or vomiting  Neurological: No confusion or weakness    OBJECTIVE:     Vital Signs Range (Last 24H):  /60 (Patient Position: Lying)   Pulse (!) 48   Temp 97.8 °F (36.6 °C) (Oral)   Resp 16   Ht 5' 5" (1.651 m)   Wt 119.4 kg (263 lb 3.7 oz)   LMP 01/01/1983 (LMP Unknown)   SpO2 95%   Breastfeeding? No   BMI 43.80 kg/m²     Temp:  [97.4 °F (36.3 °C)-98.6 °F (37 °C)]   Pulse:  [42-50]   Resp:  [13-16]   BP: (110-161)/(53-69)   SpO2:  [93 %-100 %]     I & O (Last 24H):    Intake/Output Summary (Last 24 hours) at 04/25/18 0825  Last data filed at 04/24/18 1813   Gross per 24 hour   Intake              400 ml   Output                0 ml   Net              400 ml       Physical Exam:  General appearance: morbidly obese female in NAD  Eyes:  Conjunctivae/corneas clear. PERRL.  Lungs: Normal respiratory effort,  diminished  Heart: Regular/Irregular/Jerry rate and rhythm, S1, S2 normal, no murmur, rub or cuco.  Abdomen: Soft, non-tender non-distended; bowel sounds normal; no masses,  no organomegaly, obese  Extremities: No cyanosis or clubbing. trace edema.    Skin: Skin color, texture, turgor normal. No rashes or lesions  Neurologic: Normal strength and tone. No focal numbness or weakness   Right IJ EULALIO no E/E/T  Left SC PICC        Laboratory Data:    Recent Labs  Lab 04/25/18 0422   WBC 6.34   RBC 3.48*   HGB 9.1*   HCT 29.5*      MCV 85   MCH 26.1*   MCHC 30.8*       BMP:     Recent Labs  Lab 04/25/18 0422   GLU 70   *   K 4.2   CL 96   CO2 24   BUN 23 "   CREATININE 3.8*   CALCIUM 8.9   MG 2.0     Lab Results   Component Value Date    CALCIUM 8.9 04/25/2018    PHOS 3.9 04/25/2018       Lab Results   Component Value Date    .8 (H) 02/08/2018    CALCIUM 8.9 04/25/2018    PHOS 3.9 04/25/2018       No results found for: URICACID    BNP    Recent Labs  Lab 04/18/18  1712   BNP 1,178*     Blood:  MSSA      Medications:  Medication list was reviewed and changes noted under Assessment/Plan.    Diagnostic Results:    IR Tunneled Catheter Insert w/o Port   Final Result      Insertion of left-sided supradiaphragmatic dual- lumen tunneled cuffed catheter, with tip in the expected location of the cavoatrial junction.      Plan:      The catheter may be used immediately.      _______________________________________________________________         Electronically signed by: Aditya Francis MD   Date:    04/24/2018   Time:    15:41      US Lower Extremity Veins Bilateral   Final Result      Persistent nonocclusive thrombus in the left common femoral and popliteal veins.      Questionable new thrombus in the right deep femoral vein, although this is difficult visualize.         Electronically signed by: Aditya Francis MD   Date:    04/23/2018   Time:    16:26      X-Ray Chest AP Portable   Final Result      Bilateral central venous catheters present.      Some consolidation in both lungs, more at the left lung base with small pleural effusion.         Electronically signed by: Ilan Ochoa MD   Date:    04/19/2018   Time:    07:55          ASSESSMENT/PLAN:     1. ESRD on HD MWF in [place], [place] with Dr. Sharpe (N18.6, Z99.2):  Routine HD MWF/PRN while inpt.  Keep EULALIO in for now.  Consent signed.  Renally dose meds, avoid nephrotoxins, and monitor I/O's closely.  2. Bacteremia-MSSA (R78.81):  Antibx per ID. No evidence of PNa. Removed PICC and cultured tip. Now with MSSA.  Last admit only identifiable source was extensive chronic LLE DVT.  Repeat U/S now with same LLE  DVT and new R leg DVT.  Eliquis for now.  Discussed with team.  PICC replaced for antibx treatments.  Dr. Gustafson consult appreciated.  ECHO with no vegetation.   3. Anemia of CKD/infection (D63.1):  No IV iron with infection.  Epo on HD.  S/p transfusion 2 units PRBCs on HD 4/18.  Hgb stable  4. 2HPT/Vit D deficiency:  Vit D3 and calcitriol.  5. Hyponatremia (E87.1):  Adjust HD bath.   6. Morbid Obesity (E66.01):  Needs aggressive weight mgmt.         See above  Follow for renal needs.   Discussed with Dr. Rogel.

## 2018-04-25 NOTE — PROGRESS NOTES
"[facility] Hospital Medicine  Progress Note    Patient Name: Yumiko Proctor  MRN: [id number]  Patient Class: IP- Inpatient   Admission Date: 4/18/2018  Length of Stay: 7 days  Attending Physician: Victor Hugo Rogel MD  Primary Care Provider: [facility]        Subjective:     Principal Problem:Bacteremia due to methicillin resistant Staphylococcus aureus    HPI:  Per Nat Woodward, "Ms Hoa Proctor is a 68 y.o. female, with PMH of HTN, NIDDM-2, CHF (EF 35%), ESRD on dialysis ([facility], Dr. Prather), A. Fib, LLE DVT, cardiomyopathy, and recent treatment in this facility for MRSA bacteremia from her vascular access site, who returns 2/2 need for ID consultation. In the past week the patient developed a fever and was diagnosed with RUL PNA, and started on Levaquin 3/week after dialysis. Associated symptoms include non-productive cough, fevers, and SOB with wheeze. She was started on vancymycin and zosyn on 4/15/18. Blood cultures taken on 4/16/18 were the second set, first after antibiotics, that grew staph. Other associated symptoms include b/l hip pain, and constipation.  She denies fever, chills, sweats, chest pain, SOB, N/V, diarrhea.     During the patient's previous stay at this facility she had blood cultures positive for MRSA on 2/8/18, and she was started on Daptomycin. On 2/10/18 a second set of blood cultures were positive for MRSA. At the time she had her left Jaspreet catheter removed and a right IJ tunneled dialysis cath was placed on 2/12/18. Repeat cultures on 2/13/18 were negative for MRSA. She was positive in 1/2 bottles on 2/16/18 and both blood cultures bottles for MRSA on 2/16/18. At that time she was started on vancomycin, and Daptomycin was stopped 2/2 myositis. On 2/22/18 blood cultures were negative. A RANDA on 2/26/18 was negative for vegetations, but the patient did have a LLE DVT discovered 2/20/18, and Apixaban was started. After CPK " "levels were normal, she was started back on Daptomycin and Ceftaroline. She was to continue this treatment until 4/4/18, and she was transferred to a NH facility in [place], [place]. She states she was transferred from the Nursing facility to [facility] on 4/15/18."    Hospital Course:  Ms. Yumiko Proctor is a 68 year-old woman with a history hypertension, diabetes mellitus type II on insulin, chronic systolic heart failure with ejection fraction of 35 percent, and end-stage renal disease on hemodialysis via tunneled dialysis catheter exited her right chest wall who returned from a nursing facility in [place] where she was undergoing treatment for presumed endovascular infection with Methicillin-resistant Staphylococcus aureus infection via a peripherally inserted central catheter who returns with recurrence of Staphylococcus aureus bacteremia.  Patient's peripherally inserted central catheter was removed here.  Blood cultures obtained here on 4/19/2018 positive for methicillin-sensitive Staphylococcus aureus.  Infectious disease service consulted and recommended treatment with intravenous ceftaroline and daptomycin.    Interval History: No acute events overnight.     Review of Systems   Constitutional: Negative for chills and fever.   Respiratory: Negative for shortness of breath and wheezing.    Cardiovascular: Negative for chest pain.   Gastrointestinal: Negative for abdominal distention, abdominal pain, constipation, diarrhea, nausea and vomiting.   Genitourinary: Negative for dysuria and frequency.   Musculoskeletal: Negative for arthralgias, back pain and myalgias.   Neurological: Negative for light-headedness.   Psychiatric/Behavioral: Negative for agitation and confusion.     Objective:     Vital Signs (Most Recent):  Temp: 97.8 °F (36.6 °C) (04/25/18 0711)  Pulse: (!) 48 (04/25/18 0711)  Resp: 16 (04/25/18 0711)  BP: 139/60 (04/25/18 0711)  SpO2: 95 % (04/25/18 0711) Vital Signs (24h Range):  Temp:  [97.4 °F " (36.3 °C)-98.6 °F (37 °C)] 97.8 °F (36.6 °C)  Pulse:  [42-50] 48  Resp:  [13-16] 16  SpO2:  [93 %-100 %] 95 %  BP: (110-161)/(53-69) 139/60     Weight: 119.4 kg (263 lb 3.7 oz)  Body mass index is 43.8 kg/m².    Intake/Output Summary (Last 24 hours) at 04/25/18 0825  Last data filed at 04/24/18 1813   Gross per 24 hour   Intake              400 ml   Output                0 ml   Net              400 ml      Physical Exam   Constitutional: She is oriented to person, place, and time. She appears well-developed and well-nourished. No distress.   Morbid obesity   HENT:   Head: Atraumatic.   Eyes: Conjunctivae are normal.   Neck: Neck supple.   Cardiovascular: Normal rate, regular rhythm and normal heart sounds.    No murmur heard.  Pulmonary/Chest: Effort normal and breath sounds normal. She has no wheezes.   Abdominal: Soft. Bowel sounds are normal. She exhibits no distension. There is no tenderness.   Musculoskeletal: Normal range of motion. She exhibits edema. She exhibits no deformity.   Neurological: She is alert and oriented to person, place, and time.   Skin:   Tunneled HD catheter exiting right chest wall without signs of infection.  New tunneled venous line exiting left chest wall.       Significant Labs: All pertinent labs within the past 24 hours have been reviewed.    Significant Imaging: I have reviewed all pertinent imaging results/findings within the past 24 hours.    Assessment/Plan:      * Bacteremia due to methicillin resistant Staphylococcus aureus    Previously diagnosed during prior hospitalization.  Transesophageal echocardiogram negative for vegetation.  Patient with large thrombus burden and left this was the likely source of her Staphylococcus bacteremia.  Per Dr. Sherron Wise's note on 2/28/2018, plan was for 8 weeks of intravenous antibiotics from last date of negative blood culture (2/22/2018) which would call for treatment until 4/19/2018.  It appears antibiotics were discontinued on  4/4/2018 so possible treatment failure due to inadequate treatment course or treatment failure due to inadequate source control.   Most recent positive blood culture obtained here on 4/18/2018 with methicillin sensitive Staphylococcus aureus suggesting possible new infection?  Tunneled peripherally inserted central catheter removed and tunneled dialysis catheter recently exchanged as well during last hospitalization.  New tunneled venous line placed yesterday on 4/24/2108.  Most recent blood cultures collected on 4/22/2018 now also positive for gram positive cocci.  Continue with current antibiotic treatment for now and will discuss with Dr. Shahid Bhakta.        DVT of deep femoral vein, left    Ultrasound on 2/23/2018 showed persistent nonocclusive thrombus in the left common femoral and popliteal veins and possible new thrombus in the right deep femoral vein.  Continue anticoagulation with apixaban.  Dose adjusted 4/24/2018 per nephrology's recommendation.        Chronic atrial fibrillation    Continue amiodarone & carvedilol and anticoagulation with apixaban.        Type 2 diabetes mellitus with chronic kidney disease on chronic dialysis, without long-term current use of insulin    Well controlled likely due to renal failure.  Continue diabetic diet with sliding scale insulin.         End stage renal disease    Patient follows with Dr. Axel Sharpe with [facility] in [place], [place] for outpatient dialysis on Mondays, Wednesdays, and Fridays. Nephrology consulted here to continue with renal replacement therapy.        Anemia due to chronic kidney disease    Improved status post blood transfusion on 4/20/2018 and hemoglobin remains stable.        Slow transit constipation    Improved with bowel regimen. Continue current management.          Debility    Consult physical and occupational therapy.            VTE Risk Mitigation         Ordered     apixaban tablet 5 mg  2 times daily      04/24/18 1844      heparin (porcine) injection 5,000 Units  As needed (PRN)      04/18/18 0759              Victor Hugo Rogel MD  Department of Hospital Medicine   [facility]

## 2018-04-26 LAB
POCT GLUCOSE: 102 MG/DL (ref 70–110)
POCT GLUCOSE: 106 MG/DL (ref 70–110)
POCT GLUCOSE: 69 MG/DL (ref 70–110)

## 2018-04-26 PROCEDURE — 63600175 PHARM REV CODE 636 W HCPCS: Performed by: INTERNAL MEDICINE

## 2018-04-26 PROCEDURE — 25000003 PHARM REV CODE 250: Performed by: PHYSICIAN ASSISTANT

## 2018-04-26 PROCEDURE — 25000003 PHARM REV CODE 250: Performed by: INTERNAL MEDICINE

## 2018-04-26 PROCEDURE — 94761 N-INVAS EAR/PLS OXIMETRY MLT: CPT

## 2018-04-26 PROCEDURE — 97110 THERAPEUTIC EXERCISES: CPT

## 2018-04-26 PROCEDURE — 99233 SBSQ HOSP IP/OBS HIGH 50: CPT | Mod: ,,, | Performed by: HOSPITALIST

## 2018-04-26 PROCEDURE — 25000003 PHARM REV CODE 250: Performed by: NURSE PRACTITIONER

## 2018-04-26 PROCEDURE — 99900035 HC TECH TIME PER 15 MIN (STAT)

## 2018-04-26 PROCEDURE — 97535 SELF CARE MNGMENT TRAINING: CPT

## 2018-04-26 PROCEDURE — 97530 THERAPEUTIC ACTIVITIES: CPT

## 2018-04-26 PROCEDURE — 11000001 HC ACUTE MED/SURG PRIVATE ROOM

## 2018-04-26 RX ORDER — CARVEDILOL 3.12 MG/1
3.12 TABLET ORAL 2 TIMES DAILY
Status: DISCONTINUED | OUTPATIENT
Start: 2018-04-26 | End: 2018-04-27

## 2018-04-26 RX ADMIN — APIXABAN 5 MG: 2.5 TABLET, FILM COATED ORAL at 08:04

## 2018-04-26 RX ADMIN — OXYCODONE AND ACETAMINOPHEN 1 TABLET: 5; 325 TABLET ORAL at 02:04

## 2018-04-26 RX ADMIN — GABAPENTIN 100 MG: 100 CAPSULE ORAL at 08:04

## 2018-04-26 RX ADMIN — DOCUSATE SODIUM 200 MG: 100 CAPSULE, LIQUID FILLED ORAL at 08:04

## 2018-04-26 RX ADMIN — ISOSORBIDE MONONITRATE 30 MG: 30 TABLET, EXTENDED RELEASE ORAL at 04:04

## 2018-04-26 RX ADMIN — CEFTAROLINE FOSAMIL 200 MG: 400 POWDER, FOR SOLUTION INTRAVENOUS at 02:04

## 2018-04-26 RX ADMIN — CALCITRIOL 0.25 MCG: 0.25 CAPSULE, LIQUID FILLED ORAL at 08:04

## 2018-04-26 RX ADMIN — CARVEDILOL 3.12 MG: 3.12 TABLET, FILM COATED ORAL at 08:04

## 2018-04-26 RX ADMIN — SEVELAMER CARBONATE 800 MG: 800 TABLET, FILM COATED ORAL at 08:04

## 2018-04-26 RX ADMIN — FUROSEMIDE 80 MG: 40 TABLET ORAL at 08:04

## 2018-04-26 RX ADMIN — SEVELAMER CARBONATE 800 MG: 800 TABLET, FILM COATED ORAL at 04:04

## 2018-04-26 RX ADMIN — AMIODARONE HYDROCHLORIDE 200 MG: 200 TABLET ORAL at 08:04

## 2018-04-26 RX ADMIN — CEFTAROLINE FOSAMIL 200 MG: 400 POWDER, FOR SOLUTION INTRAVENOUS at 01:04

## 2018-04-26 RX ADMIN — OXYCODONE HYDROCHLORIDE 5 MG: 5 TABLET ORAL at 10:04

## 2018-04-26 RX ADMIN — POLYETHYLENE GLYCOL 3350 17 G: 17 POWDER, FOR SOLUTION ORAL at 08:04

## 2018-04-26 RX ADMIN — SEVELAMER CARBONATE 800 MG: 800 TABLET, FILM COATED ORAL at 12:04

## 2018-04-26 RX ADMIN — VITAMIN D, TAB 1000IU (100/BT) 2000 UNITS: 25 TAB at 08:04

## 2018-04-26 RX ADMIN — FUROSEMIDE 80 MG: 40 TABLET ORAL at 06:04

## 2018-04-26 RX ADMIN — FAMOTIDINE 20 MG: 20 TABLET ORAL at 08:04

## 2018-04-26 RX ADMIN — GABAPENTIN 100 MG: 100 CAPSULE ORAL at 02:04

## 2018-04-26 NOTE — PLAN OF CARE
Problem: Patient Care Overview  Goal: Plan of Care Review  Outcome: Ongoing (interventions implemented as appropriate)  Pt free from falls, injuries or skin breakdown this shift. VSS on RA. After therapy, pt complained of pain, administered PRN pain meds.  Incontinent of bowel and bladder. One bowel movement this shift. All safety precautions in place. Report given to oncoming nurse.

## 2018-04-26 NOTE — PLAN OF CARE
Problem: Occupational Therapy Goal  Goal: Occupational Therapy Goal  Goals to be met by 5/21/18  1. Min A G/H (2 tasks) sitting EOB without using UE for support on bed or tray table  2. Mod A UBD seated EOB (MET 04/26/18)  REVISED Min assist UBD at EOB  3. Max A bed-BSC transfer  4.Maximize UE and axial muscle strength      Outcome: Ongoing (interventions implemented as appropriate)  Despite c/o back pain, improved performance of and tolerance for bed mobility and functional sit to/from stand.  Incontinent of BM during stand attempts.  Patient was able to transfer with 1 person assist and perform functional mobility with SNF staff, thus feel patient will benefit from return to continue progress.  Continue OT services to maximize patient functioning.    Comments: PATTY Alonzo, 4/26/2018

## 2018-04-26 NOTE — PROGRESS NOTES
"[facility]  Cardiology  Progress Note    Patient Name: Yumiko Proctor  MRN: [id number]  Admission Date: 4/18/2018  Hospital Length of Stay: 7 days  Code Status: Full Code   Attending Physician: Victor Hugo Rogel MD   Primary Care Physician: [facility]  Expected Discharge Date:   Principal Problem:Bacteremia due to methicillin resistant Staphylococcus aureus    Subjective:     Brief HPI:    Complex History as per Dr. Harrison and Nat Woodward PA:     "Ms. Yumiko Proctor is a 68 y.o. female, with PMH of HTN, NIDDM-2, CHF (EF 35%), ESRD on dialysis ([facility], [facility], Dr. Prather), A. Fib, LLE DVT, cardiomyopathy, and recent treatment in this facility for MRSA bacteremia from her vascular access site, who returns 2/2 need for ID consultation. In the past week the patient developed a fever and was diagnosed with RUL PNA, and started on Levaquin 3/week after dialysis. Associated symptoms include non-productive cough, fevers, and SOB with wheeze. She was started on vancymycin and zosyn on 4/15/18. Blood cultures taken on 4/16/18 were the second set, first after antibiotics, that grew staph. Other associated symptoms include b/l hip pain, and constipation.  She denies fever, chills, sweats, chest pain, SOB, N/V, diarrhea.      During the patient's previous stay at this facility she had blood cultures positive for MRSA on 2/8/18, and she was started on Daptomycin. On 2/10/18 a second set of blood cultures were positive for MRSA. At the time she had her left Jaspreet catheter removed and a right IJ tunneled dialysis cath was placed on 2/12/18. Repeat cultures on 2/13/18 were negative for MRSA. She was positive in 1/2 bottles on 2/16/18 and both blood cultures bottles for MRSA on 2/16/18. At that time she was started on vancomycin, and Daptomycin was stopped 2/2 myositis. On 2/22/18 blood cultures were negative. A RANDA on 2/26/18 was negative for vegetations, but the patient did " "have a LLE DVT discovered 2/20/18, and Apixaban was started. After CPK levels were normal, she was started back on Daptomycin and Ceftaroline. She was to continue this treatment until 4/4/18, and she was transferred to a NH facility in [place], [place]. She states she was transferred from the Nursing facility to [facility] on 4/15/18."     Hospital Course:  "Ms. Yumiko Proctor is a 68 year-old woman with a history hypertension, diabetes mellitus type II on insulin, chronic systolic heart failure with ejection fraction of 35 percent, and end-stage renal disease on hemodialysis via tunneled dialysis catheter exited her right chest wall who returned from a nursing facility in [place] where she was undergoing treatment for presumed endovascular infection with Methicillin-resistant Staphylococcus aureus infection via a peripherally inserted central catheter who returns with recurrence of Staphylococcus aureus bacteremia. Patient's peripherally inserted central catheter was removed here.  Blood cultures obtained here on 4/19/2018 positive for methicillin-sensitive Staphylococcus aureus.  Infectious disease service consulted and recommended treatment with intravenous ceftaroline and daptomycin."     She had another Venous Duplex on 4/24/2018 that reveals a "persistent nonocclusive thrombus in the left common femoral and popliteal veins. In addition there is a questionable new thrombus in the right deep femoral vein, although this is difficult visualize". The patient is anticoagulated with apixiban 5 mg Q12. I am asked to see for consideration of IVC Filter.    Hospital Course:     Antibiotics iv.    Anticoagulation with apixiban.    Interval History:     Stable.    Review of Systems   Cardiovascular: Negative for chest pain, orthopnea and palpitations.   Respiratory: Negative for shortness of breath.      Objective:     Vital Signs (Most Recent):  Temp: 97.9 °F (36.6 °C) (04/25/18 1611)  Pulse: (!) 48 (04/25/18 1611)  Resp: 16 " (04/25/18 1611)  BP: (!) 107/49 (04/25/18 1611)  SpO2: 96 % (04/25/18 1611) Vital Signs (24h Range):  Temp:  [97.7 °F (36.5 °C)-98.6 °F (37 °C)] 97.9 °F (36.6 °C)  Pulse:  [42-60] 48  Resp:  [14-20] 16  SpO2:  [93 %-98 %] 96 %  BP: (102-144)/(43-69) 107/49     Weight: 119.4 kg (263 lb 3.7 oz)  Body mass index is 43.8 kg/m².    SpO2: 96 %  O2 Device (Oxygen Therapy): room air      Intake/Output Summary (Last 24 hours) at 04/25/18 1904  Last data filed at 04/25/18 1146   Gross per 24 hour   Intake                0 ml   Output             2000 ml   Net            -2000 ml       Lines/Drains/Airways     Central Venous Catheter Line                 Hemodialysis Catheter 02/12/18 0200 right internal jugular 72 days         Tunneled Central Line Insertion/Assessment - Double Lumen  04/24/18 1450 left internal jugular 1 day          Airway                 Airway - Non-Surgical 02/12/18 1310 Nasal Cannula 72 days                Physical Exam   Cardiovascular: Normal rate, regular rhythm, S1 normal and S2 normal.    Pulmonary/Chest: Breath sounds normal.       Current Medications:     amiodarone  200 mg Oral Daily    apixaban  5 mg Oral BID    calcitRIOL  0.25 mcg Oral Daily    carvedilol  12.5 mg Oral BID    ceftaroline (TEFLARO) IVPB  200 mg Intravenous Q12H    DAPTOmycin (CUBICIN)  IV  6 mg/kg Intravenous Q48H    docusate sodium  200 mg Oral BID    epoetin davion (PROCRIT) injection  20,000 Units Intravenous Every Mon, Wed, Fri    famotidine  20 mg Oral Daily    furosemide  80 mg Oral BID    gabapentin  100 mg Oral TID    isosorbide mononitrate  30 mg Oral Daily    polyethylene glycol  17 g Oral Daily    sevelamer carbonate  800 mg Oral TID WM    vitamin D  2,000 Units Oral Daily     Current Laboratory Results:    Recent Results (from the past 24 hour(s))   POCT glucose    Collection Time: 04/24/18  8:50 PM   Result Value Ref Range    POCT Glucose 90 70 - 110 mg/dL   Magnesium    Collection Time: 04/25/18   4:22 AM   Result Value Ref Range    Magnesium 2.0 1.6 - 2.6 mg/dL   Phosphorus    Collection Time: 04/25/18  4:22 AM   Result Value Ref Range    Phosphorus 3.9 2.7 - 4.5 mg/dL   Basic Metabolic Panel (BMP)    Collection Time: 04/25/18  4:22 AM   Result Value Ref Range    Sodium 129 (L) 136 - 145 mmol/L    Potassium 4.2 3.5 - 5.1 mmol/L    Chloride 96 95 - 110 mmol/L    CO2 24 23 - 29 mmol/L    Glucose 70 70 - 110 mg/dL    BUN, Bld 23 8 - 23 mg/dL    Creatinine 3.8 (H) 0.5 - 1.4 mg/dL    Calcium 8.9 8.7 - 10.5 mg/dL    Anion Gap 9 8 - 16 mmol/L    eGFR if African American 13 (A) >60 mL/min/1.73 m^2    eGFR if non African American 12 (A) >60 mL/min/1.73 m^2   CBC with Automated Differential    Collection Time: 04/25/18  4:22 AM   Result Value Ref Range    WBC 6.34 3.90 - 12.70 K/uL    RBC 3.48 (L) 4.00 - 5.40 M/uL    Hemoglobin 9.1 (L) 12.0 - 16.0 g/dL    Hematocrit 29.5 (L) 37.0 - 48.5 %    MCV 85 82 - 98 fL    MCH 26.1 (L) 27.0 - 31.0 pg    MCHC 30.8 (L) 32.0 - 36.0 g/dL    RDW 17.5 (H) 11.5 - 14.5 %    Platelets 341 150 - 350 K/uL    MPV 9.3 9.2 - 12.9 fL    Gran # (ANC) 3.2 1.8 - 7.7 K/uL    Lymph # 2.3 1.0 - 4.8 K/uL    Mono # 0.6 0.3 - 1.0 K/uL    Eos # 0.2 0.0 - 0.5 K/uL    Baso # 0.02 0.00 - 0.20 K/uL    Gran% 51.1 38.0 - 73.0 %    Lymph% 35.8 18.0 - 48.0 %    Mono% 9.9 4.0 - 15.0 %    Eosinophil% 2.7 0.0 - 8.0 %    Basophil% 0.3 0.0 - 1.9 %    Differential Method Automated    POCT glucose    Collection Time: 04/25/18  7:54 AM   Result Value Ref Range    POCT Glucose 76 70 - 110 mg/dL   POCT glucose    Collection Time: 04/25/18 12:21 PM   Result Value Ref Range    POCT Glucose 82 70 - 110 mg/dL   POCT glucose    Collection Time: 04/25/18  4:30 PM   Result Value Ref Range    POCT Glucose 87 70 - 110 mg/dL     Current Imaging Results:    Imaging Results    None         Assessment and Plan:     Problem List:    Active Diagnoses:    Diagnosis Date Noted POA    PRINCIPAL PROBLEM:  Bacteremia due to methicillin  "resistant Staphylococcus aureus [R78.81] 02/07/2018 Yes    Debility [R53.81] 04/24/2018 Yes    Slow transit constipation [K59.01] 04/20/2018 Yes    Anemia due to chronic kidney disease [N18.9, D63.1] 04/19/2018 Yes    DVT of deep femoral vein, left [I82.412] 02/21/2018 Yes    Chronic atrial fibrillation [I48.2] 02/08/2018 Yes    End stage renal disease [N18.6] 02/08/2018 Yes    Type 2 diabetes mellitus with chronic kidney disease on chronic dialysis, without long-term current use of insulin [E11.22, N18.6, Z99.2] 02/08/2018 Not Applicable      Problems Resolved During this Admission:    Diagnosis Date Noted Date Resolved POA     Assessment and Plan:     1. MRSA Bacteremia              2/9/2018: TTE: No vegetations seen.              2/26/2018: RANDA: Small calcified nodule on atrial side of posterior mitral leaflet. Mild to moderate MR. Aortic valve unremarkable.              No findings to suggest endocarditis.              Now recurrent bacteremia.     2. Deep Venous Trombosis of Lower Extremity              2/20/2018: Venous Duplex: "Extensive thrombus throughout the left lower extremity with nonocclusive thrombus in the common femoral vein with occlusive thrombus throughout the femoral, popliteal, and peroneal vein. Anterior and posterior tibial veins are patent.              2/23/2018: Venous Duplex: "Persistent nonocclusive thrombus in the left common femoral and popliteal veins.Questionable new thrombus in the right deep femoral vein, although this is difficult visualize."              Old left leg DVT and possibly new right leg DVT. No evidence of pulmonary embolism.              She is anticoagulated with apixiban 5 mg Q12.              It appears to me that the best option is continued anticoagulation indefinitely.              I would not favor an IVC Filter as she appears to tolerate anticoagulation. Also would avoid implantation if at all possible in the setting of recurrent infection.   Discussed " with patient further.    No need for me to follow,    Thanks,    VTE Risk Mitigation         Ordered     apixaban tablet 5 mg  2 times daily      04/24/18 0998     heparin (porcine) injection 5,000 Units  As needed (PRN)      04/18/18 6882          Rikki Gustafson MD  Cardiology  [facility]

## 2018-04-26 NOTE — NURSING
PT oriented, denies pain, turning q2 and encouraging pt to shift weight in bed, elevated heels on pillow, HS bs stable not requiring coverage, notified charge and NP/ MD pt lashay to 45, evening coreg held. Will continue to monitor. Fall px maintained at all times. No signs of distress.  purposeful rounding q2 hrs, call bell at bedside, POC reviewed with pt and pt verbalized understanding, pt resting comfortably at this time.

## 2018-04-26 NOTE — PROGRESS NOTES
"Nephrology  Progress Note    Admit Date: 4/18/2018   LOS: 8 days     SUBJECTIVE:     Follow-up For:  Bacteremia due to methicillin resistant Staphylococcus aureus    Interval History:     No c/o this am.   Awaiting culture results to adjust treatment plan.  No CP/SOB.     Review of Systems:  Constitutional: No fever or chills  Respiratory: No cough or shortness of breath  Cardiovascular: No chest pain or palpitations  Gastrointestinal: No nausea or vomiting  Neurological: No confusion or weakness    OBJECTIVE:     Vital Signs Range (Last 24H):  BP (!) 128/56 (BP Location: Left arm, Patient Position: Lying)   Pulse 60   Temp 97.7 °F (36.5 °C) (Oral)   Resp 18   Ht 5' 5" (1.651 m)   Wt 118.2 kg (260 lb 9.3 oz)   LMP 01/01/1983 (LMP Unknown)   SpO2 96%   Breastfeeding? No   BMI 43.36 kg/m²     Temp:  [97.1 °F (36.2 °C)-98.6 °F (37 °C)]   Pulse:  [45-60]   Resp:  [16-20]   BP: ()/(43-69)   SpO2:  [92 %-97 %]     I & O (Last 24H):    Intake/Output Summary (Last 24 hours) at 04/26/18 0814  Last data filed at 04/25/18 1146   Gross per 24 hour   Intake                0 ml   Output             2000 ml   Net            -2000 ml       Physical Exam:  General appearance: morbidly obese female in NAD  Eyes:  Conjunctivae/corneas clear. PERRL.  Lungs: Normal respiratory effort,  diminished  Heart: Regular/Irregular/Jerry rate and rhythm, S1, S2 normal, no murmur, rub or cuco.  Abdomen: Soft, non-tender non-distended; bowel sounds normal; no masses,  no organomegaly, obese  Extremities: No cyanosis or clubbing. trace edema.    Skin: Skin color, texture, turgor normal. No rashes or lesions  Neurologic: Normal strength and tone. No focal numbness or weakness   Right IJ EULALIO no E/E/T  Left SC PICC        Laboratory Data:  No results for input(s): WBC, RBC, HGB, HCT, PLT, MCV, MCH, MCHC in the last 24 hours.    BMP:     Recent Labs  Lab 04/25/18  0422   GLU 70   *   K 4.2   CL 96   CO2 24   BUN 23   CREATININE " 3.8*   CALCIUM 8.9   MG 2.0     Lab Results   Component Value Date    CALCIUM 8.9 04/25/2018    PHOS 3.9 04/25/2018       Lab Results   Component Value Date    .8 (H) 02/08/2018    CALCIUM 8.9 04/25/2018    PHOS 3.9 04/25/2018       No results found for: URICACID    BNP  No results for input(s): BNP, BNPTRIAGEBLO in the last 168 hours.  Blood:  MSSA      Medications:  Medication list was reviewed and changes noted under Assessment/Plan.    Diagnostic Results:    IR Tunneled Catheter Insert w/o Port   Final Result      Insertion of left-sided supradiaphragmatic dual- lumen tunneled cuffed catheter, with tip in the expected location of the cavoatrial junction.      Plan:      The catheter may be used immediately.      _______________________________________________________________         Electronically signed by: Aditya Francis MD   Date:    04/24/2018   Time:    15:41      US Lower Extremity Veins Bilateral   Final Result      Persistent nonocclusive thrombus in the left common femoral and popliteal veins.      Questionable new thrombus in the right deep femoral vein, although this is difficult visualize.         Electronically signed by: Aditya Francis MD   Date:    04/23/2018   Time:    16:26      X-Ray Chest AP Portable   Final Result      Bilateral central venous catheters present.      Some consolidation in both lungs, more at the left lung base with small pleural effusion.         Electronically signed by: Ilan Ochoa MD   Date:    04/19/2018   Time:    07:55          ASSESSMENT/PLAN:     1. ESRD on HD MWF in [place], [place] with Dr. Sharpe (N18.6, Z99.2):  Routine HD MWF/PRN while inpt.  Keep EULALIO in for now.  Consent signed.  May need EULALIO line holiday if MSSA on current culture.  Tentative plan to remove tomorrow for weekend holiday and replace Monday if needed.  Renally dose meds, avoid nephrotoxins, and monitor I/O's closely.  2. Bacteremia-MSSA (R78.81):  Antibx per ID. No evidence of PNa.  Removed PICC and cultured tip. Now with MSSA.  Last admit only identifiable source was extensive chronic LLE DVT.  Repeat U/S now with same LLE DVT and new R leg DVT.  Eliquis for now.  Discussed with team.  PICC replaced for antibx treatments.  Dr. Gustafson consult appreciated.  ECHO with no vegetation.   3. Anemia of CKD/infection (D63.1):  No IV iron with infection.  Epo on HD.  S/p transfusion 2 units PRBCs on HD 4/18.  Hgb stable  4. 2HPT/Vit D deficiency:  Vit D3 and calcitriol.  5. Hyponatremia (E87.1):  Adjust HD bath.   6. Morbid Obesity (E66.01):  Needs aggressive weight mgmt.         See above  Follow for renal needs.   Discussed with Dr. Rogel/Livia       Addendum:    Blood cultures still with MSSA.  Discussed with Dr Cisneros and he will remove EULALIO cath Friday after HD and plans on Replacing Monday.

## 2018-04-26 NOTE — PLAN OF CARE
Problem: Physical Therapy Goal  Goal: Physical Therapy Goal  Goals to be met by: 18    Patient will increase functional independence with mobility by performin. Supine to sit with min A.  2. Sit to supine with min A.   3. Rolling R and L with min A.   4. Sitting at edge of bed >20 minutes with SBA.   5. Sit < stand with RW and min A   6. SPT from EOB <> bedside chair with RW and Min A   7. Pt will tolerate sitting at EOB for all meals with set up assist    Outcome: Ongoing (interventions implemented as appropriate)  Pt progressing slowly towards above goals. Please see progress note for details, POC, and recommendations.

## 2018-04-26 NOTE — PT/OT/SLP PROGRESS
"Physical Therapy Treatment    Patient Name:  Yumiko Proctor   MRN:  [id number]    Recommendations:     Discharge Recommendations:  nursing facility, skilled   Discharge Equipment Recommendations:     Barriers to discharge: Inaccessible home and Decreased caregiver support    Assessment:     Yumiko Proctor is a 68 y.o. female admitted with a medical diagnosis of Bacteremia due to methicillin resistant Staphylococcus aureus.  She presents with the following impairments/functional limitations:  weakness, impaired endurance, impaired functional mobilty, gait instability, pain, decreased safety awareness, decreased lower extremity function, decreased upper extremity function, impaired skin, impaired balance, impaired self care skills. Pt with improved activity tolerance this session compared to previous session. Continues to be unable to perform gait or transfers to chair. Recommend eating all meals seated at EOB and roc lift to chair with nursing staff for OOB mobility tolerance.     Rehab Prognosis:  Good; patient would benefit from acute skilled PT services to address these deficits and reach maximum level of function.      Recent Surgery: Procedure(s) (LRB):  DRAINAGE (Left) 2 Days Post-Op    Plan:     During this hospitalization, patient to be seen 6 x/week to address the above listed problems via therapeutic activities, therapeutic exercises, gait training, neuromuscular re-education, wheelchair management/training  · Plan of Care Expires:  05/21/18   Plan of Care Reviewed with: patient    Subjective     Communicated with RN prior to session.  Patient found Supine upon PT entry to room, agreeable to treatment.      Chief Complaint: back pain   Patient comments/goals: "To get up and walking again so I can go to the bathroom"   Pain/Comfort:  · Pain Rating 1: 8/10  · Location - Side 1: Bilateral  · Location - Orientation 1: lower  · Location 1: back  · Pain Addressed 1: Distraction, Reposition, Nurse notified  · Pain " Rating Post-Intervention 1: 7/10    Patients cultural, spiritual, Anglican conflicts given the current situation: none mentioned    Objective:     Patient found with: central line, telemetry     General Precautions: Standard, fall, anti-coagulation medicine   Orthopedic Precautions:N/A     Functional Mobility:  · Bed Mobility:     · Rolling Left:  moderate assistance with use of bedrail and verbal cues to complete   · Rolling Right: moderate assistance with use of bedrail and verbal cues to complete   · Scooting: moderate assistance seated scooting to the L with assist to complete.   · Supine to Sit: minimum assistance with HOB elevated pt required verbal cues for hand placement and increased time needed to complete   · Sit to Supine: moderate assistance at BLE   · Transfers:     · Sit to Stand:  moderate assistance and 2nd person for safety with CGA with rolling walker x 3 from EOB. Pt required verbal and tactile cues for proper hand placement ands afety. Unable to achieve full erect standing posture and unable to tolerate standing > 30 seconds.   · Balance: Good seated EOB balance     AM-PAC 6 CLICK MOBILITY  Turning over in bed (including adjusting bedclothes, sheets and blankets)?: 2  Sitting down on and standing up from a chair with arms (e.g., wheelchair, bedside commode, etc.): 2  Moving from lying on back to sitting on the side of the bed?: 3 (with HOB elevated)  Moving to and from a bed to a chair (including a wheelchair)?: 1  Need to walk in hospital room?: 1  Climbing 3-5 steps with a railing?: 1  Total Score: 10     Therapeutic Activities and Exercises:   Pt incontinent of BM during session requiring jose g care     Patient left supine with pillow under R side with all lines intact, call button in reach, bed alarm on, RN notified and OT present..    GOALS:    Physical Therapy Goals        Problem: Physical Therapy Goal    Goal Priority Disciplines Outcome Goal Variances Interventions   Physical Therapy  Goal     PT/OT, PT Ongoing (interventions implemented as appropriate)     Description:  Goals to be met by: 18    Patient will increase functional independence with mobility by performin. Supine to sit with min A.  2. Sit to supine with min A.   3. Rolling R and L with min A.   4. Sitting at edge of bed >20 minutes with SBA.   5. Sit < stand with RW and min A   6. SPT from EOB <> bedside chair with RW and Min A   7. Pt will tolerate sitting at EOB for all meals with set up assist                   Time Tracking:     PT Received On: 18  PT Start Time: 910     PT Stop Time: 942  PT Total Time (min): 32 min     Billable Minutes: Therapeutic Activity 20 and Therapeutic Exercise 12    Treatment Type: Treatment  PT/PTA: PT         Lynsey Arnett, PT  2018

## 2018-04-26 NOTE — PT/OT/SLP PROGRESS
Occupational Therapy   Treatment  (co tx with PT for transfers)  Name: Yumiko Proctor  MRN: [id number]  Admitting Diagnosis:  Bacteremia due to methicillin resistant Staphylococcus aureus  2 Days Post-Op    Recommendations:     Discharge Recommendations: nursing facility, skilled  Discharge Equipment Recommendations:  none  Barriers to discharge:  None    Subjective     Communicated with: RN prior to session.  Pain/Comfort:  Pain Rating 1: 8/10  Location - Side 1: Bilateral  Location - Orientation 1: lower  Location 1: back  Pain Addressed 1: Reposition, Distraction  Pain Rating Post-Intervention 1: 7/10    Patients cultural, spiritual, Spiritism conflicts given the current situation: No change    Objective:     Patient found with: bed alarm, telemetry, central line    General Precautions: Standard, diabetic, anti-coagulation medicine, fall   Orthopedic Precautions:N/A   Braces: N/A     Occupational Performance:    Bed Mobility:    · Patient completed Rolling/Turning to Left with  moderate assistance with side rails, at hips  · Patient completed Rolling/Turning to Right with moderate assistance with side rails, at hips  · Patient completed Scooting/Bridging with moderate assistance along EOB to HOB  · Patient completed Sit to Supine with moderate assistance for LEs    Functional Mobility/Transfers:  · Patient completed Sit <> Stand Transfer with moderate assistance  with  rolling walker , able to elevate hips off EOB, but unable to attain full upright posture x 3 trials.  · Functional Mobility: N/A    Activities of Daily Living:  · Feeding:  set up assistance for tray  · UB Dressing: moderate assistance for donning gown as robe at EOB  · LB Dressing: total assistance for gripper socks  · Toileting: total assistance in partial stand and at bed level    Patient left left sidelying, HOB elevated, with all lines intact, call button in reach, bed alarm on and RN notified    AMPA 6 Click:  [facility] Total Score:  13    Treatment & Education:  Patient performed (B) UE AAROM, 1 set x 5-8 reps in all available, pain-free planes for improved UE strength and endurance for ADL tasks, bed mobility and functional transfers.  Educated that sitting up EOB, standing trials important to improve strength, endurance and overall assist with pain.  Verbalized understanding.  Education:    Assessment:     Yumiko Proctor is a 68 y.o. female with a medical diagnosis of Bacteremia due to methicillin resistant Staphylococcus aureus.  She presents with the following performance deficits affecting function: weakness, impaired endurance, impaired self care skills, impaired functional mobilty, gait instability, impaired balance, decreased lower extremity function, decreased upper extremity function, decreased safety awareness, pain, decreased ROM, edema, impaired skin.  Despite c/o back pain, improved performance of and tolerance for bed mobility and functional sit to/from stand.  Incontinent of BM during stand attempts.  Patient was able to transfer with 1 person assist and perform functional mobility with SNF staff, thus feel patient will benefit from return to continue progress.  Continue OT services to maximize patient functioning.    Rehab Prognosis:  Good; patient would benefit from acute skilled OT services to address these deficits and reach maximum level of function.       Plan:     Patient to be seen 6 x/week to address the above listed problems via self-care/home management, therapeutic activities, therapeutic exercises  · Plan of Care Expires: 05/21/18  · Plan of Care Reviewed with: patient    This Plan of care has been discussed with the patient who was involved in its development and understands and is in agreement with the identified goals and treatment plan    GOALS:    Occupational Therapy Goals        Problem: Occupational Therapy Goal    Goal Priority Disciplines Outcome Interventions   Occupational Therapy Goal     OT, PT/OT  Ongoing (interventions implemented as appropriate)    Description:  Goals to be met by 5/21/18  1. Min A G/H (2 tasks) sitting EOB without using UE for support on bed or tray table  2. Mod A UBD seated EOB (MET 04/26/18)  REVISED Min assist UBD at EOB  3. Max A bed-BSC transfer  4.Maximize UE and axial muscle strength                        Time Tracking:     OT Date of Treatment: 04/26/18  OT Start Time: 0917  OT Stop Time: 0950  OT Total Time (min): 33 min    Billable Minutes:Self Care/Home Management 20  Therapeutic Exercise 13    PATTY Alonzo  4/26/2018

## 2018-04-27 ENCOUNTER — SURGERY (OUTPATIENT)
Age: 68
End: 2018-04-27

## 2018-04-27 PROBLEM — R33.9 URINARY RETENTION: Status: ACTIVE | Noted: 2018-04-27

## 2018-04-27 LAB
ANION GAP SERPL CALC-SCNC: 6 MMOL/L
BACTERIA #/AREA URNS HPF: ABNORMAL /HPF
BASOPHILS # BLD AUTO: 0.02 K/UL
BASOPHILS NFR BLD: 0.3 %
BILIRUB UR QL STRIP: ABNORMAL
BUN SERPL-MCNC: 18 MG/DL
CALCIUM SERPL-MCNC: 8.4 MG/DL
CHLORIDE SERPL-SCNC: 99 MMOL/L
CK SERPL-CCNC: 23 U/L
CLARITY UR: CLEAR
CO2 SERPL-SCNC: 25 MMOL/L
COLOR UR: YELLOW
CREAT SERPL-MCNC: 3.6 MG/DL
DIFFERENTIAL METHOD: ABNORMAL
EOSINOPHIL # BLD AUTO: 0.2 K/UL
EOSINOPHIL NFR BLD: 2.9 %
ERYTHROCYTE [DISTWIDTH] IN BLOOD BY AUTOMATED COUNT: 17.8 %
EST. GFR  (AFRICAN AMERICAN): 14 ML/MIN/1.73 M^2
EST. GFR  (NON AFRICAN AMERICAN): 12 ML/MIN/1.73 M^2
GLUCOSE SERPL-MCNC: 76 MG/DL
GLUCOSE UR QL STRIP: NEGATIVE
HCT VFR BLD AUTO: 29 %
HGB BLD-MCNC: 8.8 G/DL
HGB UR QL STRIP: ABNORMAL
HYALINE CASTS #/AREA URNS LPF: 0 /LPF
KETONES UR QL STRIP: NEGATIVE
LEUKOCYTE ESTERASE UR QL STRIP: NEGATIVE
LYMPHOCYTES # BLD AUTO: 2.2 K/UL
LYMPHOCYTES NFR BLD: 37.1 %
MAGNESIUM SERPL-MCNC: 2 MG/DL
MCH RBC QN AUTO: 26 PG
MCHC RBC AUTO-ENTMCNC: 30.3 G/DL
MCV RBC AUTO: 86 FL
MICROSCOPIC COMMENT: ABNORMAL
MONOCYTES # BLD AUTO: 0.5 K/UL
MONOCYTES NFR BLD: 8.4 %
NEUTROPHILS # BLD AUTO: 3 K/UL
NEUTROPHILS NFR BLD: 50.8 %
NITRITE UR QL STRIP: NEGATIVE
PH UR STRIP: 5 [PH] (ref 5–8)
PHOSPHATE SERPL-MCNC: 3.5 MG/DL
PLATELET # BLD AUTO: 299 K/UL
PMV BLD AUTO: 9.4 FL
POCT GLUCOSE: 105 MG/DL (ref 70–110)
POCT GLUCOSE: 130 MG/DL (ref 70–110)
POCT GLUCOSE: 80 MG/DL (ref 70–110)
POCT GLUCOSE: 82 MG/DL (ref 70–110)
POTASSIUM SERPL-SCNC: 4 MMOL/L
PROT UR QL STRIP: ABNORMAL
RBC # BLD AUTO: 3.39 M/UL
RBC #/AREA URNS HPF: 10 /HPF (ref 0–4)
SODIUM SERPL-SCNC: 130 MMOL/L
SP GR UR STRIP: 1.01 (ref 1–1.03)
URN SPEC COLLECT METH UR: ABNORMAL
UROBILINOGEN UR STRIP-ACNC: NEGATIVE EU/DL
WBC # BLD AUTO: 5.96 K/UL
WBC #/AREA URNS HPF: 5 /HPF (ref 0–5)

## 2018-04-27 PROCEDURE — 83735 ASSAY OF MAGNESIUM: CPT

## 2018-04-27 PROCEDURE — 63600175 PHARM REV CODE 636 W HCPCS: Performed by: INTERNAL MEDICINE

## 2018-04-27 PROCEDURE — 25000003 PHARM REV CODE 250: Performed by: INTERNAL MEDICINE

## 2018-04-27 PROCEDURE — 99233 SBSQ HOSP IP/OBS HIGH 50: CPT | Mod: ,,, | Performed by: HOSPITALIST

## 2018-04-27 PROCEDURE — 80048 BASIC METABOLIC PNL TOTAL CA: CPT

## 2018-04-27 PROCEDURE — 85025 COMPLETE CBC W/AUTO DIFF WBC: CPT

## 2018-04-27 PROCEDURE — 99900035 HC TECH TIME PER 15 MIN (STAT)

## 2018-04-27 PROCEDURE — 63600175 PHARM REV CODE 636 W HCPCS: Performed by: NURSE PRACTITIONER

## 2018-04-27 PROCEDURE — 25000003 PHARM REV CODE 250: Performed by: NURSE PRACTITIONER

## 2018-04-27 PROCEDURE — 84100 ASSAY OF PHOSPHORUS: CPT

## 2018-04-27 PROCEDURE — 25000003 PHARM REV CODE 250

## 2018-04-27 PROCEDURE — 87086 URINE CULTURE/COLONY COUNT: CPT

## 2018-04-27 PROCEDURE — 11000001 HC ACUTE MED/SURG PRIVATE ROOM

## 2018-04-27 PROCEDURE — 99233 SBSQ HOSP IP/OBS HIGH 50: CPT | Mod: ,,, | Performed by: INTERNAL MEDICINE

## 2018-04-27 PROCEDURE — 25000003 PHARM REV CODE 250: Performed by: PHYSICIAN ASSISTANT

## 2018-04-27 PROCEDURE — 80100016 HC MAINTENANCE HEMODIALYSIS

## 2018-04-27 PROCEDURE — 82550 ASSAY OF CK (CPK): CPT

## 2018-04-27 PROCEDURE — 97802 MEDICAL NUTRITION INDIV IN: CPT

## 2018-04-27 PROCEDURE — 81000 URINALYSIS NONAUTO W/SCOPE: CPT

## 2018-04-27 PROCEDURE — 02PY33Z REMOVAL OF INFUSION DEVICE FROM GREAT VESSEL, PERCUTANEOUS APPROACH: ICD-10-PCS | Performed by: INTERNAL MEDICINE

## 2018-04-27 PROCEDURE — 36589 REMOVAL TUNNELED CV CATH: CPT | Performed by: INTERNAL MEDICINE

## 2018-04-27 PROCEDURE — 94761 N-INVAS EAR/PLS OXIMETRY MLT: CPT

## 2018-04-27 RX ORDER — LIDOCAINE HYDROCHLORIDE 10 MG/ML
INJECTION, SOLUTION EPIDURAL; INFILTRATION; INTRACAUDAL; PERINEURAL
Status: DISCONTINUED | OUTPATIENT
Start: 2018-04-27 | End: 2018-05-04 | Stop reason: HOSPADM

## 2018-04-27 RX ORDER — TAMSULOSIN HYDROCHLORIDE 0.4 MG/1
0.4 CAPSULE ORAL DAILY
Status: DISCONTINUED | OUTPATIENT
Start: 2018-04-27 | End: 2018-05-04 | Stop reason: HOSPADM

## 2018-04-27 RX ADMIN — CALCITRIOL 0.25 MCG: 0.25 CAPSULE, LIQUID FILLED ORAL at 01:04

## 2018-04-27 RX ADMIN — GABAPENTIN 100 MG: 100 CAPSULE ORAL at 01:04

## 2018-04-27 RX ADMIN — ERYTHROPOIETIN 20000 UNITS: 20000 INJECTION, SOLUTION INTRAVENOUS; SUBCUTANEOUS at 09:04

## 2018-04-27 RX ADMIN — FUROSEMIDE 80 MG: 40 TABLET ORAL at 01:04

## 2018-04-27 RX ADMIN — VITAMIN D, TAB 1000IU (100/BT) 2000 UNITS: 25 TAB at 01:04

## 2018-04-27 RX ADMIN — ISOSORBIDE MONONITRATE 30 MG: 30 TABLET, EXTENDED RELEASE ORAL at 05:04

## 2018-04-27 RX ADMIN — OXYCODONE HYDROCHLORIDE 5 MG: 5 TABLET ORAL at 08:04

## 2018-04-27 RX ADMIN — DAPTOMYCIN 710 MG: 500 INJECTION, POWDER, LYOPHILIZED, FOR SOLUTION INTRAVENOUS at 05:04

## 2018-04-27 RX ADMIN — LIDOCAINE HYDROCHLORIDE 10 ML: 10 INJECTION, SOLUTION EPIDURAL; INFILTRATION; INTRACAUDAL; PERINEURAL at 01:04

## 2018-04-27 RX ADMIN — CEFTAROLINE FOSAMIL 200 MG: 400 POWDER, FOR SOLUTION INTRAVENOUS at 05:04

## 2018-04-27 RX ADMIN — OXYCODONE AND ACETAMINOPHEN 1 TABLET: 5; 325 TABLET ORAL at 01:04

## 2018-04-27 RX ADMIN — DOCUSATE SODIUM 200 MG: 100 CAPSULE, LIQUID FILLED ORAL at 08:04

## 2018-04-27 RX ADMIN — SEVELAMER CARBONATE 800 MG: 800 TABLET, FILM COATED ORAL at 01:04

## 2018-04-27 RX ADMIN — HEPARIN SODIUM 5000 UNITS: 5000 INJECTION, SOLUTION INTRAVENOUS; SUBCUTANEOUS at 10:04

## 2018-04-27 RX ADMIN — APIXABAN 5 MG: 2.5 TABLET, FILM COATED ORAL at 01:04

## 2018-04-27 RX ADMIN — CEFTAROLINE FOSAMIL 200 MG: 400 POWDER, FOR SOLUTION INTRAVENOUS at 12:04

## 2018-04-27 RX ADMIN — APIXABAN 5 MG: 2.5 TABLET, FILM COATED ORAL at 08:04

## 2018-04-27 RX ADMIN — GABAPENTIN 100 MG: 100 CAPSULE ORAL at 08:04

## 2018-04-27 RX ADMIN — FUROSEMIDE 80 MG: 40 TABLET ORAL at 05:04

## 2018-04-27 NOTE — PROGRESS NOTES
Patient away at dialysis in the a.m. Will attempt in p.m. If time permits. Kajal Navas 4/27/2018

## 2018-04-27 NOTE — PROGRESS NOTES
"[facility]  Infectious Disease  Progress Note    Patient Name: Yumiko Proctor  MRN: [id number]  Admission Date: 4/18/2018  Length of Stay: 9 days  Attending Physician: Victor Hugo Rogel MD  Primary Care Provider: [facility]    Isolation Status: No active isolations  Assessment/Plan:      * Bacteremia due to methicillin resistant Staphylococcus aureus    Continue Daptomycin and Ceftaroline. Cultures from here grew MSSA. Cultures from [facility] growing MRSA, by report. Unclear if sensitivities are identical to original organism.    Cultures from 4/22 growing Staph aureus, after PICC removed on 4/19. Awaiting identification and sensitivities. Recommend a line holiday with removal of dialysis catheter on Friday while on antibiotics and replacement on Monday for dialysis.    Cultures from 4/25 are negative so far.              Thank you for your consult. I will follow-up with patient. Please contact us if you have any additional questions.    Shahid Bhakta MD  Infectious Disease  [facility]    Subjective:     Principal Problem:Bacteremia due to methicillin resistant Staphylococcus aureus    HPI: 69 yo female with ESRD who was discharged on IV ceftaroline and daptomycin for MRSA bacteremia on 3/1/18. She was supposed to receive these antibiotics for 8 weeks from 2/22/18, the last negative blood culture. This would have put her end of care at 4/19/18. However, on examination of the records, it appears that she may have stopped them on 4/4/18, 2 weeks early. The PICC appears to have been left in place as well.    She reportedly began having "low grade" fever, cough, and SOB with an elevated BNP of 900. She was started on Levaquin 250 mg orally three times per week after dialysis. Chest xray showed a right upper lobe infiltrate.It is unclear specifically why she was admitted - she says that she had a cough and a slight fever. She was admitted to [facility] on 4/15, with a normal WBC. She was placed on vancomycin and zosyn empirically. Blood cultures taken on 4/15 grew Staph aureus, as did blood cultures on 4/16. Sensitivities were reported verbally as MRSA, but I have not received the culture report to confirm this or compare to previous cultures. PICC was removed on 4/19 - culture from the tip is negative. Blood cultures from 4/22 are now growing Staph sp. Blood cultures from 4/25 are negative so far.  Interval History: found to have urinary retention.    Review of Systems   All other systems reviewed and are negative.    Objective:     Vital Signs (Most Recent):  Temp: 97.8 °F (36.6 °C) (04/27/18 1233)  Pulse: (!) 49 (04/27/18 1233)  Resp: 16 (04/27/18 1105)  BP: (!) 129/57 (04/27/18 1233)  SpO2: 96 % (04/27/18 1233) Vital Signs (24h Range):  Temp:  [97.6 °F (36.4 °C)-97.8 °F (36.6 °C)] 97.8 °F (36.6 °C)  Pulse:  [40-49] 49  Resp:  [16-20] 16  SpO2:  [95 %-98 %] 96 %  BP: (106-136)/(45-59) 129/57     Weight: 118.2 kg (260 lb 9.3 oz)  Body mass index is 43.36 kg/m².    Estimated Creatinine Clearance: 19.2 mL/min (A) (based on SCr of 3.6 mg/dL (H)).    Physical Exam   Constitutional: She is oriented to person, place, and time. She appears well-developed and well-nourished.   HENT:   Head: Normocephalic and atraumatic.   Eyes: Conjunctivae and EOM are normal. Pupils are equal, round, and reactive to light.   Cardiovascular: Normal rate, regular rhythm and normal heart sounds.    Pulmonary/Chest: Effort normal and breath sounds normal.       Abdominal: Soft. Bowel sounds are normal.   Musculoskeletal: Normal range of motion. She exhibits no edema.   Neurological: She is alert and oriented to person, place, and time.   Skin: Skin is warm and dry.   Nursing note and vitals reviewed.      Significant Labs:   Blood Culture:   Recent Labs  Lab 02/19/18  0537 02/22/18  0557 04/18/18  1831 04/22/18  1112 04/25/18  2018   LABBLOO Gram stain aer bottle: Gram positive cocci  in clusters resembling Staph   Gram stain vicente bottle: Gram positive cocci in clusters resembling Staph   Results called to and read back by:Alison Oswald RN 02/20/2018    03:01  METHICILLIN RESISTANT STAPHYLOCOCCUS AUREUSID consult required at [facility]. No growth after 5 days. Gram stain aer bottle: Gram positive cocci in clusters resembling Staph   Results called to and read back by: Nat Diane RN  04/20/2018  18:35  STAPHYLOCOCCUS AUREUSID consult required at [facility]. Gram stain vicente bottle: Gram positive cocci   Results called to and read back by: Drea Emerson RN 04/24/2018   14:28  Gram stain aer bottle: Gram positive cocci  04/24/2018  16:23  STAPHYLOCOCCUS AUREUSSusceptibility pendingID consult required at [facility]. No Growth to date  No Growth to date  No Growth to date  No Growth to date     CBC:   Recent Labs  Lab 04/27/18  0427   WBC 5.96   HGB 8.8*   HCT 29.0*        CMP:   Recent Labs  Lab 04/27/18  0427   *   K 4.0   CL 99   CO2 25   GLU 76   BUN 18   CREATININE 3.6*   CALCIUM 8.4*   ANIONGAP 6*   EGFRNONAA 12*       Significant Imaging: None

## 2018-04-27 NOTE — H&P
[facility]  History & Physical    Subjective:      Chief Complaint/Reason for Admission: tunneled dialysis catheter removal    Yumiko Proctor is a 68 y.o. female with ESRD, who presents with bacteremia, also has a TDC in place.  This has been a repeat episode for her, we were consulted to remove the TDC for line holiday, and to replace the catheter, possibly on Monday.    Past Medical History:   Diagnosis Date    A-fib     Cardiomyopathy     Diabetes mellitus     ESRD (end stage renal disease)      History reviewed. No pertinent surgical history.  History reviewed. No pertinent family history.  Social History   Substance Use Topics    Smoking status: Never Smoker    Smokeless tobacco: Never Used    Alcohol use No       PTA Medications   Medication Sig    amiodarone (PACERONE) 100 MG Tab Take 200 mg by mouth once daily.    apixaban 2.5 mg Tab Take 1 tablet (2.5 mg total) by mouth 2 (two) times daily.    calcitRIOL (ROCALTROL) 0.25 MCG Cap Take 1 capsule (0.25 mcg total) by mouth once daily.    carvedilol (COREG) 25 MG tablet Take 25 mg by mouth 2 (two) times daily.    dextrose 5 % SolP 50 mL with ceftaroline fosamil 600 mg SolR 200 mg Inject 200 mg into the vein every 12 (twelve) hours.    docusate sodium (COLACE) 100 MG capsule Take 1 capsule (100 mg total) by mouth 2 (two) times daily.    epoetin davion (PROCRIT) 20,000 unit/mL injection Inject 1 mL (20,000 Units total) into the skin every Mon, Wed, Fri.    ferric citrate 210 mg iron Tab Take 210 mg by mouth 3 (three) times daily.    furosemide (LASIX) 80 MG tablet Take 1 tablet (80 mg total) by mouth 2 (two) times daily.    hydrALAZINE (APRESOLINE) 100 MG tablet Take 100 mg by mouth 3 (three) times daily.    ISOSORBIDE MONONITRATE ORAL Take 30 mg by mouth Daily.    oxyCODONE-acetaminophen (PERCOCET) 5-325 mg per tablet Take 1 tablet by mouth every 4 (four) hours as needed.    sevelamer carbonate (RENVELA) 800 mg Tab Take 1  tablet (800 mg total) by mouth 3 (three) times daily with meals.    SITagliptin (JANUVIA) 25 MG Tab Take 1 tablet (25 mg total) by mouth once daily.    sodium chloride 0.9% SolP 50 mL with DAPTOmycin 500 mg SolR 865 mg Inject 865 mg into the vein every Mon, Wed, Fri. After dialysis treatments.    vitamin D 1000 units Tab Take 2 tablets (2,000 Units total) by mouth once daily.    vitamin renal formula, B-complex-vitamin c-folic acid, (NEPHROCAP) 1 mg Cap Take 1 capsule by mouth once daily.     Review of patient's allergies indicates:   Allergen Reactions    Sulfa (sulfonamide antibiotics) Anaphylaxis    Albuterol sulfate Palpitations        Review of Systems   Constitutional: Negative.    Respiratory: Negative.    Cardiovascular: Negative.        Objective:      Vital Signs (Most Recent)  Temp: 97.8 °F (36.6 °C) (04/27/18 1233)  Pulse: (!) 49 (04/27/18 1233)  Resp: 16 (04/27/18 1105)  BP: (!) 129/57 (04/27/18 1233)  SpO2: 96 % (04/27/18 1233)    Vital Signs Range (Last 24H):  Temp:  [97.6 °F (36.4 °C)-97.8 °F (36.6 °C)]   Pulse:  [40-49]   Resp:  [16-20]   BP: (106-136)/(45-59)   SpO2:  [95 %-98 %]     Physical Exam   Constitutional: She is oriented to person, place, and time. She appears well-developed and well-nourished. No distress.   HENT:   Head: Normocephalic and atraumatic.   Eyes: EOM are normal.   Neck: Neck supple.   Cardiovascular:   R internal jugular tunneled dialysis catheter in place, no erythema near the exit site.   Pulmonary/Chest: Effort normal. No respiratory distress.   Neurological: She is alert and oriented to person, place, and time.   Skin: She is not diaphoretic.   Psychiatric: She has a normal mood and affect. Her behavior is normal.         Assessment:      Active Hospital Problems    Diagnosis  POA    *Bacteremia due to methicillin resistant Staphylococcus aureus [R78.81]  Yes    Urinary retention [R33.9]  Yes    Debility [R53.81]  Yes    Slow transit constipation [K59.01]  Yes     Anemia due to chronic kidney disease [N18.9, D63.1]  Yes    DVT of deep femoral vein, left [I82.412]  Yes    Chronic atrial fibrillation [I48.2]  Yes    End stage renal disease [N18.6]  Yes    Type 2 diabetes mellitus with chronic kidney disease on chronic dialysis, without long-term current use of insulin [E11.22, N18.6, Z99.2]  Not Applicable      Resolved Hospital Problems    Diagnosis Date Resolved POA   No resolved problems to display.       Plan:      TDC removal today.  Risks explained, consent signed.  This will be performed in her room.

## 2018-04-27 NOTE — SUBJECTIVE & OBJECTIVE
Interval History: found to have urinary retention.    Review of Systems   All other systems reviewed and are negative.    Objective:     Vital Signs (Most Recent):  Temp: 97.8 °F (36.6 °C) (04/27/18 1233)  Pulse: (!) 49 (04/27/18 1233)  Resp: 16 (04/27/18 1105)  BP: (!) 129/57 (04/27/18 1233)  SpO2: 96 % (04/27/18 1233) Vital Signs (24h Range):  Temp:  [97.6 °F (36.4 °C)-97.8 °F (36.6 °C)] 97.8 °F (36.6 °C)  Pulse:  [40-49] 49  Resp:  [16-20] 16  SpO2:  [95 %-98 %] 96 %  BP: (106-136)/(45-59) 129/57     Weight: 118.2 kg (260 lb 9.3 oz)  Body mass index is 43.36 kg/m².    Estimated Creatinine Clearance: 19.2 mL/min (A) (based on SCr of 3.6 mg/dL (H)).    Physical Exam   Constitutional: She is oriented to person, place, and time. She appears well-developed and well-nourished.   HENT:   Head: Normocephalic and atraumatic.   Eyes: Conjunctivae and EOM are normal. Pupils are equal, round, and reactive to light.   Cardiovascular: Normal rate, regular rhythm and normal heart sounds.    Pulmonary/Chest: Effort normal and breath sounds normal.       Abdominal: Soft. Bowel sounds are normal.   Musculoskeletal: Normal range of motion. She exhibits no edema.   Neurological: She is alert and oriented to person, place, and time.   Skin: Skin is warm and dry.   Nursing note and vitals reviewed.      Significant Labs:   Blood Culture:   Recent Labs  Lab 02/19/18  0537 02/22/18  0557 04/18/18  1831 04/22/18  1112 04/25/18  2018   LABBLOO Gram stain aer bottle: Gram positive cocci in clusters resembling Staph   Gram stain vicente bottle: Gram positive cocci in clusters resembling Staph   Results called to and read back by:Alison Oswald RN 02/20/2018    03:01  METHICILLIN RESISTANT STAPHYLOCOCCUS AUREUSID consult required at [facility] and [place] locations. No growth after 5 days. Gram stain aer bottle: Gram positive cocci in clusters resembling Staph   Results called to and read back by: Nat Diane RN  04/20/2018   18:35  STAPHYLOCOCCUS AUREUSID consult required at [facility] and [facility] locations. Gram stain vicente bottle: Gram positive cocci   Results called to and read back by: Drea Emerson RN 04/24/2018   14:28  Gram stain aer bottle: Gram positive cocci  04/24/2018  16:23  STAPHYLOCOCCUS AUREUSSusceptibility pendingID consult required [facility]. No Growth to date  No Growth to date  No Growth to date  No Growth to date     CBC:   Recent Labs  Lab 04/27/18  0427   WBC 5.96   HGB 8.8*   HCT 29.0*        CMP:   Recent Labs  Lab 04/27/18  0427   *   K 4.0   CL 99   CO2 25   GLU 76   BUN 18   CREATININE 3.6*   CALCIUM 8.4*   ANIONGAP 6*   EGFRNONAA 12*       Significant Imaging: None

## 2018-04-27 NOTE — ASSESSMENT & PLAN NOTE
Previously diagnosed during prior hospitalization.  Transesophageal echocardiogram negative for vegetation.  Patient with large thrombus burden and left this was the likely source of her Staphylococcus bacteremia.  Per Dr. Sherron Wise's note on 2/28/2018, plan was for 8 weeks of intravenous antibiotics from last date of negative blood culture (2/22/2018) which would call for treatment until 4/19/2018.  It appears antibiotics were discontinued on 4/4/2018 so possible treatment failure due to inadequate treatment course or treatment failure due to inadequate source control.  Patient also had tunneled right internal jugular exchanged on 4/24/2018.  Despite exchanging internal jugular line and restarting antibiotics again with intravenous ceftaroline and daptomycin, patient failed to clear bacteremia and had HD catheter removed on 4/27/2018.  Plan to place tunneled dialysis line on 4/30/2018 if most recent repeat cultures remain negative.

## 2018-04-27 NOTE — SUBJECTIVE & OBJECTIVE
Interval History: Patient with symptomatic urinary retention last night with 1200 cc of urine output with straight in and out bladder catheterization last night.  Patient denies any bladder discomfort this morning.    Review of Systems   Constitutional: Negative for chills and fever.   Respiratory: Negative for shortness of breath and wheezing.    Cardiovascular: Negative for chest pain.   Gastrointestinal: Negative for abdominal distention, abdominal pain, constipation, diarrhea, nausea and vomiting.   Genitourinary: Positive for difficulty urinating. Negative for dysuria and frequency.   Musculoskeletal: Negative for arthralgias, back pain and myalgias.   Neurological: Positive for weakness. Negative for light-headedness.   Psychiatric/Behavioral: Negative for agitation and confusion.     Objective:     Vital Signs (Most Recent):  Temp: 97.6 °F (36.4 °C) (04/27/18 0422)  Pulse: (!) 43 (04/27/18 0422)  Resp: 16 (04/27/18 0422)  BP: (!) 106/53 (04/27/18 0422)  SpO2: 96 % (04/27/18 0422) Vital Signs (24h Range):  Temp:  [97.6 °F (36.4 °C)-98 °F (36.7 °C)] 97.6 °F (36.4 °C)  Pulse:  [41-48] 43  Resp:  [16-20] 16  SpO2:  [95 %-98 %] 96 %  BP: (106-122)/(49-54) 106/53     Weight: 118.2 kg (260 lb 9.3 oz)  Body mass index is 43.36 kg/m².    Intake/Output Summary (Last 24 hours) at 04/27/18 0737  Last data filed at 04/27/18 0600   Gross per 24 hour   Intake              200 ml   Output             1200 ml   Net            -1000 ml      Physical Exam   Constitutional: She is oriented to person, place, and time. She appears well-developed and well-nourished. No distress.   Morbid obesity   HENT:   Head: Atraumatic.   Eyes: Conjunctivae are normal.   Neck: Neck supple.   Cardiovascular: Normal rate, regular rhythm and normal heart sounds.    No murmur heard.  Pulmonary/Chest: Effort normal and breath sounds normal. She has no wheezes.   Abdominal: Soft. Bowel sounds are normal. She exhibits no distension. There is no  tenderness.   Musculoskeletal: Normal range of motion. She exhibits edema. She exhibits no deformity.   Neurological: She is alert and oriented to person, place, and time. No sensory deficit.   No focal weakness, moving upper and lower extremities well but diffusely weak.   Skin:   Tunneled HD catheter exiting right chest wall without signs of infection.  Tunneled venous line placed on exiting left chest wall also without overt signs of infection.       Significant Labs: All pertinent labs within the past 24 hours have been reviewed.    Significant Imaging: I have reviewed all pertinent imaging results/findings within the past 24 hours.

## 2018-04-27 NOTE — SUBJECTIVE & OBJECTIVE
Interval History: No acute events overnight.     Review of Systems   Constitutional: Negative for chills and fever.   Respiratory: Negative for shortness of breath and wheezing.    Cardiovascular: Negative for chest pain.   Gastrointestinal: Negative for abdominal distention, abdominal pain, constipation, diarrhea, nausea and vomiting.   Genitourinary: Negative for dysuria and frequency.   Musculoskeletal: Negative for arthralgias, back pain and myalgias.   Neurological: Negative for light-headedness.   Psychiatric/Behavioral: Negative for agitation and confusion.     Objective:     Vital Signs (Most Recent):  Temp: 97.6 °F (36.4 °C) (04/26/18 1604)  Pulse: (!) 41 (04/26/18 1837)  Resp: 20 (04/26/18 1837)  BP: (!) 113/50 (04/26/18 1604)  SpO2: 95 % (04/26/18 1837) Vital Signs (24h Range):  Temp:  [97.1 °F (36.2 °C)-98.5 °F (36.9 °C)] 97.6 °F (36.4 °C)  Pulse:  [41-60] 41  Resp:  [16-20] 20  SpO2:  [92 %-96 %] 95 %  BP: ()/(46-56) 113/50     Weight: 118.2 kg (260 lb 9.3 oz)  Body mass index is 43.36 kg/m².  No intake or output data in the 24 hours ending 04/26/18 1947   Physical Exam   Constitutional: She is oriented to person, place, and time. She appears well-developed and well-nourished. No distress.   Morbid obesity   HENT:   Head: Atraumatic.   Eyes: Conjunctivae are normal.   Neck: Neck supple.   Cardiovascular: Normal rate, regular rhythm and normal heart sounds.    No murmur heard.  Pulmonary/Chest: Effort normal and breath sounds normal. She has no wheezes.   Abdominal: Soft. Bowel sounds are normal. She exhibits no distension. There is no tenderness.   Musculoskeletal: Normal range of motion. She exhibits edema. She exhibits no deformity.   Neurological: She is alert and oriented to person, place, and time.   Skin:   Tunneled HD catheter exiting right chest wall without signs of infection.  New tunneled venous line exiting left chest wall.       Significant Labs: All pertinent labs within the past 24  hours have been reviewed.    Significant Imaging: I have reviewed all pertinent imaging results/findings within the past 24 hours.

## 2018-04-27 NOTE — PLAN OF CARE
Problem: Patient Care Overview  Goal: Plan of Care Review  Outcome: Ongoing (interventions implemented as appropriate)  Patient in no apparent distress. Sat's 96  % on room air. Will continue to monitor.

## 2018-04-27 NOTE — BRIEF OP NOTE
[facility], [place]  Brief Operative Note    SUMMARY     Surgery Date: 4/27/2018     Surgeon(s) and Role:     * Gavin Cisneros MD - Primary    Assisting Surgeon: None    Pre-op Diagnosis:  Complication of vascular access for dialysis, subsequent encounter [T82.9XXD]  ESRD (end stage renal disease) [N18.6]    Post-op Diagnosis:  Post-Op Diagnosis Codes:     * Complication of vascular access for dialysis, subsequent encounter [T82.9XXD]     * ESRD (end stage renal disease) [N18.6]    Procedure(s) (LRB):  PERMCATH REMOVAL-TUNNELED CVC REMOVAL (Right)    Anesthesia: Local    Description of Procedure: Patient was prepped and draped in a sterile fashion.  This was a successful removal of her R internal jugular tunneled dialysis catheter.  Patient tolerated the procedure well and no immediate complications noted.    Description of the findings of the procedure: Patient was prepped and draped in a sterile fashion.  This was a successful removal of her R internal jugular tunneled dialysis catheter.  Patient tolerated the procedure well and no immediate complications noted.      Estimated Blood Loss: < 10 mL         Specimens:   Specimen (12h ago through future)    None

## 2018-04-27 NOTE — PLAN OF CARE
Problem: Patient Care Overview  Goal: Plan of Care Review  Outcome: Ongoing (interventions implemented as appropriate)  Patient on room air saturations 96% oxygen not needed.

## 2018-04-27 NOTE — PROGRESS NOTES
" [facility]  Adult Nutrition  Progress Note    SUMMARY       Recommendations    Recommendation/Intervention:   1. When medically feasible, advance to mechanical soft diet   2. When diet is advanced, consider Novasource Renal 1x/d if appetite remains poor   3. Assess diet education needs when diet is advanced    Goals:   1. Meet >85% EEN and EPN   2. Prevent dry wt loss >2%/week   3. Promote nutrition related labs wnl    Nutrition Goal Status: new  Communication of RD Recs: discussed on rounds    Reason for Assessment    Reason for Assessment: length of stay  Diagnosis:   1. MRSA bacteremia    2. Bacteremia    3. Bacteremia due to methicillin resistant Staphylococcus aureus    4. End stage renal disease      Past Medical History:   Diagnosis Date    A-fib     Cardiomyopathy     Diabetes mellitus     ESRD (end stage renal disease)      Interdisciplinary Rounds: attended  General Information Comments: Pt currently NPO, endorses poor appetite prior to being NPO. Per chart, it appears pt may have gained weight in last 2 months. Pt has no teeth, is requesting softer diet.   Nutrition Discharge Planning: d/c on mechanical soft diet + oral supplements prn    Nutrition Risk Screen    Nutrition Risk Screen: no indicators present    Nutrition/Diet History    Do you have any cultural, spiritual, Orthodoxy conflicts, given your current situation?: No change    Anthropometrics    Temp: 97.8 °F (36.6 °C)  Height Method: Stated  Height: 5' 5" (165.1 cm)  Height (inches): 65 in  Weight Method: Bed Scale  Weight: 118.2 kg (260 lb 9.3 oz)  Weight (lb): 260.59 lb  Ideal Body Weight (IBW), Female: 125 lb  % Ideal Body Weight, Female (lb): 208.47 lb  BMI (Calculated): 43.5  BMI Grade: greater than 40 - morbid obesity     Lab/Procedures/Meds    Pertinent Labs Reviewed: reviewed  Lab Results   Component Value Date     (L) 04/27/2018    K 4.0 04/27/2018    CL 99 04/27/2018    CO2 25 04/27/2018    BUN 18 " 04/27/2018    CREATININE 3.6 (H) 04/27/2018    CALCIUM 8.4 (L) 04/27/2018    PHOS 3.5 04/27/2018    MG 2.0 04/27/2018    ESTGFRAFRICA 14 (A) 04/27/2018    EGFRNONAA 12 (A) 04/27/2018    ALBUMIN 1.7 (L) 04/18/2018     Lab Results   Component Value Date    ALT 8 (L) 04/18/2018    AST 11 04/18/2018    ALKPHOS 53 (L) 04/18/2018     POCT Glucose   Date Value Ref Range Status   04/27/2018 82 70 - 110 mg/dL Final   04/27/2018 80 70 - 110 mg/dL Final   04/26/2018 102 70 - 110 mg/dL Final   04/26/2018 106 70 - 110 mg/dL Final   04/26/2018 69 (L) 70 - 110 mg/dL Final   04/25/2018 102 70 - 110 mg/dL Final   04/25/2018 87 70 - 110 mg/dL Final   04/25/2018 82 70 - 110 mg/dL Final   04/25/2018 76 70 - 110 mg/dL Final   04/24/2018 90 70 - 110 mg/dL Final   04/24/2018 92 70 - 110 mg/dL Final     Lab Results   Component Value Date    HGBA1C 4.2 04/18/2018     Pertinent Medications Reviewed: reviewed  Scheduled Meds:   amiodarone  200 mg Oral Daily    apixaban  5 mg Oral BID    calcitRIOL  0.25 mcg Oral Daily    ceftaroline (TEFLARO) IVPB  200 mg Intravenous Q12H    DAPTOmycin (CUBICIN)  IV  6 mg/kg Intravenous Q48H    docusate sodium  200 mg Oral BID    epoetin davion (PROCRIT) injection  20,000 Units Intravenous Every Mon, Wed, Fri    famotidine  20 mg Oral Daily    furosemide  80 mg Oral BID    gabapentin  100 mg Oral TID    isosorbide mononitrate  30 mg Oral Daily    polyethylene glycol  17 g Oral Daily    sevelamer carbonate  800 mg Oral TID WM    tamsulosin  0.4 mg Oral Daily    vitamin D  2,000 Units Oral Daily     Physical Findings/Assessment    Overall Physical Appearance: obese  Oral/Mouth Cavity: teeth absent  Skin: incision(s)    Estimated/Assessed Needs    Weight Used For Calorie Calculations: 118.2 kg (260 lb 9.3 oz)  Energy Calorie Requirements (kcal): 2225  Energy Need Method: GalienElisa Pritchard (stress factor 1.3)  Protein Requirements: 102-114 gm/d (1.8-2 gm/kg IBW)  Weight Used For Protein Calculations:  56.7 kg (125 lb) (IBW)  Fluid Requirements (mL): 1000 (+UOP, or per team)    Nutrition Prescription Ordered    Current Diet Order: NPO    Evaluation of Received Nutrient/Fluid Intake     % Intake of Estimated Energy Needs: 0 - 25 %  % Meal Intake: NPO    Nutrition Risk    Level of Risk/Frequency of Follow-up: low     Assessment and Plan    End stage renal disease    Contributing Nutrition Diagnosis  Increased nutrient needs (kcal, protein)    Related to (etiology):   HD    Signs and Symptoms (as evidenced by):   Pt with ESRD on HD     Interventions/Recommendations (treatment strategy):  When medically feasible, advance to mechanical soft diet and consider Novasource Renal supplement 1x/d if PO intake remains poor; See RD note 4/27/2018 for full recs     Nutrition Diagnosis Status:   New          Monitor and Evaluation    Food and Nutrient Intake: energy intake, food and beverage intake  Food and Nutrient Adminstration: diet order  Knowledge/Beliefs/Attitudes: food and nutrition knowledge/skill, beliefs and attitudes  Physical Activity and Function: nutrition-related ADLs and IADLs  Anthropometric Measurements: weight, weight change  Biochemical Data, Medical Tests and Procedures: electrolyte and renal panel, gastrointestinal profile, glucose/endocrine profile, inflammatory profile, lipid profile  Nutrition-Focused Physical Findings: overall appearance, extremities, muscles and bones, skin     Nutrition Follow-Up    RD Follow-up?: Yes

## 2018-04-27 NOTE — PROGRESS NOTES
"[facility] Hospital Medicine  Progress Note    Patient Name: Yumiko Proctor  MRN: [id number]  Patient Class: IP- Inpatient   Admission Date: 4/18/2018  Length of Stay: 8 days  Attending Physician: Victor Hugo Rogel MD  Primary Care Provider: [facility]        Subjective:     Principal Problem:Bacteremia due to methicillin resistant Staphylococcus aureus    HPI:  Per Nat Woodward, "Ms Hoa Proctor is a 68 y.o. female, with PMH of HTN, NIDDM-2, CHF (EF 35%), ESRD on dialysis ([facility], Dr. Prather), A. Fib, LLE DVT, cardiomyopathy, and recent treatment in this facility for MRSA bacteremia from her vascular access site, who returns 2/2 need for ID consultation. In the past week the patient developed a fever and was diagnosed with RUL PNA, and started on Levaquin 3/week after dialysis. Associated symptoms include non-productive cough, fevers, and SOB with wheeze. She was started on vancymycin and zosyn on 4/15/18. Blood cultures taken on 4/16/18 were the second set, first after antibiotics, that grew staph. Other associated symptoms include b/l hip pain, and constipation.  She denies fever, chills, sweats, chest pain, SOB, N/V, diarrhea.     During the patient's previous stay at this facility she had blood cultures positive for MRSA on 2/8/18, and she was started on Daptomycin. On 2/10/18 a second set of blood cultures were positive for MRSA. At the time she had her left Jaspreet catheter removed and a right IJ tunneled dialysis cath was placed on 2/12/18. Repeat cultures on 2/13/18 were negative for MRSA. She was positive in 1/2 bottles on 2/16/18 and both blood cultures bottles for MRSA on 2/16/18. At that time she was started on vancomycin, and Daptomycin was stopped 2/2 myositis. On 2/22/18 blood cultures were negative. A RANDA on 2/26/18 was negative for vegetations, but the patient did have a LLE DVT discovered 2/20/18, and Apixaban was started. After CPK " "levels were normal, she was started back on Daptomycin and Ceftaroline. She was to continue this treatment until 4/4/18, and she was transferred to a NH facility in [place], [place]. She states she was transferred from the Nursing facility to [facility] on 4/15/18."    Hospital Course:  Ms. Yumiko Proctor is a 68 year-old woman with a history hypertension, diabetes mellitus type II on insulin, chronic systolic heart failure with ejection fraction of 35 percent, and end-stage renal disease on hemodialysis via tunneled dialysis catheter exited her right chest wall who returned from a nursing facility in [place] where she was undergoing treatment for presumed endovascular infection with Methicillin-resistant Staphylococcus aureus infection via a peripherally inserted central catheter who returns with recurrence of Staphylococcus aureus bacteremia.  Patient's peripherally inserted central catheter was removed here.  Blood cultures obtained here on 4/19/2018 positive for methicillin-sensitive Staphylococcus aureus.  Infectious disease service consulted and recommended treatment with intravenous ceftaroline and daptomycin.    Interval History: No acute events overnight.     Review of Systems   Constitutional: Negative for chills and fever.   Respiratory: Negative for shortness of breath and wheezing.    Cardiovascular: Negative for chest pain.   Gastrointestinal: Negative for abdominal distention, abdominal pain, constipation, diarrhea, nausea and vomiting.   Genitourinary: Negative for dysuria and frequency.   Musculoskeletal: Negative for arthralgias, back pain and myalgias.   Neurological: Negative for light-headedness.   Psychiatric/Behavioral: Negative for agitation and confusion.     Objective:     Vital Signs (Most Recent):  Temp: 97.6 °F (36.4 °C) (04/26/18 1604)  Pulse: (!) 41 (04/26/18 1837)  Resp: 20 (04/26/18 1837)  BP: (!) 113/50 (04/26/18 1604)  SpO2: 95 % (04/26/18 1837) Vital Signs (24h Range):  Temp:  [97.1 " °F (36.2 °C)-98.5 °F (36.9 °C)] 97.6 °F (36.4 °C)  Pulse:  [41-60] 41  Resp:  [16-20] 20  SpO2:  [92 %-96 %] 95 %  BP: ()/(46-56) 113/50     Weight: 118.2 kg (260 lb 9.3 oz)  Body mass index is 43.36 kg/m².  No intake or output data in the 24 hours ending 04/26/18 1947   Physical Exam   Constitutional: She is oriented to person, place, and time. She appears well-developed and well-nourished. No distress.   Morbid obesity   HENT:   Head: Atraumatic.   Eyes: Conjunctivae are normal.   Neck: Neck supple.   Cardiovascular: Normal rate, regular rhythm and normal heart sounds.    No murmur heard.  Pulmonary/Chest: Effort normal and breath sounds normal. She has no wheezes.   Abdominal: Soft. Bowel sounds are normal. She exhibits no distension. There is no tenderness.   Musculoskeletal: Normal range of motion. She exhibits edema. She exhibits no deformity.   Neurological: She is alert and oriented to person, place, and time.   Skin:   Tunneled HD catheter exiting right chest wall without signs of infection.  New tunneled venous line exiting left chest wall.       Significant Labs: All pertinent labs within the past 24 hours have been reviewed.    Significant Imaging: I have reviewed all pertinent imaging results/findings within the past 24 hours.    Assessment/Plan:      * Bacteremia due to methicillin resistant Staphylococcus aureus    Previously diagnosed during prior hospitalization.  Transesophageal echocardiogram negative for vegetation.  Patient with large thrombus burden and left this was the likely source of her Staphylococcus bacteremia.  Per Dr. Sherron Wise's note on 2/28/2018, plan was for 8 weeks of intravenous antibiotics from last date of negative blood culture (2/22/2018) which would call for treatment until 4/19/2018.  It appears antibiotics were discontinued on 4/4/2018 so possible treatment failure due to inadequate treatment course or treatment failure due to inadequate source control.   Tunneled peripherally inserted central catheter removed and tunneled dialysis catheter recently exchanged as well during last hospitalization.  New tunneled venous line placed yesterday on 4/24/2108.  Most recent blood cultures collected on 4/22/2018 now also positive for gram positive cocci which I now positive for Staphylococcus aureus.  Repeat blood cultures collected on 4/25/2018 no growth thus far.  Plan to remove tunneled dialysis catheter if repeat cultures remain positive.        DVT of deep femoral vein, left    Ultrasound on 2/23/2018 showed persistent nonocclusive thrombus in the left common femoral and popliteal veins and possible new thrombus in the right deep femoral vein.  Continue anticoagulation with apixaban.  Dose adjusted 4/24/2018 per nephrology's recommendation.        Chronic atrial fibrillation    Continue amiodarone & carvedilol and anticoagulation with apixaban.        Type 2 diabetes mellitus with chronic kidney disease on chronic dialysis, without long-term current use of insulin    Well controlled likely due to renal failure.  Continue diabetic diet with sliding scale insulin.         End stage renal disease    Patient follows with Dr. Axel Sharpe with [facility] in [place], [place] for outpatient dialysis on Mondays, Wednesdays, and Fridays. Nephrology consulted here to continue with renal replacement therapy.        Anemia due to chronic kidney disease    Improved status post blood transfusion on 4/20/2018 and hemoglobin remains stable.        Slow transit constipation    Improved with bowel regimen. Continue current management.          Debility    Consult physical and occupational therapy.            VTE Risk Mitigation         Ordered     apixaban tablet 5 mg  2 times daily      04/24/18 0934     heparin (porcine) injection 5,000 Units  As needed (PRN)      04/18/18 0936              Victor Hugo Rogel MD  Department of Hospital Medicine   [facility]

## 2018-04-27 NOTE — PROGRESS NOTES
"Nephrology  Progress Note    Admit Date: 4/18/2018   LOS: 9 days     SUBJECTIVE:     Follow-up For:  Bacteremia due to methicillin resistant Staphylococcus aureus    Interval History:     Uneventful night.  Seen on HD.  Discussed with team.  EULALIO cath removal following HD today.      Review of Systems:  Constitutional: No fever or chills  Respiratory: No cough or shortness of breath  Cardiovascular: No chest pain or palpitations  Gastrointestinal: No nausea or vomiting  Neurological: No confusion or weakness    OBJECTIVE:     Vital Signs Range (Last 24H):  BP (!) 128/54   Pulse (!) 40   Temp 97.6 °F (36.4 °C) (Axillary)   Resp 16   Ht 5' 5" (1.651 m)   Wt 118.2 kg (260 lb 9.3 oz)   LMP 01/01/1983 (LMP Unknown)   SpO2 96%   Breastfeeding? No   BMI 43.36 kg/m²     Temp:  [97.6 °F (36.4 °C)-98 °F (36.7 °C)]   Pulse:  [40-48]   Resp:  [16-20]   BP: (106-136)/(49-59)   SpO2:  [95 %-98 %]     I & O (Last 24H):    Intake/Output Summary (Last 24 hours) at 04/27/18 0849  Last data filed at 04/27/18 0600   Gross per 24 hour   Intake              200 ml   Output             1200 ml   Net            -1000 ml       Physical Exam:  General appearance: morbidly obese female in NAD  Eyes:  Conjunctivae/corneas clear. PERRL.  Lungs: Normal respiratory effort,  diminished  Heart: Regular/Jerry rate and rhythm, S1, S2 normal, no murmur, rub or cuco.  Abdomen: Soft, non-tender non-distended; bowel sounds normal; no masses,  no organomegaly, obese  Extremities: No cyanosis or clubbing. trace edema.    Skin: Skin color, texture, turgor normal. No rashes or lesions  Neurologic: Normal strength and tone. No focal numbness or weakness   Right IJ EULALIO no E/E/T  Left SC PICC        Laboratory Data:    Recent Labs  Lab 04/27/18 0427   WBC 5.96   RBC 3.39*   HGB 8.8*   HCT 29.0*      MCV 86   MCH 26.0*   MCHC 30.3*       BMP:     Recent Labs  Lab 04/27/18 0427   GLU 76   *   K 4.0   CL 99   CO2 25   BUN 18   CREATININE " 3.6*   CALCIUM 8.4*   MG 2.0     Lab Results   Component Value Date    CALCIUM 8.4 (L) 04/27/2018    PHOS 3.5 04/27/2018       Lab Results   Component Value Date    .8 (H) 02/08/2018    CALCIUM 8.4 (L) 04/27/2018    PHOS 3.5 04/27/2018     No results for input(s): CPK, CPKMB, TROPONINI, MB in the last 24 hours.  No results found for: URICACID    BNP  No results for input(s): BNP, BNPTRIAGEBLO in the last 168 hours.  Blood:  MSSA      Medications:  Medication list was reviewed and changes noted under Assessment/Plan.    Diagnostic Results:    IR Tunneled Catheter Insert w/o Port   Final Result      Insertion of left-sided supradiaphragmatic dual- lumen tunneled cuffed catheter, with tip in the expected location of the cavoatrial junction.      Plan:      The catheter may be used immediately.      _______________________________________________________________         Electronically signed by: Aditya Francis MD   Date:    04/24/2018   Time:    15:41      US Lower Extremity Veins Bilateral   Final Result      Persistent nonocclusive thrombus in the left common femoral and popliteal veins.      Questionable new thrombus in the right deep femoral vein, although this is difficult visualize.         Electronically signed by: Aditya Francis MD   Date:    04/23/2018   Time:    16:26      X-Ray Chest AP Portable   Final Result      Bilateral central venous catheters present.      Some consolidation in both lungs, more at the left lung base with small pleural effusion.         Electronically signed by: Ilan Ochoa MD   Date:    04/19/2018   Time:    07:55          ASSESSMENT/PLAN:     1. ESRD on HD MWF in [place], [place] with Dr. Sharpe (N18.6, Z99.2):  Routine HD MWF/PRN while inpt.  Consent signed.  Line holiday scheduled for Friday-Monday.  See below. Renally dose meds, avoid nephrotoxins, and monitor I/O's closely.  2. Bacteremia-MSSA (R78.81):  Antibx per ID. No evidence of PNa. Removed PICC and cultured tip.  Now with MSSA.  Last admit only identifiable source was extensive chronic LLE DVT.  Repeat U/S now with same LLE DVT and new R leg DVT.  Eliquis for now.  Discussed with team.  PICC replaced for antibx treatments.  Dr. Gustafson consult appreciated.  ECHO with no vegetation.  Plan for EULALIO cath line holiday.  Remove today and plan on replacement Monday per Dr. Cisneros (thanks).   3. Anemia of CKD/infection (D63.1):  No IV iron with infection.  Epo on HD.  S/p transfusion 2 units PRBCs on HD 4/18.  Hgb stable  4. 2HPT/Vit D deficiency:  Vit D3 and calcitriol.  5. Hyponatremia (E87.1):  Adjusted HD bath.   6. Morbid Obesity (E66.01):  Needs aggressive weight mgmt.         See above  Follow for renal needs.   Discussed with Dr. Rogel

## 2018-04-27 NOTE — ASSESSMENT & PLAN NOTE
Patient follows with Dr. Axel Sharpe with [facility] in [place], [place] for outpatient dialysis on Mondays, Wednesdays, and Fridays. Nephrology consulted here to continue with renal replacement therapy.

## 2018-04-27 NOTE — ASSESSMENT & PLAN NOTE
Previously diagnosed during prior hospitalization.  Transesophageal echocardiogram negative for vegetation.  Patient with large thrombus burden and left this was the likely source of her Staphylococcus bacteremia.  Per Dr. Sherron Wise's note on 2/28/2018, plan was for 8 weeks of intravenous antibiotics from last date of negative blood culture (2/22/2018) which would call for treatment until 4/19/2018.  It appears antibiotics were discontinued on 4/4/2018 so possible treatment failure due to inadequate treatment course or treatment failure due to inadequate source control.  Tunneled peripherally inserted central catheter removed and tunneled dialysis catheter recently exchanged as well during last hospitalization.  New tunneled venous line placed yesterday on 4/24/2108.  Most recent blood cultures collected on 4/22/2018 now also positive for gram positive cocci which I now positive for Staphylococcus aureus.  Repeat blood cultures collected on 4/25/2018 no growth thus far.  Plan to remove tunneled dialysis catheter if repeat cultures remain positive.

## 2018-04-27 NOTE — PLAN OF CARE
Problem: Patient Care Overview  Goal: Plan of Care Review  Outcome: Ongoing (interventions implemented as appropriate)  Recommendation/Intervention:   1. When medically feasible, advance to mechanical soft diet   2. When diet is advanced, consider Novasource Renal 1x/d if appetite remains poor   3. Assess diet education needs when diet is advanced     Goals:   1. Meet >85% EEN and EPN   2. Prevent dry wt loss >2%/week   3. Promote nutrition related labs wnl

## 2018-04-27 NOTE — ASSESSMENT & PLAN NOTE
Previously diagnosed during prior hospitalization.  Transesophageal echocardiogram negative for vegetation.  Patient with large thrombus burden and left this was the likely source of her Staphylococcus bacteremia.  Per Dr. Sherron Wise's note on 2/28/2018, plan was for 8 weeks of intravenous antibiotics from last date of negative blood culture (2/22/2018) which would call for treatment until 4/19/2018.  It appears antibiotics were discontinued on 4/4/2018 so possible treatment failure due to inadequate treatment course or treatment failure due to inadequate source control.  Thus far blood cultures obtained on 64/25/5018 are no growth to date.  If they remain negative, plan to continue to treat via current access.  If still positive, plan to remove tunneled dialysis line (placed per chart records on 2/21/2018).  I have also asked the nurse to see if we can obtain peripheral access.

## 2018-04-27 NOTE — ASSESSMENT & PLAN NOTE
Continue Daptomycin and Ceftaroline. Cultures from here grew MSSA. Cultures from [facility] growing MRSA, by report. Unclear if sensitivities are identical to original organism.    Cultures from 4/22 growing Staph aureus, after PICC removed on 4/19. Awaiting identification and sensitivities. Recommend a line holiday with removal of dialysis catheter on Friday while on antibiotics and replacement on Monday for dialysis.    Cultures from 4/25 are negative so far.

## 2018-04-27 NOTE — PLAN OF CARE
Problem: Patient Care Overview  Goal: Plan of Care Review  Outcome: Ongoing (interventions implemented as appropriate)  Patient resting in bed at this time, no injuries reported, bed locked and in lowest position, night light on, nonskid foot wear applied, call bell and other important items within reach, instructed to call as needed, purposeful rounding done and plan of care discussed, verbalized understanding     No c/o pain   RA, tolerating   NPO after midnight       No further complaints or concerns at this time  Will cont to monitor

## 2018-04-27 NOTE — PROGRESS NOTES
"[facility] Hospital Medicine  Progress Note    Patient Name: Yumiko Proctor  MRN: [id number]  Patient Class: IP- Inpatient   Admission Date: 4/18/2018  Length of Stay: 9 days  Attending Physician: Victor Hugo Rogel MD  Primary Care Provider: [facility]        Subjective:     Principal Problem:Bacteremia due to methicillin resistant Staphylococcus aureus    HPI:  Per Nat Woodward, "Ms Hoa Proctor is a 68 y.o. female, with PMH of HTN, NIDDM-2, CHF (EF 35%), ESRD on dialysis ([facility], Dr. Prather), A. Fib, LLE DVT, cardiomyopathy, and recent treatment in this facility for MRSA bacteremia from her vascular access site, who returns 2/2 need for ID consultation. In the past week the patient developed a fever and was diagnosed with RUL PNA, and started on Levaquin 3/week after dialysis. Associated symptoms include non-productive cough, fevers, and SOB with wheeze. She was started on vancymycin and zosyn on 4/15/18. Blood cultures taken on 4/16/18 were the second set, first after antibiotics, that grew staph. Other associated symptoms include b/l hip pain, and constipation.  She denies fever, chills, sweats, chest pain, SOB, N/V, diarrhea.     During the patient's previous stay at this facility she had blood cultures positive for MRSA on 2/8/18, and she was started on Daptomycin. On 2/10/18 a second set of blood cultures were positive for MRSA. At the time she had her left Jaspreet catheter removed and a right IJ tunneled dialysis cath was placed on 2/12/18. Repeat cultures on 2/13/18 were negative for MRSA. She was positive in 1/2 bottles on 2/16/18 and both blood cultures bottles for MRSA on 2/16/18. At that time she was started on vancomycin, and Daptomycin was stopped 2/2 myositis. On 2/22/18 blood cultures were negative. A RANDA on 2/26/18 was negative for vegetations, but the patient did have a LLE DVT discovered 2/20/18, and Apixaban was started. After CPK " "levels were normal, she was started back on Daptomycin and Ceftaroline. She was to continue this treatment until 4/4/18, and she was transferred to a NH facility in [place], [place]. She states she was transferred from the Nursing facility to [facility] on 4/15/18."    Hospital Course:  Ms. Yumiko Proctor is a 68 year-old woman with a history hypertension, diabetes mellitus type II on insulin, chronic systolic heart failure with ejection fraction of 35 percent, and end-stage renal disease on hemodialysis via tunneled dialysis catheter exited her right chest wall who returned from a nursing facility in [place] where she was undergoing treatment for presumed endovascular infection with Methicillin-resistant Staphylococcus aureus infection via a peripherally inserted central catheter who returns with recurrence of Staphylococcus aureus bacteremia.  Patient's peripherally inserted central catheter was removed here.  Blood cultures obtained here on 4/19/2018 positive for methicillin-sensitive Staphylococcus aureus.  Infectious disease service consulted and recommended treatment with intravenous ceftaroline and daptomycin.    Interval History: Patient with symptomatic urinary retention last night with 1200 cc of urine output with straight in and out bladder catheterization last night.  Patient denies any bladder discomfort this morning.    Review of Systems   Constitutional: Negative for chills and fever.   Respiratory: Negative for shortness of breath and wheezing.    Cardiovascular: Negative for chest pain.   Gastrointestinal: Negative for abdominal distention, abdominal pain, constipation, diarrhea, nausea and vomiting.   Genitourinary: Positive for difficulty urinating. Negative for dysuria and frequency.   Musculoskeletal: Negative for arthralgias, back pain and myalgias.   Neurological: Positive for weakness. Negative for light-headedness.   Psychiatric/Behavioral: Negative for agitation and confusion.     Objective: "     Vital Signs (Most Recent):  Temp: 97.6 °F (36.4 °C) (04/27/18 0422)  Pulse: (!) 43 (04/27/18 0422)  Resp: 16 (04/27/18 0422)  BP: (!) 106/53 (04/27/18 0422)  SpO2: 96 % (04/27/18 0422) Vital Signs (24h Range):  Temp:  [97.6 °F (36.4 °C)-98 °F (36.7 °C)] 97.6 °F (36.4 °C)  Pulse:  [41-48] 43  Resp:  [16-20] 16  SpO2:  [95 %-98 %] 96 %  BP: (106-122)/(49-54) 106/53     Weight: 118.2 kg (260 lb 9.3 oz)  Body mass index is 43.36 kg/m².    Intake/Output Summary (Last 24 hours) at 04/27/18 0737  Last data filed at 04/27/18 0600   Gross per 24 hour   Intake              200 ml   Output             1200 ml   Net            -1000 ml      Physical Exam   Constitutional: She is oriented to person, place, and time. She appears well-developed and well-nourished. No distress.   Morbid obesity   HENT:   Head: Atraumatic.   Eyes: Conjunctivae are normal.   Neck: Neck supple.   Cardiovascular: Normal rate, regular rhythm and normal heart sounds.    No murmur heard.  Pulmonary/Chest: Effort normal and breath sounds normal. She has no wheezes.   Abdominal: Soft. Bowel sounds are normal. She exhibits no distension. There is no tenderness.   Musculoskeletal: Normal range of motion. She exhibits edema. She exhibits no deformity.   Neurological: She is alert and oriented to person, place, and time. No sensory deficit.   No focal weakness, moving upper and lower extremities well but diffusely weak.   Skin:   Tunneled HD catheter exiting right chest wall without signs of infection.  Tunneled venous line placed on exiting left chest wall also without overt signs of infection.       Significant Labs: All pertinent labs within the past 24 hours have been reviewed.    Significant Imaging: I have reviewed all pertinent imaging results/findings within the past 24 hours.    Assessment/Plan:      * Bacteremia due to methicillin resistant Staphylococcus aureus    Previously diagnosed during prior hospitalization.  Transesophageal echocardiogram  negative for vegetation.  Patient with large thrombus burden and left this was the likely source of her Staphylococcus bacteremia.  Per Dr. Sherron Wise's note on 2/28/2018, plan was for 8 weeks of intravenous antibiotics from last date of negative blood culture (2/22/2018) which would call for treatment until 4/19/2018.  It appears antibiotics were discontinued on 4/4/2018 so possible treatment failure due to inadequate treatment course or treatment failure due to inadequate source control.  Plan for dialysis today and remove HD catheter afterwards.        Urinary retention    Continue straight catheterization as needed.  Started alpha-blocker this morning.          DVT of deep femoral vein, left    Ultrasound on 2/23/2018 showed persistent nonocclusive thrombus in the left common femoral and popliteal veins and possible new thrombus in the right deep femoral vein.  Continue anticoagulation with apixaban.  Dose adjusted 4/24/2018 per nephrology's recommendation.        Chronic atrial fibrillation    Continue amiodarone & carvedilol and anticoagulation with apixaban.        Type 2 diabetes mellitus with chronic kidney disease on chronic dialysis, without long-term current use of insulin    Well controlled likely due to renal failure.  Continue diabetic diet with sliding scale insulin.         End stage renal disease    Patient follows with Dr. Axel Sharpe with [facility] in [place], [place] for outpatient dialysis on Mondays, Wednesdays, and Fridays. Nephrology consulted here to continue with renal replacement therapy.        Anemia due to chronic kidney disease    Improved status post blood transfusion on 4/20/2018 and hemoglobin remains stable.        Slow transit constipation    Improved with bowel regimen. Continue current management.          Debility    Consult physical and occupational therapy.            VTE Risk Mitigation         Ordered     apixaban tablet 5 mg  2 times daily      04/24/18  0934     heparin (porcine) injection 5,000 Units  As needed (PRN)      04/18/18 0759              Victor Hugo Rogel MD  Department of Hospital Medicine   [facility]

## 2018-04-28 LAB
BACTERIA BLD CULT: NORMAL
BACTERIA UR CULT: NO GROWTH
POCT GLUCOSE: 100 MG/DL (ref 70–110)
POCT GLUCOSE: 165 MG/DL (ref 70–110)
POCT GLUCOSE: 77 MG/DL (ref 70–110)
POCT GLUCOSE: 90 MG/DL (ref 70–110)

## 2018-04-28 PROCEDURE — 97530 THERAPEUTIC ACTIVITIES: CPT

## 2018-04-28 PROCEDURE — 25000003 PHARM REV CODE 250: Performed by: HOSPITALIST

## 2018-04-28 PROCEDURE — 97535 SELF CARE MNGMENT TRAINING: CPT

## 2018-04-28 PROCEDURE — 94761 N-INVAS EAR/PLS OXIMETRY MLT: CPT

## 2018-04-28 PROCEDURE — 63600175 PHARM REV CODE 636 W HCPCS: Performed by: INTERNAL MEDICINE

## 2018-04-28 PROCEDURE — 25000003 PHARM REV CODE 250: Performed by: INTERNAL MEDICINE

## 2018-04-28 PROCEDURE — 99233 SBSQ HOSP IP/OBS HIGH 50: CPT | Mod: ,,, | Performed by: HOSPITALIST

## 2018-04-28 PROCEDURE — 25000003 PHARM REV CODE 250: Performed by: NURSE PRACTITIONER

## 2018-04-28 PROCEDURE — 11000001 HC ACUTE MED/SURG PRIVATE ROOM

## 2018-04-28 PROCEDURE — 25000003 PHARM REV CODE 250: Performed by: PHYSICIAN ASSISTANT

## 2018-04-28 RX ADMIN — APIXABAN 5 MG: 2.5 TABLET, FILM COATED ORAL at 08:04

## 2018-04-28 RX ADMIN — CEFTAROLINE FOSAMIL 200 MG: 400 POWDER, FOR SOLUTION INTRAVENOUS at 01:04

## 2018-04-28 RX ADMIN — GABAPENTIN 100 MG: 100 CAPSULE ORAL at 08:04

## 2018-04-28 RX ADMIN — TAMSULOSIN HYDROCHLORIDE 0.4 MG: 0.4 CAPSULE ORAL at 08:04

## 2018-04-28 RX ADMIN — FAMOTIDINE 20 MG: 20 TABLET ORAL at 08:04

## 2018-04-28 RX ADMIN — OXYCODONE HYDROCHLORIDE 5 MG: 5 TABLET ORAL at 11:04

## 2018-04-28 RX ADMIN — CEFTAROLINE FOSAMIL 200 MG: 400 POWDER, FOR SOLUTION INTRAVENOUS at 03:04

## 2018-04-28 RX ADMIN — ISOSORBIDE MONONITRATE 30 MG: 30 TABLET, EXTENDED RELEASE ORAL at 05:04

## 2018-04-28 RX ADMIN — SEVELAMER CARBONATE 800 MG: 800 TABLET, FILM COATED ORAL at 05:04

## 2018-04-28 RX ADMIN — SEVELAMER CARBONATE 800 MG: 800 TABLET, FILM COATED ORAL at 11:04

## 2018-04-28 RX ADMIN — DOCUSATE SODIUM 200 MG: 100 CAPSULE, LIQUID FILLED ORAL at 08:04

## 2018-04-28 RX ADMIN — FUROSEMIDE 80 MG: 40 TABLET ORAL at 08:04

## 2018-04-28 RX ADMIN — SEVELAMER CARBONATE 800 MG: 800 TABLET, FILM COATED ORAL at 08:04

## 2018-04-28 RX ADMIN — CALCITRIOL 0.25 MCG: 0.25 CAPSULE, LIQUID FILLED ORAL at 08:04

## 2018-04-28 RX ADMIN — OXYCODONE HYDROCHLORIDE 5 MG: 5 TABLET ORAL at 08:04

## 2018-04-28 RX ADMIN — VITAMIN D, TAB 1000IU (100/BT) 2000 UNITS: 25 TAB at 08:04

## 2018-04-28 RX ADMIN — GABAPENTIN 100 MG: 100 CAPSULE ORAL at 03:04

## 2018-04-28 RX ADMIN — FUROSEMIDE 80 MG: 40 TABLET ORAL at 05:04

## 2018-04-28 RX ADMIN — AMIODARONE HYDROCHLORIDE 200 MG: 200 TABLET ORAL at 08:04

## 2018-04-28 NOTE — PLAN OF CARE
Problem: Patient Care Overview  Goal: Plan of Care Review  Outcome: Ongoing (interventions implemented as appropriate)  Patient in bed no noted acute distress.Patient pain well controlled with PRN pain meds.Q2 hr turn.

## 2018-04-28 NOTE — SUBJECTIVE & OBJECTIVE
Interval History: No acute events overnight.    Review of Systems   Constitutional: Negative for chills and fever.   Respiratory: Negative for shortness of breath and wheezing.    Cardiovascular: Negative for chest pain.   Gastrointestinal: Negative for abdominal distention, abdominal pain, constipation, diarrhea, nausea and vomiting.   Genitourinary: Positive for difficulty urinating. Negative for dysuria and frequency.   Musculoskeletal: Negative for arthralgias, back pain and myalgias.   Neurological: Positive for weakness. Negative for light-headedness.   Psychiatric/Behavioral: Negative for agitation and confusion.     Objective:     Vital Signs (Most Recent):  Temp: 97.5 °F (36.4 °C) (04/28/18 1248)  Pulse: (!) 46 (04/28/18 1248)  Resp: 18 (04/28/18 0735)  BP: (!) 129/56 (04/28/18 1248)  SpO2: 100 % (04/28/18 1248) Vital Signs (24h Range):  Temp:  [97.5 °F (36.4 °C)-98.1 °F (36.7 °C)] 97.5 °F (36.4 °C)  Pulse:  [45-54] 46  Resp:  [16-20] 18  SpO2:  [94 %-100 %] 100 %  BP: (102-132)/(51-63) 129/56     Weight: 117.5 kg (259 lb 0.7 oz)  Body mass index is 43.11 kg/m².    Intake/Output Summary (Last 24 hours) at 04/28/18 1433  Last data filed at 04/28/18 0700   Gross per 24 hour   Intake              120 ml   Output                0 ml   Net              120 ml      Physical Exam   Constitutional: She is oriented to person, place, and time. She appears well-developed and well-nourished. No distress.   Morbid obesity   HENT:   Head: Atraumatic.   Eyes: Conjunctivae are normal.   Neck: Neck supple.   Cardiovascular: Normal rate, regular rhythm and normal heart sounds.    No murmur heard.  Pulmonary/Chest: Effort normal and breath sounds normal. She has no wheezes.   Abdominal: Soft. Bowel sounds are normal. She exhibits no distension. There is no tenderness.   Musculoskeletal: Normal range of motion. She exhibits edema. She exhibits no deformity.   Neurological: She is alert and oriented to person, place, and time.  No sensory deficit.   No focal weakness, moving upper and lower extremities well but diffusely weak.   Skin:   Tunneled venous line placed on exiting left chest wall also without overt signs of infection.       Significant Labs: All pertinent labs within the past 24 hours have been reviewed.    Significant Imaging: I have reviewed all pertinent imaging results/findings within the past 24 hours.

## 2018-04-28 NOTE — PT/OT/SLP PROGRESS
Occupational Therapy   Treatment    Name: Yumiko Proctor  MRN: [id number]  Admitting Diagnosis:  Bacteremia due to methicillin resistant Staphylococcus aureus  1 Day Post-Op    Recommendations:     Discharge Recommendations: nursing facility, skilled  Discharge Equipment Recommendations:  none  Barriers to discharge:  None    Subjective     Communicated with: patient prior to session.  She was agreeable to OT treatment.  She continues to resist task participation due to back pain.  Although complaints of pain reduce with exercise and tasks engagement.  Pain/Comfort:  · Pain Rating 1: 10/10  · Location - Side 1: Bilateral  · Location - Orientation 1: lower  · Location 1: back  · Pain Addressed 1: Pre-medicate for activity, Reposition, Cessation of Activity, Nurse notified (her nurse provided pain medication upone request at end of session)  · Pain Rating Post-Intervention 1: 10/10  · Pain Rating 2: 6/10  · Location - Side 2: Left  · Location - Orientation 2: upper  · Location 2: arm  · Pain Addressed 2: Distraction  · Pain Rating Post-Intervention 2: 3/10    Patients cultural, spiritual, Alevism conflicts given the current situation: none    Objective:     Patient found with: PICC line, telemetry    General Precautions: Standard, anti-coagulation medicine, fall, diabetic   Orthopedic Precautions:N/A   Braces: N/A     Occupational Performance:    Bed Mobility:    · Min A roll to Right (VC & assist needed to move LE during movement)  · Max/Mod A roll to Left (VC and assist needed to move LE during movement)  · Max A supine>sit (from flat bed) with pt reporting back pain during movement (subsided once sitting EOB)  · Mod A sit>supine (pt moved UB and assisted LE movement with VC)     Functional Mobility/Transfers:  · None    Activities of Daily Living:  · Min A G/H (mouth wash, wash face and hands, comb hair) sitting EOB - frequent VC to initiate and continue tasks needed for completion    Patient left right sidelying  with all lines intact, call button in reach, bed alarm on and nurse notified    AM PAC 6 Click:  AM PAC Total Score: 13    Treatment & Education:  Sitting Balance Training: fair sitting balance (refers to put RUE on bed but not needed for sitting balance, frequent VC and assist needed for her to shift weight in sitting, intermittent UE support on bed table present during G/H tasks EOB  Education:    Assessment:     Yumiko Proctor is a 68 y.o. female with a medical diagnosis of Bacteremia due to methicillin resistant Staphylococcus aureus.  She presents with Performance deficits affecting function are weakness, impaired endurance, impaired self care skills, impaired functional mobilty, decreased safety awareness, pain, impaired balance, decreased lower extremity function, decreased upper extremity function, edema, decreased ROM and anxiety effected performance during the session.  She was Mod>Min A bed mobility, Max A supine>sit EOB, sat EOB 20 minutes (encouragement and assist needed to perform sitting balance and G/H tasks.   Continuation of OT treatment is needed to maximize function while in the hospital    Rehab Prognosis:  good; patient would benefit from acute skilled OT services to address these deficits and reach maximum level of function.       Plan:     Patient to be seen 6 x/week to address the above listed problems via self-care/home management, therapeutic exercises, therapeutic activities  · Plan of Care Expires: 05/21/18  · Plan of Care Reviewed with: patient    This Plan of care has been discussed with the patient who was involved in its development and understands and is in agreement with the identified goals and treatment plan    GOALS:    Occupational Therapy Goals        Problem: Occupational Therapy Goal    Goal Priority Disciplines Outcome Interventions   Occupational Therapy Goal     OT, PT/OT Ongoing (interventions implemented as appropriate)    Description:  Goals to be met by 5/21/18  1. Min  A G/H (2 tasks) sitting EOB without using UE for support on bed or tray table (MET 4/28/18)  REVISED GOAL: Completed G/H (3 tasks) sitting EOB without VC or assist to perform task  2. Mod A UBD seated EOB (MET 04/26/18)  REVISED Min assist UBD at EOB  3. Max A bed-BSC transfer  4.Maximize UE and axial muscle strength                         Time Tracking:     OT Date of Treatment: 04/28/18  OT Start Time: 1102  OT Stop Time: 1140  OT Total Time (min): 38 min    Billable Minutes:Self Care/Home Management 12  Therapeutic Activity 26    PATTY Whitten  4/28/2018

## 2018-04-28 NOTE — PROGRESS NOTES
"Nephrology  Progress Note    Admit Date: 4/18/2018   LOS: 10 days     SUBJECTIVE:     Follow-up For:  Bacteremia due to methicillin resistant Staphylococcus aureus    Interval History:     Uneventful night.  EULALIO cath removal per ID recs.    Review of Systems:  Constitutional: No fever or chills  Respiratory: No cough or shortness of breath  Cardiovascular: No chest pain or palpitations  Gastrointestinal: No nausea or vomiting  Neurological: No confusion or weakness    OBJECTIVE:     Vital Signs Range (Last 24H):  BP (!) 111/54   Pulse (!) 47   Temp 97.6 °F (36.4 °C) (Oral)   Resp 18   Ht 5' 5" (1.651 m)   Wt 117.5 kg (259 lb 0.7 oz)   LMP 01/01/1983 (LMP Unknown)   SpO2 96%   Breastfeeding? No   BMI 43.11 kg/m²     Temp:  [97.6 °F (36.4 °C)-98.1 °F (36.7 °C)]   Pulse:  [45-54]   Resp:  [16-20]   BP: (102-132)/(51-63)   SpO2:  [94 %-96 %]     I & O (Last 24H):    Intake/Output Summary (Last 24 hours) at 04/28/18 1123  Last data filed at 04/28/18 0700   Gross per 24 hour   Intake              120 ml   Output                0 ml   Net              120 ml       Physical Exam:  General appearance: morbidly obese female in NAD  Eyes:  Conjunctivae/corneas clear. PERRL.  Lungs: Normal respiratory effort,  diminished  Heart: Regular/Jerry rate and rhythm, S1, S2 normal, no murmur, rub or cuco.  Abdomen: Soft, non-tender non-distended; bowel sounds normal; no masses,  no organomegaly, obese  Extremities: No cyanosis or clubbing. trace edema.    Skin: Skin color, texture, turgor normal. No rashes or lesions  Neurologic: Normal strength and tone. No focal numbness or weakness   Right IJ EULALIO no E/E/T  Left SC PICC        Laboratory Data:  BMP:     Recent Labs  Lab 04/27/18  0427   GLU 76   *   K 4.0   CL 99   CO2 25   BUN 18   CREATININE 3.6*   CALCIUM 8.4*   MG 2.0     Lab Results   Component Value Date    CALCIUM 8.4 (L) 04/27/2018    PHOS 3.5 04/27/2018       Lab Results   Component Value Date    .8 " (H) 02/08/2018    CALCIUM 8.4 (L) 04/27/2018    PHOS 3.5 04/27/2018       Blood:  MSSA      Medications:  Medication list was reviewed and changes noted under Assessment/Plan.    Diagnostic Results:  reviewed      ASSESSMENT/PLAN:     1. ESRD on HD MWF in [place], [place] with Dr. Sharpe (N18.6, Z99.2):  Routine HD MWF/PRN while inpt.  Consent signed.  Line holiday scheduled for Friday-Monday.  See below. Renally dose meds, avoid nephrotoxins, and monitor I/O's closely. Check labs Monday.  2. Bacteremia-MSSA (R78.81):  Antibx per ID. No evidence of PNa. Removed PICC and cultured tip. Now with MSSA.  Last admit only identifiable source was extensive chronic LLE DVT.  Repeat U/S now with same LLE DVT and new R leg DVT.  Eliquis for now.  Discussed with team.  PICC replaced for antibx treatments.  Dr. Gustafson consult appreciated.  ECHO with no vegetation.  Plan for EULALIO cath line holiday.  Remove today and plan on replacement Monday per Dr. Cisneros (thanks).   3. Anemia of CKD/infection (D63.1):  No IV iron with infection.  Epo on HD.  S/p transfusion 2 units PRBCs on HD 4/18.  Hgb stable  4. 2HPT/Vit D deficiency:  Vit D3 and calcitriol.  5. Hyponatremia (E87.1):  Adjusted HD bath.   6. Morbid Obesity (E66.01):  Needs aggressive weight mgmt.

## 2018-04-28 NOTE — PLAN OF CARE
Problem: Occupational Therapy Goal  Goal: Occupational Therapy Goal  Goals to be met by 5/21/18  1. Min A G/H (2 tasks) sitting EOB without using UE for support on bed or tray table (MET 4/28/18)  REVISED GOAL: Completed G/H (3 tasks) sitting EOB without VC or assist to perform task  2. Mod A UBD seated EOB (MET 04/26/18)  REVISED Min assist UBD at EOB  3. Max A bed-BSC transfer  4.Maximize UE and axial muscle strength       Outcome: Ongoing (interventions implemented as appropriate)  The patient continues to be limited by back pain.  She was Mod>Min A bed mobility, Max A supine>sit EOB, sat EOB 20 minutes (encouragement and assist needed to perform sitting balance and G/H tasks.  PATTY Whitten 4/28/2018

## 2018-04-28 NOTE — ASSESSMENT & PLAN NOTE
Continue straight catheterization as needed.  Ultrasound of the kidneys did not reveal evidence of hydronephrosis. Continue with alpha-blocker therapy.

## 2018-04-28 NOTE — PLAN OF CARE
Plan of care reviewed with patient, medications explained. Pt currently resting comfortably in bed and appears to be sleeping in between care. VSS, bed in lowest, locked position and call light in reach. Hourly rounding performed, will continue to monitor.

## 2018-04-28 NOTE — PROCEDURES
[facility] Interventional Nephrology Service     Tunneled Dialysis Catheter Removal Procedure Note     Date of Procedure: 4/27/18 at 1:15 PM    Primary Surgeon: Gavin Cisneros MD  Assist: none    Procedure Performed:  1. Tunneled Dialysis Catheter Removal    Indication: Catheter not required; functioning AV Access     Location of catheter: R internal jugular vein and R chest wall     Patient was prepped and draped in a sterile fashion.  1% lidocaine was used for local anesthesia.  Blunt hemostat was used to dissect the exit site and fibrin sheath from the catheter cuff.  Once the cuff was freed, catheter was pulled and pressure was applied to the venotomy site for at least 5 minutes.  Hemostasis was achieved, tegaderm dressing applied.      Blood Loss: < 10 mL  Discharge instructions given verbally.    Gavin Cisneros MD  [phone]

## 2018-04-28 NOTE — PROGRESS NOTES
"[facility] Hospital Medicine  Progress Note    Patient Name: Yumiko Proctor  MRN: [id number]  Patient Class: IP- Inpatient   Admission Date: 4/18/2018  Length of Stay: 10 days  Attending Physician: Victor Hugo Rogel MD  Primary Care Provider: [facility]        Subjective:     Principal Problem:Bacteremia due to methicillin resistant Staphylococcus aureus    HPI:  Per Nat Woodward, "Ms Hoa Proctor is a 68 y.o. female, with PMH of HTN, NIDDM-2, CHF (EF 35%), ESRD on dialysis ([facility], Dr. Prather), A. Fib, LLE DVT, cardiomyopathy, and recent treatment in this facility for MRSA bacteremia from her vascular access site, who returns 2/2 need for ID consultation. In the past week the patient developed a fever and was diagnosed with RUL PNA, and started on Levaquin 3/week after dialysis. Associated symptoms include non-productive cough, fevers, and SOB with wheeze. She was started on vancymycin and zosyn on 4/15/18. Blood cultures taken on 4/16/18 were the second set, first after antibiotics, that grew staph. Other associated symptoms include b/l hip pain, and constipation.  She denies fever, chills, sweats, chest pain, SOB, N/V, diarrhea.     During the patient's previous stay at this facility she had blood cultures positive for MRSA on 2/8/18, and she was started on Daptomycin. On 2/10/18 a second set of blood cultures were positive for MRSA. At the time she had her left Jaspreet catheter removed and a right IJ tunneled dialysis cath was placed on 2/12/18. Repeat cultures on 2/13/18 were negative for MRSA. She was positive in 1/2 bottles on 2/16/18 and both blood cultures bottles for MRSA on 2/16/18. At that time she was started on vancomycin, and Daptomycin was stopped 2/2 myositis. On 2/22/18 blood cultures were negative. A RANDA on 2/26/18 was negative for vegetations, but the patient did have a LLE DVT discovered 2/20/18, and Apixaban was started. After CPK " "levels were normal, she was started back on Daptomycin and Ceftaroline. She was to continue this treatment until 4/4/18, and she was transferred to a NH facility in [place], [place]. She states she was transferred from the Nursing facility to [facility] on 4/15/18."    Hospital Course:  Ms. Yumiko Proctor is a 68 year-old woman with a history hypertension, diabetes mellitus type II on insulin, chronic systolic heart failure with ejection fraction of 35 percent, and end-stage renal disease on hemodialysis via tunneled dialysis catheter exited her right chest wall who returned from a nursing facility in [place] where she was undergoing treatment for presumed endovascular infection with Methicillin-resistant Staphylococcus aureus infection via a peripherally inserted central catheter who returns with recurrence of Staphylococcus aureus bacteremia.  Patient's peripherally inserted central catheter was removed here.  Blood cultures obtained here on 4/19/2018 positive for methicillin-sensitive Staphylococcus aureus.  Infectious disease service consulted and recommended treatment with intravenous ceftaroline and daptomycin.  In addition, as patient failed to clear her bacteremia, infectious disease service recommended removal of her tunneled dialysis catheter which was done following dialysis on 4/27/2018 with place to place new tunneled catheter on 4/30/2018 to allow for a line holiday.    Interval History: No acute events overnight.    Review of Systems   Constitutional: Negative for chills and fever.   Respiratory: Negative for shortness of breath and wheezing.    Cardiovascular: Negative for chest pain.   Gastrointestinal: Negative for abdominal distention, abdominal pain, constipation, diarrhea, nausea and vomiting.   Genitourinary: Positive for difficulty urinating. Negative for dysuria and frequency.   Musculoskeletal: Negative for arthralgias, back pain and myalgias.   Neurological: Positive for weakness. Negative for " light-headedness.   Psychiatric/Behavioral: Negative for agitation and confusion.     Objective:     Vital Signs (Most Recent):  Temp: 97.5 °F (36.4 °C) (04/28/18 1248)  Pulse: (!) 46 (04/28/18 1248)  Resp: 18 (04/28/18 0735)  BP: (!) 129/56 (04/28/18 1248)  SpO2: 100 % (04/28/18 1248) Vital Signs (24h Range):  Temp:  [97.5 °F (36.4 °C)-98.1 °F (36.7 °C)] 97.5 °F (36.4 °C)  Pulse:  [45-54] 46  Resp:  [16-20] 18  SpO2:  [94 %-100 %] 100 %  BP: (102-132)/(51-63) 129/56     Weight: 117.5 kg (259 lb 0.7 oz)  Body mass index is 43.11 kg/m².    Intake/Output Summary (Last 24 hours) at 04/28/18 1433  Last data filed at 04/28/18 0700   Gross per 24 hour   Intake              120 ml   Output                0 ml   Net              120 ml      Physical Exam   Constitutional: She is oriented to person, place, and time. She appears well-developed and well-nourished. No distress.   Morbid obesity   HENT:   Head: Atraumatic.   Eyes: Conjunctivae are normal.   Neck: Neck supple.   Cardiovascular: Normal rate, regular rhythm and normal heart sounds.    No murmur heard.  Pulmonary/Chest: Effort normal and breath sounds normal. She has no wheezes.   Abdominal: Soft. Bowel sounds are normal. She exhibits no distension. There is no tenderness.   Musculoskeletal: Normal range of motion. She exhibits edema. She exhibits no deformity.   Neurological: She is alert and oriented to person, place, and time. No sensory deficit.   No focal weakness, moving upper and lower extremities well but diffusely weak.   Skin:   Tunneled venous line placed on exiting left chest wall also without overt signs of infection.       Significant Labs: All pertinent labs within the past 24 hours have been reviewed.    Significant Imaging: I have reviewed all pertinent imaging results/findings within the past 24 hours.    Assessment/Plan:      * Bacteremia due to methicillin resistant Staphylococcus aureus    Previously diagnosed during prior hospitalization.   Transesophageal echocardiogram negative for vegetation.  Patient with large thrombus burden and left this was the likely source of her Staphylococcus bacteremia.  Per Dr. Sherron Wise's note on 2/28/2018, plan was for 8 weeks of intravenous antibiotics from last date of negative blood culture (2/22/2018) which would call for treatment until 4/19/2018.  It appears antibiotics were discontinued on 4/4/2018 so possible treatment failure due to inadequate treatment course or treatment failure due to inadequate source control.  Patient also had tunneled right internal jugular exchanged on 4/24/2018.  Despite exchanging internal jugular line and restarting antibiotics again with intravenous ceftaroline and daptomycin, patient failed to clear bacteremia and had HD catheter removed on 4/27/2018.  Plan to place tunneled dialysis line on 4/30/2018 if most recent repeat cultures remain negative.          Urinary retention    Continue straight catheterization as needed.  Ultrasound of the kidneys did not reveal evidence of hydronephrosis. Continue with alpha-blocker therapy.        DVT of deep femoral vein, left    Ultrasound on 2/23/2018 showed persistent nonocclusive thrombus in the left common femoral and popliteal veins and possible new thrombus in the right deep femoral vein.  Continue anticoagulation with apixaban.  Dose adjusted 4/24/2018 per nephrology's recommendation.        Chronic atrial fibrillation    Continue amiodarone & carvedilol and anticoagulation with apixaban.        Type 2 diabetes mellitus with chronic kidney disease on chronic dialysis, without long-term current use of insulin    Well controlled likely due to renal failure.  Continue diabetic diet with sliding scale insulin.         End stage renal disease    Patient follows with Dr. Axel Sharpe with [facility] in [place], [place] for outpatient dialysis on Mondays, Wednesdays, and Fridays. Nephrology consulted here to continue with renal  replacement therapy.        Anemia due to chronic kidney disease    Improved status post blood transfusion on 4/20/2018 and hemoglobin remains stable.        Slow transit constipation    Improved with bowel regimen. Continue current management.          Debility    Consult physical and occupational therapy.            VTE Risk Mitigation         Ordered     apixaban tablet 5 mg  2 times daily      04/24/18 0934     heparin (porcine) injection 5,000 Units  As needed (PRN)      04/18/18 075              Victor Hugo Rogel MD  Department of Hospital Medicine   [facility]

## 2018-04-29 LAB
POCT GLUCOSE: 108 MG/DL (ref 70–110)
POCT GLUCOSE: 127 MG/DL (ref 70–110)
POCT GLUCOSE: 76 MG/DL (ref 70–110)
POCT GLUCOSE: 92 MG/DL (ref 70–110)

## 2018-04-29 PROCEDURE — 25000003 PHARM REV CODE 250: Performed by: INTERNAL MEDICINE

## 2018-04-29 PROCEDURE — 94761 N-INVAS EAR/PLS OXIMETRY MLT: CPT

## 2018-04-29 PROCEDURE — 97110 THERAPEUTIC EXERCISES: CPT

## 2018-04-29 PROCEDURE — 11000001 HC ACUTE MED/SURG PRIVATE ROOM

## 2018-04-29 PROCEDURE — 25000003 PHARM REV CODE 250: Performed by: NURSE PRACTITIONER

## 2018-04-29 PROCEDURE — 97140 MANUAL THERAPY 1/> REGIONS: CPT

## 2018-04-29 PROCEDURE — 97530 THERAPEUTIC ACTIVITIES: CPT

## 2018-04-29 PROCEDURE — 25000003 PHARM REV CODE 250: Performed by: HOSPITALIST

## 2018-04-29 PROCEDURE — 99232 SBSQ HOSP IP/OBS MODERATE 35: CPT | Mod: ,,, | Performed by: HOSPITALIST

## 2018-04-29 PROCEDURE — 25000003 PHARM REV CODE 250: Performed by: PHYSICIAN ASSISTANT

## 2018-04-29 PROCEDURE — 63600175 PHARM REV CODE 636 W HCPCS: Performed by: INTERNAL MEDICINE

## 2018-04-29 RX ADMIN — OXYCODONE HYDROCHLORIDE 5 MG: 5 TABLET ORAL at 03:04

## 2018-04-29 RX ADMIN — DOCUSATE SODIUM 200 MG: 100 CAPSULE, LIQUID FILLED ORAL at 09:04

## 2018-04-29 RX ADMIN — ISOSORBIDE MONONITRATE 30 MG: 30 TABLET, EXTENDED RELEASE ORAL at 05:04

## 2018-04-29 RX ADMIN — SEVELAMER CARBONATE 800 MG: 800 TABLET, FILM COATED ORAL at 05:04

## 2018-04-29 RX ADMIN — GABAPENTIN 100 MG: 100 CAPSULE ORAL at 09:04

## 2018-04-29 RX ADMIN — SEVELAMER CARBONATE 800 MG: 800 TABLET, FILM COATED ORAL at 09:04

## 2018-04-29 RX ADMIN — POLYETHYLENE GLYCOL 3350 17 G: 17 POWDER, FOR SOLUTION ORAL at 09:04

## 2018-04-29 RX ADMIN — DAPTOMYCIN 710 MG: 500 INJECTION, POWDER, LYOPHILIZED, FOR SOLUTION INTRAVENOUS at 04:04

## 2018-04-29 RX ADMIN — TAMSULOSIN HYDROCHLORIDE 0.4 MG: 0.4 CAPSULE ORAL at 09:04

## 2018-04-29 RX ADMIN — AMIODARONE HYDROCHLORIDE 200 MG: 200 TABLET ORAL at 09:04

## 2018-04-29 RX ADMIN — CEFTAROLINE FOSAMIL 200 MG: 400 POWDER, FOR SOLUTION INTRAVENOUS at 01:04

## 2018-04-29 RX ADMIN — CALCITRIOL 0.25 MCG: 0.25 CAPSULE, LIQUID FILLED ORAL at 09:04

## 2018-04-29 RX ADMIN — APIXABAN 5 MG: 2.5 TABLET, FILM COATED ORAL at 09:04

## 2018-04-29 RX ADMIN — CEFTAROLINE FOSAMIL 200 MG: 400 POWDER, FOR SOLUTION INTRAVENOUS at 02:04

## 2018-04-29 RX ADMIN — SEVELAMER CARBONATE 800 MG: 800 TABLET, FILM COATED ORAL at 02:04

## 2018-04-29 RX ADMIN — VITAMIN D, TAB 1000IU (100/BT) 2000 UNITS: 25 TAB at 09:04

## 2018-04-29 RX ADMIN — GABAPENTIN 100 MG: 100 CAPSULE ORAL at 02:04

## 2018-04-29 RX ADMIN — FUROSEMIDE 80 MG: 40 TABLET ORAL at 09:04

## 2018-04-29 RX ADMIN — OXYCODONE HYDROCHLORIDE 5 MG: 5 TABLET ORAL at 09:04

## 2018-04-29 RX ADMIN — FUROSEMIDE 80 MG: 40 TABLET ORAL at 05:04

## 2018-04-29 RX ADMIN — FAMOTIDINE 20 MG: 20 TABLET ORAL at 09:04

## 2018-04-29 NOTE — PLAN OF CARE
Problem: Patient Care Overview  Goal: Plan of Care Review  Outcome: Ongoing (interventions implemented as appropriate)  Patient on RA. Sats 95%. Pt. in no distress, will continue to monitor.

## 2018-04-29 NOTE — PLAN OF CARE
Problem: Patient Care Overview  Goal: Plan of Care Review  Outcome: Ongoing (interventions implemented as appropriate)  Patient on room air SpO2 95% with no sign of distress.

## 2018-04-29 NOTE — PLAN OF CARE
Problem: Physical Therapy Goal  Goal: Physical Therapy Goal  Goals to be met by: 18    Patient will increase functional independence with mobility by performin. Supine to sit with min A.  2. Sit to supine with min A.   3. Rolling R and L with min A.   4. Sitting at edge of bed >20 minutes with SBA.   5. Sit < stand with RW and min A   6. SPT from EOB <> bedside chair with RW and Min A   7. Pt will tolerate sitting at EOB for all meals with set up assist    -    Comments: Good participation in therapy.  Pt with limited tolerance.

## 2018-04-29 NOTE — PROGRESS NOTES
"Nephrology  Progress Note    Admit Date: 4/18/2018   LOS: 11 days     SUBJECTIVE:     Follow-up For:  Bacteremia due to methicillin resistant Staphylococcus aureus    Interval History:     Uneventful night.  EULALIO cath removal per ID recs.    Review of Systems:  Constitutional: No fever or chills  Respiratory: No cough or shortness of breath  Cardiovascular: No chest pain or palpitations  Gastrointestinal: No nausea or vomiting  Neurological: No confusion or weakness    OBJECTIVE:     Vital Signs Range (Last 24H):  BP (!) 107/51 (BP Location: Right arm, Patient Position: Lying)   Pulse (!) 56   Temp 97.5 °F (36.4 °C) (Oral)   Resp 18   Ht 5' 5" (1.651 m)   Wt 117.8 kg (259 lb 11.2 oz) Comment: no pump on bed for weighing.  LMP 01/01/1983 (LMP Unknown)   SpO2 95%   Breastfeeding? No   BMI 43.22 kg/m²     Temp:  [97.5 °F (36.4 °C)-97.9 °F (36.6 °C)]   Pulse:  [40-56]   Resp:  [18-20]   BP: ()/(51-56)   SpO2:  [95 %-100 %]     I & O (Last 24H):    Intake/Output Summary (Last 24 hours) at 04/29/18 1131  Last data filed at 04/29/18 0146   Gross per 24 hour   Intake              100 ml   Output                0 ml   Net              100 ml       Physical Exam:  General appearance: morbidly obese female in NAD  Eyes:  Conjunctivae/corneas clear. PERRL.  Lungs: Normal respiratory effort,  diminished  Heart: Regular/Jerry rate and rhythm, S1, S2 normal, no murmur, rub or cuco.  Abdomen: Soft, non-tender non-distended; bowel sounds normal; no masses,  no organomegaly, obese  Extremities: No cyanosis or clubbing. trace edema.    Skin: Skin color, texture, turgor normal. No rashes or lesions  Neurologic: Normal strength and tone. No focal numbness or weakness   Right IJ EULALIO no E/E/T  Left SC PICC        Laboratory Data:  BMP:     Recent Labs  Lab 04/27/18  0427   GLU 76   *   K 4.0   CL 99   CO2 25   BUN 18   CREATININE 3.6*   CALCIUM 8.4*   MG 2.0     Lab Results   Component Value Date    CALCIUM 8.4 (L) " 04/27/2018    PHOS 3.5 04/27/2018       Lab Results   Component Value Date    .8 (H) 02/08/2018    CALCIUM 8.4 (L) 04/27/2018    PHOS 3.5 04/27/2018       Blood:  MSSA      Medications:  Medication list was reviewed and changes noted under Assessment/Plan.    Diagnostic Results:  reviewed      ASSESSMENT/PLAN:     1. ESRD on HD MWF in [place], [place] with Dr. Sharpe (N18.6, Z99.2):  Routine HD MWF/PRN while inpt.  Consent signed.  Line holiday scheduled for Friday-Monday.  See below. Renally dose meds, avoid nephrotoxins, and monitor I/O's closely. Check labs Monday.  2. Bacteremia-MSSA (R78.81):  Antibx per ID. No evidence of PNa. Removed PICC and cultured tip. Now with MSSA.  Last admit only identifiable source was extensive chronic LLE DVT.  Repeat U/S now with same LLE DVT and new R leg DVT.  Eliquis for now.  Discussed with team.  PICC replaced for antibx treatments.  Dr. Gustafson consult appreciated.  ECHO with no vegetation.  Plan for EULALIO cath line holiday.  Remove today and plan on replacement Monday per Dr. Cisneros (thanks).   3. Anemia of CKD/infection (D63.1):  No IV iron with infection.  Epo on HD.  S/p transfusion 2 units PRBCs on HD 4/18.  Hgb stable  4. 2HPT/Vit D deficiency:  Vit D3 and calcitriol.  5. Hyponatremia (E87.1):  Adjusted HD bath.   6. Morbid Obesity (E66.01):  Needs aggressive weight mgmt.

## 2018-04-29 NOTE — PLAN OF CARE
Problem: Patient Care Overview  Goal: Plan of Care Review  Outcome: Ongoing (interventions implemented as appropriate)  Plan of care reviewed with the patient during her assessment.  No new questions noted.    Problem: Diabetes, Type 2 (Adult)  Goal: Signs and Symptoms of Listed Potential Problems Will be Absent, Minimized or Managed (Diabetes, Type 2)  Signs and symptoms of listed potential problems will be absent, minimized or managed by discharge/transition of care (reference Diabetes, Type 2 (Adult) CPG).   Outcome: Ongoing (interventions implemented as appropriate)  HS cbg at 100.  No sliding scale given.    Problem: Infection, Risk/Actual (Adult)  Goal: Infection Prevention/Resolution  Patient will demonstrate the desired outcomes by discharge/transition of care.   Outcome: Ongoing (interventions implemented as appropriate)  No temps.  No chills. No complaints of tenderness to prior site of catheter.  Still awaiting blood culture results.    Problem: Pressure Ulcer Risk (Ridge Scale) (Adult,Obstetrics,Pediatric)  Goal: Skin Integrity  Patient will demonstrate the desired outcomes by discharge/transition of care.   Outcome: Ongoing (interventions implemented as appropriate)  Turning patient q 2 hours.  Low air loss mattress with Isogel air pump now in use.  Patient with mild moisture related maceration... Sacral area cleansed, dried and protective barrier cream applied.    Problem: Fall Risk (Adult)  Goal: Absence of Falls  Patient will demonstrate the desired outcomes by discharge/transition of care.   Outcome: Ongoing (interventions implemented as appropriate)  Patient is a fall risk.  Yellow bracelet, yellow socks and bed alarm noted in use.  No falls/injury noted thus far.    Problem: Hemodialysis (Adult)  Goal: Signs and Symptoms of Listed Potential Problems Will be Absent, Minimized or Managed (Hemodialysis)  Signs and symptoms of listed potential problems will be absent, minimized or managed by  discharge/transition of care (reference Hemodialysis (Adult) CPG).   Outcome: Ongoing (interventions implemented as appropriate)  Patient is a HD patient but her Dialysis catheter was d/c'd yesterday.  A new one is scheduled for placement on Monday.    Problem: Pain, Chronic (Adult)  Goal: Acceptable Pain Control/Comfort Level  Patient will demonstrate the desired outcomes by discharge/transition of care.   Outcome: Ongoing (interventions implemented as appropriate)  Patient's pain relieved with Oxycodone 5mg PO.  Pain levels drop from 7-8/10 down to 0-3/10.

## 2018-04-29 NOTE — PT/OT/SLP PROGRESS
"Physical Therapy Treatment    Patient Name:  Yumiko Proctor   MRN:  [id number]    Recommendations:     Discharge Recommendations:  nursing facility, skilled   Discharge Equipment Recommendations: none   Barriers to discharge: None    Assessment:     Yumiko Proctor is a 68 y.o. female admitted with a medical diagnosis of Bacteremia due to methicillin resistant Staphylococcus aureus.  She presents with the following impairments/functional limitations:  weakness, impaired endurance, impaired balance, gait instability, decreased lower extremity function, pain, impaired functional mobilty, edema, decreased upper extremity function. Good participation in therapy.  Pt with limited tolerance.      Rehab Prognosis:  good; patient would benefit from acute skilled PT services to address these deficits and reach maximum level of function.      Recent Surgery: Procedure(s) (LRB):  PERMCATH REMOVAL-TUNNELED CVC REMOVAL (Right) 2 Days Post-Op    Plan:     During this hospitalization, patient to be seen 6 x/week to address the above listed problems via gait training, therapeutic activities, therapeutic exercises, neuromuscular re-education, wheelchair management/training  · Plan of Care Expires:  05/21/18   Plan of Care Reviewed with: patient    Subjective     Communicated with patient prior to session.  Patient found in bed supine upon PT entry to room, agreeable to treatment.      Chief Complaint: low back pain   Patient comments/goals: when asked to side step; "I can't move my L leg"  Pain/Comfort:  · Pain Rating 1: 7/10  · Location - Orientation 1: lower  · Location 1: back  · Pain Addressed 1: Pre-medicate for activity, Distraction  · Pain Rating Post-Intervention 1: 10/10  · Pain Rating 2: 5/10  · Location - Side 2: Left  · Location - Orientation 2: upper  · Location 2: arm  · Pain Addressed 2: Pre-medicate for activity, Distraction  · Pain Rating Post-Intervention 2: 3/10    Patients cultural, spiritual, Latter day conflicts " given the current situation: none    Objective:     Patient found with: PICC line, telemetry     General Precautions: Standard, anti-coagulation medicine, diabetic, fall   Orthopedic Precautions:N/A   Braces: N/A     Functional Mobility:  · Bed Mobility:     · Supine to Sit: moderate assistance  · Sit to Supine: maximal assistance  · Transfers:     · Sit to Stand:  moderate assistance with rolling walker  · Gait: unable x 3 attempts to sidestep EOB   · Balance: fair standing dynamic balance      AM-PAC 6 CLICK MOBILITY  Turning over in bed (including adjusting bedclothes, sheets and blankets)?: 3  Sitting down on and standing up from a chair with arms (e.g., wheelchair, bedside commode, etc.): 2  Moving from lying on back to sitting on the side of the bed?: 3  Moving to and from a bed to a chair (including a wheelchair)?: 1  Need to walk in hospital room?: 1  Climbing 3-5 steps with a railing?: 1  Total Score: 11       Therapeutic Activities and Exercises:   in bed supine: B slr and AB wi assist x 10    Patient left supine with all lines intact, call button in reach, bed alarm on, nurse notified and PCA present..    GOALS:    Physical Therapy Goals        Problem: Physical Therapy Goal    Goal Priority Disciplines Outcome Goal Variances Interventions   Physical Therapy Goal     PT/OT, PT Ongoing (interventions implemented as appropriate)     Description:  Goals to be met by: 18    Patient will increase functional independence with mobility by performin. Supine to sit with min A.  2. Sit to supine with min A.   3. Rolling R and L with min A.   4. Sitting at edge of bed >20 minutes with SBA.   5. Sit < stand with RW and min A   6. SPT from EOB <> bedside chair with RW and Min A   7. Pt will tolerate sitting at EOB for all meals with set up assist                     Time Tracking:     PT Received On: 18  PT Start Time: 1021     PT Stop Time: 1044  PT Total Time (min): 23 min     Billable Minutes:  Therapeutic Activity 23    Treatment Type: Treatment  PT/PTA: PT           Gildardo Pickett, PT  04/29/2018

## 2018-04-29 NOTE — PLAN OF CARE
Problem: Occupational Therapy Goal  Goal: Occupational Therapy Goal  Goals to be met by 5/21/18  1. Min A G/H (2 tasks) sitting EOB without using UE for support on bed or tray table (MET 4/28/18)  REVISED GOAL: Completed G/H (3 tasks) sitting EOB without VC or assist to perform task  2. Mod A UBD seated EOB (MET 04/26/18)  REVISED Min assist UBD at EOB  3. Max A bed-BSC transfer  4.Maximize UE and axial muscle strength        Outcome: Ongoing (interventions implemented as appropriate)  Patient treatment was done in bed.  UE exercise to RUE, with manual therapy attempted to relieve Left shoulder pain/ROM exercise not performed. PATTY Whitten  4/29/2018

## 2018-04-29 NOTE — PT/OT/SLP PROGRESS
Occupational Therapy   Treatment    Name: Yumiko Proctor  MRN: [id number]  Admitting Diagnosis:  Bacteremia due to methicillin resistant Staphylococcus aureus  2 Days Post-Op    Recommendations:     Discharge Recommendations: nursing facility, skilled  Discharge Equipment Recommendations:  none  Barriers to discharge:  None    Subjective     Communicated with: patient and her nurse prior to session. She was agreeable to OT treatment  Pain/Comfort:  · Pain Rating 1: 10/10  · Location - Side 1: Left  · Location - Orientation 1: upper  · Location 1: shoulder  · Pain Rating Post-Intervention 1: 10/10    Patients cultural, spiritual, Lutheran conflicts given the current situation: noen    Objective:     Patient found with: peripheral IV, PICC line, telemetry    General Precautions: Standard, anti-coagulation medicine, diabetic, fall   Orthopedic Precautions:N/A   Braces: N/A     Occupational Performance:    Bed Mobility:    · None     Functional Mobility/Transfers:       None    Activities of Daily Living:  · None    Patient left supine with all lines intact, call button in reach, bed alarm on and nurse present    [facility] 6 Click:  [facility] Total Score: 13    Treatment & Education:  Left shoulder pain; manual therapy limited to light MF Unwinding and trigger point release due to  Muscle tissue reactance to touch.(pain 10/10 did not change, Tong her nurse notified, not time for pain medication   Education:  UE Exercise: LUE: ROM exercise not done due to unresolved shoulder pain; RUE: orange theraband exercises (12 reps shoulder and elbow planes of movement)    Assessment:     Yumiko Proctor is a 68 y.o. female with a medical diagnosis of Bacteremia due to methicillin resistant Staphylococcus aureus.  She presents with  Performance deficits affecting function are weakness, impaired endurance, impaired self care skills, impaired functional mobilty, pain, edema, decreased upper extremity function, decreased lower extremity  function, impaired balance effected performance during the session.  Patient treatment was done in bed.  UE exercise to RUE, with manual therapy attempted to relieve Left shoulder pain/ROM exercise not performed.  Continuation of OT treatment is needed to maximize function while in the hospital.    Rehab Prognosis:  good; patient would benefit from acute skilled OT services to address these deficits and reach maximum level of function.       Plan:     Patient to be seen 6 x/week to address the above listed problems via self-care/home management, therapeutic activities, therapeutic exercises  · Plan of Care Expires: 05/21/18  · Plan of Care Reviewed with: patient    This Plan of care has been discussed with the patient who was involved in its development and understands and is in agreement with the identified goals and treatment plan    GOALS:    Occupational Therapy Goals        Problem: Occupational Therapy Goal    Goal Priority Disciplines Outcome Interventions   Occupational Therapy Goal     OT, PT/OT Ongoing (interventions implemented as appropriate)    Description:  Goals to be met by 5/21/18  1. Min A G/H (2 tasks) sitting EOB without using UE for support on bed or tray table (MET 4/28/18)  REVISED GOAL: Completed G/H (3 tasks) sitting EOB without VC or assist to perform task  2. Mod A UBD seated EOB (MET 04/26/18)  REVISED Min assist UBD at EOB  3. Max A bed-BSC transfer  4.Maximize UE and axial muscle strength                         Time Tracking:     OT Date of Treatment: 04/29/18  OT Start Time: 1601  OT Stop Time: 1627  OT Total Time (min): 26 min    Billable Minutes:Therapeutic Exercise 14                                 Manual Therapy 12    PATTY Whitten  4/29/2018

## 2018-04-29 NOTE — PROGRESS NOTES
"[facility] Hospital Medicine  Progress Note    Patient Name: Yumiko Proctor  MRN: [id number]  Patient Class: IP- Inpatient   Admission Date: 4/18/2018  Length of Stay: 11 days  Attending Physician: Victor Hugo Rogel MD  Primary Care Provider: [facility]        Subjective:     Principal Problem:Bacteremia due to methicillin resistant Staphylococcus aureus    HPI:  Per Nat Woodward, "Ms Hoa Proctor is a 68 y.o. female, with PMH of HTN, NIDDM-2, CHF (EF 35%), ESRD on dialysis ([facility], Dr. Prather), A. Fib, LLE DVT, cardiomyopathy, and recent treatment in this facility for MRSA bacteremia from her vascular access site, who returns 2/2 need for ID consultation. In the past week the patient developed a fever and was diagnosed with RUL PNA, and started on Levaquin 3/week after dialysis. Associated symptoms include non-productive cough, fevers, and SOB with wheeze. She was started on vancymycin and zosyn on 4/15/18. Blood cultures taken on 4/16/18 were the second set, first after antibiotics, that grew staph. Other associated symptoms include b/l hip pain, and constipation.  She denies fever, chills, sweats, chest pain, SOB, N/V, diarrhea.     During the patient's previous stay at this facility she had blood cultures positive for MRSA on 2/8/18, and she was started on Daptomycin. On 2/10/18 a second set of blood cultures were positive for MRSA. At the time she had her left Jaspreet catheter removed and a right IJ tunneled dialysis cath was placed on 2/12/18. Repeat cultures on 2/13/18 were negative for MRSA. She was positive in 1/2 bottles on 2/16/18 and both blood cultures bottles for MRSA on 2/16/18. At that time she was started on vancomycin, and Daptomycin was stopped 2/2 myositis. On 2/22/18 blood cultures were negative. A RANDA on 2/26/18 was negative for vegetations, but the patient did have a LLE DVT discovered 2/20/18, and Apixaban was started. After CPK " "levels were normal, she was started back on Daptomycin and Ceftaroline. She was to continue this treatment until 4/4/18, and she was transferred to a NH facility in [place], [place]. She states she was transferred from the Nursing facility to [facility] on 4/15/18."    Hospital Course:  Ms. Yumiko Proctor is a 68 year-old woman with a history hypertension, diabetes mellitus type II on insulin, chronic systolic heart failure with ejection fraction of 35 percent, and end-stage renal disease on hemodialysis via tunneled dialysis catheter exited her right chest wall who returned from a nursing facility in [place] where she was undergoing treatment for presumed endovascular infection with Methicillin-resistant Staphylococcus aureus infection via a peripherally inserted central catheter who returns with recurrence of Staphylococcus aureus bacteremia.  Patient's peripherally inserted central catheter was removed here.  Blood cultures obtained here on 4/19/2018 positive for methicillin-sensitive Staphylococcus aureus.  Infectious disease service consulted and recommended treatment with intravenous ceftaroline and daptomycin.  In addition, as patient failed to clear her bacteremia, infectious disease service recommended removal of her tunneled dialysis catheter which was done following dialysis on 4/27/2018 with place to place new tunneled catheter on 4/30/2018 to allow for a line holiday.    Interval History: No acute events overnight.    Review of Systems   Constitutional: Negative for chills and fever.   Respiratory: Negative for shortness of breath and wheezing.    Cardiovascular: Negative for chest pain.   Gastrointestinal: Negative for abdominal distention, abdominal pain, constipation, diarrhea, nausea and vomiting.   Genitourinary: Negative for difficulty urinating, dysuria and frequency.   Musculoskeletal: Negative for arthralgias, back pain and myalgias.   Neurological: Positive for weakness. Negative for " light-headedness.   Psychiatric/Behavioral: Negative for agitation and confusion.     Objective:     Vital Signs (Most Recent):  Temp: 97.5 °F (36.4 °C) (04/29/18 0739)  Pulse: (!) 42 (04/29/18 1259)  Resp: 18 (04/29/18 1259)  BP: (!) 113/53 (04/29/18 1259)  SpO2: (!) 94 % (04/29/18 1259) Vital Signs (24h Range):  Temp:  [97.5 °F (36.4 °C)-97.9 °F (36.6 °C)] 97.5 °F (36.4 °C)  Pulse:  [40-56] 42  Resp:  [18-20] 18  SpO2:  [94 %-99 %] 94 %  BP: ()/(51-55) 113/53     Weight: 117.8 kg (259 lb 11.2 oz) (no pump on bed for weighing.)  Body mass index is 43.22 kg/m².    Intake/Output Summary (Last 24 hours) at 04/29/18 1402  Last data filed at 04/29/18 0146   Gross per 24 hour   Intake              100 ml   Output                0 ml   Net              100 ml      Physical Exam   Constitutional: She is oriented to person, place, and time. She appears well-developed and well-nourished. No distress.   Morbid obesity   HENT:   Head: Atraumatic.   Eyes: Conjunctivae are normal.   Neck: Neck supple.   Cardiovascular: Normal rate, regular rhythm and normal heart sounds.    No murmur heard.  Pulmonary/Chest: Effort normal and breath sounds normal. She has no wheezes.   Abdominal: Soft. Bowel sounds are normal. She exhibits no distension. There is no tenderness.   Musculoskeletal: Normal range of motion. She exhibits edema. She exhibits no deformity.   Neurological: She is alert and oriented to person, place, and time. No sensory deficit.   No focal weakness, moving upper and lower extremities well but diffusely weak.   Skin:   Tunneled venous line placed on exiting left chest wall also without overt signs of infection.       Significant Labs: All pertinent labs within the past 24 hours have been reviewed.    Significant Imaging: I have reviewed all pertinent imaging results/findings within the past 24 hours.    Assessment/Plan:      * Bacteremia due to methicillin resistant Staphylococcus aureus    Previously diagnosed  during prior hospitalization.  Transesophageal echocardiogram negative for vegetation.  Patient with large thrombus burden and left this was the likely source of her Staphylococcus bacteremia.  Per Dr. Sherron Wise's note on 2/28/2018, plan was for 8 weeks of intravenous antibiotics from last date of negative blood culture (2/22/2018) which would call for treatment until 4/19/2018.  It appears antibiotics were discontinued on 4/4/2018 so possible treatment failure due to inadequate treatment course or treatment failure due to inadequate source control.  Patient also had tunneled right internal jugular exchanged on 4/24/2018.  Despite exchanging internal jugular line and restarting antibiotics again with intravenous ceftaroline and daptomycin, patient failed to clear bacteremia and had HD catheter removed on 4/27/2018.  Plan to place tunneled dialysis line on 4/30/2018 if most recent repeat cultures remain negative.          Urinary retention    Continue straight catheterization as needed.  Ultrasound of the kidneys did not reveal evidence of hydronephrosis. Continue with alpha-blocker therapy.        DVT of deep femoral vein, left    Ultrasound on 2/23/2018 showed persistent nonocclusive thrombus in the left common femoral and popliteal veins and possible new thrombus in the right deep femoral vein.  Continue anticoagulation with apixaban.  Dose adjusted 4/24/2018 per nephrology's recommendation.        Chronic atrial fibrillation    Continue amiodarone & carvedilol and anticoagulation with apixaban.        Type 2 diabetes mellitus with chronic kidney disease on chronic dialysis, without long-term current use of insulin    Well controlled likely due to renal failure.  Continue diabetic diet with sliding scale insulin.         End stage renal disease    Patient follows with Dr. Axel Sharpe with [facility] in [place], [place] for outpatient dialysis on Mondays, Wednesdays, and Fridays. Nephrology  consulted here to continue with renal replacement therapy.        Anemia due to chronic kidney disease    Improved status post blood transfusion on 4/20/2018 and hemoglobin remains stable.        Slow transit constipation    Improved with bowel regimen. Continue current management.          Debility    Consult physical and occupational therapy.            VTE Risk Mitigation         Ordered     apixaban tablet 5 mg  2 times daily      04/24/18 0934     heparin (porcine) injection 5,000 Units  As needed (PRN)      04/18/18 9162              Victor Hugo Rogel MD  Department of Hospital Medicine   [facility]

## 2018-04-29 NOTE — SUBJECTIVE & OBJECTIVE
Interval History: No acute events overnight.    Review of Systems   Constitutional: Negative for chills and fever.   Respiratory: Negative for shortness of breath and wheezing.    Cardiovascular: Negative for chest pain.   Gastrointestinal: Negative for abdominal distention, abdominal pain, constipation, diarrhea, nausea and vomiting.   Genitourinary: Negative for difficulty urinating, dysuria and frequency.   Musculoskeletal: Negative for arthralgias, back pain and myalgias.   Neurological: Positive for weakness. Negative for light-headedness.   Psychiatric/Behavioral: Negative for agitation and confusion.     Objective:     Vital Signs (Most Recent):  Temp: 97.5 °F (36.4 °C) (04/29/18 0739)  Pulse: (!) 42 (04/29/18 1259)  Resp: 18 (04/29/18 1259)  BP: (!) 113/53 (04/29/18 1259)  SpO2: (!) 94 % (04/29/18 1259) Vital Signs (24h Range):  Temp:  [97.5 °F (36.4 °C)-97.9 °F (36.6 °C)] 97.5 °F (36.4 °C)  Pulse:  [40-56] 42  Resp:  [18-20] 18  SpO2:  [94 %-99 %] 94 %  BP: ()/(51-55) 113/53     Weight: 117.8 kg (259 lb 11.2 oz) (no pump on bed for weighing.)  Body mass index is 43.22 kg/m².    Intake/Output Summary (Last 24 hours) at 04/29/18 1402  Last data filed at 04/29/18 0146   Gross per 24 hour   Intake              100 ml   Output                0 ml   Net              100 ml      Physical Exam   Constitutional: She is oriented to person, place, and time. She appears well-developed and well-nourished. No distress.   Morbid obesity   HENT:   Head: Atraumatic.   Eyes: Conjunctivae are normal.   Neck: Neck supple.   Cardiovascular: Normal rate, regular rhythm and normal heart sounds.    No murmur heard.  Pulmonary/Chest: Effort normal and breath sounds normal. She has no wheezes.   Abdominal: Soft. Bowel sounds are normal. She exhibits no distension. There is no tenderness.   Musculoskeletal: Normal range of motion. She exhibits edema. She exhibits no deformity.   Neurological: She is alert and oriented to  person, place, and time. No sensory deficit.   No focal weakness, moving upper and lower extremities well but diffusely weak.   Skin:   Tunneled venous line placed on exiting left chest wall also without overt signs of infection.       Significant Labs: All pertinent labs within the past 24 hours have been reviewed.    Significant Imaging: I have reviewed all pertinent imaging results/findings within the past 24 hours.

## 2018-04-30 ENCOUNTER — SURGERY (OUTPATIENT)
Age: 68
End: 2018-04-30

## 2018-04-30 PROBLEM — L89.152 SACRAL DECUBITUS ULCER, STAGE II: Status: ACTIVE | Noted: 2018-04-30

## 2018-04-30 LAB
ALBUMIN SERPL BCP-MCNC: 1.6 G/DL
ANION GAP SERPL CALC-SCNC: 7 MMOL/L
ANION GAP SERPL CALC-SCNC: 7 MMOL/L
BASOPHILS # BLD AUTO: 0.01 K/UL
BASOPHILS NFR BLD: 0.2 %
BUN SERPL-MCNC: 20 MG/DL
BUN SERPL-MCNC: 20 MG/DL
CALCIUM SERPL-MCNC: 8.4 MG/DL
CALCIUM SERPL-MCNC: 8.4 MG/DL
CHLORIDE SERPL-SCNC: 97 MMOL/L
CHLORIDE SERPL-SCNC: 97 MMOL/L
CO2 SERPL-SCNC: 23 MMOL/L
CO2 SERPL-SCNC: 23 MMOL/L
CREAT SERPL-MCNC: 4.1 MG/DL
CREAT SERPL-MCNC: 4.1 MG/DL
DIFFERENTIAL METHOD: ABNORMAL
EOSINOPHIL # BLD AUTO: 0.2 K/UL
EOSINOPHIL NFR BLD: 3.6 %
ERYTHROCYTE [DISTWIDTH] IN BLOOD BY AUTOMATED COUNT: 17.8 %
EST. GFR  (AFRICAN AMERICAN): 12 ML/MIN/1.73 M^2
EST. GFR  (AFRICAN AMERICAN): 12 ML/MIN/1.73 M^2
EST. GFR  (NON AFRICAN AMERICAN): 11 ML/MIN/1.73 M^2
EST. GFR  (NON AFRICAN AMERICAN): 11 ML/MIN/1.73 M^2
GLUCOSE SERPL-MCNC: 77 MG/DL
GLUCOSE SERPL-MCNC: 77 MG/DL
HCT VFR BLD AUTO: 29.1 %
HGB BLD-MCNC: 8.8 G/DL
LYMPHOCYTES # BLD AUTO: 1.9 K/UL
LYMPHOCYTES NFR BLD: 39.6 %
MAGNESIUM SERPL-MCNC: 2 MG/DL
MCH RBC QN AUTO: 25.7 PG
MCHC RBC AUTO-ENTMCNC: 30.2 G/DL
MCV RBC AUTO: 85 FL
MONOCYTES # BLD AUTO: 0.5 K/UL
MONOCYTES NFR BLD: 11.4 %
NEUTROPHILS # BLD AUTO: 2.1 K/UL
NEUTROPHILS NFR BLD: 45 %
PHOSPHATE SERPL-MCNC: 3.7 MG/DL
PHOSPHATE SERPL-MCNC: 3.7 MG/DL
PLATELET # BLD AUTO: 241 K/UL
PMV BLD AUTO: 9 FL
POCT GLUCOSE: 79 MG/DL (ref 70–110)
POCT GLUCOSE: 79 MG/DL (ref 70–110)
POCT GLUCOSE: 88 MG/DL (ref 70–110)
POTASSIUM SERPL-SCNC: 4.1 MMOL/L
POTASSIUM SERPL-SCNC: 4.1 MMOL/L
RBC # BLD AUTO: 3.43 M/UL
SODIUM SERPL-SCNC: 127 MMOL/L
SODIUM SERPL-SCNC: 127 MMOL/L
WBC # BLD AUTO: 4.75 K/UL

## 2018-04-30 PROCEDURE — C1752 CATH,HEMODIALYSIS,SHORT-TERM: HCPCS | Performed by: INTERNAL MEDICINE

## 2018-04-30 PROCEDURE — C1769 GUIDE WIRE: HCPCS | Performed by: INTERNAL MEDICINE

## 2018-04-30 PROCEDURE — 63600175 PHARM REV CODE 636 W HCPCS: Performed by: INTERNAL MEDICINE

## 2018-04-30 PROCEDURE — 36558 INSERT TUNNELED CV CATH: CPT | Performed by: INTERNAL MEDICINE

## 2018-04-30 PROCEDURE — 94761 N-INVAS EAR/PLS OXIMETRY MLT: CPT

## 2018-04-30 PROCEDURE — 99233 SBSQ HOSP IP/OBS HIGH 50: CPT | Mod: ,,, | Performed by: INTERNAL MEDICINE

## 2018-04-30 PROCEDURE — 97535 SELF CARE MNGMENT TRAINING: CPT

## 2018-04-30 PROCEDURE — 83735 ASSAY OF MAGNESIUM: CPT

## 2018-04-30 PROCEDURE — 97112 NEUROMUSCULAR REEDUCATION: CPT

## 2018-04-30 PROCEDURE — 25000003 PHARM REV CODE 250: Performed by: INTERNAL MEDICINE

## 2018-04-30 PROCEDURE — 80100016 HC MAINTENANCE HEMODIALYSIS

## 2018-04-30 PROCEDURE — 99900035 HC TECH TIME PER 15 MIN (STAT)

## 2018-04-30 PROCEDURE — 97530 THERAPEUTIC ACTIVITIES: CPT

## 2018-04-30 PROCEDURE — 11000001 HC ACUTE MED/SURG PRIVATE ROOM

## 2018-04-30 PROCEDURE — C1894 INTRO/SHEATH, NON-LASER: HCPCS | Performed by: INTERNAL MEDICINE

## 2018-04-30 PROCEDURE — 87040 BLOOD CULTURE FOR BACTERIA: CPT | Mod: 59

## 2018-04-30 PROCEDURE — 85025 COMPLETE CBC W/AUTO DIFF WBC: CPT

## 2018-04-30 PROCEDURE — 25000003 PHARM REV CODE 250: Performed by: HOSPITALIST

## 2018-04-30 PROCEDURE — 02HV33Z INSERTION OF INFUSION DEVICE INTO SUPERIOR VENA CAVA, PERCUTANEOUS APPROACH: ICD-10-PCS | Performed by: INTERNAL MEDICINE

## 2018-04-30 PROCEDURE — 36415 COLL VENOUS BLD VENIPUNCTURE: CPT

## 2018-04-30 PROCEDURE — 25000003 PHARM REV CODE 250: Performed by: PHYSICIAN ASSISTANT

## 2018-04-30 PROCEDURE — 99233 SBSQ HOSP IP/OBS HIGH 50: CPT | Mod: ,,, | Performed by: HOSPITALIST

## 2018-04-30 PROCEDURE — 25000003 PHARM REV CODE 250

## 2018-04-30 PROCEDURE — 80069 RENAL FUNCTION PANEL: CPT

## 2018-04-30 PROCEDURE — 63600175 PHARM REV CODE 636 W HCPCS: Performed by: NURSE PRACTITIONER

## 2018-04-30 PROCEDURE — 0JH60WZ INSERTION OF TOTALLY IMPLANTABLE VASCULAR ACCESS DEVICE INTO CHEST SUBCUTANEOUS TISSUE AND FASCIA, OPEN APPROACH: ICD-10-PCS | Performed by: INTERNAL MEDICINE

## 2018-04-30 PROCEDURE — 25000003 PHARM REV CODE 250: Performed by: NURSE PRACTITIONER

## 2018-04-30 RX ORDER — HEPARIN SODIUM 1000 [USP'U]/ML
INJECTION, SOLUTION INTRAVENOUS; SUBCUTANEOUS
Status: DISCONTINUED | OUTPATIENT
Start: 2018-04-30 | End: 2018-05-04 | Stop reason: HOSPADM

## 2018-04-30 RX ADMIN — APIXABAN 5 MG: 2.5 TABLET, FILM COATED ORAL at 09:04

## 2018-04-30 RX ADMIN — OXYCODONE HYDROCHLORIDE 5 MG: 5 TABLET ORAL at 09:04

## 2018-04-30 RX ADMIN — CEFTAROLINE FOSAMIL 200 MG: 400 POWDER, FOR SOLUTION INTRAVENOUS at 05:04

## 2018-04-30 RX ADMIN — FUROSEMIDE 80 MG: 40 TABLET ORAL at 09:04

## 2018-04-30 RX ADMIN — GABAPENTIN 100 MG: 100 CAPSULE ORAL at 09:04

## 2018-04-30 RX ADMIN — FUROSEMIDE 80 MG: 40 TABLET ORAL at 05:04

## 2018-04-30 RX ADMIN — CALCITRIOL 0.25 MCG: 0.25 CAPSULE, LIQUID FILLED ORAL at 09:04

## 2018-04-30 RX ADMIN — DOCUSATE SODIUM 200 MG: 100 CAPSULE, LIQUID FILLED ORAL at 09:04

## 2018-04-30 RX ADMIN — AMIODARONE HYDROCHLORIDE 200 MG: 200 TABLET ORAL at 09:04

## 2018-04-30 RX ADMIN — VITAMIN D, TAB 1000IU (100/BT) 2000 UNITS: 25 TAB at 09:04

## 2018-04-30 RX ADMIN — ERYTHROPOIETIN 20000 UNITS: 20000 INJECTION, SOLUTION INTRAVENOUS; SUBCUTANEOUS at 04:04

## 2018-04-30 RX ADMIN — HEPARIN SODIUM 5000 UNITS: 5000 INJECTION, SOLUTION INTRAVENOUS; SUBCUTANEOUS at 04:04

## 2018-04-30 RX ADMIN — DOCUSATE SODIUM 200 MG: 100 CAPSULE, LIQUID FILLED ORAL at 08:04

## 2018-04-30 RX ADMIN — TAMSULOSIN HYDROCHLORIDE 0.4 MG: 0.4 CAPSULE ORAL at 09:04

## 2018-04-30 RX ADMIN — CEFTAROLINE FOSAMIL 200 MG: 400 POWDER, FOR SOLUTION INTRAVENOUS at 01:04

## 2018-04-30 RX ADMIN — HEPARIN SODIUM 4000 UNITS: 1000 INJECTION, SOLUTION INTRAVENOUS; SUBCUTANEOUS at 12:04

## 2018-04-30 RX ADMIN — APIXABAN 5 MG: 2.5 TABLET, FILM COATED ORAL at 08:04

## 2018-04-30 RX ADMIN — ISOSORBIDE MONONITRATE 30 MG: 30 TABLET, EXTENDED RELEASE ORAL at 05:04

## 2018-04-30 RX ADMIN — GABAPENTIN 100 MG: 100 CAPSULE ORAL at 08:04

## 2018-04-30 RX ADMIN — OXYCODONE HYDROCHLORIDE 5 MG: 5 TABLET ORAL at 01:04

## 2018-04-30 RX ADMIN — OXYCODONE HYDROCHLORIDE 5 MG: 5 TABLET ORAL at 05:04

## 2018-04-30 RX ADMIN — FAMOTIDINE 20 MG: 20 TABLET ORAL at 09:04

## 2018-04-30 NOTE — PLAN OF CARE
Problem: Physical Therapy Goal  Goal: Physical Therapy Goal  Goals to be met by: 18    Patient will increase functional independence with mobility by performin. Supine to sit with min A.  2. Sit to supine with min A.   3. Rolling R and L with min A.   4. Sitting at edge of bed >20 minutes with SBA.   5. Sit < stand with RW and min A   6. SPT from EOB <> bedside chair with RW and Min A   7. Pt will tolerate sitting at EOB for all meals with set up assist    Outcome: Ongoing (interventions implemented as appropriate)    Patient required Min~Moderate assistance with functional mobility on this day.

## 2018-04-30 NOTE — SUBJECTIVE & OBJECTIVE
Interval History: No acute events overnight.    Review of Systems   Constitutional: Negative for chills and fever.   Respiratory: Negative for shortness of breath and wheezing.    Cardiovascular: Negative for chest pain.   Gastrointestinal: Negative for abdominal distention, abdominal pain, constipation, diarrhea, nausea and vomiting.   Genitourinary: Negative for difficulty urinating, dysuria and frequency.   Musculoskeletal: Negative for arthralgias, back pain and myalgias.   Neurological: Positive for weakness. Negative for light-headedness.   Psychiatric/Behavioral: Negative for agitation and confusion.     Objective:     Vital Signs (Most Recent):  Temp: 97.6 °F (36.4 °C) (04/30/18 0715)  Pulse: (!) 42 (04/30/18 0715)  Resp: 16 (04/30/18 0715)  BP: (!) 120/57 (04/30/18 0715)  SpO2: 95 % (04/30/18 0715) Vital Signs (24h Range):  Temp:  [97.5 °F (36.4 °C)-97.9 °F (36.6 °C)] 97.6 °F (36.4 °C)  Pulse:  [41-43] 42  Resp:  [12-18] 16  SpO2:  [94 %-98 %] 95 %  BP: ()/(46-57) 120/57     Weight: 117.8 kg (259 lb 11.2 oz) (no pump on bed for weighing.)  Body mass index is 43.22 kg/m².  No intake or output data in the 24 hours ending 04/30/18 0903   Physical Exam   Constitutional: She is oriented to person, place, and time. She appears well-developed and well-nourished. No distress.   Morbid obesity   HENT:   Head: Atraumatic.   Eyes: Conjunctivae are normal.   Neck: Neck supple.   Cardiovascular: Normal rate, regular rhythm and normal heart sounds.    No murmur heard.  Pulmonary/Chest: Effort normal and breath sounds normal. She has no wheezes.   Abdominal: Soft. Bowel sounds are normal. She exhibits no distension. There is no tenderness.   Musculoskeletal: Normal range of motion. She exhibits edema. She exhibits no deformity.   Neurological: She is alert and oriented to person, place, and time. No sensory deficit.   No focal weakness, moving upper and lower extremities well but diffusely weak.   Skin:   Tunneled  venous line placed on exiting left chest wall also without overt signs of infection.  Two small ~ 1 cm Stage II sacral decubitus ulcers.       Significant Labs: All pertinent labs within the past 24 hours have been reviewed.    Significant Imaging: I have reviewed all pertinent imaging results/findings within the past 24 hours.

## 2018-04-30 NOTE — ASSESSMENT & PLAN NOTE
Previously diagnosed during prior hospitalization.  Transesophageal echocardiogram negative for vegetation.  Patient with large thrombus burden and left this was the likely source of her Staphylococcus bacteremia.  Per Dr. Sherron Wise's note on 2/28/2018, plan was for 8 weeks of intravenous antibiotics from last date of negative blood culture (2/22/2018) which would call for treatment until 4/19/2018.  It appears antibiotics were discontinued on 4/4/2018 so possible treatment failure due to inadequate treatment course or treatment failure due to inadequate source control.  Patient also had tunneled right internal jugular exchanged on 4/24/2018.  Despite exchanging internal jugular line and restarting antibiotics again with intravenous ceftaroline and daptomycin, patient failed to clear bacteremia and had HD catheter removed on 4/27/2018.  Most recent repeat cultures negative.  Plan to place tunneled dialysis line today.

## 2018-04-30 NOTE — H&P
[facility]  History & Physical    Subjective:      Chief Complaint/Reason for Admission: Here for replacement of tunneled dialysis catheter    Yumiko Proctor is a 68 y.o. female with ESRD, who is inpatient, presented with bacteremia, which has been treated while she has been here.  Her most recent blood cultures have been negative and we were consulted to have her TDC placed after it was removed on Friday for line holiday.      Past Medical History:   Diagnosis Date    A-fib     Cardiomyopathy     Diabetes mellitus     ESRD (end stage renal disease)      History reviewed. No pertinent surgical history.  History reviewed. No pertinent family history.  Social History   Substance Use Topics    Smoking status: Never Smoker    Smokeless tobacco: Never Used    Alcohol use No       PTA Medications   Medication Sig    amiodarone (PACERONE) 100 MG Tab Take 200 mg by mouth once daily.    apixaban 2.5 mg Tab Take 1 tablet (2.5 mg total) by mouth 2 (two) times daily.    calcitRIOL (ROCALTROL) 0.25 MCG Cap Take 1 capsule (0.25 mcg total) by mouth once daily.    carvedilol (COREG) 25 MG tablet Take 25 mg by mouth 2 (two) times daily.    dextrose 5 % SolP 50 mL with ceftaroline fosamil 600 mg SolR 200 mg Inject 200 mg into the vein every 12 (twelve) hours.    docusate sodium (COLACE) 100 MG capsule Take 1 capsule (100 mg total) by mouth 2 (two) times daily.    epoetin davion (PROCRIT) 20,000 unit/mL injection Inject 1 mL (20,000 Units total) into the skin every Mon, Wed, Fri.    ferric citrate 210 mg iron Tab Take 210 mg by mouth 3 (three) times daily.    furosemide (LASIX) 80 MG tablet Take 1 tablet (80 mg total) by mouth 2 (two) times daily.    hydrALAZINE (APRESOLINE) 100 MG tablet Take 100 mg by mouth 3 (three) times daily.    ISOSORBIDE MONONITRATE ORAL Take 30 mg by mouth Daily.    oxyCODONE-acetaminophen (PERCOCET) 5-325 mg per tablet Take 1 tablet by mouth every 4 (four) hours as needed.     sevelamer carbonate (RENVELA) 800 mg Tab Take 1 tablet (800 mg total) by mouth 3 (three) times daily with meals.    SITagliptin (JANUVIA) 25 MG Tab Take 1 tablet (25 mg total) by mouth once daily.    sodium chloride 0.9% SolP 50 mL with DAPTOmycin 500 mg SolR 865 mg Inject 865 mg into the vein every Mon, Wed, Fri. After dialysis treatments.    vitamin D 1000 units Tab Take 2 tablets (2,000 Units total) by mouth once daily.    vitamin renal formula, B-complex-vitamin c-folic acid, (NEPHROCAP) 1 mg Cap Take 1 capsule by mouth once daily.     Review of patient's allergies indicates:   Allergen Reactions    Sulfa (sulfonamide antibiotics) Anaphylaxis    Albuterol sulfate Palpitations        Review of Systems   Constitutional: Negative.    Respiratory: Negative.    Cardiovascular: Negative.        Objective:      Vital Signs (Most Recent)  Temp: 97.6 °F (36.4 °C) (04/30/18 0715)  Pulse: (!) 42 (04/30/18 0715)  Resp: 16 (04/30/18 0715)  BP: (!) 120/57 (04/30/18 0715)  SpO2: 96 % (04/30/18 0900)    Vital Signs Range (Last 24H):  Temp:  [97.5 °F (36.4 °C)-97.9 °F (36.6 °C)]   Pulse:  [41-43]   Resp:  [12-18]   BP: ()/(46-57)   SpO2:  [94 %-98 %]     Physical Exam   Constitutional: She is oriented to person, place, and time. She appears well-developed and well-nourished. No distress.   HENT:   Head: Normocephalic and atraumatic.   Eyes: EOM are normal.   Neck: Neck supple.   Cardiovascular:   R chest wall with prior exit site still oozing blood, no erythema or tenderness, but may have some purulence on her gauze.   Pulmonary/Chest: Effort normal. No respiratory distress.   Neurological: She is alert and oriented to person, place, and time.   Skin: Skin is warm and dry. She is not diaphoretic.   Psychiatric: She has a normal mood and affect. Her behavior is normal.       Assessment:      Active Hospital Problems    Diagnosis  POA    *Bacteremia due to methicillin resistant Staphylococcus aureus [R78.81]  Yes     Urinary retention [R33.9]  Yes    Debility [R53.81]  Yes    Slow transit constipation [K59.01]  Yes    Anemia due to chronic kidney disease [N18.9, D63.1]  Yes    DVT of deep femoral vein, left [I82.412]  Yes    Chronic atrial fibrillation [I48.2]  Yes    End stage renal disease [N18.6]  Yes    Type 2 diabetes mellitus with chronic kidney disease on chronic dialysis, without long-term current use of insulin [E11.22, N18.6, Z99.2]  Not Applicable      Resolved Hospital Problems    Diagnosis Date Resolved POA   No resolved problems to display.       Plan:      TDC placement today.  Given the appearance of the gauze, we will create new tunnel, since there is a slight possibility there is infection within the tunnel.

## 2018-04-30 NOTE — PROGRESS NOTES
"Nephrology  Progress Note    Admit Date: 4/18/2018   LOS: 12 days     SUBJECTIVE:     Follow-up For:  Bacteremia due to methicillin resistant Staphylococcus aureus    Interval History:     Uneventful night.  Awaiting EULALIO replacement today followed by HD.  Cultures NGTD.  No CP/SOB.      Review of Systems:  Constitutional: No fever or chills  Respiratory: No cough or shortness of breath  Cardiovascular: No chest pain or palpitations  Gastrointestinal: No nausea or vomiting  Neurological: No confusion or weakness    OBJECTIVE:     Vital Signs Range (Last 24H):  BP (!) 120/57   Pulse (!) 42   Temp 97.6 °F (36.4 °C)   Resp 16   Ht 5' 5" (1.651 m)   Wt 117.8 kg (259 lb 11.2 oz) Comment: no pump on bed for weighing.  LMP 01/01/1983 (LMP Unknown)   SpO2 96%   Breastfeeding? No   BMI 43.22 kg/m²     Temp:  [97.5 °F (36.4 °C)-97.9 °F (36.6 °C)]   Pulse:  [41-43]   Resp:  [12-18]   BP: ()/(46-57)   SpO2:  [94 %-98 %]     I & O (Last 24H):  No intake or output data in the 24 hours ending 04/30/18 0925    Physical Exam:  General appearance: morbidly obese female in NAD  Eyes:  Conjunctivae/corneas clear. PERRL.  Lungs: Normal respiratory effort,  diminished  Heart: Regular/Jerry rate and rhythm, S1, S2 normal, no murmur, rub or cuco.  Abdomen: Soft, non-tender non-distended; bowel sounds normal; no masses,  no organomegaly, obese  Extremities: No cyanosis or clubbing. trace edema.    Skin: Skin color, texture, turgor normal. No rashes or lesions  Neurologic: Normal strength and tone. No focal numbness or weakness   Left SC PICC        Laboratory Data:  BMP:     Recent Labs  Lab 04/30/18  0515   GLU 77  77   *  127*   K 4.1  4.1   CL 97  97   CO2 23  23   BUN 20  20   CREATININE 4.1*  4.1*   CALCIUM 8.4*  8.4*   MG 2.0     Lab Results   Component Value Date    CALCIUM 8.4 (L) 04/30/2018    CALCIUM 8.4 (L) 04/30/2018    PHOS 3.7 04/30/2018    PHOS 3.7 04/30/2018       Lab Results   Component Value " Date    .8 (H) 02/08/2018    CALCIUM 8.4 (L) 04/30/2018    CALCIUM 8.4 (L) 04/30/2018    PHOS 3.7 04/30/2018    PHOS 3.7 04/30/2018       Blood:  MSSA/MRSA      Medications:  Medication list was reviewed and changes noted under Assessment/Plan.    Diagnostic Results:  reviewed      ASSESSMENT/PLAN:     1. ESRD on HD MWF in [place], [place] with Dr. Sharpe (N18.6, Z99.2):  Routine HD MWF/PRN while inpt.  Consent signed.  Line holiday scheduled for Friday-Monday.  See below. Renally dose meds, avoid nephrotoxins, and monitor I/O's closely.   2. Bacteremia-MSSA/MRSA (R78.81):  Antibx per ID. No evidence of PNa. Removed PICC and cultured tip. Now with MSSA-MRSA.  Last admit only identifiable source was extensive chronic LLE DVT.  Repeat U/S now with same LLE DVT and new R leg DVT.  Eliquis for now.  Discussed with team.  PICC replaced for antibx treatments.  Dr. Gustafson consult appreciated.  ECHO with no vegetation.  Plan for EULALIO cath replacement today.   3. Anemia of CKD/infection (D63.1):  No IV iron with infection.  Epo on HD.  S/p transfusion 2 units PRBCs on HD 4/18.  Hgb stable  4. 2HPT/Vit D deficiency:  Vit D3 and calcitriol.  5. Hyponatremia (E87.1):  Adjusted HD bath.   6. Morbid Obesity (E66.01):  Needs aggressive weight mgmt.        See above  Ok to DC after EULALIO placed.

## 2018-04-30 NOTE — BRIEF OP NOTE
[facility]  Brief Operative Note    SUMMARY     Surgery Date: 4/30/2018     Surgeon(s) and Role:     * Gavin Cisneros MD - Primary    Assisting Surgeon: None    Pre-op Diagnosis:  Complication of vascular access for dialysis, subsequent encounter [T82.9XXD]  ESRD (end stage renal disease) [N18.6]    Post-op Diagnosis:  Post-Op Diagnosis Codes:     * Complication of vascular access for dialysis, subsequent encounter [T82.9XXD]     * ESRD (end stage renal disease) [N18.6]    Procedure(s) (LRB):  PERMCATH INSERTION-TUNNELED CVC (N/A)    Anesthesia: 1% lidocaine    Description of Procedure: Patient was prepped and draped in a sterile fashion.  This was a successful placement of a R internal jugular tunneled dialysis catheter.  Patient tolerated the procedure well and no immediate complications noted.    Description of the findings of the procedure: Patient was prepped and draped in a sterile fashion.  This was a successful placement of a R internal jugular tunneled dialysis catheter.  Patient tolerated the procedure well and no immediate complications noted.      Estimated Blood Loss: < 10 mL         Specimens:   Specimen (12h ago through future)    None

## 2018-04-30 NOTE — PROGRESS NOTES
"[facility] Hospital Medicine  Progress Note    Patient Name: Yumiko Proctor  MRN: [id number]  Patient Class: IP- Inpatient   Admission Date: 4/18/2018  Length of Stay: 12 days  Attending Physician: Victor Hugo Rogel MD  Primary Care Provider: [facility]        Subjective:     Principal Problem:Bacteremia due to methicillin resistant Staphylococcus aureus    HPI:  Per Nat Woodward, "Ms Hoa Proctor is a 68 y.o. female, with PMH of HTN, NIDDM-2, CHF (EF 35%), ESRD on dialysis ([facility], Dr. Prather), A. Fib, LLE DVT, cardiomyopathy, and recent treatment in this facility for MRSA bacteremia from her vascular access site, who returns 2/2 need for ID consultation. In the past week the patient developed a fever and was diagnosed with RUL PNA, and started on Levaquin 3/week after dialysis. Associated symptoms include non-productive cough, fevers, and SOB with wheeze. She was started on vancymycin and zosyn on 4/15/18. Blood cultures taken on 4/16/18 were the second set, first after antibiotics, that grew staph. Other associated symptoms include b/l hip pain, and constipation.  She denies fever, chills, sweats, chest pain, SOB, N/V, diarrhea.     During the patient's previous stay at this facility she had blood cultures positive for MRSA on 2/8/18, and she was started on Daptomycin. On 2/10/18 a second set of blood cultures were positive for MRSA. At the time she had her left Jaspreet catheter removed and a right IJ tunneled dialysis cath was placed on 2/12/18. Repeat cultures on 2/13/18 were negative for MRSA. She was positive in 1/2 bottles on 2/16/18 and both blood cultures bottles for MRSA on 2/16/18. At that time she was started on vancomycin, and Daptomycin was stopped 2/2 myositis. On 2/22/18 blood cultures were negative. A RANDA on 2/26/18 was negative for vegetations, but the patient did have a LLE DVT discovered 2/20/18, and Apixaban was started. After CPK " "levels were normal, she was started back on Daptomycin and Ceftaroline. She was to continue this treatment until 4/4/18, and she was transferred to a NH facility in [place], [place]. She states she was transferred from the Nursing facility to [facility] on 4/15/18."    Hospital Course:  Ms. Yumiko Proctor is a 68 year-old woman with a history hypertension, diabetes mellitus type II on insulin, chronic systolic heart failure with ejection fraction of 35 percent, and end-stage renal disease on hemodialysis via tunneled dialysis catheter exited her right chest wall who returned from a nursing facility in [place] where she was undergoing treatment for presumed endovascular infection with Methicillin-resistant Staphylococcus aureus infection via a peripherally inserted central catheter who returns with recurrence of Staphylococcus aureus bacteremia.  Patient's peripherally inserted central catheter was removed here.  Blood cultures obtained here on 4/19/2018 positive for methicillin-sensitive Staphylococcus aureus.  Infectious disease service consulted and recommended treatment with intravenous ceftaroline and daptomycin.  In addition, as patient failed to clear her bacteremia, infectious disease service recommended removal of her tunneled dialysis catheter which was done following dialysis on 4/27/2018 with place to place new tunneled catheter on 4/30/2018 to allow for a line holiday.    Interval History: No acute events overnight.    Review of Systems   Constitutional: Negative for chills and fever.   Respiratory: Negative for shortness of breath and wheezing.    Cardiovascular: Negative for chest pain.   Gastrointestinal: Negative for abdominal distention, abdominal pain, constipation, diarrhea, nausea and vomiting.   Genitourinary: Negative for difficulty urinating, dysuria and frequency.   Musculoskeletal: Negative for arthralgias, back pain and myalgias.   Neurological: Positive for weakness. Negative for " light-headedness.   Psychiatric/Behavioral: Negative for agitation and confusion.     Objective:     Vital Signs (Most Recent):  Temp: 97.6 °F (36.4 °C) (04/30/18 0715)  Pulse: (!) 42 (04/30/18 0715)  Resp: 16 (04/30/18 0715)  BP: (!) 120/57 (04/30/18 0715)  SpO2: 95 % (04/30/18 0715) Vital Signs (24h Range):  Temp:  [97.5 °F (36.4 °C)-97.9 °F (36.6 °C)] 97.6 °F (36.4 °C)  Pulse:  [41-43] 42  Resp:  [12-18] 16  SpO2:  [94 %-98 %] 95 %  BP: ()/(46-57) 120/57     Weight: 117.8 kg (259 lb 11.2 oz) (no pump on bed for weighing.)  Body mass index is 43.22 kg/m².  No intake or output data in the 24 hours ending 04/30/18 0903   Physical Exam   Constitutional: She is oriented to person, place, and time. She appears well-developed and well-nourished. No distress.   Morbid obesity   HENT:   Head: Atraumatic.   Eyes: Conjunctivae are normal.   Neck: Neck supple.   Cardiovascular: Normal rate, regular rhythm and normal heart sounds.    No murmur heard.  Pulmonary/Chest: Effort normal and breath sounds normal. She has no wheezes.   Abdominal: Soft. Bowel sounds are normal. She exhibits no distension. There is no tenderness.   Musculoskeletal: Normal range of motion. She exhibits edema. She exhibits no deformity.   Neurological: She is alert and oriented to person, place, and time. No sensory deficit.   No focal weakness, moving upper and lower extremities well but diffusely weak.   Skin:   Tunneled venous line placed on exiting left chest wall also without overt signs of infection.   No skin breakdown.      Significant Labs: All pertinent labs within the past 24 hours have been reviewed.    Significant Imaging: I have reviewed all pertinent imaging results/findings within the past 24 hours.    Assessment/Plan:      * Bacteremia due to methicillin resistant Staphylococcus aureus    Previously diagnosed during prior hospitalization.  Transesophageal echocardiogram negative for vegetation.  Patient with large thrombus burden  and left this was the likely source of her Staphylococcus bacteremia.  Per Dr. Sherron Wise's note on 2/28/2018, plan was for 8 weeks of intravenous antibiotics from last date of negative blood culture (2/22/2018) which would call for treatment until 4/19/2018.  It appears antibiotics were discontinued on 4/4/2018 so possible treatment failure due to inadequate treatment course or treatment failure due to inadequate source control.  Patient also had tunneled right internal jugular exchanged on 4/24/2018.  Despite exchanging internal jugular line and restarting antibiotics again with intravenous ceftaroline and daptomycin, patient failed to clear bacteremia and had HD catheter removed on 4/27/2018.  Most recent repeat cultures negative.  Plan to place tunneled dialysis line today.        Urinary retention    Continue straight catheterization as needed.  Ultrasound of the kidneys did not reveal evidence of hydronephrosis. Continue with alpha-blocker therapy.        DVT of deep femoral vein, left    Ultrasound on 2/23/2018 showed persistent nonocclusive thrombus in the left common femoral and popliteal veins and possible new thrombus in the right deep femoral vein.  Continue anticoagulation with apixaban.  Dose adjusted 4/24/2018 per nephrology's recommendation.        Chronic atrial fibrillation    Continue amiodarone & carvedilol and anticoagulation with apixaban.        Type 2 diabetes mellitus with chronic kidney disease on chronic dialysis, without long-term current use of insulin    Well controlled likely due to renal failure.  Continue diabetic diet with sliding scale insulin.         End stage renal disease    Patient follows with Dr. Axel Sharpe with [facility] in [place], [place] for outpatient dialysis on Mondays, Wednesdays, and Fridays. Nephrology consulted here to continue with renal replacement therapy.        Anemia due to chronic kidney disease    Improved status post blood transfusion on  4/20/2018 and hemoglobin remains stable.        Slow transit constipation    Improved with bowel regimen. Continue current management.          Debility    Patient able to sit up at the side of the bed and stand yesterday.  Continue with therapy.            VTE Risk Mitigation         Ordered     apixaban tablet 5 mg  2 times daily      04/24/18 0934     heparin (porcine) injection 5,000 Units  As needed (PRN)      04/18/18 0758              Victor Hugo Rogel MD  Department of Hospital Medicine   [facility]

## 2018-04-30 NOTE — PT/OT/SLP PROGRESS
"Occupational Therapy  Co-Treatment with PTA    Name: Yumiko Proctor  MRN: [id number]  Admitting Diagnosis:  Bacteremia due to methicillin resistant Staphylococcus aureus  3 Days Post-Op    Recommendations:     Discharge Recommendations: nursing facility, skilled  Discharge Equipment Recommendations:  none  Barriers to discharge:  None    Subjective     Communicated with: RN prior to session.  Pt reports, "My arm is hurting me, so I can't push up through it for standing. My legs feel really weak as well."    Pain/Comfort:  · Pain Rating 1: 8/10  · Location - Side 1: Left  · Location - Orientation 1: generalized  · Location 1: shoulder  · Pain Addressed 1: Reposition, Cessation of Activity  · Pain Rating Post-Intervention 1: 8/10    Patients cultural, spiritual, Taoism conflicts given the current situation: none    Objective:     Patient found with: peripheral IV, PICC line, telemetry    General Precautions: Standard, anti-coagulation medicine, diabetic, fall   Orthopedic Precautions:N/A   Braces: N/A     Occupational Performance:    Bed Mobility:    · Patient completed Rolling/Turning to Left with  moderate assistance  · Patient completed Rolling/Turning to Right with moderate assistance  · Patient completed Scooting/Bridging with mod (A) x2 persons to scoot along EOB- assist for blocking of emiliana knees and at emiliana hips for progression along EOB; max (A) x2 persons for scooting via drawsheet towards HOB- bed in trendelenberg and pt assisting with B LE  · Patient completed Supine to Sit with minimum assistance  · Patient completed Sit to Supine with maximal assistance and x2 persons     Functional Mobility/Transfers:  · Patient completed Sit <> Stand Transfer   · 1st trial- minimum assistance x2 persons  with  rolling walker   · 2nd trial- moderate assistance x2 persons with RW; assist for lift assist and blocking of B LE  · 3rd trial- moderate assistance x2 persons with RW; assist for lift assist and blocking of B " LE  · Patient completed Bed <> Chair Transfer: attempted, however pt unable to advance B LE and began to have bowel movement  · Functional Mobility:  · 1st trial- pt took x4 side steps at EOB towards (R) using RW with min (A) x2 persons  · 2nd trial- pt attempted to take side steps towards (L), however pt with increased emiliana knee buckling and difficulty weight-shifting to advance each LE with increased pain in (L) shoulder    Activities of Daily Living:  · Grooming: minimum assistance incidental (A) for thoroughness with combing hair at EOB  · UB Dressing: moderate assistance to pull gown across her backside while seated EOB  · LB Dressing: total assistance to don emiliana socks  · Toileting: total assistance for hygiene and clothing management     Patient left supine on bedpan to complete her BM with all lines intact, call button in reach and RN and PCT notified    [facility] 6 Click:  [facility] Total Score: 13    Treatment & Education:  Pt educated on OT role and POC. Also discussed importance of OOB mobility.   Education:    Assessment:     Yumiko Proctor is a 68 y.o. female with a medical diagnosis of Bacteremia due to methicillin resistant Staphylococcus aureus.  Pt is making steady progress towards her OT goals. Noted improvements in her supine > sit transfer this date. Pt continues to require min (A) for G/H and mod (A) for UB dressing. Total (A) still required for toileting and LB dressing. Pt with increased independence with first sit>stand transfer with need for only min (A) x2 persons. She was also able to complete x4 side steps using RW with min (A) x2 persons. With increased fatigue, onset of emiliana knee buckling and increased pain in (L) shoulder- pt with increased need for assistance with further mobility trials. Performance deficits affecting function are weakness, impaired endurance, impaired self care skills, impaired functional mobilty, gait instability, impaired balance, decreased lower extremity function,  decreased upper extremity function, pain, decreased ROM. Pt remains appropriate for acute OT services at this time to maximize her return to PLOF.     Rehab Prognosis:  Good ; patient would benefit from acute skilled OT services to address these deficits and reach maximum level of function.       Plan:     Patient to be seen 6 x/week to address the above listed problems via self-care/home management, therapeutic activities, therapeutic exercises  · Plan of Care Expires: 05/21/18  · Plan of Care Reviewed with: patient    This Plan of care has been discussed with the patient who was involved in its development and understands and is in agreement with the identified goals and treatment plan    GOALS:    Occupational Therapy Goals        Problem: Occupational Therapy Goal    Goal Priority Disciplines Outcome Interventions   Occupational Therapy Goal     OT, PT/OT Ongoing (interventions implemented as appropriate)    Description:  Goals to be met by 5/21/18  1. Min A G/H (2 tasks) sitting EOB without using UE for support on bed or tray table (MET 4/28/18)  REVISED GOAL: Completed G/H (3 tasks) sitting EOB without VC or assist to perform task  2. Mod A UBD seated EOB (MET 04/26/18)  REVISED Min assist UBD at EOB  3. Max A bed-BSC transfer  4.Maximize UE and axial muscle strength                         Time Tracking:     OT Date of Treatment: 04/30/18  OT Start Time: 0755  OT Stop Time: 0821  OT Total Time (min): 26 min    Billable Minutes:Self Care/Home Management 26    RADHA Moran/JARET  4/30/2018

## 2018-04-30 NOTE — PLAN OF CARE
Problem: Occupational Therapy Goal  Goal: Occupational Therapy Goal  Goals to be met by 5/21/18  1. Min A G/H (2 tasks) sitting EOB without using UE for support on bed or tray table (MET 4/28/18)  REVISED GOAL: Completed G/H (3 tasks) sitting EOB without VC or assist to perform task  2. Mod A UBD seated EOB (MET 04/26/18)  REVISED Min assist UBD at EOB  3. Max A bed-BSC transfer  4.Maximize UE and axial muscle strength        Outcome: Ongoing (interventions implemented as appropriate)  Pt is making steady progress towards her OT goals. Goals remain appropriate at this time.     Elisabet Morales, OTR/L  4/30/2018

## 2018-04-30 NOTE — SUBJECTIVE & OBJECTIVE
Interval History: No acute events overnight.    Review of Systems   Constitutional: Negative for activity change, chills, diaphoresis, fatigue and fever.   HENT: Negative for congestion, drooling and hearing loss.    Eyes: Negative for discharge.   Respiratory: Negative for apnea, chest tightness, shortness of breath and wheezing.    Cardiovascular: Negative for chest pain, palpitations and leg swelling.   Gastrointestinal: Negative for abdominal distention, abdominal pain, constipation, diarrhea, nausea and vomiting.   Genitourinary: Negative for difficulty urinating, dysuria and frequency.   Musculoskeletal: Negative for arthralgias, back pain, gait problem and myalgias.   Skin: Negative for rash.   Neurological: Positive for weakness. Negative for seizures, light-headedness and numbness.   Hematological: Negative for adenopathy.   Psychiatric/Behavioral: Negative for agitation, behavioral problems and confusion.     Objective:     Vital Signs (Most Recent):  Temp: 97.6 °F (36.4 °C) (04/30/18 0715)  Pulse: (!) 42 (04/30/18 0715)  Resp: 16 (04/30/18 0715)  BP: (!) 120/57 (04/30/18 0715)  SpO2: 96 % (04/30/18 0900) Vital Signs (24h Range):  Temp:  [97.5 °F (36.4 °C)-97.9 °F (36.6 °C)] 97.6 °F (36.4 °C)  Pulse:  [41-43] 42  Resp:  [12-18] 16  SpO2:  [94 %-98 %] 96 %  BP: ()/(46-57) 120/57     Weight: 117.8 kg (259 lb 11.2 oz) (no pump on bed for weighing.)  Body mass index is 43.22 kg/m².  No intake or output data in the 24 hours ending 04/30/18 0907   Physical Exam   Constitutional: She is oriented to person, place, and time. She appears well-developed and well-nourished. No distress.   Morbid obesity and significant generalized debility.   HENT:   Head: Atraumatic.   Eyes: Conjunctivae are normal.   Neck: Neck supple.   Cardiovascular: Normal rate, regular rhythm and normal heart sounds.    No murmur heard.  Pulmonary/Chest: Effort normal and breath sounds normal. She has no wheezes.   Abdominal: Soft. Bowel  sounds are normal. She exhibits no distension. There is no tenderness.   Musculoskeletal: Normal range of motion. She exhibits no edema or deformity.   Neurological: She is alert and oriented to person, place, and time. No sensory deficit.   No focal weakness, moving upper and lower extremities well but diffusely weak.   Skin:   Tunneled venous line placed on exiting left chest wall also without overt signs of infection.  Right chest dialysis catheter appears c/d/i as well.       Significant Labs: All pertinent labs within the past 24 hours have been reviewed.    Significant Imaging: I have reviewed all pertinent imaging results/findings within the past 24 hours.

## 2018-04-30 NOTE — PT/OT/SLP PROGRESS
Physical Therapy Treatment    Patient Name:  Yumiko Proctor   MRN:  [id number]    Recommendations:     Discharge Recommendations:  nursing facility, skilled   Discharge Equipment Recommendations: none   Barriers to discharge: Decreased caregiver support    Assessment:     Yumiko Proctor is a 68 y.o. female admitted with a medical diagnosis of Bacteremia due to methicillin resistant Staphylococcus aureus.  She presents with the following impairments/functional limitations:  weakness, impaired endurance, impaired functional mobilty, gait instability, decreased safety awareness patient tolerated treatment session well. Patient required Minimum to moderate Assistance for functional mobility. Patient was easily fatigued but improved.     Rehab Prognosis:  good; patient would benefit from acute skilled PT services to address these deficits and reach maximum level of function.      Recent Surgery: Procedure(s) (LRB):  PERMCATH REMOVAL-TUNNELED CVC REMOVAL (Right) 3 Days Post-Op    Plan:     During this hospitalization, patient to be seen 6 x/week to address the above listed problems via gait training, therapeutic activities, therapeutic exercises, neuromuscular re-education, wheelchair management/training  · Plan of Care Expires:  05/21/18   Plan of Care Reviewed with: patient    Subjective     Communicated with nurse prior to session.  Patient found supine upon PT entry to room, agreeable to treatment.      Chief Complaint: patient complained of low back pain  Patient comments/goals: I need to move   Pain/Comfort:  · Pain Rating 1: 5/10  · Location - Orientation 1: generalized  · Location 1: shoulder  · Pain Addressed 1: Reposition, Cessation of Activity  · Pain Rating Post-Intervention 1: 5/10    Patients cultural, spiritual, Pentecostal conflicts given the current situation: none    Objective:     Patient found with: peripheral IV, PICC line, telemetry     General Precautions: Standard, diabetic, fall   Orthopedic  Precautions:N/A   Braces: N/A     Functional Mobility:  · Supine to sit with Min A         Sit to supine with maximum assistance  Of 2 people         drawsheet to HOB with maximum assistance of 2 people (        Rolled right/left X 4 trials each way assisted with bed change, repositioning.           with moderate assistance several times with use of hand rail     Required        total A for jose g care   sit to stand from bed with Min A on first attempt then moderate assistance X 2 trials. Patient required assistance to elevate hips and block bilateral LE's,  Patient took 4 sidesteps with Rolling walker with Min A of 2 people second person for safety and patient required assistance to manage walker and tactile cues for upright posture.   Patient scooted on edge of bed with blocking bilateral knees and OT assisting at hips moderate assistance of 2 people.      AM-PAC 6 CLICK MOBILITY  Turning over in bed (including adjusting bedclothes, sheets and blankets)?: 3  Sitting down on and standing up from a chair with arms (e.g., wheelchair, bedside commode, etc.): 2  Moving from lying on back to sitting on the side of the bed?: 3  Moving to and from a bed to a chair (including a wheelchair)?: 1  Need to walk in hospital room?: 1  Climbing 3-5 steps with a railing?: 1  Total Score: 11       Therapeutic Activities and Exercises:   sat on edge of bed 20 minutes with SBA for safety     Patient left supine with all lines intact, call button in reach and nurse notified..    GOALS:    Physical Therapy Goals        Problem: Physical Therapy Goal    Goal Priority Disciplines Outcome Goal Variances Interventions   Physical Therapy Goal     PT/OT, PT Ongoing (interventions implemented as appropriate)     Description:  Goals to be met by: 18    Patient will increase functional independence with mobility by performin. Supine to sit with min A.  2. Sit to supine with min A.   3. Rolling R and L with min A.   4. Sitting at edge  of bed >20 minutes with SBA.   5. Sit < stand with RW and min A   6. SPT from EOB <> bedside chair with RW and Min A   7. Pt will tolerate sitting at EOB for all meals with set up assist                     Time Tracking:     PT Received On: 04/30/18  PT Start Time: 0755     PT Stop Time: 0842  PT Total Time (min): 47 min     Billable Minutes: Therapeutic Activity 30 and Neuromuscular Re-education 17    Treatment Type: Treatment  PT/PTA: RADHA Navas PTA  04/30/2018

## 2018-04-30 NOTE — PROGRESS NOTES
[facility] sent updated medical records to [facility] via [facility] () and called [phone], spoke to Jessica, notified [facility] records sent.  Jessica stated [facility] from last week .

## 2018-04-30 NOTE — PROCEDURES
U Interventional Nephrology Service     Date of Procedure:  04/30/2018 12:31 PM    Procedure: Tunneled Dialysis Central Catheter Insertion     Indication: Bacteremia, requiring line holiday     Primary Surgeon: Gavin Cisneros MD   Assist: none    Referring Provider: Dr. Lora    Blood Loss: < 10 mL    Procedure in Detail:   Patient was prepped and draped in usual sterile fashion. 1% lidocaine was used for local sedation to anesthetize soft tissues. Ultrasound was used to localize the right internal jugular vein; using ultrasound guidance the right internal jugular vein was cannulated. Micropuncture wire passed easily into vein and confirmed in correct location under flouroscopy.  Microsheath passed over micropuncture wire, microwire removed, and glidewire passed into the IVC. This was done under fluoroscopic guidance. Using sequential dilator under flouroscopy, the venotomy site was dilated, then using a breakaway sheath, the Reverse Tunnel 19 cm Palindrome catheter was advanced into the correct location confirmed under flouroscopy. Glidewire removed.  Tunnel tract was then created; mapped in location in the right chest wall, near the shoulder, lateral to the last tunnel; injected prior with 1% lidocaine; then using a dilator, the tunnel was created, catheter clamped at venotomy site, tip severed and attached to tunnel creator and catheter easily pulled through tunnel, with new clamp placed at exit site after which the venotomy clamp was released.  Then, the catheter was cut to desired length, and the RT port tip apparatus was attached according to  instructions.  Both ports flushed and aspirated well.  The catheter was packed with 1000 units/mL heparin. Vicryl suture was used to close the venotomy site, and a #2 ethilon suture was used to secure the catheter at the exit site and to anchor to the patient's skin.     A total of 4 sutures were placed.    Patient tolerated the procedure well. The catheter  was dressed with tegaderm sterile dressing.    Gavin Cisneros MD  [phone]

## 2018-04-30 NOTE — PLAN OF CARE
Problem: Patient Care Overview  Goal: Plan of Care Review  Outcome: Ongoing (interventions implemented as appropriate)  Plan of care reviewed with Pt, purposeful hourly rounding performed. VSS on RA. NAD during shift. Pain controlled with PRN medication. Incontinence care provided. No c/o at this time. Bed locked and lowered, call light in reach. Will continue to monitor.

## 2018-05-01 PROBLEM — E87.1 HYPONATREMIA: Status: ACTIVE | Noted: 2018-05-01

## 2018-05-01 LAB
BACTERIA BLD CULT: NORMAL
BACTERIA BLD CULT: NORMAL
POCT GLUCOSE: 77 MG/DL (ref 70–110)
POCT GLUCOSE: 84 MG/DL (ref 70–110)
POCT GLUCOSE: 86 MG/DL (ref 70–110)
POCT GLUCOSE: 89 MG/DL (ref 70–110)

## 2018-05-01 PROCEDURE — 25000003 PHARM REV CODE 250: Performed by: INTERNAL MEDICINE

## 2018-05-01 PROCEDURE — 25000003 PHARM REV CODE 250: Performed by: HOSPITALIST

## 2018-05-01 PROCEDURE — 25000003 PHARM REV CODE 250: Performed by: NURSE PRACTITIONER

## 2018-05-01 PROCEDURE — 63600175 PHARM REV CODE 636 W HCPCS: Performed by: INTERNAL MEDICINE

## 2018-05-01 PROCEDURE — 99223 1ST HOSP IP/OBS HIGH 75: CPT | Mod: ,,, | Performed by: HOSPITALIST

## 2018-05-01 PROCEDURE — 11000001 HC ACUTE MED/SURG PRIVATE ROOM

## 2018-05-01 PROCEDURE — 93005 ELECTROCARDIOGRAM TRACING: CPT

## 2018-05-01 PROCEDURE — 25000003 PHARM REV CODE 250: Performed by: PHYSICIAN ASSISTANT

## 2018-05-01 PROCEDURE — 99900035 HC TECH TIME PER 15 MIN (STAT)

## 2018-05-01 PROCEDURE — 94761 N-INVAS EAR/PLS OXIMETRY MLT: CPT

## 2018-05-01 PROCEDURE — 93010 ELECTROCARDIOGRAM REPORT: CPT | Mod: ,,, | Performed by: INTERNAL MEDICINE

## 2018-05-01 PROCEDURE — 97530 THERAPEUTIC ACTIVITIES: CPT

## 2018-05-01 PROCEDURE — 97535 SELF CARE MNGMENT TRAINING: CPT

## 2018-05-01 RX ADMIN — SEVELAMER CARBONATE 800 MG: 800 TABLET, FILM COATED ORAL at 08:05

## 2018-05-01 RX ADMIN — CEFTAROLINE FOSAMIL 200 MG: 400 POWDER, FOR SOLUTION INTRAVENOUS at 06:05

## 2018-05-01 RX ADMIN — APIXABAN 5 MG: 2.5 TABLET, FILM COATED ORAL at 08:05

## 2018-05-01 RX ADMIN — ISOSORBIDE MONONITRATE 30 MG: 30 TABLET, EXTENDED RELEASE ORAL at 05:05

## 2018-05-01 RX ADMIN — DOCUSATE SODIUM 200 MG: 100 CAPSULE, LIQUID FILLED ORAL at 08:05

## 2018-05-01 RX ADMIN — VITAMIN D, TAB 1000IU (100/BT) 2000 UNITS: 25 TAB at 08:05

## 2018-05-01 RX ADMIN — POLYETHYLENE GLYCOL 3350 17 G: 17 POWDER, FOR SOLUTION ORAL at 08:05

## 2018-05-01 RX ADMIN — CALCITRIOL 0.25 MCG: 0.25 CAPSULE, LIQUID FILLED ORAL at 08:05

## 2018-05-01 RX ADMIN — APIXABAN 5 MG: 2.5 TABLET, FILM COATED ORAL at 07:05

## 2018-05-01 RX ADMIN — TAMSULOSIN HYDROCHLORIDE 0.4 MG: 0.4 CAPSULE ORAL at 08:05

## 2018-05-01 RX ADMIN — GABAPENTIN 100 MG: 100 CAPSULE ORAL at 05:05

## 2018-05-01 RX ADMIN — GABAPENTIN 100 MG: 100 CAPSULE ORAL at 08:05

## 2018-05-01 RX ADMIN — CEFTAROLINE FOSAMIL 200 MG: 400 POWDER, FOR SOLUTION INTRAVENOUS at 05:05

## 2018-05-01 RX ADMIN — DAPTOMYCIN 710 MG: 500 INJECTION, POWDER, LYOPHILIZED, FOR SOLUTION INTRAVENOUS at 06:05

## 2018-05-01 RX ADMIN — FUROSEMIDE 80 MG: 40 TABLET ORAL at 05:05

## 2018-05-01 RX ADMIN — OXYCODONE AND ACETAMINOPHEN 1 TABLET: 5; 325 TABLET ORAL at 12:05

## 2018-05-01 RX ADMIN — SEVELAMER CARBONATE 800 MG: 800 TABLET, FILM COATED ORAL at 12:05

## 2018-05-01 RX ADMIN — FAMOTIDINE 20 MG: 20 TABLET ORAL at 08:05

## 2018-05-01 RX ADMIN — FUROSEMIDE 80 MG: 40 TABLET ORAL at 08:05

## 2018-05-01 RX ADMIN — GABAPENTIN 100 MG: 100 CAPSULE ORAL at 07:05

## 2018-05-01 RX ADMIN — OXYCODONE HYDROCHLORIDE 5 MG: 5 TABLET ORAL at 08:05

## 2018-05-01 RX ADMIN — DOCUSATE SODIUM 200 MG: 100 CAPSULE, LIQUID FILLED ORAL at 07:05

## 2018-05-01 RX ADMIN — AMIODARONE HYDROCHLORIDE 200 MG: 200 TABLET ORAL at 08:05

## 2018-05-01 NOTE — NURSING
Plan of care reviewed with Pt, purposeful hourly rounding performed. VSS on RA. NAD during shift. Incontinence care provided. No c/o at this time. Bed locked and lowered, call light in reach. Will continue to monitor.

## 2018-05-01 NOTE — ASSESSMENT & PLAN NOTE
Previously diagnosed during prior hospitalization.  Transesophageal echocardiogram negative for vegetation.  Patient with large thrombus burden and left this was the likely source of her Staphylococcus bacteremia.  Per Dr. Sherron Wise's note on 2/28/2018, plan was for 8 weeks of intravenous antibiotics from last date of negative blood culture (2/22/2018) which would call for treatment until 4/19/2018.  It appears antibiotics were discontinued on 4/4/2018 so possible treatment failure due to inadequate treatment course or treatment failure due to inadequate source control.  Patient also had tunneled right internal jugular exchanged on 4/24/2018.  Despite exchanging internal jugular line and restarting antibiotics again with intravenous ceftaroline and daptomycin, patient failed to clear bacteremia and had HD catheter removed on 4/27/2018.  Most recent repeat cultures negative.  Tunneled dialysis line placed and has been used.  Plan for iv antibiotics to be completed at her home SNF.

## 2018-05-01 NOTE — PLAN OF CARE
Problem: Physical Therapy Goal  Goal: Physical Therapy Goal  Goals to be met by: 18    Patient will increase functional independence with mobility by performin. Supine to sit with min A. MET 18  2. Sit to supine with min A.   3. Rolling R and L with min A.   4. Sitting at edge of bed >20 minutes with SBA.   5. Sit < stand with RW and min A   6. SPT from EOB <> bedside chair with RW and Min A   7. Pt will tolerate sitting at EOB for all meals with set up assist  MET 18  Outcome: Ongoing (interventions implemented as appropriate)    Patient required encouragement to progress and participate. Patient required moderate assistance of 2 people for transfers; unable to transfer and weight shift

## 2018-05-01 NOTE — ASSESSMENT & PLAN NOTE
69 y/o with a prior history of staph aureus bacteremia earlier this year.  She is admitted with MRSA and MSSA bacteremia.  She was started on IV daptomycin and IV ceftaroline by my colleagues who saw her previously.  She is being prepared for discharge tomorrow.  Recommendations as follows;    1.  Repeat blood cultures today.    2.  IV daptomycin and IV ceftaroline for 6 weeks total for possible endovascular infection (stop date would be June 6).    3.  Check cbc, cmp, cpk once a week and fax to ID clinic at [phone].    She will need ID clinic follow up either with us or in [place] in 4-6 weeks.

## 2018-05-01 NOTE — PLAN OF CARE
Problem: Occupational Therapy Goal  Goal: Occupational Therapy Goal  Goals to be met by 5/21/18  1. Min A G/H (2 tasks) sitting EOB without using UE for support on bed or tray table (MET 4/28/18)  REVISED GOAL: Completed G/H (3 tasks) sitting EOB without VC or assist to perform task  2. Mod A UBD seated EOB (MET 04/26/18)  REVISED Min assist UBD at EOB  3. Max A bed-BSC transfer  4.Maximize UE and axial muscle strength        Outcome: Ongoing (interventions implemented as appropriate)  With increased c/o pain in (L) UE, declined sitting EOB secondary to this pain and (R) UE HD port, but then, with PTA, attempted standing with Mod assist of 2.  Engaged in UBD, bathing and bed mobility.  Continue OT services to maximize patient functioning.    Comments: PATTY Alonzo, 5/1/2018

## 2018-05-01 NOTE — PT/OT/SLP PROGRESS
Physical Therapy Treatment    Patient Name:  Yumiko Proctor   MRN:  [id number]    Recommendations:     Discharge Recommendations:  nursing facility, skilled   Discharge Equipment Recommendations:  (TBD at SNF )   Barriers to discharge: Decreased caregiver support    Assessment:     Yumiko Proctor is a 68 y.o. female admitted with a medical diagnosis of Bacteremia due to methicillin resistant Staphylococcus aureus.  She presents with the following impairments/functional limitations:  weakness, impaired endurance, impaired self care skills, impaired balance, gait instability, impaired functional mobilty, decreased lower extremity function, decreased safety awareness, pain, decreased ROM, edema patient still required increase assistance with functional mobility.     Rehab Prognosis:  fair; patient would benefit from acute skilled PT services to address these deficits and reach maximum level of function.      Recent Surgery: Procedure(s) (LRB):  PERMCATH INSERTION-TUNNELED CVC (N/A) 1 Day Post-Op    Plan:     During this hospitalization, patient to be seen 6 x/week to address the above listed problems via gait training, therapeutic activities, therapeutic exercises, neuromuscular re-education, wheelchair management/training  · Plan of Care Expires:  05/21/18   Plan of Care Reviewed with: patient    Subjective     Communicated with nurse prior to session.  Patient found supine upon PT entry to room, agreeable to treatment.      Chief Complaint: patient complained of pain and being weak  Patient comments/goals: Patient reported I want to go home   Pain/Comfort:  · Pain Rating 1: 10/10  · Location - Side 1: Left  · Location - Orientation 1: generalized  · Location 1: shoulder  · Pain Addressed 1: Reposition, Cessation of Activity  · Pain Rating Post-Intervention 1: 0/10    Patients cultural, spiritual, Mu-ism conflicts given the current situation: none    Objective:     Patient found with: central line     General  Precautions: Standard, fall   Orthopedic Precautions:N/A   Braces: N/A     Functional Mobility:  · Supine to sit with moderate assistance   · Roll right/left X 3 trials with hand rail with moderate assistance   · Sit to supine with total A of 2 people trunk and bilateral LE's back onto bed   · drawsheet to HOB with bed in trendelenburg with total A of 2 people   · Patient sat on EOB with CGA/SBA for safety; patient required total A of 3 people to scoot back onto bed  · Sit to stand from bed X 5 trials with Moderate A of 2 people and Maximum assistance of 1 person. Patient required one person to block bilateral knees and tactile cues at trunk for upright posture.       AM-PAC 6 CLICK MOBILITY  Turning over in bed (including adjusting bedclothes, sheets and blankets)?: 2  Sitting down on and standing up from a chair with arms (e.g., wheelchair, bedside commode, etc.): 2  Moving from lying on back to sitting on the side of the bed?: 3  Moving to and from a bed to a chair (including a wheelchair)?: 1  Need to walk in hospital room?: 1  Climbing 3-5 steps with a railing?: 1  Total Score: 10       Therapeutic Activities and Exercises:  Patient had incontinent episode bowel movement while standing and required total A for jose g care     Patient left supine with all lines intact, call button in reach and nurse notified..    GOALS:    Physical Therapy Goals        Problem: Physical Therapy Goal    Goal Priority Disciplines Outcome Goal Variances Interventions   Physical Therapy Goal     PT/OT, PT Ongoing (interventions implemented as appropriate)     Description:  Goals to be met by: 18    Patient will increase functional independence with mobility by performin. Supine to sit with min A. MET 18  2. Sit to supine with min A.   3. Rolling R and L with min A.   4. Sitting at edge of bed >20 minutes with SBA.   5. Sit < stand with RW and min A   6. SPT from EOB <> bedside chair with RW and Min A   7. Pt will  tolerate sitting at EOB for all meals with set up assist  MET 5/1/18                    Time Tracking:     PT Received On: 05/01/18  PT Start Time: 1020     PT Stop Time: 1115  PT Total Time (min): 55 min     Billable Minutes: Therapeutic Activity 55    Treatment Type: Treatment  PT/PTA: PTA     PTA Visit Number: 1     Kajal Navas, PTA  05/01/2018

## 2018-05-01 NOTE — PROGRESS NOTES
"Nephrology  Progress Note    Admit Date: 4/18/2018   LOS: 13 days     SUBJECTIVE:     Follow-up For:  Bacteremia due to methicillin resistant Staphylococcus aureus    Interval History:     Uneventful night.  Awaiting transfer to [facility].  No CP/SOB.       Review of Systems:  Constitutional: No fever or chills  Respiratory: No cough or shortness of breath  Cardiovascular: No chest pain or palpitations  Gastrointestinal: No nausea or vomiting  Neurological: No confusion or weakness    OBJECTIVE:     Vital Signs Range (Last 24H):  /68   Pulse (!) 48   Temp 97.9 °F (36.6 °C)   Resp 19   Ht 5' 5" (1.651 m)   Wt 117.8 kg (259 lb 11.2 oz) Comment: no pump on bed for weighing.  LMP 01/01/1983 (LMP Unknown)   SpO2 98%   Breastfeeding? No   BMI 43.22 kg/m²     Temp:  [97.7 °F (36.5 °C)-98.2 °F (36.8 °C)]   Pulse:  [40-50]   Resp:  [15-19]   BP: (108-160)/(47-71)   SpO2:  [93 %-98 %]     I & O (Last 24H):    Intake/Output Summary (Last 24 hours) at 05/01/18 0821  Last data filed at 04/30/18 1700   Gross per 24 hour   Intake                0 ml   Output             3000 ml   Net            -3000 ml       Physical Exam:  General appearance: morbidly obese female in NAD  Eyes:  Conjunctivae/corneas clear. PERRL.  Lungs: Normal respiratory effort,  diminished  Heart: Regular/Jerry rate and rhythm, S1, S2 normal, no murmur, rub or cuco.  Abdomen: Soft, non-tender non-distended; bowel sounds normal; no masses,  no organomegaly, obese  Extremities: No cyanosis or clubbing. trace edema.    Skin: Skin color, texture, turgor normal. No rashes or lesions  Neurologic: Normal strength and tone. No focal numbness or weakness   Left SC PICC  Right IJ EULALIO        Laboratory Data:  BMP:     Recent Labs  Lab 04/30/18  0515   GLU 77  77   *  127*   K 4.1  4.1   CL 97  97   CO2 23  23   BUN 20  20   CREATININE 4.1*  4.1*   CALCIUM 8.4*  8.4*   MG 2.0     Lab Results   Component Value Date    CALCIUM 8.4 (L) " 04/30/2018    CALCIUM 8.4 (L) 04/30/2018    PHOS 3.7 04/30/2018    PHOS 3.7 04/30/2018       Lab Results   Component Value Date    .8 (H) 02/08/2018    CALCIUM 8.4 (L) 04/30/2018    CALCIUM 8.4 (L) 04/30/2018    PHOS 3.7 04/30/2018    PHOS 3.7 04/30/2018       Blood:  MSSA/MRSA      Medications:  Medication list was reviewed and changes noted under Assessment/Plan.    Diagnostic Results:  reviewed      ASSESSMENT/PLAN:     1. ESRD on HD MWF in [place], [place] with Dr. Sharpe (N18.6, Z99.2):  Routine HD MWF/PRN while inpt.  Consent signed.  Line holiday completed from Friday-Monday. EULALIO replaced 4/30/18. Renally dose meds, avoid nephrotoxins, and monitor I/O's closely.   2. Bacteremia-MSSA/MRSA (R78.81):  Antibx per ID. No evidence of PNa. Removed PICC and cultured tip. Now with MSSA-MRSA.  Last admit only identifiable source was extensive chronic LLE DVT.  Repeat U/S now with same LLE DVT and new R leg DVT.  Eliquis for now.  Discussed with team.  PICC replaced for antibx treatments.  Dr. Gustafson consult appreciated.  ECHO with no vegetation.  Dapto/Ceftaro for 6 wks.    3. Anemia of CKD/infection (D63.1):  No IV iron with infection.  Epo on HD.  S/p transfusion 2 units PRBCs on HD 4/18.  Hgb stable  4. 2HPT/Vit D deficiency:  Vit D3 and calcitriol.  5. Hyponatremia (E87.1):  Adjusted HD bath.   6. Morbid Obesity (E66.01):  Needs aggressive weight mgmt.        See above  Ok to Transfer back to [facility] today.

## 2018-05-01 NOTE — PLAN OF CARE
05/01/18 0800   Medicare Message   Important Message from Medicare regarding Discharge Appeal Rights Given to patient/caregiver;Explained to patient/caregiver;Signed/date by patient/caregiver   Date IMM was signed 05/01/18   Time IMM was signed 0800

## 2018-05-01 NOTE — ASSESSMENT & PLAN NOTE
Patient follows with Dr. Axel Sharpe with [facility] in [place], [place] for outpatient dialysis on Mondays, Wednesdays, and Fridays. Nephrology consulted here to continue with renal replacement therapy.

## 2018-05-01 NOTE — PT/OT/SLP PROGRESS
Occupational Therapy   Treatment    Name: Yumiko Proctor  MRN: [id number]  Admitting Diagnosis:  Bacteremia due to methicillin resistant Staphylococcus aureus  1 Day Post-Op    Recommendations:     Discharge Recommendations: nursing facility, skilled  Discharge Equipment Recommendations:   (TBD at SNF)  Barriers to discharge:  None    Subjective     Communicated with: RN prior to session.  Pain/Comfort:  Pain Rating 1: 10/10  Location - Side 1: Left  Location - Orientation 1: generalized  Location 1: shoulder  Pain Addressed 1: Reposition, Cessation of Activity  Pain Rating Post-Intervention 1: 7/10    Patients cultural, spiritual, Yazidism conflicts given the current situation: No    Objective:     Patient found with: central line (HD port)    General Precautions: Standard, anti-coagulation medicine, fall, other (see comments) (renal diet)   Orthopedic Precautions:N/A   Braces: N/A     Occupational Performance:    Bed Mobility:    · Patient completed Rolling/Turning to Left with  maximal assistance and with side rail  · Patient completed Rolling/Turning to Right with maximal assistance and with side rail   · Supine to sit:  With PTA  · Sit to Supine: Total assist of 2 people    Functional Mobility/Transfers:  · Sit to stand:  Mod assist x 2 RW  · Functional Mobility: NT    Activities of Daily Living:  · Grooming: contact guard assistance at elbow for washing face at bed level  · Bathing: maximal assistance for sponge bath  · UB Dressing: moderate assistance donning gown bed level  · Toileting: total assistance bed level    Patient left HOB elevated with all lines intact, call button in reach, bed alarm on and RN notified    [facility] 6 Click:  [facility] Total Score: 11    Treatment & Education:  Attempted education on importance of at least sitting EOB for improved healthEducation:      Assessment:     Yumiko Proctor is a 68 y.o. female with a medical diagnosis of Bacteremia due to methicillin resistant Staphylococcus  aureus.  She presents with the following performance deficits affecting function are weakness, impaired endurance, impaired self care skills, impaired functional mobilty, gait instability, impaired balance, decreased upper extremity function, decreased lower extremity function, decreased safety awareness, pain, decreased ROM, edema, impaired skin.  With increased c/o pain in (L) UE, declined sitting EOB secondary to this pain and (R) UE HD port but, with PTA as well as OT present, stood from EOB with RW.  Engaged in UBD, bathing and bed mobility.  Continue OT services to maximize patient functioning.    Rehab Prognosis:  Good; patient would benefit from acute skilled OT services to address these deficits and reach maximum level of function.       Plan:     Patient to be seen 6 x/week to address the above listed problems via self-care/home management, therapeutic activities, therapeutic exercises  · Plan of Care Expires: 05/21/18  · Plan of Care Reviewed with: patient    This Plan of care has been discussed with the patient who was involved in its development and understands and is in agreement with the identified goals and treatment plan    GOALS:    Occupational Therapy Goals        Problem: Occupational Therapy Goal    Goal Priority Disciplines Outcome Interventions   Occupational Therapy Goal     OT, PT/OT Ongoing (interventions implemented as appropriate)    Description:  Goals to be met by 5/21/18  1. Min A G/H (2 tasks) sitting EOB without using UE for support on bed or tray table (MET 4/28/18)  REVISED GOAL: Completed G/H (3 tasks) sitting EOB without VC or assist to perform task  2. Mod A UBD seated EOB (MET 04/26/18)  REVISED Min assist UBD at EOB  3. Max A bed-BSC transfer  4.Maximize UE and axial muscle strength                         Time Tracking:     OT Date of Treatment: 05/01/18  OT Start Time: 0904  OT Stop Time: 0939  OT Total Time (min): 35 min    Billable Minutes:Self Care/Home Management  35    PATTY Alonzo  5/1/2018

## 2018-05-01 NOTE — SUBJECTIVE & OBJECTIVE
Interval History:     No adverse events over the last 24 hours.    Review of Systems   Constitutional: Positive for fatigue.   All other systems reviewed and are negative.    Objective:     Vital Signs (Most Recent):  Temp: 97.9 °F (36.6 °C) (04/30/18 1738)  Pulse: (!) 48 (04/30/18 1738)  Resp: 16 (04/30/18 1738)  BP: (!) 134/58 (04/30/18 1738)  SpO2: 98 % (04/30/18 1738) Vital Signs (24h Range):  Temp:  [97.5 °F (36.4 °C)-98.2 °F (36.8 °C)] 97.9 °F (36.6 °C)  Pulse:  [40-50] 48  Resp:  [15-18] 16  SpO2:  [93 %-98 %] 98 %  BP: ()/(46-71) 134/58     Weight: 117.8 kg (259 lb 11.2 oz) (no pump on bed for weighing.)  Body mass index is 43.22 kg/m².    Estimated Creatinine Clearance: 16.9 mL/min (A) (based on SCr of 4.1 mg/dL (H)).    Physical Exam   Constitutional: She is oriented to person, place, and time. She appears well-developed and well-nourished.   HENT:   Head: Normocephalic and atraumatic.   Eyes: Conjunctivae and EOM are normal. Pupils are equal, round, and reactive to light.   Neck: Normal range of motion. Neck supple.   Cardiovascular: Normal rate, regular rhythm and normal heart sounds.    Pulmonary/Chest: Effort normal and breath sounds normal.       Abdominal: Soft. Bowel sounds are normal.   Musculoskeletal: Normal range of motion. She exhibits no edema.   Neurological: She is alert and oriented to person, place, and time.   Skin: Skin is warm and dry.   Nursing note and vitals reviewed.      Significant Labs:   Blood Culture:   Recent Labs  Lab 02/19/18  0537 02/22/18  0557 04/18/18  1831 04/22/18  1112 04/25/18  2018   LABBLOO Gram stain aer bottle: Gram positive cocci in clusters resembling Staph   Gram stain vicente bottle: Gram positive cocci in clusters resembling Staph   Results called to and read back by:Alison Oswald RN 02/20/2018    03:01  METHICILLIN RESISTANT STAPHYLOCOCCUS AUREUSID consult [facility] [facility] [place] [facility]. No growth after 5 days. Gram stain  aer bottle: Gram positive cocci in clusters resembling Staph   Results called to and read back by: Nat Diane RN  04/20/2018  18:35  STAPHYLOCOCCUS AUREUSID consult required at [facility]. Gram stain vicente bottle: Gram positive cocci   Results called to and read back by: Drea Emerson RN 04/24/2018   14:28  Gram stain aer bottle: Gram positive cocci  04/24/2018  16:23  METHICILLIN RESISTANT STAPHYLOCOCCUS AUREUSID consult required at [facility]. No Growth to date  No Growth to date  No Growth to date  No Growth to date  No Growth to date  No Growth to date  No Growth to date  No Growth to date  No Growth to date  No Growth to date     CBC:   Recent Labs  Lab 04/30/18  0515   WBC 4.75   HGB 8.8*   HCT 29.1*          Significant Imaging: I have reviewed all pertinent imaging results/findings within the past 24 hours.

## 2018-05-01 NOTE — ASSESSMENT & PLAN NOTE
Rather bradycardic but no symptoms.  Not on coreg presently, but is on amio and apixaban.  Order telemetry monitoring due to high risk and check EKG.

## 2018-05-01 NOTE — PLAN OF CARE
CM met with pt for discharge reassessment.     Picc line placed this am, information added to right care.    Transport will have to be arranged with [facility], pt is unable to sit in chair for extended period of time.    Discharge is pending auth from pt's insurance, [employer].  CM to follow for arrangements.     05/01/18 1015   Discharge Assessment   Assessment Type Discharge Planning Reassessment   Confirmed/corrected address and phone number on facesheet? Yes   Assessment information obtained from? Patient;Medical Record   Prior to hospitilization cognitive status: Alert/Oriented   Prior to hospitalization functional status: Needs Assistance;Partially Dependent;Assistive Equipment   Current cognitive status: Alert/Oriented   Current Functional Status: Assistive Equipment;Needs Assistance;Partially Dependent   Lives With facility resident   Able to Return to Prior Arrangements yes   Is patient able to care for self after discharge? No   Do you have any problems affording any of your prescribed medications? No   Is the patient taking medications as prescribed? yes   Does the patient have transportation home? Yes   Transportation Available family or friend will provide   Does the patient receive services at the [facility]? No   Discharge Plan B Skilled Nursing Facility   Patient/Family In Agreement With Plan yes

## 2018-05-01 NOTE — PROGRESS NOTES
"[facility]  Infectious Disease  Progress Note    Patient Name: Yumiko Proctor  MRN: [id number]  Admission Date: 4/18/2018  Length of Stay: 12 days  Attending Physician: Victor Hugo Rogel MD  Primary Care Provider: [facility]    Isolation Status: No active isolations  Assessment/Plan:      * Bacteremia due to methicillin resistant Staphylococcus aureus    67 y/o with a prior history of staph aureus bacteremia earlier this year.  She is admitted with MRSA and MSSA bacteremia.  She was started on IV daptomycin and IV ceftaroline by my colleagues who saw her previously.  She is being prepared for discharge tomorrow.  Recommendations as follows;    1.  Repeat blood cultures today.    2.  IV daptomycin and IV ceftaroline for 6 weeks total for possible endovascular infection (stop date would be June 6).    3.  Check cbc, cmp, cpk once a week and fax to ID clinic at [phone].    She will need ID clinic follow up either with us or in [place] in 4-6 weeks.            Anticipated Disposition: TBD    Thank you for your consult. I will follow-up with patient. Please contact us if you have any additional questions.    Robert Wilson MD  Infectious Disease  [facility]    Subjective:     Principal Problem:Bacteremia due to methicillin resistant Staphylococcus aureus    HPI: 67 yo female with ESRD who was discharged on IV ceftaroline and daptomycin for MRSA bacteremia on 3/1/18. She was supposed to receive these antibiotics for 8 weeks from 2/22/18, the last negative blood culture. This would have put her end of care at 4/19/18. However, on examination of the records, it appears that she may have stopped them on 4/4/18, 2 weeks early. The PICC appears to have been left in place as well.    She reportedly began having "low grade" fever, cough, and SOB with an elevated BNP of 900. She was started on Levaquin 250 mg orally three times per week after " dialysis. Chest xray showed a right upper lobe infiltrate.It is unclear specifically why she was admitted - she says that she had a cough and a slight fever. She was admitted to [facility] on 4/15, with a normal WBC. She was placed on vancomycin and zosyn empirically. Blood cultures taken on 4/15 grew Staph aureus, as did blood cultures on 4/16. Sensitivities were reported verbally as MRSA, but I have not received the culture report to confirm this or compare to previous cultures. PICC was removed on 4/19 - culture from the tip is negative. Blood cultures from 4/22 are now growing Staph sp. Blood cultures from 4/25 are negative so far.  Interval History:     No adverse events over the last 24 hours.    Review of Systems   Constitutional: Positive for fatigue.   All other systems reviewed and are negative.    Objective:     Vital Signs (Most Recent):  Temp: 97.9 °F (36.6 °C) (04/30/18 1738)  Pulse: (!) 48 (04/30/18 1738)  Resp: 16 (04/30/18 1738)  BP: (!) 134/58 (04/30/18 1738)  SpO2: 98 % (04/30/18 1738) Vital Signs (24h Range):  Temp:  [97.5 °F (36.4 °C)-98.2 °F (36.8 °C)] 97.9 °F (36.6 °C)  Pulse:  [40-50] 48  Resp:  [15-18] 16  SpO2:  [93 %-98 %] 98 %  BP: ()/(46-71) 134/58     Weight: 117.8 kg (259 lb 11.2 oz) (no pump on bed for weighing.)  Body mass index is 43.22 kg/m².    Estimated Creatinine Clearance: 16.9 mL/min (A) (based on SCr of 4.1 mg/dL (H)).    Physical Exam   Constitutional: She is oriented to person, place, and time. She appears well-developed and well-nourished.   HENT:   Head: Normocephalic and atraumatic.   Eyes: Conjunctivae and EOM are normal. Pupils are equal, round, and reactive to light.   Neck: Normal range of motion. Neck supple.   Cardiovascular: Normal rate, regular rhythm and normal heart sounds.    Pulmonary/Chest: Effort normal and breath sounds normal.       Abdominal: Soft. Bowel sounds are normal.   Musculoskeletal: Normal range of motion. She exhibits no edema.    Neurological: She is alert and oriented to person, place, and time.   Skin: Skin is warm and dry.   Nursing note and vitals reviewed.      Significant Labs:   Blood Culture:   Recent Labs  Lab 02/19/18  0537 02/22/18  0557 04/18/18  1831 04/22/18  1112 04/25/18  2018   LABBLOO Gram stain aer bottle: Gram positive cocci in clusters resembling Staph   Gram stain vicente bottle: Gram positive cocci in clusters resembling Staph   Results called to and read back by:Alison Oswald RN 02/20/2018    03:01  METHICILLIN RESISTANT STAPHYLOCOCCUS AUREUSID consult required at [facility] and [facility] locations. No growth after 5 days. Gram stain aer bottle: Gram positive cocci in clusters resembling Staph   Results called to and read back by: Nat Diane RN  04/20/2018  18:35  STAPHYLOCOCCUS AUREUSID consult required at [facility] and [facility] locations. Gram stain vicente bottle: Gram positive cocci   Results called to and read back by: Drea Emerson RN 04/24/2018   14:28  Gram stain aer bottle: Gram positive cocci  04/24/2018  16:23  METHICILLIN RESISTANT STAPHYLOCOCCUS AUREUSID consult required at [facility] and [facility] locations. No Growth to date  No Growth to date  No Growth to date  No Growth to date  No Growth to date  No Growth to date  No Growth to date  No Growth to date  No Growth to date  No Growth to date     CBC:   Recent Labs  Lab 04/30/18  0515   WBC 4.75   HGB 8.8*   HCT 29.1*          Significant Imaging: I have reviewed all pertinent imaging results/findings within the past 24 hours.

## 2018-05-01 NOTE — PROGRESS NOTES
"[facility] Hospital Medicine  Progress Note    Patient Name: Yumiko Proctor  MRN: [id number]  Patient Class: IP- Inpatient   Admission Date: 4/18/2018  Length of Stay: 13 days  Attending Physician: Gildardo Nuñez MD  Primary Care Provider: [facility]        Subjective:     Principal Problem:Bacteremia due to methicillin resistant Staphylococcus aureus    HPI:  Per Nat Woodward, "Ms Hoa Proctor is a 68 y.o. female, with PMH of HTN, NIDDM-2, CHF (EF 35%), ESRD on dialysis ([facility], Dr. Prather), A. Fib, LLE DVT, cardiomyopathy, and recent treatment in this facility for MRSA bacteremia from her vascular access site, who returns 2/2 need for ID consultation. In the past week the patient developed a fever and was diagnosed with RUL PNA, and started on Levaquin 3/week after dialysis. Associated symptoms include non-productive cough, fevers, and SOB with wheeze. She was started on vancymycin and zosyn on 4/15/18. Blood cultures taken on 4/16/18 were the second set, first after antibiotics, that grew staph. Other associated symptoms include b/l hip pain, and constipation.  She denies fever, chills, sweats, chest pain, SOB, N/V, diarrhea.     During the patient's previous stay at this facility she had blood cultures positive for MRSA on 2/8/18, and she was started on Daptomycin. On 2/10/18 a second set of blood cultures were positive for MRSA. At the time she had her left Jaspreet catheter removed and a right IJ tunneled dialysis cath was placed on 2/12/18. Repeat cultures on 2/13/18 were negative for MRSA. She was positive in 1/2 bottles on 2/16/18 and both blood cultures bottles for MRSA on 2/16/18. At that time she was started on vancomycin, and Daptomycin was stopped 2/2 myositis. On 2/22/18 blood cultures were negative. A RANDA on 2/26/18 was negative for vegetations, but the patient did have a LLE DVT discovered 2/20/18, and Apixaban was started. After CPK " "levels were normal, she was started back on Daptomycin and Ceftaroline. She was to continue this treatment until 4/4/18, and she was transferred to a NH facility in [place], [place]. She states she was transferred from the Nursing facility to [facility] on 4/15/18."    Hospital Course:  Ms. Yumiko Proctor is a 68 year-old woman with a history hypertension, diabetes mellitus type II on insulin, chronic systolic heart failure with ejection fraction of 35 percent, and end-stage renal disease on hemodialysis via tunneled dialysis catheter exited her right chest wall who returned from a nursing facility in [place] where she was undergoing treatment for presumed endovascular infection with Methicillin-resistant Staphylococcus aureus infection via a peripherally inserted central catheter who returns with recurrence of Staphylococcus aureus bacteremia.  Patient's peripherally inserted central catheter was removed here.  Blood cultures obtained here on 4/19/2018 positive for methicillin-sensitive Staphylococcus aureus.  Infectious disease service consulted and recommended treatment with intravenous ceftaroline and daptomycin.  In addition, as patient failed to clear her bacteremia, infectious disease service recommended removal of her tunneled dialysis catheter which was done following dialysis on 4/27/2018 with place to place new tunneled catheter on 4/30/2018 to allow for a line holiday.  Today she complains of some left arm and shoulder pain, but is eager to leave the hospital.  No acute events noted overnight.    Interval History: No acute events overnight.    Review of Systems   Constitutional: Negative for activity change, chills, diaphoresis, fatigue and fever.   HENT: Negative for congestion, drooling and hearing loss.    Eyes: Negative for discharge.   Respiratory: Negative for apnea, chest tightness, shortness of breath and wheezing.    Cardiovascular: Negative for chest pain, palpitations and leg swelling. "   Gastrointestinal: Negative for abdominal distention, abdominal pain, constipation, diarrhea, nausea and vomiting.   Genitourinary: Negative for difficulty urinating, dysuria and frequency.   Musculoskeletal: Negative for arthralgias, back pain, gait problem and myalgias.   Skin: Negative for rash.   Neurological: Positive for weakness. Negative for seizures, light-headedness and numbness.   Hematological: Negative for adenopathy.   Psychiatric/Behavioral: Negative for agitation, behavioral problems and confusion.     Objective:     Vital Signs (Most Recent):  Temp: 97.6 °F (36.4 °C) (04/30/18 0715)  Pulse: (!) 42 (04/30/18 0715)  Resp: 16 (04/30/18 0715)  BP: (!) 120/57 (04/30/18 0715)  SpO2: 96 % (04/30/18 0900) Vital Signs (24h Range):  Temp:  [97.5 °F (36.4 °C)-97.9 °F (36.6 °C)] 97.6 °F (36.4 °C)  Pulse:  [41-43] 42  Resp:  [12-18] 16  SpO2:  [94 %-98 %] 96 %  BP: ()/(46-57) 120/57     Weight: 117.8 kg (259 lb 11.2 oz) (no pump on bed for weighing.)  Body mass index is 43.22 kg/m².  No intake or output data in the 24 hours ending 04/30/18 0907   Physical Exam   Constitutional: She is oriented to person, place, and time. She appears well-developed and well-nourished. No distress.   Morbid obesity and significant generalized debility.   HENT:   Head: Atraumatic.   Eyes: Conjunctivae are normal.   Neck: Neck supple.   Cardiovascular: Normal rate, regular rhythm and normal heart sounds.    No murmur heard.  Pulmonary/Chest: Effort normal and breath sounds normal. She has no wheezes.   Abdominal: Soft. Bowel sounds are normal. She exhibits no distension. There is no tenderness.   Musculoskeletal: Normal range of motion. She exhibits no edema or deformity.   Neurological: She is alert and oriented to person, place, and time. No sensory deficit.   No focal weakness, moving upper and lower extremities well but diffusely weak.   Skin:   Tunneled venous line placed on exiting left chest wall also without overt  signs of infection.  Right chest dialysis catheter appears c/d/i as well.       Significant Labs: All pertinent labs within the past 24 hours have been reviewed.    Significant Imaging: I have reviewed all pertinent imaging results/findings within the past 24 hours.    Assessment/Plan:      * Bacteremia due to methicillin resistant Staphylococcus aureus    Previously diagnosed during prior hospitalization.  Transesophageal echocardiogram negative for vegetation.  Patient with large thrombus burden and left this was the likely source of her Staphylococcus bacteremia.  Per Dr. Sherron Wise's note on 2/28/2018, plan was for 8 weeks of intravenous antibiotics from last date of negative blood culture (2/22/2018) which would call for treatment until 4/19/2018.  It appears antibiotics were discontinued on 4/4/2018 so possible treatment failure due to inadequate treatment course or treatment failure due to inadequate source control.  Patient also had tunneled right internal jugular exchanged on 4/24/2018.  Despite exchanging internal jugular line and restarting antibiotics again with intravenous ceftaroline and daptomycin, patient failed to clear bacteremia and had HD catheter removed on 4/27/2018.  Most recent repeat cultures negative.  Tunneled dialysis line placed and has been used.  Plan for iv antibiotics to be completed at her home SNF.          Hyponatremia      Noted.  Continue HD per nephrology.  Repeat labs in AM.        Urinary retention    Continue straight catheterization as needed.  Ultrasound of the kidneys did not reveal evidence of hydronephrosis. Continue with alpha-blocker therapy.        Debility    Patient able to sit up at the side of the bed and stand yesterday.  Continue with therapy.          Slow transit constipation    Improved with bowel regimen. Continue current management.          Anemia due to chronic kidney disease    Improved status post blood transfusion on 4/20/2018 and  hemoglobin remains stable.        DVT of deep femoral vein, left    Ultrasound on 2/23/2018 showed persistent nonocclusive thrombus in the left common femoral and popliteal veins and possible new thrombus in the right deep femoral vein.  Continue anticoagulation with apixaban.  Dose adjusted 4/24/2018 per nephrology's recommendation.        Type 2 diabetes mellitus with chronic kidney disease on chronic dialysis, without long-term current use of insulin    Well controlled likely due to renal failure.  Continue diabetic diet with sliding scale insulin.         End stage renal disease    Patient follows with Dr. Axel Sharpe with [facility] in [place], [place] for outpatient dialysis on Mondays, Wednesdays, and Fridays. Nephrology consulted here to continue with renal replacement therapy.        Chronic atrial fibrillation    Rather bradycardic but no symptoms.  Not on coreg presently, but is on amio and apixaban.  Order telemetry monitoring due to high risk and check EKG.            VTE Risk Mitigation         Ordered     heparin (porcine) injection  As needed (PRN)      04/30/18 1219     apixaban tablet 5 mg  2 times daily      04/24/18 0934     heparin (porcine) injection 5,000 Units  As needed (PRN)      04/18/18 0756              Gildardo Nuñez MD  Department of Hospital Medicine   [facility]

## 2018-05-02 LAB
ANION GAP SERPL CALC-SCNC: 8 MMOL/L
BASOPHILS # BLD AUTO: 0.02 K/UL
BASOPHILS NFR BLD: 0.4 %
BUN SERPL-MCNC: 13 MG/DL
CALCIUM SERPL-MCNC: 8.6 MG/DL
CHLORIDE SERPL-SCNC: 99 MMOL/L
CO2 SERPL-SCNC: 23 MMOL/L
CREAT SERPL-MCNC: 3.4 MG/DL
DIFFERENTIAL METHOD: ABNORMAL
EOSINOPHIL # BLD AUTO: 0.2 K/UL
EOSINOPHIL NFR BLD: 3.7 %
ERYTHROCYTE [DISTWIDTH] IN BLOOD BY AUTOMATED COUNT: 18.1 %
EST. GFR  (AFRICAN AMERICAN): 15 ML/MIN/1.73 M^2
EST. GFR  (NON AFRICAN AMERICAN): 13 ML/MIN/1.73 M^2
GLUCOSE SERPL-MCNC: 84 MG/DL
HCT VFR BLD AUTO: 31.8 %
HGB BLD-MCNC: 9.8 G/DL
LYMPHOCYTES # BLD AUTO: 1.7 K/UL
LYMPHOCYTES NFR BLD: 35.4 %
MAGNESIUM SERPL-MCNC: 1.9 MG/DL
MCH RBC QN AUTO: 26.1 PG
MCHC RBC AUTO-ENTMCNC: 30.8 G/DL
MCV RBC AUTO: 85 FL
MONOCYTES # BLD AUTO: 0.5 K/UL
MONOCYTES NFR BLD: 10.6 %
NEUTROPHILS # BLD AUTO: 2.4 K/UL
NEUTROPHILS NFR BLD: 49.7 %
PHOSPHATE SERPL-MCNC: 3.2 MG/DL
PLATELET # BLD AUTO: 227 K/UL
PMV BLD AUTO: 9 FL
POCT GLUCOSE: 115 MG/DL (ref 70–110)
POCT GLUCOSE: 72 MG/DL (ref 70–110)
POCT GLUCOSE: 84 MG/DL (ref 70–110)
POTASSIUM SERPL-SCNC: 3.7 MMOL/L
RBC # BLD AUTO: 3.75 M/UL
SODIUM SERPL-SCNC: 130 MMOL/L
WBC # BLD AUTO: 4.89 K/UL

## 2018-05-02 PROCEDURE — 80048 BASIC METABOLIC PNL TOTAL CA: CPT

## 2018-05-02 PROCEDURE — 94761 N-INVAS EAR/PLS OXIMETRY MLT: CPT

## 2018-05-02 PROCEDURE — 25000003 PHARM REV CODE 250: Performed by: INTERNAL MEDICINE

## 2018-05-02 PROCEDURE — 25000003 PHARM REV CODE 250: Performed by: PHYSICIAN ASSISTANT

## 2018-05-02 PROCEDURE — 83735 ASSAY OF MAGNESIUM: CPT

## 2018-05-02 PROCEDURE — 85025 COMPLETE CBC W/AUTO DIFF WBC: CPT

## 2018-05-02 PROCEDURE — 25000003 PHARM REV CODE 250: Performed by: HOSPITALIST

## 2018-05-02 PROCEDURE — 63600175 PHARM REV CODE 636 W HCPCS: Performed by: NURSE PRACTITIONER

## 2018-05-02 PROCEDURE — 99900035 HC TECH TIME PER 15 MIN (STAT)

## 2018-05-02 PROCEDURE — 80100016 HC MAINTENANCE HEMODIALYSIS

## 2018-05-02 PROCEDURE — 11000001 HC ACUTE MED/SURG PRIVATE ROOM

## 2018-05-02 PROCEDURE — 63600175 PHARM REV CODE 636 W HCPCS: Performed by: INTERNAL MEDICINE

## 2018-05-02 PROCEDURE — 99232 SBSQ HOSP IP/OBS MODERATE 35: CPT | Mod: ,,, | Performed by: HOSPITALIST

## 2018-05-02 PROCEDURE — 25000003 PHARM REV CODE 250: Performed by: NURSE PRACTITIONER

## 2018-05-02 PROCEDURE — 84100 ASSAY OF PHOSPHORUS: CPT

## 2018-05-02 RX ORDER — GABAPENTIN 100 MG/1
100 CAPSULE ORAL 3 TIMES DAILY
Qty: 90 CAPSULE | Refills: 11 | Status: SHIPPED | OUTPATIENT
Start: 2018-05-02 | End: 2019-10-21

## 2018-05-02 RX ORDER — FAMOTIDINE 20 MG/1
20 TABLET, FILM COATED ORAL DAILY
Qty: 30 TABLET | Refills: 11 | Status: SHIPPED | OUTPATIENT
Start: 2018-05-03 | End: 2019-10-22

## 2018-05-02 RX ORDER — INSULIN ASPART 100 [IU]/ML
0-5 INJECTION, SOLUTION INTRAVENOUS; SUBCUTANEOUS
Qty: 10 ML | Refills: 0
Start: 2018-05-02 | End: 2019-10-21

## 2018-05-02 RX ORDER — IBUPROFEN 200 MG
16 TABLET ORAL
Refills: 12 | COMMUNITY
Start: 2018-05-02 | End: 2019-10-21

## 2018-05-02 RX ORDER — ACETAMINOPHEN 325 MG/1
650 TABLET ORAL EVERY 4 HOURS PRN
Refills: 0 | COMMUNITY
Start: 2018-05-02 | End: 2019-10-21

## 2018-05-02 RX ORDER — AMOXICILLIN 250 MG
1 CAPSULE ORAL DAILY PRN
COMMUNITY
Start: 2018-05-02 | End: 2019-10-21

## 2018-05-02 RX ORDER — AMIODARONE HYDROCHLORIDE 100 MG/1
100 TABLET ORAL DAILY
Status: DISCONTINUED | OUTPATIENT
Start: 2018-05-02 | End: 2018-05-04 | Stop reason: HOSPADM

## 2018-05-02 RX ORDER — AMIODARONE HYDROCHLORIDE 100 MG/1
100 TABLET ORAL DAILY
Qty: 30 TABLET | Refills: 11 | Status: SHIPPED | OUTPATIENT
Start: 2018-05-03 | End: 2019-10-21

## 2018-05-02 RX ORDER — TAMSULOSIN HYDROCHLORIDE 0.4 MG/1
0.4 CAPSULE ORAL DAILY
Qty: 30 CAPSULE | Refills: 11 | Status: SHIPPED | OUTPATIENT
Start: 2018-05-03 | End: 2019-10-21

## 2018-05-02 RX ORDER — POLYETHYLENE GLYCOL 3350 17 G/17G
17 POWDER, FOR SOLUTION ORAL DAILY
Qty: 30 EACH | Refills: 0
Start: 2018-05-03 | End: 2019-10-21

## 2018-05-02 RX ADMIN — GABAPENTIN 100 MG: 100 CAPSULE ORAL at 02:05

## 2018-05-02 RX ADMIN — SEVELAMER CARBONATE 800 MG: 800 TABLET, FILM COATED ORAL at 04:05

## 2018-05-02 RX ADMIN — CALCITRIOL 0.25 MCG: 0.25 CAPSULE, LIQUID FILLED ORAL at 08:05

## 2018-05-02 RX ADMIN — TAMSULOSIN HYDROCHLORIDE 0.4 MG: 0.4 CAPSULE ORAL at 08:05

## 2018-05-02 RX ADMIN — DOCUSATE SODIUM 200 MG: 100 CAPSULE, LIQUID FILLED ORAL at 08:05

## 2018-05-02 RX ADMIN — ACETAMINOPHEN 650 MG: 325 TABLET ORAL at 02:05

## 2018-05-02 RX ADMIN — FUROSEMIDE 80 MG: 40 TABLET ORAL at 04:05

## 2018-05-02 RX ADMIN — HEPARIN SODIUM 5000 UNITS: 5000 INJECTION, SOLUTION INTRAVENOUS; SUBCUTANEOUS at 11:05

## 2018-05-02 RX ADMIN — CEFTAROLINE FOSAMIL 200 MG: 400 POWDER, FOR SOLUTION INTRAVENOUS at 05:05

## 2018-05-02 RX ADMIN — APIXABAN 5 MG: 2.5 TABLET, FILM COATED ORAL at 08:05

## 2018-05-02 RX ADMIN — FUROSEMIDE 80 MG: 40 TABLET ORAL at 08:05

## 2018-05-02 RX ADMIN — POLYETHYLENE GLYCOL 3350 17 G: 17 POWDER, FOR SOLUTION ORAL at 08:05

## 2018-05-02 RX ADMIN — VITAMIN D, TAB 1000IU (100/BT) 2000 UNITS: 25 TAB at 08:05

## 2018-05-02 RX ADMIN — ERYTHROPOIETIN 20000 UNITS: 20000 INJECTION, SOLUTION INTRAVENOUS; SUBCUTANEOUS at 11:05

## 2018-05-02 RX ADMIN — FAMOTIDINE 20 MG: 20 TABLET ORAL at 08:05

## 2018-05-02 RX ADMIN — SEVELAMER CARBONATE 800 MG: 800 TABLET, FILM COATED ORAL at 08:05

## 2018-05-02 RX ADMIN — GABAPENTIN 100 MG: 100 CAPSULE ORAL at 08:05

## 2018-05-02 RX ADMIN — FUROSEMIDE 80 MG: 40 TABLET ORAL at 05:05

## 2018-05-02 RX ADMIN — OXYCODONE AND ACETAMINOPHEN 1 TABLET: 5; 325 TABLET ORAL at 02:05

## 2018-05-02 RX ADMIN — OXYCODONE HYDROCHLORIDE 5 MG: 5 TABLET ORAL at 08:05

## 2018-05-02 RX ADMIN — CEFTAROLINE FOSAMIL 200 MG: 400 POWDER, FOR SOLUTION INTRAVENOUS at 04:05

## 2018-05-02 NOTE — PLAN OF CARE
Problem: Patient Care Overview  Goal: Plan of Care Review  Outcome: Ongoing (interventions implemented as appropriate)  Patient resting in bed at this time, no injuries reported, bed locked and in lowest position, night light on, nonskid foot wear applied, call bell and other important items within reach, instructed to call as needed, purposeful rounding done and plan of care discussed, verbalized understanding       Turn q2  Tele on reading SB in the 40s (seen as low as 38bpm), KELLEY Weber notified and aware   BP on low end, last BP 98/48  Pain controlled with prn meds  RA, tolerating   saline locked        No further complaints or concerns at this time  Will cont to monitor

## 2018-05-02 NOTE — PROGRESS NOTES
"[facility] Hospital Medicine  Progress Note    Patient Name: Yumiko Proctor  MRN: [id number]  Patient Class: IP- Inpatient   Admission Date: 4/18/2018  Length of Stay: 14 days  Attending Physician: Gildardo Nuñez MD  Primary Care Provider: [facility]        Subjective:     Principal Problem:Bacteremia due to methicillin resistant Staphylococcus aureus    HPI:  Per Nat Woodward, "Ms Hoa Proctor is a 68 y.o. female, with PMH of HTN, NIDDM-2, CHF (EF 35%), ESRD on dialysis ([facility], Dr. Prather), A. Fib, LLE DVT, cardiomyopathy, and recent treatment in this facility for MRSA bacteremia from her vascular access site, who returns 2/2 need for ID consultation. In the past week the patient developed a fever and was diagnosed with RUL PNA, and started on Levaquin 3/week after dialysis. Associated symptoms include non-productive cough, fevers, and SOB with wheeze. She was started on vancymycin and zosyn on 4/15/18. Blood cultures taken on 4/16/18 were the second set, first after antibiotics, that grew staph. Other associated symptoms include b/l hip pain, and constipation.  She denies fever, chills, sweats, chest pain, SOB, N/V, diarrhea.     During the patient's previous stay at this facility she had blood cultures positive for MRSA on 2/8/18, and she was started on Daptomycin. On 2/10/18 a second set of blood cultures were positive for MRSA. At the time she had her left Jaspreet catheter removed and a right IJ tunneled dialysis cath was placed on 2/12/18. Repeat cultures on 2/13/18 were negative for MRSA. She was positive in 1/2 bottles on 2/16/18 and both blood cultures bottles for MRSA on 2/16/18. At that time she was started on vancomycin, and Daptomycin was stopped 2/2 myositis. On 2/22/18 blood cultures were negative. A RANDA on 2/26/18 was negative for vegetations, but the patient did have a LLE DVT discovered 2/20/18, and Apixaban was started. After CPK " "levels were normal, she was started back on Daptomycin and Ceftaroline. She was to continue this treatment until 4/4/18, and she was transferred to a NH facility in [place], [place]. She states she was transferred from the Nursing facility to [facility] on 4/15/18."    Hospital Course:  Ms. Yumiko Proctor is a 68 year-old woman with a history hypertension, diabetes mellitus type II on insulin, chronic systolic heart failure with ejection fraction of 35 percent, and end-stage renal disease on hemodialysis via tunneled dialysis catheter exited her right chest wall who returned from a nursing facility in [place] where she was undergoing treatment for presumed endovascular infection with Methicillin-resistant Staphylococcus aureus infection via a peripherally inserted central catheter who returns with recurrence of Staphylococcus aureus bacteremia.  Patient's peripherally inserted central catheter was removed here.  Blood cultures obtained here on 4/19/2018 positive for methicillin-sensitive Staphylococcus aureus.  Infectious disease service consulted and recommended treatment with intravenous ceftaroline and daptomycin.  In addition, as patient failed to clear her bacteremia, infectious disease service recommended removal of her tunneled dialysis catheter which was done following dialysis on 4/27/2018 with plan to place new tunneled catheter on 4/30/2018 to allow for a line holiday.    5/1: Today she complains of some left arm and shoulder pain, but is eager to leave the hospital.  No acute events noted overnight.  5/2: Tele reviewed and noted HR into upper 30s overnight.  No acute events noted.  Seen in dialysis.  Eager to leave the hospital.    Interval History: no acute events overnight.    Review of Systems   Constitutional: Negative for activity change, chills, diaphoresis and fatigue.   HENT: Negative for congestion, drooling and hearing loss.    Eyes: Negative for discharge.   Respiratory: Negative for apnea, chest " tightness, shortness of breath and wheezing.    Cardiovascular: Negative for palpitations and leg swelling.   Gastrointestinal: Negative for abdominal distention, abdominal pain, constipation and diarrhea.   Musculoskeletal: Negative for arthralgias and gait problem.   Skin: Negative for rash.   Neurological: Negative for seizures, light-headedness and numbness.   Hematological: Negative for adenopathy.   Psychiatric/Behavioral: Negative for agitation and behavioral problems.     Objective:     Vital Signs (Most Recent):  Temp: 97.7 °F (36.5 °C) (05/02/18 0423)  Pulse: (!) 42 (05/02/18 0600)  Resp: 16 (05/01/18 1939)  BP: (!) 98/48 (pt was sleeping, asymptomatic ) (05/02/18 0423)  SpO2: 97 % (05/02/18 0423) Vital Signs (24h Range):  Temp:  [97.2 °F (36.2 °C)-97.9 °F (36.6 °C)] 97.7 °F (36.5 °C)  Pulse:  [40-47] 42  Resp:  [16] 16  SpO2:  [93 %-97 %] 97 %  BP: ()/(48-57) 98/48     Weight: 119 kg (262 lb 5.6 oz)  Body mass index is 43.66 kg/m².    Intake/Output Summary (Last 24 hours) at 05/02/18 0718  Last data filed at 05/02/18 0400   Gross per 24 hour   Intake              300 ml   Output                0 ml   Net              300 ml      Physical Exam   Constitutional: She appears well-developed and well-nourished.   Frail in appearance     HENT:   Head: Normocephalic and atraumatic.   Mouth/Throat: No oropharyngeal exudate.   Eyes: EOM are normal. Pupils are equal, round, and reactive to light.   Neck: No tracheal deviation present.   Cardiovascular: Regular rhythm.    No murmur heard.  Bradycardic.   Pulmonary/Chest: Effort normal and breath sounds normal.   Abdominal: Soft. Bowel sounds are normal.   Musculoskeletal: She exhibits no edema.   Vitals reviewed.      Significant Labs:   Recent Lab Results       05/02/18  0703 05/02/18  0235 05/01/18  2137 05/01/18  1645 05/01/18  1127      Anion Gap  8        Baso #  0.02        Basophil%  0.4        BUN, Bld  13        Calcium  8.6(L)        Chloride  99         CO2  23        Creatinine  3.4(H)        Differential Method  Automated        eGFR if   15(A)        eGFR if non   13  Comment:  Calculation used to obtain the estimated glomerular filtration  rate (eGFR) is the CKD-EPI equation.   (A)        Eos #  0.2        Eosinophil%  3.7        Glucose  84        Gran # (ANC)  2.4        Gran%  49.7        Hematocrit  31.8(L)        Hemoglobin  9.8(L)        Lymph #  1.7        Lymph%  35.4        Magnesium  1.9        MCH  26.1(L)        MCHC  30.8(L)        MCV  85        Mono #  0.5        Mono%  10.6        MPV  9.0(L)        Phosphorus  3.2        Platelets  227        POCT Glucose 72  86 77 89     Potassium  3.7        RBC  3.75(L)        RDW  18.1(H)        Sodium  130(L)        WBC  4.89                      All pertinent labs within the past 24 hours have been reviewed.    Significant Imaging: I have reviewed all pertinent imaging results/findings within the past 24 hours.    Assessment/Plan:      * Bacteremia due to methicillin resistant Staphylococcus aureus    Initially diagnosed during prior hospitalization.  Transesophageal echocardiogram negative for vegetation.  Patient with large thrombus burden thought to be likely source of her Staphylococcus bacteremia.  Per Dr. Sherron Wise's note on 2/28/2018, plan was for 8 weeks of intravenous antibiotics from last date of negative blood culture (2/22/2018) which callled for treatment until 4/19/2018.  It appears antibiotics were discontinued on 4/4/2018 so possible treatment failure due to inadequate treatment course or treatment failure due to inadequate source control.  Patient also had tunneled right internal jugular exchanged on 4/24/2018.  Despite exchanging internal jugular line and restarting antibiotics again with intravenous ceftaroline and daptomycin, patient failed to clear bacteremia and had HD catheter removed on 4/27/2018, and subsequently replaced after line  holiday.  Most recent repeat cultures negative.  Tunneled dialysis line placed and has been used.  Plan for iv antibiotics to be completed at her home SNF in [place].  Will continue IV dapto and ceftaroline x 6 weeks total with stop date June 6.  Will need CB, CMP, CPK weekly and faxed to ID clinic at [phone].  Will need ID followup here or in [place] in 4-6 weeks.        Hyponatremia      Noted and mildly improved.  Continue HD per nephrology.  Repeat labs per nephrology..        Urinary retention    Continue straight catheterization as needed.  Ultrasound of the kidneys did not reveal evidence of hydronephrosis. Continue with alpha-blocker therapy.        Debility    Patient able to sit up at the side of the bed and stand.  Rather debilitated.  Continue with PT/OT.          Slow transit constipation    Improved with bowel regimen. Continue current management.          Anemia due to chronic kidney disease    Improved status post blood transfusion on 4/20/2018 and hemoglobin remains stable.        DVT of deep femoral vein, left    Ultrasound on 2/23/2018 showed persistent nonocclusive thrombus in the left common femoral and popliteal veins and possible new thrombus in the right deep femoral vein.  Continue anticoagulation with apixaban.  Dose adjusted 4/24/2018 per nephrology's recommendation.        Type 2 diabetes mellitus with chronic kidney disease on chronic dialysis, without long-term current use of insulin    Well controlled likely due to renal failure.  Continue diabetic diet with sliding scale insulin.         End stage renal disease    Patient follows with Dr. Axel Sharpe with [facility] in [place], [place] for outpatient dialysis on Mondays, Wednesdays, and Fridays. Nephrology consulted here to continue with renal replacement therapy.        Chronic atrial fibrillation    Continues to be bradycardic into 30s overnight.  No obvious symptoms.  Previously on coreg, amio and apixaban, but  coreg on hold.  Continue amio and apixaban.  D/w Dr. Gustafson and likely will need to cut dose of amio for now.  Recent echo reviewed.  Continue tele while in house.  Await EKG.  Placed consult to Dr. Gustafson.          VTE Risk Mitigation         Ordered     heparin (porcine) injection  As needed (PRN)      04/30/18 1219     apixaban tablet 5 mg  2 times daily      04/24/18 0934     heparin (porcine) injection 5,000 Units  As needed (PRN)      04/18/18 2170              Gildardo Nuñez MD  Department of Hospital Medicine   [facility]

## 2018-05-02 NOTE — PT/OT/SLP PROGRESS
Occupational Therapy      Patient Name:  Yumiko Proctor   MRN:  [id number]    Patient not seen today secondary to Dialysis (this was second attempt, first attempt, RN held secondary to lashay). Will follow-up next scheduled treatment date.    PATTY Alonzo  5/2/2018

## 2018-05-02 NOTE — ASSESSMENT & PLAN NOTE
Continues to be bradycardic into 30s overnight.  No obvious symptoms.  Previously on coreg, amio and apixaban, but coreg on hold.  Continue amio and apixaban.  D/w Dr. Gustafson and likely will need to cut dose of amio for now.  Recent echo reviewed.  Continue tele while in house.  Await EKG.  Placed consult to Dr. Gustafson.

## 2018-05-02 NOTE — NURSING
"Telemetry Miya called and notified me of patient's Heart rate in the high 30s (38-40) while sleeping. Awakes but c/o "being tired".     Notified KELLEY Weber    No orders at this time  Will cont to monitor   "

## 2018-05-02 NOTE — PLAN OF CARE
ROBBY placed call to [facility] to inquire about [employer] auth. ROBBY spoke with Martina, [phone], who states they have not heard back from [facility] yet. Facility has everything they need and will reach out to [facility] again.    Martina states she will call this CM as soon as she has auth.

## 2018-05-02 NOTE — SUBJECTIVE & OBJECTIVE
Interval History: no acute events overnight.    Review of Systems   Constitutional: Negative for activity change, chills, diaphoresis and fatigue.   HENT: Negative for congestion, drooling and hearing loss.    Eyes: Negative for discharge.   Respiratory: Negative for apnea, chest tightness, shortness of breath and wheezing.    Cardiovascular: Negative for palpitations and leg swelling.   Gastrointestinal: Negative for abdominal distention, abdominal pain, constipation and diarrhea.   Musculoskeletal: Negative for arthralgias and gait problem.   Skin: Negative for rash.   Neurological: Negative for seizures, light-headedness and numbness.   Hematological: Negative for adenopathy.   Psychiatric/Behavioral: Negative for agitation and behavioral problems.     Objective:     Vital Signs (Most Recent):  Temp: 97.7 °F (36.5 °C) (05/02/18 0423)  Pulse: (!) 42 (05/02/18 0600)  Resp: 16 (05/01/18 1939)  BP: (!) 98/48 (pt was sleeping, asymptomatic ) (05/02/18 0423)  SpO2: 97 % (05/02/18 0423) Vital Signs (24h Range):  Temp:  [97.2 °F (36.2 °C)-97.9 °F (36.6 °C)] 97.7 °F (36.5 °C)  Pulse:  [40-47] 42  Resp:  [16] 16  SpO2:  [93 %-97 %] 97 %  BP: ()/(48-57) 98/48     Weight: 119 kg (262 lb 5.6 oz)  Body mass index is 43.66 kg/m².    Intake/Output Summary (Last 24 hours) at 05/02/18 0718  Last data filed at 05/02/18 0400   Gross per 24 hour   Intake              300 ml   Output                0 ml   Net              300 ml      Physical Exam   Constitutional: She appears well-developed and well-nourished.   Frail in appearance     HENT:   Head: Normocephalic and atraumatic.   Mouth/Throat: No oropharyngeal exudate.   Eyes: EOM are normal. Pupils are equal, round, and reactive to light.   Neck: No tracheal deviation present.   Cardiovascular: Regular rhythm.    No murmur heard.  Bradycardic.   Pulmonary/Chest: Effort normal and breath sounds normal.   Abdominal: Soft. Bowel sounds are normal.   Musculoskeletal: She exhibits  no edema.   Vitals reviewed.      Significant Labs:   Recent Lab Results       05/02/18  0703 05/02/18  0235 05/01/18  2137 05/01/18  1645 05/01/18  1127      Anion Gap  8        Baso #  0.02        Basophil%  0.4        BUN, Bld  13        Calcium  8.6(L)        Chloride  99        CO2  23        Creatinine  3.4(H)        Differential Method  Automated        eGFR if   15(A)        eGFR if non   13  Comment:  Calculation used to obtain the estimated glomerular filtration  rate (eGFR) is the CKD-EPI equation.   (A)        Eos #  0.2        Eosinophil%  3.7        Glucose  84        Gran # (ANC)  2.4        Gran%  49.7        Hematocrit  31.8(L)        Hemoglobin  9.8(L)        Lymph #  1.7        Lymph%  35.4        Magnesium  1.9        MCH  26.1(L)        MCHC  30.8(L)        MCV  85        Mono #  0.5        Mono%  10.6        MPV  9.0(L)        Phosphorus  3.2        Platelets  227        POCT Glucose 72  86 77 89     Potassium  3.7        RBC  3.75(L)        RDW  18.1(H)        Sodium  130(L)        WBC  4.89                      All pertinent labs within the past 24 hours have been reviewed.    Significant Imaging: I have reviewed all pertinent imaging results/findings within the past 24 hours.

## 2018-05-02 NOTE — PROGRESS NOTES
"Nephrology  Progress Note    Admit Date: 4/18/2018   LOS: 14 days     SUBJECTIVE:     Follow-up For:  Bacteremia due to methicillin resistant Staphylococcus aureus    Interval History:     Events noted.  More bradycardic overnight and this am.  Seen by Cardiology.  No CP/SOB.  Awaiting HD.       Review of Systems:  Constitutional: No fever or chills  Respiratory: No cough or shortness of breath  Cardiovascular: No chest pain or palpitations  Gastrointestinal: No nausea or vomiting  Neurological: No confusion or weakness    OBJECTIVE:     Vital Signs Range (Last 24H):  BP (!) 125/57 (BP Location: Right arm, Patient Position: Lying)   Pulse (!) 47   Temp 98.3 °F (36.8 °C) (Oral)   Resp 18   Ht 5' 5" (1.651 m)   Wt 119 kg (262 lb 5.6 oz)   LMP 01/01/1983 (LMP Unknown)   SpO2 98%   Breastfeeding? No   BMI 43.66 kg/m²     Temp:  [97.2 °F (36.2 °C)-98.3 °F (36.8 °C)]   Pulse:  [40-50]   Resp:  [16-18]   BP: ()/(48-57)   SpO2:  [93 %-98 %]     I & O (Last 24H):    Intake/Output Summary (Last 24 hours) at 05/02/18 1022  Last data filed at 05/02/18 0400   Gross per 24 hour   Intake              300 ml   Output                0 ml   Net              300 ml       Physical Exam:  General appearance: morbidly obese female in NAD  Eyes:  Conjunctivae/corneas clear. PERRL.  Lungs: Normal respiratory effort,  diminished  Heart: Regular/Jerry rate and rhythm, S1, S2 normal, no murmur, rub or cuco.  Abdomen: Soft, non-tender non-distended; bowel sounds normal; no masses,  no organomegaly, obese  Extremities: No cyanosis or clubbing. trace edema.    Skin: Skin color, texture, turgor normal. No rashes or lesions  Neurologic: Normal strength and tone. No focal numbness or weakness   Left SC PICC  Right IJ EULALIO        Laboratory Data:  BMP:     Recent Labs  Lab 05/02/18  0235   GLU 84   *   K 3.7   CL 99   CO2 23   BUN 13   CREATININE 3.4*   CALCIUM 8.6*   MG 1.9     Lab Results   Component Value Date    CALCIUM 8.6 " (L) 05/02/2018    PHOS 3.2 05/02/2018       Lab Results   Component Value Date    .8 (H) 02/08/2018    CALCIUM 8.6 (L) 05/02/2018    PHOS 3.2 05/02/2018       Blood:  MSSA/MRSA      Medications:  Medication list was reviewed and changes noted under Assessment/Plan.    Diagnostic Results:  reviewed      ASSESSMENT/PLAN:     1. ESRD on HD MWF in [place], [place] with Dr. Sharpe (N18.6, Z99.2):  Routine HD MWF/PRN while inpt.  Consent signed.  Line holiday completed from Friday-Monday. EULALIO replaced 4/30/18. Renally dose meds, avoid nephrotoxins, and monitor I/O's closely.   2. Bacteremia-MSSA/MRSA (R78.81):  Antibx per ID. No evidence of PNa. Removed PICC and cultured tip. Now with MSSA-MRSA.  Last admit only identifiable source was extensive chronic LLE DVT.  Repeat U/S now with same LLE DVT and new R leg DVT.  Eliquis for now.  Discussed with team.  PICC replaced for antibx treatments.  Dr. Gustfason consult appreciated.  ECHO with no vegetation.  Dapto/Ceftaro for 6 wks.    3. Anemia of CKD/infection (D63.1):  No IV iron with infection.  Epo on HD.  S/p transfusion 2 units PRBCs on HD 4/18.  Hgb stable  4. 2HPT/Vit D deficiency:  Vit D3 and calcitriol.  5. Bradycardia (R00.1):  Defer to Cards.  Hold parameters on amio.  DC'd BB.   6. Hyponatremia (E87.1):  Adjusted HD bath.   7. Morbid Obesity (E66.01):  Needs aggressive weight mgmt.        See above  Ok to Transfer back to [facility] today.

## 2018-05-02 NOTE — ASSESSMENT & PLAN NOTE
Initially diagnosed during prior hospitalization.  Transesophageal echocardiogram negative for vegetation.  Patient with large thrombus burden thought to be likely source of her Staphylococcus bacteremia.  Per Dr. Sherron Wise's note on 2/28/2018, plan was for 8 weeks of intravenous antibiotics from last date of negative blood culture (2/22/2018) which callled for treatment until 4/19/2018.  It appears antibiotics were discontinued on 4/4/2018 so possible treatment failure due to inadequate treatment course or treatment failure due to inadequate source control.  Patient also had tunneled right internal jugular exchanged on 4/24/2018.  Despite exchanging internal jugular line and restarting antibiotics again with intravenous ceftaroline and daptomycin, patient failed to clear bacteremia and had HD catheter removed on 4/27/2018, and subsequently replaced after line holiday.  Most recent repeat cultures negative.  Tunneled dialysis line placed and has been used.  Plan for iv antibiotics to be completed at her home SNF in [place].  Will continue IV dapto and ceftaroline x 6 weeks total with stop date June 6.  Will need CB, CMP, CPK weekly and faxed to ID clinic at [phone].  Will need ID followup here or in [place] in 4-6 weeks.

## 2018-05-02 NOTE — CONSULTS
[facility]  Cardiology  Consult Note    Patient Name: Yumiko Proctor  MRN: [id number]  Admission Date: 4/18/2018  Hospital Length of Stay: 14 days  Code Status: Full Code   Attending Provider: Amilcar Nuñez MD   Consulting Provider: Rikki Delaney MD  Primary Care Physician: [facility]  Principal Problem:Bacteremia due to methicillin resistant Staphylococcus aureus    Patient information was obtained from patient and past medical records.     Inpatient consult to Cardiology  Consult performed by: RIKKI DELANEY  Consult ordered by: AMILCAR NUÑEZ  Reason for consult: Bradycardia        Subjective:     Chief Complaint:  Bradycardia.     HPI: Ms. Yumiko Proctor is a 68 year-old woman with a history hypertension, diabetes mellitus type 2, chronic heart failure with end-stage renal disease on hemodialysis via tunneled dialysis catheter who returned from a nursing facility in [place] where she was undergoing treatment for presumed endovascular infection with Methicillin-resistant Staphylococcus aureus infection.    She apparently has had left ventricular dysfunction in the past but the last Echo reveals normal systolic function. She was on amiodarone and carvedilol on presentation. Apixiban appear to be given for deep venous thrombosis. She has had a heart rate in the 40s and the carvedilol has been stopped. Currently she is receiving amiodarone at a dose of 200 mg Q24. I recently saw her for evaluation for a possible IVC Filter. She is morbidly obese and essentially bedridden here as well as a the Nursing Home. Her nurse tells me she has been up in a chair once since presentation.    On my review of the chart I mostly find that her heart rate has been in the upper 40s.    Past Medical History:   Diagnosis Date    A-fib     Cardiomyopathy     Diabetes mellitus     ESRD (end stage renal disease)        History reviewed. No pertinent surgical history.    Review  of patient's allergies indicates:   Allergen Reactions    Sulfa (sulfonamide antibiotics) Anaphylaxis    Albuterol sulfate Palpitations       No current facility-administered medications on file prior to encounter.      Current Outpatient Prescriptions on File Prior to Encounter   Medication Sig    amiodarone (PACERONE) 100 MG Tab Take 200 mg by mouth once daily.    apixaban 2.5 mg Tab Take 1 tablet (2.5 mg total) by mouth 2 (two) times daily.    calcitRIOL (ROCALTROL) 0.25 MCG Cap Take 1 capsule (0.25 mcg total) by mouth once daily.    carvedilol (COREG) 25 MG tablet Take 25 mg by mouth 2 (two) times daily.    dextrose 5 % SolP 50 mL with ceftaroline fosamil 600 mg SolR 200 mg Inject 200 mg into the vein every 12 (twelve) hours.    docusate sodium (COLACE) 100 MG capsule Take 1 capsule (100 mg total) by mouth 2 (two) times daily.    epoetin davion (PROCRIT) 20,000 unit/mL injection Inject 1 mL (20,000 Units total) into the skin every Mon, Wed, Fri.    ferric citrate 210 mg iron Tab Take 210 mg by mouth 3 (three) times daily.    furosemide (LASIX) 80 MG tablet Take 1 tablet (80 mg total) by mouth 2 (two) times daily.    hydrALAZINE (APRESOLINE) 100 MG tablet Take 100 mg by mouth 3 (three) times daily.    ISOSORBIDE MONONITRATE ORAL Take 30 mg by mouth Daily.    oxyCODONE-acetaminophen (PERCOCET) 5-325 mg per tablet Take 1 tablet by mouth every 4 (four) hours as needed.    sevelamer carbonate (RENVELA) 800 mg Tab Take 1 tablet (800 mg total) by mouth 3 (three) times daily with meals.    SITagliptin (JANUVIA) 25 MG Tab Take 1 tablet (25 mg total) by mouth once daily.    sodium chloride 0.9% SolP 50 mL with DAPTOmycin 500 mg SolR 865 mg Inject 865 mg into the vein every Mon, Wed, Fri. After dialysis treatments.    vitamin D 1000 units Tab Take 2 tablets (2,000 Units total) by mouth once daily.    vitamin renal formula, B-complex-vitamin c-folic acid, (NEPHROCAP) 1 mg Cap Take 1 capsule by mouth once  daily.     Family History     None        Social History Main Topics    Smoking status: Never Smoker    Smokeless tobacco: Never Used    Alcohol use No    Drug use: No    Sexual activity: Not Currently     Review of Systems   Constitution: Positive for weakness and malaise/fatigue. Negative for chills.   Cardiovascular: Positive for leg swelling. Negative for chest pain, irregular heartbeat, palpitations and syncope.   Respiratory: Negative for cough, shortness of breath and wheezing.    Hematologic/Lymphatic: Negative for bleeding problem.     Objective:     Vital Signs (Most Recent):  Temp: 97.7 °F (36.5 °C) (05/02/18 0423)  Pulse: (!) 46 (05/02/18 0800)  Resp: 16 (05/01/18 1939)  BP: (!) 98/48 (pt was sleeping, asymptomatic ) (05/02/18 0423)  SpO2: 97 % (05/02/18 0423) Vital Signs (24h Range):  Temp:  [97.2 °F (36.2 °C)-97.9 °F (36.6 °C)] 97.7 °F (36.5 °C)  Pulse:  [40-50] 46  Resp:  [16] 16  SpO2:  [93 %-97 %] 97 %  BP: ()/(48-57) 98/48     Weight: 119 kg (262 lb 5.6 oz)  Body mass index is 43.66 kg/m².    SpO2: 97 %  O2 Device (Oxygen Therapy): room air      Intake/Output Summary (Last 24 hours) at 05/02/18 0836  Last data filed at 05/02/18 0400   Gross per 24 hour   Intake              300 ml   Output                0 ml   Net              300 ml       Lines/Drains/Airways     Peripherally Inserted Central Catheter Line                 PICC Double Lumen 04/27/18 1200 left cephalic 4 days          Central Venous Catheter Line                 Hemodialysis Catheter 04/30/18 1211 right internal jugular 1 day          Peripheral Intravenous Line                 Peripheral IV - Single Lumen 04/27/18 1749 Left Upper Arm 4 days                Physical Exam   Constitutional: She appears well-developed.   Neck: Carotid bruit is not present.   Cardiovascular: Regular rhythm, S1 normal and S2 normal.  Bradycardia present.    Murmur heard.  Pulmonary/Chest: Effort normal and breath sounds normal.   Abdominal:  Soft. Normal appearance.   Skin: Skin is warm.       Current Medications:     amiodarone  200 mg Oral Daily    apixaban  5 mg Oral BID    calcitRIOL  0.25 mcg Oral Daily    ceftaroline (TEFLARO) IVPB  200 mg Intravenous Q12H    DAPTOmycin (CUBICIN)  IV  6 mg/kg Intravenous Q48H    docusate sodium  200 mg Oral BID    epoetin davion (PROCRIT) injection  20,000 Units Intravenous Every Mon, Wed, Fri    famotidine  20 mg Oral Daily    furosemide  80 mg Oral BID    gabapentin  100 mg Oral TID    isosorbide mononitrate  30 mg Oral Daily    polyethylene glycol  17 g Oral Daily    sevelamer carbonate  800 mg Oral TID WM    tamsulosin  0.4 mg Oral Daily    vitamin D  2,000 Units Oral Daily     Current Laboratory Results:    Recent Results (from the past 24 hour(s))   POCT glucose    Collection Time: 05/01/18 11:27 AM   Result Value Ref Range    POCT Glucose 89 70 - 110 mg/dL   POCT glucose    Collection Time: 05/01/18  4:45 PM   Result Value Ref Range    POCT Glucose 77 70 - 110 mg/dL   POCT glucose    Collection Time: 05/01/18  9:37 PM   Result Value Ref Range    POCT Glucose 86 70 - 110 mg/dL   Basic Metabolic Panel (BMP)    Collection Time: 05/02/18  2:35 AM   Result Value Ref Range    Sodium 130 (L) 136 - 145 mmol/L    Potassium 3.7 3.5 - 5.1 mmol/L    Chloride 99 95 - 110 mmol/L    CO2 23 23 - 29 mmol/L    Glucose 84 70 - 110 mg/dL    BUN, Bld 13 8 - 23 mg/dL    Creatinine 3.4 (H) 0.5 - 1.4 mg/dL    Calcium 8.6 (L) 8.7 - 10.5 mg/dL    Anion Gap 8 8 - 16 mmol/L    eGFR if African American 15 (A) >60 mL/min/1.73 m^2    eGFR if non African American 13 (A) >60 mL/min/1.73 m^2   CBC with Automated Differential    Collection Time: 05/02/18  2:35 AM   Result Value Ref Range    WBC 4.89 3.90 - 12.70 K/uL    RBC 3.75 (L) 4.00 - 5.40 M/uL    Hemoglobin 9.8 (L) 12.0 - 16.0 g/dL    Hematocrit 31.8 (L) 37.0 - 48.5 %    MCV 85 82 - 98 fL    MCH 26.1 (L) 27.0 - 31.0 pg    MCHC 30.8 (L) 32.0 - 36.0 g/dL    RDW 18.1 (H) 11.5  - 14.5 %    Platelets 227 150 - 350 K/uL    MPV 9.0 (L) 9.2 - 12.9 fL    Gran # (ANC) 2.4 1.8 - 7.7 K/uL    Lymph # 1.7 1.0 - 4.8 K/uL    Mono # 0.5 0.3 - 1.0 K/uL    Eos # 0.2 0.0 - 0.5 K/uL    Baso # 0.02 0.00 - 0.20 K/uL    Gran% 49.7 38.0 - 73.0 %    Lymph% 35.4 18.0 - 48.0 %    Mono% 10.6 4.0 - 15.0 %    Eosinophil% 3.7 0.0 - 8.0 %    Basophil% 0.4 0.0 - 1.9 %    Differential Method Automated    Magnesium    Collection Time: 05/02/18  2:35 AM   Result Value Ref Range    Magnesium 1.9 1.6 - 2.6 mg/dL   Phosphorus    Collection Time: 05/02/18  2:35 AM   Result Value Ref Range    Phosphorus 3.2 2.7 - 4.5 mg/dL   POCT glucose    Collection Time: 05/02/18  7:03 AM   Result Value Ref Range    POCT Glucose 72 70 - 110 mg/dL     Current Imaging Results:    Imaging Results    None       4/24/2018: Echo:    Technical Quality: This is a technically adequate study.     Aorta: The aortic root is normal in size, measuring 2.1 cm at sinotubular junction and 2.8 cm at Sinuses of Valsalva.     Left Atrium: The left atrial volume index is normal, measuring 32.87 cc/m2.     Left Ventricle: The left ventricle is normal in size, with an end-diastolic diameter of 5.2 cm, and an end-systolic diameter of 3.2 cm. Wall thickness is mildly increased, with the septum and the posterior wall each measuring 1.3 cm across. Relative wall   thickness was increased at 0.50, and the LV mass index was increased at 152.4 g/m2 consistent with concentric left ventricular hypertrophy. There are no regional wall motion abnormalities. Left ventricular systolic function appears normal. Visually   estimated ejection fraction is 60-65%. The LV Doppler derived stroke volume equals 104.0 ccs.     Diastolic indices: E wave velocity 1.3 m/s, E/A ratio 2.1,  msec., E/e' ratio(avg) 13. Diastolic function is normal.     Right Atrium: The right atrium is normal in size, measuring 5.5 cm in length and 3.9 cm in width in the apical view.     Right Ventricle:  The right ventricle is normal in size. Global right ventricular systolic function appears normal. The estimated PA systolic pressure is 36 mmHg.     Aortic Valve:  The aortic valve is normal in structure.     Mitral Valve:  The mitral valve is normal in structure. There is mild to moderate mitral regurgitation.     Tricuspid Valve:  The tricuspid valve is normal in structure. There is mild tricuspid regurgitation.     Pulmonary Valve:  The pulmonic valve is normal in structure.     Pericardium: There is no evidence of pericardial effusion.      IVC: IVC is normal in size and collapses > 50% with a sniff, suggesting normal right atrial pressure of 3 mmHg.     Intracavitary: There is no evidence of intracavitary mass or thrombus. There is no evidence of vegetation.     CONCLUSIONS     1 - Concentric hypertrophy.     2 - Normal left ventricular systolic function (EF 60-65%).     3 - Mild to moderate mitral regurgitation.     This document has been electronically    SIGNED BY: Rikki Gustafson MD On: 04/24/2018 17:02    Assessment and Plan:     Active Diagnoses:    Diagnosis Date Noted POA    PRINCIPAL PROBLEM:  Bacteremia due to methicillin resistant Staphylococcus aureus [R78.81] 02/07/2018 Yes    Hyponatremia [E87.1] 05/01/2018 Unknown    Urinary retention [R33.9] 04/27/2018 Yes    Debility [R53.81] 04/24/2018 Yes    Slow transit constipation [K59.01] 04/20/2018 Yes    Anemia due to chronic kidney disease [N18.9, D63.1] 04/19/2018 Yes    DVT of deep femoral vein, left [I82.412] 02/21/2018 Yes    Chronic atrial fibrillation [I48.2] 02/08/2018 Yes    End stage renal disease [N18.6] 02/08/2018 Yes    Type 2 diabetes mellitus with chronic kidney disease on chronic dialysis, without long-term current use of insulin [E11.22, N18.6, Z99.2] 02/08/2018 Not Applicable      Problems Resolved During this Admission:    Diagnosis Date Noted Date Resolved POA     Assessment and Plan:     1. Bradycardia   Heart rate in the  "45-50 bpm range.   On amiodarone 200 mg Q24 and was on carvedilol 25 mg at the NH.   Appears amidarone was given for paroxysmal atrial fibrillation.   She is bedridden at NH.   Suggest reducing the amiodarone to 100 mg Q24.   Would do Holter in 4 weeks or so for follow up.   See no need for pacemaker at the present time.     2. MRSA Bacteremia              2/9/2018: TTE: No vegetations seen.              2/26/2018: RANDA: Small calcified nodule on atrial side of posterior mitral leaflet. Mild to moderate MR. Aortic valve unremarkable.              No findings to suggest endocarditis.              Treated for recurrent bacteremia.     3. Deep Venous Trombosis of Lower Extremity              2/20/2018: Venous Duplex: "Extensive thrombus throughout the left lower extremity with nonocclusive thrombus in the common femoral vein with occlusive thrombus throughout the femoral, popliteal, and peroneal vein. Anterior and posterior tibial veins are patent.              2/23/2018: Venous Duplex: "Persistent nonocclusive thrombus in the left common femoral and popliteal veins.Questionable new thrombus in the right deep femoral vein, although this is difficult visualize."              Old left leg DVT and possibly new right leg DVT. No evidence of pulmonary embolism.              She is anticoagulated with apixiban 5 mg Q12.              It appears to me that the best option is continued anticoagulation indefinitely.        VTE Risk Mitigation         Ordered     heparin (porcine) injection  As needed (PRN)      04/30/18 1219     apixaban tablet 5 mg  2 times daily      04/24/18 0934     heparin (porcine) injection 5,000 Units  As needed (PRN)      04/18/18 4761          Thank you for your consult.    I will follow-up with patient. Please contact us if you have any additional questions.    Rikki Gustafson MD  Cardiology   [facility]      "

## 2018-05-02 NOTE — ASSESSMENT & PLAN NOTE
Patient able to sit up at the side of the bed and stand.  Rather debilitated.  Continue with PT/OT.

## 2018-05-02 NOTE — ASSESSMENT & PLAN NOTE
Patient follows with Dr. Axel Sharpe with [facility] in [place], [place] for outpatient dialysis on Mondays, Wednesdays, and Fridays. Nephrology consulted here to continue with renal replacement therapy.

## 2018-05-03 LAB
POCT GLUCOSE: 100 MG/DL (ref 70–110)
POCT GLUCOSE: 124 MG/DL (ref 70–110)
POCT GLUCOSE: 84 MG/DL (ref 70–110)
POCT GLUCOSE: 86 MG/DL (ref 70–110)

## 2018-05-03 PROCEDURE — 25000003 PHARM REV CODE 250: Performed by: NURSE PRACTITIONER

## 2018-05-03 PROCEDURE — 99222 1ST HOSP IP/OBS MODERATE 55: CPT | Mod: ,,, | Performed by: HOSPITALIST

## 2018-05-03 PROCEDURE — 11000001 HC ACUTE MED/SURG PRIVATE ROOM

## 2018-05-03 PROCEDURE — 25000003 PHARM REV CODE 250: Performed by: INTERNAL MEDICINE

## 2018-05-03 PROCEDURE — 97112 NEUROMUSCULAR REEDUCATION: CPT

## 2018-05-03 PROCEDURE — 97110 THERAPEUTIC EXERCISES: CPT

## 2018-05-03 PROCEDURE — 94761 N-INVAS EAR/PLS OXIMETRY MLT: CPT

## 2018-05-03 PROCEDURE — 97535 SELF CARE MNGMENT TRAINING: CPT

## 2018-05-03 PROCEDURE — 25000003 PHARM REV CODE 250: Performed by: PHYSICIAN ASSISTANT

## 2018-05-03 PROCEDURE — 63600175 PHARM REV CODE 636 W HCPCS: Performed by: INTERNAL MEDICINE

## 2018-05-03 PROCEDURE — 25000003 PHARM REV CODE 250: Performed by: HOSPITALIST

## 2018-05-03 PROCEDURE — 97530 THERAPEUTIC ACTIVITIES: CPT

## 2018-05-03 RX ORDER — OXYCODONE HYDROCHLORIDE 5 MG/1
5 TABLET ORAL EVERY 6 HOURS PRN
Qty: 10 TABLET | Refills: 0 | Status: SHIPPED | OUTPATIENT
Start: 2018-05-03 | End: 2018-05-04

## 2018-05-03 RX ORDER — OXYCODONE AND ACETAMINOPHEN 5; 325 MG/1; MG/1
1 TABLET ORAL EVERY 4 HOURS PRN
Qty: 10 TABLET | Refills: 0 | Status: ON HOLD | OUTPATIENT
Start: 2018-05-03 | End: 2019-10-22 | Stop reason: CLARIF

## 2018-05-03 RX ADMIN — VITAMIN D, TAB 1000IU (100/BT) 2000 UNITS: 25 TAB at 11:05

## 2018-05-03 RX ADMIN — OXYCODONE HYDROCHLORIDE 5 MG: 5 TABLET ORAL at 09:05

## 2018-05-03 RX ADMIN — SEVELAMER CARBONATE 800 MG: 800 TABLET, FILM COATED ORAL at 11:05

## 2018-05-03 RX ADMIN — DAPTOMYCIN 710 MG: 500 INJECTION, POWDER, LYOPHILIZED, FOR SOLUTION INTRAVENOUS at 04:05

## 2018-05-03 RX ADMIN — CALCITRIOL 0.25 MCG: 0.25 CAPSULE, LIQUID FILLED ORAL at 11:05

## 2018-05-03 RX ADMIN — OXYCODONE AND ACETAMINOPHEN 1 TABLET: 5; 325 TABLET ORAL at 11:05

## 2018-05-03 RX ADMIN — GABAPENTIN 100 MG: 100 CAPSULE ORAL at 04:05

## 2018-05-03 RX ADMIN — DOCUSATE SODIUM 200 MG: 100 CAPSULE, LIQUID FILLED ORAL at 11:05

## 2018-05-03 RX ADMIN — FUROSEMIDE 80 MG: 40 TABLET ORAL at 06:05

## 2018-05-03 RX ADMIN — SEVELAMER CARBONATE 800 MG: 800 TABLET, FILM COATED ORAL at 06:05

## 2018-05-03 RX ADMIN — CEFTAROLINE FOSAMIL 200 MG: 400 POWDER, FOR SOLUTION INTRAVENOUS at 06:05

## 2018-05-03 RX ADMIN — FAMOTIDINE 20 MG: 20 TABLET ORAL at 11:05

## 2018-05-03 RX ADMIN — CEFTAROLINE FOSAMIL 200 MG: 400 POWDER, FOR SOLUTION INTRAVENOUS at 04:05

## 2018-05-03 RX ADMIN — TAMSULOSIN HYDROCHLORIDE 0.4 MG: 0.4 CAPSULE ORAL at 11:05

## 2018-05-03 RX ADMIN — FUROSEMIDE 80 MG: 40 TABLET ORAL at 11:05

## 2018-05-03 RX ADMIN — DOCUSATE SODIUM 200 MG: 100 CAPSULE, LIQUID FILLED ORAL at 09:05

## 2018-05-03 RX ADMIN — GABAPENTIN 100 MG: 100 CAPSULE ORAL at 09:05

## 2018-05-03 RX ADMIN — APIXABAN 5 MG: 2.5 TABLET, FILM COATED ORAL at 09:05

## 2018-05-03 RX ADMIN — GABAPENTIN 100 MG: 100 CAPSULE ORAL at 11:05

## 2018-05-03 RX ADMIN — APIXABAN 5 MG: 2.5 TABLET, FILM COATED ORAL at 11:05

## 2018-05-03 NOTE — PROGRESS NOTES
9:14am - VITO called [facility] Ins. [phone], spoke to Evette and informed that [facility] submitted for auth on 5/2/18, case ID # [id number] and transferred to Reshma FRANK RN assigned for review for auth.  VITO left message for JOEL Justice, waiting for return call regarding status of SNF auth.

## 2018-05-03 NOTE — PLAN OF CARE
Problem: Occupational Therapy Goal  Goal: Occupational Therapy Goal  Goals to be met by 5/21/18  1. Min A G/H (2 tasks) sitting EOB without using UE for support on bed or tray table (MET 4/28/18)  REVISED GOAL: Completed G/H (3 tasks) sitting EOB without VC or assist to perform task  2. Mod A UBD seated EOB (MET 04/26/18)  REVISED Min assist UBD at EOB  3. Max A bed-BSC transfer  4.Maximize UE and axial muscle strength        Outcome: Ongoing (interventions implemented as appropriate)  Pain and LE edema limiting factors.  Seems more comfortable with functional transfers with 2 people present.  Remains incontinent of bowel with mobility.  Continue OT services to maximize functioning.    Comments: PATTY Alonzo, 5/3/2018

## 2018-05-03 NOTE — ASSESSMENT & PLAN NOTE
-Continues to be bradycardic but mildly improved into upper 40s and mid 50s.  Still no symptoms.  -Coreg held.  Continue with lowered dose of amio (100 qd).  -Case d/w Dr. Gustafson.  Recent echo reviewed.  -Continue tele while in house.  -Will need f/u with Dr. Gustafson on 5/30/18 for holter monitor.

## 2018-05-03 NOTE — PT/OT/SLP PROGRESS
Occupational Therapy   Treatment    Name: Yumiko Proctor  MRN: [id number]  Admitting Diagnosis:  Bacteremia due to methicillin resistant Staphylococcus aureus  3 Days Post-Op    Recommendations:     Discharge Recommendations: nursing facility, skilled  Discharge Equipment Recommendations:   (TBD)  Barriers to discharge:  None    Subjective     Communicated with: RN prior to session.  Pain/Comfort:  · Pain Rating 1: 10/10  · Location - Side 1: Bilateral  · Location - Orientation 1: generalized  · Location 1: back  · Pain Addressed 1: Reposition, Distraction, Nurse notified (RN notified and brought pain med during session)  · Pain Rating Post-Intervention 1: 8/10  · Pain Rating 2: 10/10  · Location - Side 2: Left  · Location - Orientation 2: generalized  · Location 2: shoulder  · Pain Addressed 2: Reposition, Distraction, Nurse notified (RN notified and brought pain medication)  · Pain Rating Post-Intervention 2: 8/10    Patients cultural, spiritual, Baptism conflicts given the current situation: No    Objective:     Patient found with: central line, peripheral IV, telemetry ((R) HD cath)    General Precautions: Standard, anti-coagulation medicine, diabetic, fall, other (see comments) (renal diet)   Orthopedic Precautions:N/A   Braces: N/A     Occupational Performance:    Bed Mobility:    · Patient completed Rolling/Turning to Left with  moderate assistance  · Patient completed Rolling/Turning to Right with moderate assistance  · Patient completed Scooting/Bridging with moderate assistance  · Patient completed Supine to Sit with maximal assistance  · Patient completed Sit to Supine with total assistance and 2 persons     Functional Mobility/Transfers:  · Patient completed Sit <> Stand Transfer with moderate assistance of 2 people with  rolling walker, then (B) HR   · Functional Mobility: NT    Activities of Daily Living:  · Feeding:  set up assistance for tray  · Bathing: maximal assistance sponge bath  · UB Dressing:  moderate assistance and increased time donning gown seated at EOB  · LB Dressing: total assistance donning/donning gripper socks  · Toileting: total assistance for all aspects    Patient left HOB elevated with all lines intact, call button in reach, bed alarm on and RN notified.  Also, verbal order from cardiologist reKathy cuellar, RN notified    [facility] 6 Click:  [facility] Total Score: 11    Treatment & Education:  Educated on log rolling technique with side rails, but requires reinforcement.  Education:    Assessment:     Yumiko Proctor is a 68 y.o. female with a medical diagnosis of Bacteremia due to methicillin resistant Staphylococcus aureus.  She presents with the following performance deficits affecting function: weakness, impaired endurance, impaired self care skills, impaired functional mobilty, gait instability, impaired balance, decreased upper extremity function, decreased lower extremity function, decreased safety awareness, pain, decreased ROM, impaired skin, edema.  Pain and LE edema limiting factors.  Seems more comfortable with functional transfers with 2 people present.  Remains incontinent of bowel with mobility.  Continue OT services to maximize functioning    Rehab Prognosis:  Fair +; patient would benefit from acute skilled OT services to address these deficits and reach maximum level of function.       Plan:     Patient to be seen 6 x/week to address the above listed problems via self-care/home management, therapeutic activities, therapeutic exercises  · Plan of Care Expires: 05/21/18  · Plan of Care Reviewed with: patient    This Plan of care has been discussed with the patient who was involved in its development and understands and is in agreement with the identified goals and treatment plan    GOALS:    Occupational Therapy Goals        Problem: Occupational Therapy Goal    Goal Priority Disciplines Outcome Interventions   Occupational Therapy Goal     OT, PT/OT Ongoing (interventions  implemented as appropriate)    Description:  Goals to be met by 5/21/18  1. Min A G/H (2 tasks) sitting EOB without using UE for support on bed or tray table (MET 4/28/18)  REVISED GOAL: Completed G/H (3 tasks) sitting EOB without VC or assist to perform task  2. Mod A UBD seated EOB (MET 04/26/18)  REVISED Min assist UBD at EOB  3. Max A bed-BSC transfer  4.Maximize UE and axial muscle strength                         Time Tracking:     OT Date of Treatment: 05/03/18  OT Start Time: 0924  OT Stop Time: 1005  OT Total Time (min): 41 min    Billable Minutes:Self Care/Home Management 31 (co treat with PT for transfers)    PATTY Alonzo  5/3/2018

## 2018-05-03 NOTE — PLAN OF CARE
-------------------------------------------------------------------------------  ATTN: TEAM RAMY PLANNING     PLAN : [facility]     [facility] NURSING / REHAB IN [place]   PENDING [facility] - PER VIVIAN SHE CALLED THIS [facility] AWARE OF NEED AUTH - PENDING     GABRIEL [place]ARK ON CASE ALSO # [id number]   Josephine Garcia RN  Case management 5/3/37026:47 PM  # [phone] (FAX) [phone]     05/03/18 1688   Discharge Assessment   Assessment Type Discharge Planning Reassessment

## 2018-05-03 NOTE — ASSESSMENT & PLAN NOTE
-Well controlled likely due to renal failure.  Continue diabetic diet with sliding scale insulin.

## 2018-05-03 NOTE — PLAN OF CARE
Plan of care reviewed with patient, medications explained. Pt currently resting comfortably in bed and appears to be sleeping in between care. Pt remains in SB with HR in the 40s throughout night on telemetry monitor. VSS, bed in lowest, locked position and call light in reach. Hourly rounding performed, will continue to monitor.

## 2018-05-03 NOTE — PLAN OF CARE
Problem: Patient Care Overview  Goal: Plan of Care Review  Outcome: Ongoing (interventions implemented as appropriate)  Pt in no distress on RA, no changes at this time.

## 2018-05-03 NOTE — ASSESSMENT & PLAN NOTE
Initially diagnosed during prior hospitalization.  Transesophageal echocardiogram negative for vegetation.  Patient with large thrombus burden thought to be likely source of her Staphylococcus bacteremia.  Per Dr. Sherron Wise's note on 2/28/2018, plan was for 8 weeks of intravenous antibiotics from last date of negative blood culture (2/22/2018) which callled for treatment until 4/19/2018.  It appears antibiotics were discontinued on 4/4/2018 so possible treatment failure due to inadequate treatment course or treatment failure due to inadequate source control.  Patient also had tunneled right internal jugular exchanged on 4/24/2018.  Despite exchanging internal jugular line and restarting antibiotics again with intravenous ceftaroline and daptomycin, patient failed to clear bacteremia and had HD catheter removed on 4/27/2018, and subsequently replaced after line holiday.  Most recent repeat cultures negative.  Tunneled dialysis line placed and has been used.  Plan for iv antibiotics to be completed at her home SNF in [place].  Will continue IV dapto and ceftaroline x 6 weeks total with stop date June 6.  Will need CB, CMP, CPK weekly and faxed to ID clinic at [phone].  Will need ID followup here or in [place] in 4-6 weeks.

## 2018-05-03 NOTE — SUBJECTIVE & OBJECTIVE
Interval History: No acute events.  In good spirits.  No pain or sob.  Eager to leave hospital.      Review of Systems   Constitutional: Negative for activity change, chills, diaphoresis and fatigue.   HENT: Negative for congestion, drooling and hearing loss.    Eyes: Negative for discharge.   Respiratory: Negative for apnea, chest tightness, shortness of breath and wheezing.    Cardiovascular: Negative for palpitations and leg swelling.   Gastrointestinal: Negative for abdominal distention, abdominal pain, constipation and diarrhea.   Musculoskeletal: Negative for arthralgias and gait problem.   Skin: Negative for rash.   Neurological: Negative for seizures, light-headedness and numbness.   Hematological: Negative for adenopathy.   Psychiatric/Behavioral: Negative for agitation and behavioral problems.     Objective:     Vital Signs (Most Recent):  Temp: 98.1 °F (36.7 °C) (05/03/18 0841)  Pulse: (!) 49 (05/03/18 1000)  Resp: 18 (05/03/18 0841)  BP: (!) 126/56 (05/03/18 0841)  SpO2: 97 % (05/03/18 0841) Vital Signs (24h Range):  Temp:  [97.7 °F (36.5 °C)-98.5 °F (36.9 °C)] 98.1 °F (36.7 °C)  Pulse:  [40-55] 49  Resp:  [16-18] 18  SpO2:  [96 %-97 %] 97 %  BP: (112-146)/(42-71) 126/56     Weight: 117.2 kg (258 lb 6.1 oz)  Body mass index is 43 kg/m².    Intake/Output Summary (Last 24 hours) at 05/03/18 1040  Last data filed at 05/02/18 1345   Gross per 24 hour   Intake              500 ml   Output             1500 ml   Net            -1000 ml      Physical Exam   Constitutional: She is oriented to person, place, and time. She appears well-developed and well-nourished.   HENT:   Head: Normocephalic and atraumatic.   Eyes: Pupils are equal, round, and reactive to light.   Neck: Normal range of motion.   Cardiovascular: Regular rhythm.    No murmur heard.  Bradycardia     Pulmonary/Chest: Effort normal.   Abdominal: Soft. She exhibits no distension. There is no tenderness.   Neurological: She is alert and oriented to  person, place, and time.   Skin: Skin is warm and dry.   Psychiatric: She has a normal mood and affect.   Vitals reviewed.      Significant Labs:   Recent Lab Results       05/03/18  0843 05/02/18  2110 05/02/18  1552      POCT Glucose 100 115(H) 84         All pertinent labs within the past 24 hours have been reviewed.    Significant Imaging: I have reviewed and interpreted all pertinent imaging results/findings within the past 24 hours.

## 2018-05-03 NOTE — PT/OT/SLP PROGRESS
Physical Therapy Treatment    Patient Name:  Yumiko Proctor   MRN:  [id number]    Recommendations:     Discharge Recommendations:  nursing facility, skilled   Discharge Equipment Recommendations:  (pending progress; defer to SNF)   Barriers to discharge: Pt requires 2 person assist for mobility    Assessment:     Yumiko Proctor is a 68 y.o. female admitted with a medical diagnosis of Bacteremia due to methicillin resistant Staphylococcus aureus.  She presents with the following impairments/functional limitations:  edema, decreased ROM, decreased lower extremity function, impaired self care skills, impaired functional mobilty, weakness, pain. Pt requires mod A of two people for sit<>stand x 3 trials. Bowel incontinence during transfers noted. 4+ pitting edema bilateral distal LEs. Discussed with cardiologist Dr. Gustafson who approved ANGELI compression stockings (pt has h/o DVT). Pt measured by PT with lower leg length 32 cm and calf circumference 56 cm, therefore selected XXL regular based on measurements. Discussed with nurse Tong and PCT Kurt recommendations for skin checks at proximal ANGELI hose. Will continue to follow and progress as tolerated.     Rehab Prognosis:  Good; patient would benefit from acute skilled PT services to address these deficits and reach maximum level of function.      Recent Surgery: Procedure(s) (LRB):  PERMCATH INSERTION-TUNNELED CVC (N/A) 3 Days Post-Op    Plan:     During this hospitalization, patient to be seen 6 x/week to address the above listed problems via gait training, therapeutic activities, therapeutic exercises, neuromuscular re-education, wheelchair management/training  · Plan of Care Expires:  05/21/18   Plan of Care Reviewed with: patient    Subjective     Communicated with nurse prior to session.  Patient found supine in bed with HOB elevated upon PT entry to room, agreeable to treatment.      Chief Complaint: pain  Patient comments/goals: pain relief  Pain/Comfort:  · Pain  "Rating 1: 10/10  · Location - Orientation 1:  ("all over")  · Location 1: back  · Pain Addressed 1: Cessation of Activity, Reposition  · Pain Rating Post-Intervention 1: 10/10    Patients cultural, spiritual, Yazidism conflicts given the current situation: none    Objective:     Patient found with: telemetry, peripheral IV, central line     General Precautions: Standard,  (ambulate with assist)   Orthopedic Precautions:N/A   Braces: N/A     Functional Mobility:  · Bed Mobility:     · Rolling Left:  moderate assistance  · Rolling Right: minimum assistance  · Scooting: moderate assistance and lateral scooting at edge of bed  · Bridging: total assistance, of 2 persons and drawsheet with bed in [facility]. Pt first attempted bridging with assist to maintain hooklying position, unable to bridge to HOB  · Supine to Sit: moderate assistance and HOB elevated, use of bed rail  · Sit to Supine: total assistance, of 2 persons and bed flat, use of bed rail  · Transfers:     · Sit to Stand: x 2 trials with 2 person mod A and rolling walker, x 1 trial with 2 person mod A and bilateral UE use of bed rails, verbal cues for technique  · Balance: CGA initially progressing to supervision at edge of bed > 15 minutes      AM-PAC 6 CLICK MOBILITY  Turning over in bed (including adjusting bedclothes, sheets and blankets)?: 2  Sitting down on and standing up from a chair with arms (e.g., wheelchair, bedside commode, etc.): 2  Moving from lying on back to sitting on the side of the bed?: 2  Moving to and from a bed to a chair (including a wheelchair)?: 1  Need to walk in hospital room?: 1  Climbing 3-5 steps with a railing?: 1  Total Score: 9       Therapeutic Activities and Exercises:  Pt performed supine therapeutic exercises bilaterally including ankle pumps, heel slides (active assist), short arc quads, glute sets x 15 reps with verbal and tactile cues.   Pt performed sitting therapeutic exercises including hip flexion (active " assist L LE), long arc quads, ankle pumps x 15 reps with verbal and visual cues.   See OT note for self-care tasks while PT assist with balance and mobility.   Total assist for donning bilateral ANGELI hose.     Patient left supine with all lines intact, call button in reach, bed alarm on, nurse notified and OT present..    GOALS:    Physical Therapy Goals        Problem: Physical Therapy Goal    Goal Priority Disciplines Outcome Goal Variances Interventions   Physical Therapy Goal     PT/OT, PT Ongoing (interventions implemented as appropriate)     Description:  Goals to be met by: 18    Patient will increase functional independence with mobility by performin. Supine to sit with min A. MET 18  2. Sit to supine with min A.   3. Rolling R and L with min A.   4. Sitting at edge of bed >20 minutes with SBA.   5. Sit < stand with RW and min A   6. SPT from EOB <> bedside chair with RW and Min A   7. Pt will tolerate sitting at EOB for all meals with set up assist  MET 18                    Time Tracking:     PT Received On: 18  PT Start Time: 921     PT Stop Time: 1007  PT Total Time (min): 46 min   Overlap with OT (PT assisting with mobility and balance while OT assisting with self-care)    Billable Minutes: Therapeutic Activity 15, Therapeutic Exercise 15 and Neuromuscular Re-education 10    Treatment Type: Treatment  PT/PTA: PT     PTA Visit Number: 0     Yudi Collier, PT  2018

## 2018-05-03 NOTE — PLAN OF CARE
Problem: Physical Therapy Goal  Goal: Physical Therapy Goal  Goals to be met by: 18    Patient will increase functional independence with mobility by performin. Supine to sit with min A. MET 18  2. Sit to supine with min A.   3. Rolling R and L with min A.   4. Sitting at edge of bed >20 minutes with SBA.   5. Sit < stand with RW and min A   6. SPT from EOB <> bedside chair with RW and Min A   7. Pt will tolerate sitting at EOB for all meals with set up assist  MET 18   Outcome: Ongoing (interventions implemented as appropriate)  Pt requires mod A of two people for sit<>stand x 3 trials. Bowel incontinence during transfers noted. 4+ pitting edema bilateral distal LEs. Discussed with cardiologist Dr. Gustafson who approved ANGELI compression stockings (pt has h/o DVT). Pt measured by PT with lower leg length 32 cm and calf circumference 56 cm, therefore selected XXL regular based on measurements. Discussed with nurse Tong and EVI Hicks recommendations for skin checks at proximal ANGELI hose. Will continue to follow and progress as tolerated. Please see progress note for detailed plan of care and recommendations.

## 2018-05-03 NOTE — PROGRESS NOTES
[facility]  Cardiology  Progress Note    Patient Name: Yumiko Proctor  MRN: [id number]  Admission Date: 4/18/2018  Hospital Length of Stay: 15 days  Code Status: Full Code   Attending Physician: Gildardo Nuñez MD   Primary Care Physician: [facility]  Expected Discharge Date:   Principal Problem:Bacteremia due to methicillin resistant Staphylococcus aureus    Subjective:     Brief HPI:    Ms. Yumiko Proctor is a 68 year-old woman with a history hypertension, diabetes mellitus type 2, chronic heart failure with end-stage renal disease on hemodialysis via tunneled dialysis catheter who returned from a nursing facility in [place] where she was undergoing treatment for presumed endovascular infection with Methicillin-resistant Staphylococcus aureus infection.     She apparently has had left ventricular dysfunction in the past but the last Echo reveals normal systolic function. She was on amiodarone and carvedilol on presentation. Apixiban appear to be given for deep venous thrombosis. She has had a heart rate in the 40s and the carvedilol has been stopped. Currently she is receiving amiodarone at a dose of 200 mg Q24. I recently saw her for evaluation for a possible IVC Filter. She is morbidly obese and essentially bedridden here as well as a the Nursing Home. Her nurse tells me she has been up in a chair once since presentation.     On my review of the chart I mostly find that her heart rate has been in the upper 40s.    Hospital Course:     Reduced amiodarone dose to 100 mg Q24.    Telemetry.    Interval History:    Heart rate 46-54 bpm. No marked bradycardia. No pauses.    ROS  Objective:     Vital Signs (Most Recent):  Temp: 98.1 °F (36.7 °C) (05/03/18 0841)  Pulse: (!) 49 (05/03/18 0841)  Resp: 18 (05/03/18 0841)  BP: (!) 126/56 (05/03/18 0841)  SpO2: 97 % (05/03/18 0841) Vital Signs (24h Range):  Temp:  [97.7 °F (36.5 °C)-98.5 °F (36.9 °C)] 98.1 °F (36.7  °C)  Pulse:  [40-55] 49  Resp:  [16-18] 18  SpO2:  [96 %-97 %] 97 %  BP: (112-146)/(42-71) 126/56     Weight: 117.2 kg (258 lb 6.1 oz)  Body mass index is 43 kg/m².    SpO2: 97 %  O2 Device (Oxygen Therapy): room air      Intake/Output Summary (Last 24 hours) at 05/03/18 1003  Last data filed at 05/02/18 1345   Gross per 24 hour   Intake              500 ml   Output             1500 ml   Net            -1000 ml       Lines/Drains/Airways     Peripherally Inserted Central Catheter Line                 PICC Double Lumen 04/27/18 1200 left cephalic 5 days          Central Venous Catheter Line                 Hemodialysis Catheter 04/30/18 1211 right internal jugular 2 days          Peripheral Intravenous Line                 Peripheral IV - Single Lumen 04/27/18 1749 Left Upper Arm 5 days                Physical Exam   Constitutional: She is oriented to person, place, and time. She appears well-developed and well-nourished.  Non-toxic appearance. She appears ill. No distress.   HENT:   Head: Normocephalic and atraumatic.   Nose: Nose normal.   Eyes: Right eye exhibits no discharge. Left eye exhibits no discharge. Right conjunctiva is not injected. Left conjunctiva is not injected. Right pupil is round. Left pupil is round. Pupils are equal.   Neck: Neck supple. No JVD present. Carotid bruit is not present. No thyromegaly present.   Cardiovascular: Normal rate, regular rhythm, S1 normal and S2 normal.   No extrasystoles are present. PMI is not displaced.  Exam reveals gallop and S4.    Murmur heard.   Midsystolic murmur is present with a grade of 2/6   Pulses:       Radial pulses are 2+ on the right side.   Pulmonary/Chest: Effort normal and breath sounds normal.   Abdominal: Soft. Normal appearance.   Musculoskeletal:        Right ankle: She exhibits swelling. She exhibits no ecchymosis and no deformity.        Left ankle: She exhibits swelling. She exhibits no ecchymosis and no deformity.   Lymphadenopathy:         Head (right side): No submandibular adenopathy present.        Head (left side): No submandibular adenopathy present.     She has no cervical adenopathy.   Neurological: She is alert and oriented to person, place, and time. She is not disoriented. No cranial nerve deficit.   Skin: Skin is warm, dry and intact.   Psychiatric: Her speech is normal.       Current Medications:     amiodarone  100 mg Oral Daily    apixaban  5 mg Oral BID    calcitRIOL  0.25 mcg Oral Daily    ceftaroline (TEFLARO) IVPB  200 mg Intravenous Q12H    DAPTOmycin (CUBICIN)  IV  6 mg/kg Intravenous Q48H    docusate sodium  200 mg Oral BID    epoetin davion (PROCRIT) injection  20,000 Units Intravenous Every Mon, Wed, Fri    famotidine  20 mg Oral Daily    furosemide  80 mg Oral BID    gabapentin  100 mg Oral TID    polyethylene glycol  17 g Oral Daily    sevelamer carbonate  800 mg Oral TID WM    tamsulosin  0.4 mg Oral Daily    vitamin D  2,000 Units Oral Daily     Current Laboratory Results:    Recent Results (from the past 24 hour(s))   POCT glucose    Collection Time: 05/02/18  3:52 PM   Result Value Ref Range    POCT Glucose 84 70 - 110 mg/dL   POCT glucose    Collection Time: 05/02/18  9:10 PM   Result Value Ref Range    POCT Glucose 115 (H) 70 - 110 mg/dL   POCT glucose    Collection Time: 05/03/18  8:43 AM   Result Value Ref Range    POCT Glucose 100 70 - 110 mg/dL     Current Imaging Results:    Imaging Results    None         Assessment and Plan:     Problem List:    Active Diagnoses:    Diagnosis Date Noted POA    PRINCIPAL PROBLEM:  Bacteremia due to methicillin resistant Staphylococcus aureus [R78.81] 02/07/2018 Yes    Hyponatremia [E87.1] 05/01/2018 Unknown    Urinary retention [R33.9] 04/27/2018 Yes    Debility [R53.81] 04/24/2018 Yes    Slow transit constipation [K59.01] 04/20/2018 Yes    Anemia due to chronic kidney disease [N18.9, D63.1] 04/19/2018 Yes    DVT of deep femoral vein, left [I82.412] 02/21/2018  "Yes    Chronic atrial fibrillation [I48.2] 02/08/2018 Yes    End stage renal disease [N18.6] 02/08/2018 Yes    Type 2 diabetes mellitus with chronic kidney disease on chronic dialysis, without long-term current use of insulin [E11.22, N18.6, Z99.2] 02/08/2018 Not Applicable      Problems Resolved During this Admission:    Diagnosis Date Noted Date Resolved POA     Assessment and Plan:     1. Bradycardia              Heart rate in the 45-50 bpm range.              Was on amiodarone 200 mg Q24 and carvedilol 25 mg at the NH.              Appears amidarone was given for paroxysmal atrial fibrillation.              She is bedridden at NH.              Reduced amiodarone to 100 mg Q24.   HR 46-54 bpm.              5/30/2018: Plan Holter for follow up.             2. MRSA Bacteremia              2/9/2018: TTE: No vegetations seen.              2/26/2018: RANDA: Small calcified nodule on atrial side of posterior mitral leaflet. Mild to moderate MR. Aortic valve unremarkable.              No findings to suggest endocarditis.              Treated for recurrent bacteremia.     3. Deep Venous Trombosis of Lower Extremity              2/20/2018: Venous Duplex: "Extensive thrombus throughout the left lower extremity with nonocclusive thrombus in the common femoral vein with occlusive thrombus throughout the femoral, popliteal, and peroneal vein. Anterior and posterior tibial veins are patent.              2/23/2018: Venous Duplex: "Persistent nonocclusive thrombus in the left common femoral and popliteal veins.Questionable new thrombus in the right deep femoral vein, although this is difficult visualize."              Old left leg DVT and possibly new right leg DVT. No evidence of pulmonary embolism.              On apixiban 5 mg Q12.                  VTE Risk Mitigation         Ordered     heparin (porcine) injection  As needed (PRN)      04/30/18 1219     apixaban tablet 5 mg  2 times daily      04/24/18 0934     heparin " (porcine) injection 5,000 Units  As needed (PRN)      04/18/18 0759          Rikki Gustafson MD  Cardiology  [facility]

## 2018-05-03 NOTE — ASSESSMENT & PLAN NOTE
-Patient follows with Dr. Axel Sharpe with [facility] in [place], [place] for outpatient dialysis on Mondays, Wednesdays, and Fridays.   -Nephrology consulted here to continue with renal replacement therapy.

## 2018-05-03 NOTE — PROGRESS NOTES
"[facility] Hospital Medicine  Progress Note    Patient Name: Yumiko Proctor  MRN: [id number]  Patient Class: IP- Inpatient   Admission Date: 4/18/2018  Length of Stay: 15 days  Attending Physician: Gildardo Nuñez MD  Primary Care Provider: [facility]        Subjective:     Principal Problem:Bacteremia due to methicillin resistant Staphylococcus aureus    HPI:  Per Nat Woodward, "Ms Hoa Proctor is a 68 y.o. female, with PMH of HTN, NIDDM-2, CHF (EF 35%), ESRD on dialysis ([facility], Dr. Prather), A. Fib, LLE DVT, cardiomyopathy, and recent treatment in this facility for MRSA bacteremia from her vascular access site, who returns 2/2 need for ID consultation. In the past week the patient developed a fever and was diagnosed with RUL PNA, and started on Levaquin 3/week after dialysis. Associated symptoms include non-productive cough, fevers, and SOB with wheeze. She was started on vancymycin and zosyn on 4/15/18. Blood cultures taken on 4/16/18 were the second set, first after antibiotics, that grew staph. Other associated symptoms include b/l hip pain, and constipation.  She denies fever, chills, sweats, chest pain, SOB, N/V, diarrhea.     During the patient's previous stay at this facility she had blood cultures positive for MRSA on 2/8/18, and she was started on Daptomycin. On 2/10/18 a second set of blood cultures were positive for MRSA. At the time she had her left Jaspreet catheter removed and a right IJ tunneled dialysis cath was placed on 2/12/18. Repeat cultures on 2/13/18 were negative for MRSA. She was positive in 1/2 bottles on 2/16/18 and both blood cultures bottles for MRSA on 2/16/18. At that time she was started on vancomycin, and Daptomycin was stopped 2/2 myositis. On 2/22/18 blood cultures were negative. A RANDA on 2/26/18 was negative for vegetations, but the patient did have a LLE DVT discovered 2/20/18, and Apixaban was started. After CPK " "levels were normal, she was started back on Daptomycin and Ceftaroline. She was to continue this treatment until 4/4/18, and she was transferred to a NH facility in [place], [place]. She states she was transferred from the Nursing facility to [facility] on 4/15/18."    Hospital Course:  Ms. Yumiko Proctor is a 68 year-old woman with a history hypertension, diabetes mellitus type II on insulin, chronic systolic heart failure with ejection fraction of 35 percent, and end-stage renal disease on hemodialysis via tunneled dialysis catheter exited her right chest wall who returned from a nursing facility in [place] where she was undergoing treatment for presumed endovascular infection with Methicillin-resistant Staphylococcus aureus infection via a peripherally inserted central catheter who returns with recurrence of Staphylococcus aureus bacteremia.  Patient's peripherally inserted central catheter was removed here.  Blood cultures obtained here on 4/19/2018 positive for methicillin-sensitive Staphylococcus aureus.  Infectious disease service consulted and recommended treatment with intravenous ceftaroline and daptomycin.  In addition, as patient failed to clear her bacteremia, infectious disease service recommended removal of her tunneled dialysis catheter which was done following dialysis on 4/27/2018 with plan to place new tunneled catheter on 4/30/2018 to allow for a line holiday.    5/1: Today she complains of some left arm and shoulder pain, but is eager to leave the hospital.  No acute events noted overnight.  5/2: Tele reviewed and noted HR into upper 30s overnight.  No acute events noted.  Seen in dialysis.  Eager to leave the hospital.  5/3:  In good spirits.  No complaints.  HR stable in upper 40s to mid 50s.  Eager to leave hospital.  Waiting on insurance for SNF approval.    Interval History: No acute events.  In good spirits.  No pain or sob.  Eager to leave hospital.      Review of Systems   Constitutional: " Negative for activity change, chills, diaphoresis and fatigue.   HENT: Negative for congestion, drooling and hearing loss.    Eyes: Negative for discharge.   Respiratory: Negative for apnea, chest tightness, shortness of breath and wheezing.    Cardiovascular: Negative for palpitations and leg swelling.   Gastrointestinal: Negative for abdominal distention, abdominal pain, constipation and diarrhea.   Musculoskeletal: Negative for arthralgias and gait problem.   Skin: Negative for rash.   Neurological: Negative for seizures, light-headedness and numbness.   Hematological: Negative for adenopathy.   Psychiatric/Behavioral: Negative for agitation and behavioral problems.     Objective:     Vital Signs (Most Recent):  Temp: 98.1 °F (36.7 °C) (05/03/18 0841)  Pulse: (!) 49 (05/03/18 1000)  Resp: 18 (05/03/18 0841)  BP: (!) 126/56 (05/03/18 0841)  SpO2: 97 % (05/03/18 0841) Vital Signs (24h Range):  Temp:  [97.7 °F (36.5 °C)-98.5 °F (36.9 °C)] 98.1 °F (36.7 °C)  Pulse:  [40-55] 49  Resp:  [16-18] 18  SpO2:  [96 %-97 %] 97 %  BP: (112-146)/(42-71) 126/56     Weight: 117.2 kg (258 lb 6.1 oz)  Body mass index is 43 kg/m².    Intake/Output Summary (Last 24 hours) at 05/03/18 1040  Last data filed at 05/02/18 1345   Gross per 24 hour   Intake              500 ml   Output             1500 ml   Net            -1000 ml      Physical Exam   Constitutional: She is oriented to person, place, and time. She appears well-developed and well-nourished.   HENT:   Head: Normocephalic and atraumatic.   Eyes: Pupils are equal, round, and reactive to light.   Neck: Normal range of motion.   Cardiovascular: Regular rhythm.    No murmur heard.  Bradycardia     Pulmonary/Chest: Effort normal.   Abdominal: Soft. She exhibits no distension. There is no tenderness.   Neurological: She is alert and oriented to person, place, and time.   Skin: Skin is warm and dry.   Psychiatric: She has a normal mood and affect.   Vitals reviewed.      Significant  Labs:   Recent Lab Results       05/03/18  0843 05/02/18  2110 05/02/18  1552      POCT Glucose 100 115(H) 84         All pertinent labs within the past 24 hours have been reviewed.    Significant Imaging: I have reviewed and interpreted all pertinent imaging results/findings within the past 24 hours.    Assessment/Plan:      * Bacteremia due to methicillin resistant Staphylococcus aureus    Initially diagnosed during prior hospitalization.  Transesophageal echocardiogram negative for vegetation.  Patient with large thrombus burden thought to be likely source of her Staphylococcus bacteremia.  Per Dr. Sherron Wise's note on 2/28/2018, plan was for 8 weeks of intravenous antibiotics from last date of negative blood culture (2/22/2018) which callled for treatment until 4/19/2018.  It appears antibiotics were discontinued on 4/4/2018 so possible treatment failure due to inadequate treatment course or treatment failure due to inadequate source control.  Patient also had tunneled right internal jugular exchanged on 4/24/2018.  Despite exchanging internal jugular line and restarting antibiotics again with intravenous ceftaroline and daptomycin, patient failed to clear bacteremia and had HD catheter removed on 4/27/2018, and subsequently replaced after line holiday.  Most recent repeat cultures negative.  Tunneled dialysis line placed and has been used.  Plan for iv antibiotics to be completed at her home SNF in [place].  Will continue IV dapto and ceftaroline x 6 weeks total with stop date June 6.  Will need CB, CMP, CPK weekly and faxed to ID clinic at [phone].  Will need ID followup here or in [place] in 4-6 weeks.        Hyponatremia      Noted and mildly improved.  Continue HD per nephrology.  Repeat labs per nephrology..        Urinary retention    Continue straight catheterization as needed.  Ultrasound of the kidneys did not reveal evidence of hydronephrosis. Continue with alpha-blocker  therapy.        Debility    Patient able to sit up at the side of the bed and stand.  Rather debilitated.  Continue with PT/OT.          Slow transit constipation    Improved with bowel regimen. Continue current management.          Anemia due to chronic kidney disease    Improved status post blood transfusion on 4/20/2018 and hemoglobin remains stable.        DVT of deep femoral vein, left    Ultrasound on 2/23/2018 showed persistent nonocclusive thrombus in the left common femoral and popliteal veins and possible new thrombus in the right deep femoral vein.  Continue anticoagulation with apixaban.  Dose adjusted 4/24/2018 per nephrology's recommendation.        Type 2 diabetes mellitus with chronic kidney disease on chronic dialysis, without long-term current use of insulin    -Well controlled likely due to renal failure.  Continue diabetic diet with sliding scale insulin.         End stage renal disease    -Patient follows with Dr. Axel Sharpe with [facility] in [place], [place] for outpatient dialysis on Mondays, Wednesdays, and Fridays.   -Nephrology consulted here to continue with renal replacement therapy.        Chronic atrial fibrillation    -Continues to be bradycardic but mildly improved into upper 40s and mid 50s.  Still no symptoms.  -Coreg held.  Continue with lowered dose of amio (100 qd).  -Case d/w Dr. Gustafson.  Recent echo reviewed.  -Continue tele while in house.  -Will need f/u with Dr. Gustafson on 5/30/18 for holter monitor.          VTE Risk Mitigation         Ordered     heparin (porcine) injection  As needed (PRN)      04/30/18 1219     apixaban tablet 5 mg  2 times daily      04/24/18 0934     heparin (porcine) injection 5,000 Units  As needed (PRN)      04/18/18 2568              Gildardo Nuñez MD  Department of Hospital Medicine   [facility]

## 2018-05-03 NOTE — PROGRESS NOTES
"Nephrology  Progress Note    Admit Date: 4/18/2018   LOS: 15 days     SUBJECTIVE:     Follow-up For:  Bacteremia due to methicillin resistant Staphylococcus aureus    Interval History:     Uneventful night.  Awaiting transfer to [facility].  No CP/SOB. Bradycardia improved.       Review of Systems:  Constitutional: No fever or chills  Respiratory: No cough or shortness of breath  Cardiovascular: No chest pain or palpitations  Gastrointestinal: No nausea or vomiting  Neurological: No confusion or weakness    OBJECTIVE:     Vital Signs Range (Last 24H):  BP (!) 126/56   Pulse (!) 49   Temp 98.1 °F (36.7 °C)   Resp 18   Ht 5' 5" (1.651 m)   Wt 117.2 kg (258 lb 6.1 oz)   LMP 01/01/1983 (LMP Unknown)   SpO2 97%   Breastfeeding? No   BMI 43.00 kg/m²     Temp:  [97.7 °F (36.5 °C)-98.5 °F (36.9 °C)]   Pulse:  [40-55]   Resp:  [16-18]   BP: (112-146)/(42-71)   SpO2:  [96 %-97 %]     I & O (Last 24H):    Intake/Output Summary (Last 24 hours) at 05/03/18 0941  Last data filed at 05/02/18 1345   Gross per 24 hour   Intake              500 ml   Output             1500 ml   Net            -1000 ml       Physical Exam:  General appearance: morbidly obese female in NAD  Eyes:  Conjunctivae/corneas clear. PERRL.  Lungs: Normal respiratory effort,  diminished  Heart: Regular/Jerry rate and rhythm, S1, S2 normal, no murmur, rub or cuco.  Abdomen: Soft, non-tender non-distended; bowel sounds normal; no masses,  no organomegaly, obese  Extremities: No cyanosis or clubbing. trace edema.    Skin: Skin color, texture, turgor normal. No rashes or lesions  Neurologic: Normal strength and tone. No focal numbness or weakness   Left SC PICC  Right IJ EULALIO        Laboratory Data:  BMP:     Recent Labs  Lab 05/02/18  0235   GLU 84   *   K 3.7   CL 99   CO2 23   BUN 13   CREATININE 3.4*   CALCIUM 8.6*   MG 1.9     Lab Results   Component Value Date    CALCIUM 8.6 (L) 05/02/2018    PHOS 3.2 05/02/2018       Lab Results   Component " Value Date    .8 (H) 02/08/2018    CALCIUM 8.6 (L) 05/02/2018    PHOS 3.2 05/02/2018       Blood:  MSSA/MRSA      Medications:  Medication list was reviewed and changes noted under Assessment/Plan.    Diagnostic Results:  reviewed      ASSESSMENT/PLAN:     1. ESRD on HD MWF in [place], [place] with Dr. Sharpe (N18.6, Z99.2):  Routine HD MWF/PRN while inpt.  Consent signed.  Line holiday completed from Friday-Monday. EULALIO replaced 4/30/18. Renally dose meds, avoid nephrotoxins, and monitor I/O's closely.   2. Bacteremia-MSSA/MRSA (R78.81):  Antibx per ID. No evidence of PNa. Removed PICC and cultured tip. Now with MSSA-MRSA.  Last admit only identifiable source was extensive chronic LLE DVT.  Repeat U/S now with same LLE DVT and new R leg DVT.  Eliquis for now.  Discussed with team.  PICC replaced for antibx treatments.  Dr. Gustafson consult appreciated.  ECHO with no vegetation. Dapto/Ceftaro for 6 wks.    3. Anemia of CKD/infection (D63.1):  No IV iron with infection.  Epo on HD.  S/p transfusion 2 units PRBCs on HD 4/18.  Hgb stable  4. 2HPT/Vit D deficiency:  Vit D3 and calcitriol.  5. Bradycardia (R00.1):  Defer to Cards.  Hold parameters on amio.  DC'd BB.   6. Hyponatremia (E87.1):  Adjusted HD bath.   7. Morbid Obesity (E66.01):  Needs aggressive weight mgmt.        See above  Ok to Transfer back to [facility] today when insurance issues finalized.

## 2018-05-04 VITALS
DIASTOLIC BLOOD PRESSURE: 62 MMHG | HEART RATE: 48 BPM | WEIGHT: 263.44 LBS | RESPIRATION RATE: 16 BRPM | BODY MASS INDEX: 43.89 KG/M2 | HEIGHT: 65 IN | SYSTOLIC BLOOD PRESSURE: 135 MMHG | TEMPERATURE: 98 F | OXYGEN SATURATION: 97 %

## 2018-05-04 LAB
ANION GAP SERPL CALC-SCNC: 9 MMOL/L
BASOPHILS # BLD AUTO: 0.03 K/UL
BASOPHILS NFR BLD: 0.6 %
BUN SERPL-MCNC: 13 MG/DL
CALCIUM SERPL-MCNC: 8.5 MG/DL
CHLORIDE SERPL-SCNC: 100 MMOL/L
CO2 SERPL-SCNC: 22 MMOL/L
CREAT SERPL-MCNC: 3.3 MG/DL
DIFFERENTIAL METHOD: ABNORMAL
EOSINOPHIL # BLD AUTO: 0.2 K/UL
EOSINOPHIL NFR BLD: 3.3 %
ERYTHROCYTE [DISTWIDTH] IN BLOOD BY AUTOMATED COUNT: 18.2 %
EST. GFR  (AFRICAN AMERICAN): 16 ML/MIN/1.73 M^2
EST. GFR  (NON AFRICAN AMERICAN): 14 ML/MIN/1.73 M^2
GLUCOSE SERPL-MCNC: 90 MG/DL
HCT VFR BLD AUTO: 32.1 %
HGB BLD-MCNC: 9.8 G/DL
LYMPHOCYTES # BLD AUTO: 1.9 K/UL
LYMPHOCYTES NFR BLD: 37.2 %
MAGNESIUM SERPL-MCNC: 1.9 MG/DL
MCH RBC QN AUTO: 25.9 PG
MCHC RBC AUTO-ENTMCNC: 30.5 G/DL
MCV RBC AUTO: 85 FL
MONOCYTES # BLD AUTO: 0.5 K/UL
MONOCYTES NFR BLD: 9.7 %
NEUTROPHILS # BLD AUTO: 2.5 K/UL
NEUTROPHILS NFR BLD: 49 %
PHOSPHATE SERPL-MCNC: 2.9 MG/DL
PLATELET # BLD AUTO: 200 K/UL
PMV BLD AUTO: 9.2 FL
POCT GLUCOSE: 102 MG/DL (ref 70–110)
POCT GLUCOSE: 104 MG/DL (ref 70–110)
POTASSIUM SERPL-SCNC: 3.9 MMOL/L
RBC # BLD AUTO: 3.78 M/UL
SODIUM SERPL-SCNC: 131 MMOL/L
WBC # BLD AUTO: 5.13 K/UL

## 2018-05-04 PROCEDURE — 80100016 HC MAINTENANCE HEMODIALYSIS

## 2018-05-04 PROCEDURE — 97803 MED NUTRITION INDIV SUBSEQ: CPT

## 2018-05-04 PROCEDURE — 85025 COMPLETE CBC W/AUTO DIFF WBC: CPT

## 2018-05-04 PROCEDURE — 99239 HOSP IP/OBS DSCHRG MGMT >30: CPT | Mod: ,,, | Performed by: HOSPITALIST

## 2018-05-04 PROCEDURE — 94761 N-INVAS EAR/PLS OXIMETRY MLT: CPT

## 2018-05-04 PROCEDURE — 83735 ASSAY OF MAGNESIUM: CPT

## 2018-05-04 PROCEDURE — 25000003 PHARM REV CODE 250: Performed by: INTERNAL MEDICINE

## 2018-05-04 PROCEDURE — 80048 BASIC METABOLIC PNL TOTAL CA: CPT

## 2018-05-04 PROCEDURE — 25000003 PHARM REV CODE 250: Performed by: HOSPITALIST

## 2018-05-04 PROCEDURE — 84100 ASSAY OF PHOSPHORUS: CPT

## 2018-05-04 PROCEDURE — 25000003 PHARM REV CODE 250: Performed by: NURSE PRACTITIONER

## 2018-05-04 PROCEDURE — 25000003 PHARM REV CODE 250: Performed by: PHYSICIAN ASSISTANT

## 2018-05-04 PROCEDURE — 63600175 PHARM REV CODE 636 W HCPCS: Performed by: INTERNAL MEDICINE

## 2018-05-04 PROCEDURE — 63600175 PHARM REV CODE 636 W HCPCS: Performed by: NURSE PRACTITIONER

## 2018-05-04 RX ORDER — OXYCODONE AND ACETAMINOPHEN 5; 325 MG/1; MG/1
1 TABLET ORAL EVERY 4 HOURS PRN
Qty: 10 TABLET | Refills: 0 | Status: SHIPPED | OUTPATIENT
Start: 2018-05-04 | End: 2019-10-21

## 2018-05-04 RX ORDER — OXYCODONE HYDROCHLORIDE 5 MG/1
5 TABLET ORAL EVERY 6 HOURS PRN
Qty: 10 TABLET | Refills: 0 | Status: SHIPPED | OUTPATIENT
Start: 2018-05-04 | End: 2019-10-21

## 2018-05-04 RX ADMIN — HEPARIN SODIUM 5000 UNITS: 5000 INJECTION, SOLUTION INTRAVENOUS; SUBCUTANEOUS at 09:05

## 2018-05-04 RX ADMIN — DOCUSATE SODIUM 200 MG: 100 CAPSULE, LIQUID FILLED ORAL at 12:05

## 2018-05-04 RX ADMIN — FUROSEMIDE 80 MG: 40 TABLET ORAL at 12:05

## 2018-05-04 RX ADMIN — VITAMIN D, TAB 1000IU (100/BT) 2000 UNITS: 25 TAB at 12:05

## 2018-05-04 RX ADMIN — APIXABAN 5 MG: 2.5 TABLET, FILM COATED ORAL at 12:05

## 2018-05-04 RX ADMIN — CEFTAROLINE FOSAMIL 200 MG: 400 POWDER, FOR SOLUTION INTRAVENOUS at 05:05

## 2018-05-04 RX ADMIN — SEVELAMER CARBONATE 800 MG: 800 TABLET, FILM COATED ORAL at 12:05

## 2018-05-04 RX ADMIN — FAMOTIDINE 20 MG: 20 TABLET ORAL at 12:05

## 2018-05-04 RX ADMIN — POLYETHYLENE GLYCOL 3350 17 G: 17 POWDER, FOR SOLUTION ORAL at 12:05

## 2018-05-04 RX ADMIN — AMIODARONE HYDROCHLORIDE 100 MG: 100 TABLET ORAL at 12:05

## 2018-05-04 RX ADMIN — TAMSULOSIN HYDROCHLORIDE 0.4 MG: 0.4 CAPSULE ORAL at 12:05

## 2018-05-04 RX ADMIN — OXYCODONE HYDROCHLORIDE 5 MG: 5 TABLET ORAL at 12:05

## 2018-05-04 RX ADMIN — CALCITRIOL 0.25 MCG: 0.25 CAPSULE, LIQUID FILLED ORAL at 12:05

## 2018-05-04 RX ADMIN — GABAPENTIN 100 MG: 100 CAPSULE ORAL at 03:05

## 2018-05-04 RX ADMIN — ERYTHROPOIETIN 20000 UNITS: 20000 INJECTION, SOLUTION INTRAVENOUS; SUBCUTANEOUS at 09:05

## 2018-05-04 NOTE — SUBJECTIVE & OBJECTIVE
Interval History: No acute events.  Happy and in good spirits.  Seen in dialysis.  No cp or sob.    Review of Systems   Constitutional: Negative for activity change, chills, diaphoresis and fatigue.   HENT: Negative for congestion, drooling and hearing loss.    Eyes: Negative for discharge.   Respiratory: Negative for apnea, chest tightness, shortness of breath and wheezing.    Cardiovascular: Negative for palpitations and leg swelling.   Gastrointestinal: Negative for abdominal distention, abdominal pain, constipation and diarrhea.   Musculoskeletal: Negative for arthralgias and gait problem.   Skin: Negative for rash.   Neurological: Negative for seizures, light-headedness and numbness.   Hematological: Negative for adenopathy.   Psychiatric/Behavioral: Negative for agitation and behavioral problems.     Objective:     Vital Signs (Most Recent):  Temp: 97.8 °F (36.6 °C) (05/04/18 1130)  Pulse: (!) 51 (05/04/18 1200)  Resp: 18 (05/04/18 1130)  BP: (!) 126/45 (05/04/18 1130)  SpO2: 97 % (05/04/18 0358) Vital Signs (24h Range):  Temp:  [96.5 °F (35.8 °C)-98 °F (36.7 °C)] 97.8 °F (36.6 °C)  Pulse:  [40-57] 51  Resp:  [16-18] 18  SpO2:  [96 %-99 %] 97 %  BP: (109-137)/(41-74) 126/45     Weight: 119.5 kg (263 lb 7.2 oz) (per RN documentation)  Body mass index is 43.84 kg/m².    Intake/Output Summary (Last 24 hours) at 05/04/18 1402  Last data filed at 05/04/18 1125   Gross per 24 hour   Intake              860 ml   Output             1500 ml   Net             -640 ml      Physical Exam   Constitutional: She is oriented to person, place, and time. She appears well-developed and well-nourished.   HENT:   Head: Normocephalic and atraumatic.   Eyes: EOM are normal. Pupils are equal, round, and reactive to light.   Neck: Normal range of motion. Neck supple.   Cardiovascular: Normal rate, regular rhythm and normal heart sounds.    Pulmonary/Chest: Effort normal and breath sounds normal. No respiratory distress.   Abdominal:  Soft. Bowel sounds are normal. She exhibits no distension. There is no tenderness.   Musculoskeletal: Normal range of motion. She exhibits no edema.   Neurological: She is oriented to person, place, and time. No cranial nerve deficit. Coordination normal.   Skin: Skin is warm and dry.   Psychiatric: She has a normal mood and affect. Her behavior is normal.   Vitals reviewed.      Significant Labs:   BMP:   Recent Labs  Lab 05/04/18  0532   GLU 90   *   K 3.9      CO2 22*   BUN 13   CREATININE 3.3*   CALCIUM 8.5*   MG 1.9     CBC:   Recent Labs  Lab 05/04/18  0532   WBC 5.13   HGB 9.8*   HCT 32.1*        CMP:   Recent Labs  Lab 05/04/18  0532   *   K 3.9      CO2 22*   GLU 90   BUN 13   CREATININE 3.3*   CALCIUM 8.5*   ANIONGAP 9   EGFRNONAA 14*       Significant Imaging: I have reviewed and interpreted all pertinent imaging results/findings within the past 24 hours.

## 2018-05-04 NOTE — ASSESSMENT & PLAN NOTE
Contributing Nutrition Diagnosis  Increased nutrient needs (kcal, protein)    Related to (etiology):   HD    Signs and Symptoms (as evidenced by):   Pt with ESRD on HD     Interventions/Recommendations (treatment strategy):  See recs    Nutrition Diagnosis Status:   Continues

## 2018-05-04 NOTE — PROGRESS NOTES
VITO called [facility] [phone], spoke to Ngozi and she stated [facility] is requesting add'l PT/OT notes; VITO sent PT, OT and progress notes to [facility] via [facility].

## 2018-05-04 NOTE — ASSESSMENT & PLAN NOTE
Initially diagnosed during prior hospitalization.  Transesophageal echocardiogram negative for vegetation.  Patient with large thrombus burden thought to be likely source of her Staphylococcus bacteremia.  Per Dr. Sherron Wise's note on 2/28/2018, plan was for 8 weeks of intravenous antibiotics from last date of negative blood culture (2/22/2018) which callled for treatment until 4/19/2018.  It appears antibiotics were discontinued on 4/4/2018 so possible treatment failure due to inadequate treatment course or treatment failure due to inadequate source control.  Patient also had tunneled right internal jugular exchanged on 4/24/2018.  Despite exchanging internal jugular line and restarting antibiotics again with intravenous ceftaroline and daptomycin, patient failed to clear bacteremia and had HD catheter removed on 4/27/2018, and subsequently replaced after line holiday.  Most recent repeat cultures negative.  Tunneled dialysis line placed and has been used.  Plan for iv antibiotics to be completed at her home SNF in [place].  Will continue IV dapto and ceftaroline x 6 weeks total with stop date June 6.  Will need CB, CMP, CPK weekly and faxed to ID clinic at [phone].  Will need ID followup here or in [place] in 4-6 weeks.

## 2018-05-04 NOTE — PLAN OF CARE
[facility]   Department of Hospital Medicine  [address] [place] [id number]  [phone] (phone)  [phone] (fax)      Facility Transfer Orders                        05/04/2018    Yumiko Proctor    Admit to: SNF    Diagnoses:  Active Hospital Problems    Diagnosis  POA    *MRSA bacteremia [R78.81]  Yes    Bradycardia [R00.1]  Unknown    Hyponatremia [E87.1]  Unknown    Urinary retention [R33.9]  Yes    Debility [R53.81]  Yes    Slow transit constipation [K59.01]  Yes    Anemia due to chronic kidney disease [N18.9, D63.1]  Yes    DVT of deep femoral vein, left [I82.412]  Yes    Chronic atrial fibrillation [I48.2]  Yes    End stage renal disease [N18.6]  Yes    Type 2 diabetes mellitus with chronic kidney disease on chronic dialysis, without long-term current use of insulin [E11.22, N18.6, Z99.2]  Not Applicable      Resolved Hospital Problems    Diagnosis Date Resolved POA   No resolved problems to display.       Allergies:  Review of patient's allergies indicates:   Allergen Reactions    Sulfa (sulfonamide antibiotics) Anaphylaxis    Albuterol sulfate Palpitations       Vitals:       Every shift (Skilled Nursing patients)    Diet: RENAL  Supplement:  1 can every three times a day with meals          Activity:   - Up in a chair each morning as tolerated   - Ambulate with assistance to bathroom   - May ambulate independently   - Weight bearing: as tolerated    Nursing Precautions:     - Aspiration precautions:             - Total assistance with meals            -  Upright 90 degrees befor during and after meals             -  Suction at bedside          - Fall precautions   - Seizure precaution   - Decubitus precautions:        -  for positioning   - Pressure reducing foam mattress   - Turn patient every two hours. Use wedge pillows to anchor patient    CONSULTS:         PT to evaluate and treat - five times a week      OT to evaluate and treat -  five times a week    LABS:  - Check CBC, CMP, CPK weekly and fax results to Dr. Abhijit Wilson (with infectious disease) at [phone]    DIABETES CARE:     Check blood sugar:  Fingerstick blood sugar a.m and p.m.  Fingerstick blood sugar AC and HS  Fingerstick blood sugar every 6 hours if unable to eat   Report CBG < 60 or > 400 to physician.                                          Insulin Sliding Scale          Glucose  Novolog Insulin Subcutaneous        0 - 60   Orange juice or glucose tablet, hold insulin      No insulin   201-250  2 units   251-300  4 units   301-350  6 units   351-400  8 units   >400   10 units then call physician      Medications:    Hitesh Yumiko   Home Medication Instructions VINCE:[id number]    Printed on:05/04/18 4161   Medication Information                      acetaminophen (TYLENOL) 325 MG tablet  Take 2 tablets (650 mg total) by mouth every 4 (four) hours as needed.             amiodarone (PACERONE) 100 MG Tab  Take 1 tablet (100 mg total) by mouth once daily.             apixaban 5 mg Tab  Take 1 tablet (5 mg total) by mouth 2 (two) times daily.             calcitRIOL (ROCALTROL) 0.25 MCG Cap  Take 1 capsule (0.25 mcg total) by mouth once daily.             carvedilol (COREG) 25 MG tablet  Take 25 mg by mouth 2 (two) times daily.             dextrose 5 % SolP 50 mL with ceftaroline fosamil 400 mg SolR 200 mg  Inject 200 mg into the vein every 12 (twelve) hours.             docusate sodium (COLACE) 100 MG capsule  Take 1 capsule (100 mg total) by mouth 2 (two) times daily.             epoetin davion (PROCRIT) 20,000 unit/mL injection  Inject 1 mL (20,000 Units total) into the skin every Mon, Wed, Fri.             famotidine (PEPCID) 20 MG tablet  Take 1 tablet (20 mg total) by mouth once daily.             ferric citrate 210 mg iron Tab  Take 210 mg by mouth 3 (three) times daily.             furosemide (LASIX) 80 MG tablet  Take 1 tablet (80 mg total) by mouth 2 (two) times  daily.             gabapentin (NEURONTIN) 100 MG capsule  Take 1 capsule (100 mg total) by mouth 3 (three) times daily.             glucose 4 GM chewable tablet  Take 4 tablets (16 g total) by mouth as needed.             hydrALAZINE (APRESOLINE) 100 MG tablet  Take 100 mg by mouth 3 (three) times daily.             insulin aspart U-100 (NOVOLOG) 100 unit/mL InPn pen  Inject 0-5 Units into the skin before meals and at bedtime as needed (Hyperglycemia).             ISOSORBIDE MONONITRATE ORAL  Take 30 mg by mouth Daily.             oxyCODONE (ROXICODONE) 5 MG immediate release tablet  Take 1 tablet (5 mg total) by mouth every 6 (six) hours as needed.             oxyCODONE-acetaminophen (PERCOCET) 5-325 mg per tablet  Take 1 tablet by mouth every 4 (four) hours as needed.             oxyCODONE-acetaminophen (PERCOCET) 5-325 mg per tablet  Take 1 tablet by mouth every 4 (four) hours as needed.             polyethylene glycol (GLYCOLAX) 17 gram PwPk  Take 17 g by mouth once daily.             senna-docusate 8.6-50 mg (PERICOLACE) 8.6-50 mg per tablet  Take 1 tablet by mouth daily as needed for Constipation.             sevelamer carbonate (RENVELA) 800 mg Tab  Take 1 tablet (800 mg total) by mouth 3 (three) times daily with meals.             SITagliptin (JANUVIA) 25 MG Tab  Take 1 tablet (25 mg total) by mouth once daily.             sodium chloride 0.9% SolP 50 mL with DAPTOmycin 500 mg SolR 710 mg  Inject 710 mg into the vein 3 (three) times a week. Inject 710 mg into vein every Monday, Wednesday and Friday after Dialysis             tamsulosin (FLOMAX) 0.4 mg Cp24  Take 1 capsule (0.4 mg total) by mouth once daily.             vitamin D 1000 units Tab  Take 2 tablets (2,000 Units total) by mouth once daily.             vitamin renal formula, B-complex-vitamin c-folic acid, (NEPHROCAP) 1 mg Cap  Take 1 capsule by mouth once daily.               Start Ceftaroline on 5/5/18 and finish after last dose on 6/6/18  Stop  Daptomycin after last dose on 6/6/18    FOLLOW UP:  -Primary care provider within 1-2 weeks.  -Infectious disease doctor within 4-6 weeks.  Either Dr. Abhijit Wilson at [facility] in [place] or provider of patient's choice in [place].        _________________________________   ARTURO LOGAN MD

## 2018-05-04 NOTE — PLAN OF CARE
------------------------------------------------------------------------------  ATTN: TEAM DC PLANNING      PLAN : SNF    [facility]UR[facility] / REHAB IN [place]   - RETURN TRANSFER     WITH HR LEANDRA #[id number] STRETCHER - ADMIN APPROVAL DONE     PER SSW  Pt called [facility] spoke to Randolph, call report to  [phone], Wing 3,  pt's room 314.         [facility] ON CASE ALSO # [id number]   Josephine Garcia RN  Case management # [phone] (FAX) [phone]     05/04/18 1700   Final Note   Assessment Type Final Discharge Note   Discharge Disposition SNF   Hospital Follow Up  Appt(s) scheduled? Yes   Discharge plans and expectations educations in teach back method with documentation complete? Yes   Right Care Referral Info   Post Acute Recommendation SNF / Sub-Acute Rehab

## 2018-05-04 NOTE — PLAN OF CARE
-------------------------------------------------------------------------------  ATTN: TEAM DC PLANNING      PLAN : [facility]      [facility] NUR[facility] / RE[facility] IN [place]   PENDING [facility] - PER SSW SHE CALLED THIS [facility] AWARE OF NEED AUTH - PENDING      05/04/18 6479   Discharge Assessment   Assessment Type Discharge Planning Reassessment

## 2018-05-04 NOTE — NURSING
Orders to discharge patient to [facility]/rehab in [place]. 22g IV to JOSE removed with catheter intact. Report given to Laurita @ [phone]. All questions answered. Pt to be transported by [facility] via stretcher.

## 2018-05-04 NOTE — PLAN OF CARE
Problem: Physical Therapy Goal  Goal: Physical Therapy Goal  Goals to be met by: 18    Patient will increase functional independence with mobility by performin. Supine to sit with min A. MET 18  2. Sit to supine with min A.   3. Rolling R and L with min A.   4. Sitting at edge of bed >20 minutes with SBA.   5. Sit < stand with RW and min A   6. SPT from EOB <> bedside chair with RW and Min A   7. Pt will tolerate sitting at EOB for all meals with set up assist  MET 18   Outcome: Ongoing (interventions implemented as appropriate)  Pt with morbid obesity, bilateral LE pitting edema, severe low back pain, significant LE weakness as well as extreme fatigue on dialysis days. She is unable to perform transfers to chair or w/c. She has bowel incontinence. She is unable to tolerate sitting up in chair (has yet to sit up in chair this admission due to pain and safety with transfers) or sitting up at bedside > 30 minutes due to pain and fatigue. Recommend stretcher transport to SNF.

## 2018-05-04 NOTE — NURSING
No acute events overnight, pt resting comfortably in bed and appears to be sleeping in between care. VSS, bed in lowest, locked position and call light in reach. Hourly rounding performed, will continue to monitor patient.

## 2018-05-04 NOTE — PROGRESS NOTES
" [facility]  Adult Nutrition  Progress Note    SUMMARY       Recommendations    Recommendation/Intervention:   1. Cont w/ current diet order; if meal intake drops to <50%, order Novasource Renal TID    2. Enc adequate meal intake daily  Goals: Maintain meal intake >/=75%  Nutrition Goal Status: goal met  Communication of RD Recs:  (POC)    Reason for Assessment    Reason for Assessment: RD follow-up  Diagnosis: infection/sepsis  Interdisciplinary Rounds: did not attend  General Information Comments: Attempted to see pt on 2 attempts. @ HD. Awaiting tx to SNF facility per notes in Epic. Per RN documentation, pt w/ 75% intake of most meals.   Nutrition Discharge Planning: Discharge on Renal diet    Nutrition Risk Screen    Nutrition Risk Screen: no indicators present    Nutrition/Diet History    Do you have any cultural, spiritual, Christianity conflicts, given your current situation?: No  Factors Affecting Nutritional Intake: None identified at this time    Anthropometrics    Temp: 98 °F (36.7 °C)  Height Method: Stated  Height: 5' 5" (165.1 cm)  Height (inches): 65 in  Weight Method: Bed Scale  Weight: 119.5 kg (263 lb 7.2 oz) (per RN documentation)  Weight (lb): 263.45 lb  Ideal Body Weight (IBW), Female: 125 lb  % Ideal Body Weight, Female (lb): 210.76 lb  BMI (Calculated): 43.9  BMI Grade: greater than 40 - morbid obesity       Lab/Procedures/Meds    Pertinent Labs Reviewed: reviewed  Pertinent Labs Comments: POCT glu   Pertinent Medications Reviewed: reviewed  Pertinent Medications Comments: amiodarone, calcitriol, docusate, famotidine, lasix, sevelamer, Vit D    Physical Findings/Assessment    Overall Physical Appearance: obese  Oral/Mouth Cavity: teeth absent  Skin: incision(s)    Estimated/Assessed Needs    Weight Used For Calorie Calculations: 119.5 kg (263 lb 7.2 oz)  Energy Calorie Requirements (kcal): 2157  Energy Need Method: [place]-St Jeor (x 1.25 (PAL))  Protein Requirements: " 102-114 gm/d (1.8-2 gm/kg IBW)  Weight Used For Protein Calculations: 56.7 kg (125 lb) (IBW)  Fluid Requirements (mL): 1000 (+UOP, or per team)     RDA Method (mL): 2157         Nutrition Prescription Ordered    Current Diet Order: Renal/cardiac/diabetic    Evaluation of Received Nutrient/Fluid Intake       % Intake of Estimated Energy Needs: 50 - 75 %  % Meal Intake: 50 - 75 %    Nutrition Risk    Level of Risk/Frequency of Follow-up:  (F/u 1 x weekly)     Assessment and Plan    End stage renal disease    Contributing Nutrition Diagnosis  Increased nutrient needs (kcal, protein)    Related to (etiology):   HD    Signs and Symptoms (as evidenced by):   Pt with ESRD on HD     Interventions/Recommendations (treatment strategy):  See recs    Nutrition Diagnosis Status:   Continues               Monitor and Evaluation    Food and Nutrient Intake: energy intake, food and beverage intake  Food and Nutrient Adminstration: diet order  Knowledge/Beliefs/Attitudes: food and nutrition knowledge/skill, beliefs and attitudes  Physical Activity and Function: nutrition-related ADLs and IADLs  Anthropometric Measurements: weight, weight change  Biochemical Data, Medical Tests and Procedures: electrolyte and renal panel, gastrointestinal profile, glucose/endocrine profile, inflammatory profile, lipid profile  Nutrition-Focused Physical Findings: overall appearance, extremities, muscles and bones, skin     Nutrition Follow-Up    RD Follow-up?: Yes

## 2018-05-04 NOTE — PROGRESS NOTES
Patient not seen secondary to patient being away at dialysis. Will attempt as scheduled Kajal Navas 5/4/2018

## 2018-05-04 NOTE — PROGRESS NOTES
[facility]  Cardiology  Progress Note    Patient Name: Yumiko Proctor  MRN: [id number]  Admission Date: 4/18/2018  Hospital Length of Stay: 16 days  Code Status: Full Code   Attending Physician: Gildardo Nuñez MD   Primary Care Physician: [facility]  Expected Discharge Date: 5/4/2018  Principal Problem:MRSA bacteremia    Subjective:     Brief HPI:    Ms. Yumiko Proctor is a 68 year-old woman with a history hypertension, diabetes mellitus type 2, chronic heart failure with end-stage renal disease on hemodialysis via tunneled dialysis catheter who returned from a nursing facility in [place] where she was undergoing treatment for presumed endovascular infection with Methicillin-resistant Staphylococcus aureus infection.     She apparently has had left ventricular dysfunction in the past but the last Echo reveals normal systolic function. She was on amiodarone and carvedilol on presentation. Apixiban appear to be given for deep venous thrombosis. She has had a heart rate in the 40s and the carvedilol has been stopped. Currently she is receiving amiodarone at a dose of 200 mg Q24. I recently saw her for evaluation for a possible IVC Filter. She is morbidly obese and essentially bedridden here as well as a the Nursing Home. Her nurse tells me she has been up in a chair once since presentation.     On my review of the chart I mostly find that her heart rate has been in the upper 40s.    Hospital Course:     Reduced amiodarone dose to 100 mg Q24.    Telemetry.    Interval History:    Heart rate 46-54 bpm. No marked bradycardia. No pauses.    Review of Systems   Constitution: Positive for weakness and malaise/fatigue.   Cardiovascular: Negative for chest pain and syncope.   Respiratory: Negative for hemoptysis and shortness of breath.      Objective:     Vital Signs (Most Recent):  Temp: 97.8 °F (36.6 °C) (05/04/18 1130)  Pulse: (!) 43 (05/04/18 1400)  Resp: 18 (05/04/18  1130)  BP: (!) 126/45 (05/04/18 1130)  SpO2: 97 % (05/04/18 0358) Vital Signs (24h Range):  Temp:  [96.5 °F (35.8 °C)-98 °F (36.7 °C)] 97.8 °F (36.6 °C)  Pulse:  [40-57] 43  Resp:  [16-18] 18  SpO2:  [96 %-99 %] 97 %  BP: (109-137)/(41-74) 126/45     Weight: 119.5 kg (263 lb 7.2 oz) (per RN documentation)  Body mass index is 43.84 kg/m².    SpO2: 97 %  O2 Device (Oxygen Therapy): room air      Intake/Output Summary (Last 24 hours) at 05/04/18 1611  Last data filed at 05/04/18 1125   Gross per 24 hour   Intake              860 ml   Output             1500 ml   Net             -640 ml       Lines/Drains/Airways     Peripherally Inserted Central Catheter Line                 PICC Double Lumen 04/27/18 1200 left cephalic 7 days          Central Venous Catheter Line                 Hemodialysis Catheter 04/30/18 1211 right internal jugular 4 days          Peripheral Intravenous Line                 Peripheral IV - Single Lumen 04/27/18 1749 Left Upper Arm 6 days                Physical Exam   Constitutional: She is oriented to person, place, and time. She appears well-developed and well-nourished.  Non-toxic appearance. She appears ill. No distress.   HENT:   Head: Normocephalic and atraumatic.   Nose: Nose normal.   Eyes: Right eye exhibits no discharge. Left eye exhibits no discharge. Right conjunctiva is not injected. Left conjunctiva is not injected. Right pupil is round. Left pupil is round. Pupils are equal.   Neck: Neck supple. No JVD present. Carotid bruit is not present. No thyromegaly present.   Cardiovascular: Normal rate, regular rhythm, S1 normal and S2 normal.   No extrasystoles are present. PMI is not displaced.  Exam reveals gallop and S4.    Murmur heard.   Midsystolic murmur is present with a grade of 2/6   Pulses:       Radial pulses are 2+ on the right side.   Pulmonary/Chest: Effort normal and breath sounds normal.   Abdominal: Soft. Normal appearance.   Musculoskeletal:        Right ankle: She  exhibits swelling. She exhibits no ecchymosis and no deformity.        Left ankle: She exhibits swelling. She exhibits no ecchymosis and no deformity.   Lymphadenopathy:        Head (right side): No submandibular adenopathy present.        Head (left side): No submandibular adenopathy present.     She has no cervical adenopathy.   Neurological: She is alert and oriented to person, place, and time. She is not disoriented. No cranial nerve deficit.   Skin: Skin is warm, dry and intact.   Psychiatric: Her speech is normal.       Current Medications:     amiodarone  100 mg Oral Daily    apixaban  5 mg Oral BID    calcitRIOL  0.25 mcg Oral Daily    ceftaroline (TEFLARO) IVPB  200 mg Intravenous Q12H    DAPTOmycin (CUBICIN)  IV  6 mg/kg Intravenous Q48H    docusate sodium  200 mg Oral BID    epoetin davion (PROCRIT) injection  20,000 Units Intravenous Every Mon, Wed, Fri    famotidine  20 mg Oral Daily    furosemide  80 mg Oral BID    gabapentin  100 mg Oral TID    polyethylene glycol  17 g Oral Daily    sevelamer carbonate  800 mg Oral TID WM    tamsulosin  0.4 mg Oral Daily    vitamin D  2,000 Units Oral Daily     Current Laboratory Results:    Recent Results (from the past 24 hour(s))   POCT glucose    Collection Time: 05/03/18  6:14 PM   Result Value Ref Range    POCT Glucose 84 70 - 110 mg/dL   POCT glucose    Collection Time: 05/03/18  9:04 PM   Result Value Ref Range    POCT Glucose 124 (H) 70 - 110 mg/dL   Basic Metabolic Panel (BMP)    Collection Time: 05/04/18  5:32 AM   Result Value Ref Range    Sodium 131 (L) 136 - 145 mmol/L    Potassium 3.9 3.5 - 5.1 mmol/L    Chloride 100 95 - 110 mmol/L    CO2 22 (L) 23 - 29 mmol/L    Glucose 90 70 - 110 mg/dL    BUN, Bld 13 8 - 23 mg/dL    Creatinine 3.3 (H) 0.5 - 1.4 mg/dL    Calcium 8.5 (L) 8.7 - 10.5 mg/dL    Anion Gap 9 8 - 16 mmol/L    eGFR if African American 16 (A) >60 mL/min/1.73 m^2    eGFR if non African American 14 (A) >60 mL/min/1.73 m^2   CBC with  Automated Differential    Collection Time: 05/04/18  5:32 AM   Result Value Ref Range    WBC 5.13 3.90 - 12.70 K/uL    RBC 3.78 (L) 4.00 - 5.40 M/uL    Hemoglobin 9.8 (L) 12.0 - 16.0 g/dL    Hematocrit 32.1 (L) 37.0 - 48.5 %    MCV 85 82 - 98 fL    MCH 25.9 (L) 27.0 - 31.0 pg    MCHC 30.5 (L) 32.0 - 36.0 g/dL    RDW 18.2 (H) 11.5 - 14.5 %    Platelets 200 150 - 350 K/uL    MPV 9.2 9.2 - 12.9 fL    Gran # (ANC) 2.5 1.8 - 7.7 K/uL    Lymph # 1.9 1.0 - 4.8 K/uL    Mono # 0.5 0.3 - 1.0 K/uL    Eos # 0.2 0.0 - 0.5 K/uL    Baso # 0.03 0.00 - 0.20 K/uL    Gran% 49.0 38.0 - 73.0 %    Lymph% 37.2 18.0 - 48.0 %    Mono% 9.7 4.0 - 15.0 %    Eosinophil% 3.3 0.0 - 8.0 %    Basophil% 0.6 0.0 - 1.9 %    Differential Method Automated    Magnesium    Collection Time: 05/04/18  5:32 AM   Result Value Ref Range    Magnesium 1.9 1.6 - 2.6 mg/dL   Phosphorus    Collection Time: 05/04/18  5:32 AM   Result Value Ref Range    Phosphorus 2.9 2.7 - 4.5 mg/dL   POCT glucose    Collection Time: 05/04/18 12:40 PM   Result Value Ref Range    POCT Glucose 102 70 - 110 mg/dL     Current Imaging Results:    Imaging Results    None         Assessment and Plan:     Problem List:    Active Diagnoses:    Diagnosis Date Noted POA    PRINCIPAL PROBLEM:  MRSA bacteremia [R78.81] 02/07/2018 Yes    Bradycardia [R00.1]  Unknown    Hyponatremia [E87.1] 05/01/2018 Unknown    Urinary retention [R33.9] 04/27/2018 Yes    Debility [R53.81] 04/24/2018 Yes    Slow transit constipation [K59.01] 04/20/2018 Yes    Anemia due to chronic kidney disease [N18.9, D63.1] 04/19/2018 Yes    DVT of deep femoral vein, left [I82.412] 02/21/2018 Yes    Chronic atrial fibrillation [I48.2] 02/08/2018 Yes    End stage renal disease [N18.6] 02/08/2018 Yes    Type 2 diabetes mellitus with chronic kidney disease on chronic dialysis, without long-term current use of insulin [E11.22, N18.6, Z99.2] 02/08/2018 Not Applicable      Problems Resolved During this Admission:     "Diagnosis Date Noted Date Resolved POA     Assessment and Plan:     1. Bradycardia              Heart rate in the 45-50 bpm range.              Was on amiodarone 200 mg Q24 and carvedilol 25 mg at the NH.              Appears amidarone was given for paroxysmal atrial fibrillation.              She is bedridden at NH.              Reduced amiodarone to 100 mg Q24.   HR 46-54 bpm.              5/30/2018: Plan Holter for follow up.             2. MRSA Bacteremia              2/9/2018: TTE: No vegetations seen.              2/26/2018: RANDA: Small calcified nodule on atrial side of posterior mitral leaflet. Mild to moderate MR. Aortic valve unremarkable.              No findings to suggest endocarditis.              Treated for recurrent bacteremia.     3. Deep Venous Trombosis of Lower Extremity              2/20/2018: Venous Duplex: "Extensive thrombus throughout the left lower extremity with nonocclusive thrombus in the common femoral vein with occlusive thrombus throughout the femoral, popliteal, and peroneal vein. Anterior and posterior tibial veins are patent.              2/23/2018: Venous Duplex: "Persistent nonocclusive thrombus in the left common femoral and popliteal veins.Questionable new thrombus in the right deep femoral vein, although this is difficult visualize."              Old left leg DVT and possibly new right leg DVT. No evidence of pulmonary embolism.              On apixiban 5 mg Q12.                  VTE Risk Mitigation         Ordered     heparin (porcine) injection  As needed (PRN)      04/30/18 1219     apixaban tablet 5 mg  2 times daily      04/24/18 0974     heparin (porcine) injection 5,000 Units  As needed (PRN)      04/18/18 9538          Rikki Gustafson MD  Cardiology  [facility]  "

## 2018-05-04 NOTE — PROGRESS NOTES
Pt called [facility] spoke to Randolph, call report to  [phone], Wing 3,  pt's room 314.     SW called [facility] [place], [phone], spoke to Romi, informed of need of heavy duty and wide WC, O2 and pt may need to be place in WC with roc lift, pickup for 5:15pm.  SW notified of pickup time and who to call report to.

## 2018-05-04 NOTE — PLAN OF CARE
ATTN: TEAM RAMY PLANNING   she was transferred to a NH facility in [place], [place]  Primary Care Provider: [facility]     PLAN : SNF      PER [facility]  INSURANCE WILL NOT PAY BECAUSE SHE IS BYPASSING SNF FACILTY IN [facility] ?    HER INSURANCE WILL NOT PAY FOR TRANSPORT TO SNF IN [facility]       COST TRANSPORT STRETCH[facility]  [facility]     [facility] [place]   [facility]      PER PRIMARY RN PT BED BOUND ON DIALYSIS DAYS     SPOKE WITH P THERAPY   RECOMMENDATION FOR S SAFETY AMBULANCE - SINCE PATIENT HAS SEVERE WEAKNESS ON DIALYSIS DAYS AND DID NOT PROGRESS TO SIT TO STAND SAFELY.      CALLED HOUSE [facility] - PERLA ADMIN ..ON CALL D RYANN - JASMEET BAEZ - APPROVAL STRETCHER - R/T NO OTHER SAFE WAY TO GET PT BACK TO [place] .        [facility] ON CASE ALSO # [id number]   Josephine Garcia RN  Case management # [phone] (FAX) [phone]

## 2018-05-04 NOTE — PROGRESS NOTES
"Nephrology  Progress Note    Admit Date: 4/18/2018   LOS: 16 days     SUBJECTIVE:     Follow-up For:  Bacteremia due to methicillin resistant Staphylococcus aureus    Interval History:     Uneventful night.  Seen on HD.  No c/o.  Still waiting on insurance auth for transfer to SNF.      Review of Systems:  Constitutional: No fever or chills  Respiratory: No cough or shortness of breath  Cardiovascular: No chest pain or palpitations  Gastrointestinal: No nausea or vomiting  Neurological: No confusion or weakness    OBJECTIVE:     Vital Signs Range (Last 24H):  BP (!) 116/51   Pulse (!) 48   Temp 98 °F (36.7 °C) (Oral)   Resp 18   Ht 5' 5" (1.651 m)   Wt 119.5 kg (263 lb 7.2 oz)   LMP 01/01/1983 (LMP Unknown)   SpO2 97%   Breastfeeding? No   BMI 43.84 kg/m²     Temp:  [96.5 °F (35.8 °C)-98 °F (36.7 °C)]   Pulse:  [40-57]   Resp:  [16-18]   BP: (107-137)/(51-74)   SpO2:  [96 %-99 %]     I & O (Last 24H):    Intake/Output Summary (Last 24 hours) at 05/04/18 0913  Last data filed at 05/03/18 1652   Gross per 24 hour   Intake              360 ml   Output                0 ml   Net              360 ml       Physical Exam:  General appearance: morbidly obese female in NAD  Eyes:  Conjunctivae/corneas clear. PERRL.  Lungs: Normal respiratory effort,  diminished  Heart: Regular/Jerry rate and rhythm, S1, S2 normal, no murmur, rub or cuco.  Abdomen: Soft, non-tender non-distended; bowel sounds normal; no masses,  no organomegaly, obese  Extremities: No cyanosis or clubbing. trace edema.    Skin: Skin color, texture, turgor normal. No rashes or lesions  Neurologic: Normal strength and tone. No focal numbness or weakness   Left SC PICC  Right IJ EULALIO        Laboratory Data:  BMP:     Recent Labs  Lab 05/04/18  0532   GLU 90   *   K 3.9      CO2 22*   BUN 13   CREATININE 3.3*   CALCIUM 8.5*   MG 1.9     Lab Results   Component Value Date    CALCIUM 8.5 (L) 05/04/2018    PHOS 2.9 05/04/2018       Lab Results "   Component Value Date    .8 (H) 02/08/2018    CALCIUM 8.5 (L) 05/04/2018    PHOS 2.9 05/04/2018       Blood:  MSSA/MRSA      Medications:  Medication list was reviewed and changes noted under Assessment/Plan.    Diagnostic Results:  reviewed      ASSESSMENT/PLAN:     1. ESRD on HD MWF in [place], [place] with Dr. Sharpe (N18.6, Z99.2):  Routine HD MWF/PRN while inpt.  Consent signed.  Line holiday completed from Friday-Monday. EULALIO replaced 4/30/18. Renally dose meds, avoid nephrotoxins, and monitor I/O's closely.   2. Bacteremia-MSSA/MRSA (R78.81):  Antibx per ID. No evidence of PNa. Removed PICC and cultured tip. Now with MSSA-MRSA.  Last admit only identifiable source was extensive chronic LLE DVT.  Repeat U/S now with same LLE DVT and new R leg DVT.  Eliquis for now.  Discussed with team.  PICC replaced for antibx treatments.  Dr. Gustafson consult appreciated.  ECHO with no vegetation. Dapto/Ceftaro for 6 wks.    3. Anemia of CKD/infection (D63.1):  No IV iron with infection.  Epo on HD.  S/p transfusion 2 units PRBCs on HD 4/18.  Hgb stable  4. 2HPT/Vit D deficiency:  Vit D3 and calcitriol.  5. Bradycardia (R00.1):  Defer to Cards.  Hold parameters on amio.  DC'd BB.   6. Hyponatremia (E87.1):  Adjusted HD bath.   7. Morbid Obesity (E66.01):  Needs aggressive weight mgmt.        See above  Ok to Transfer back to [facility] today when insurance issues finalized.

## 2018-05-04 NOTE — PROGRESS NOTES
"[facility] Hospital Medicine  Progress Note    Patient Name: Yumiko Proctor  MRN: [id number]  Patient Class: IP- Inpatient   Admission Date: 4/18/2018  Length of Stay: 16 days  Attending Physician: Gildardo Nuñez MD  Primary Care Provider: [facility]        Subjective:     Principal Problem:Bacteremia due to methicillin resistant Staphylococcus aureus    HPI:  Per Nat Woodward, "Ms Hoa Proctor is a 68 y.o. female, with PMH of HTN, NIDDM-2, CHF (EF 35%), ESRD on dialysis ([facility], Dr. Prather), A. Fib, LLE DVT, cardiomyopathy, and recent treatment in this facility for MRSA bacteremia from her vascular access site, who returns 2/2 need for ID consultation. In the past week the patient developed a fever and was diagnosed with RUL PNA, and started on Levaquin 3/week after dialysis. Associated symptoms include non-productive cough, fevers, and SOB with wheeze. She was started on vancymycin and zosyn on 4/15/18. Blood cultures taken on 4/16/18 were the second set, first after antibiotics, that grew staph. Other associated symptoms include b/l hip pain, and constipation.  She denies fever, chills, sweats, chest pain, SOB, N/V, diarrhea.     During the patient's previous stay at this facility she had blood cultures positive for MRSA on 2/8/18, and she was started on Daptomycin. On 2/10/18 a second set of blood cultures were positive for MRSA. At the time she had her left Jaspreet catheter removed and a right IJ tunneled dialysis cath was placed on 2/12/18. Repeat cultures on 2/13/18 were negative for MRSA. She was positive in 1/2 bottles on 2/16/18 and both blood cultures bottles for MRSA on 2/16/18. At that time she was started on vancomycin, and Daptomycin was stopped 2/2 myositis. On 2/22/18 blood cultures were negative. A RANDA on 2/26/18 was negative for vegetations, but the patient did have a LLE DVT discovered 2/20/18, and Apixaban was started. After CPK " "levels were normal, she was started back on Daptomycin and Ceftaroline. She was to continue this treatment until 4/4/18, and she was transferred to a NH facility in [place], [place]. She states she was transferred from the Nursing facility to [facility] on 4/15/18."    Hospital Course:  Ms. Yumiko Proctor is a 68 year-old woman with a history hypertension, diabetes mellitus type II on insulin, chronic systolic heart failure with ejection fraction of 35 percent, and end-stage renal disease on hemodialysis via tunneled dialysis catheter exited her right chest wall who returned from a nursing facility in [place] where she was undergoing treatment for presumed endovascular infection with Methicillin-resistant Staphylococcus aureus infection via a peripherally inserted central catheter who returns with recurrence of Staphylococcus aureus bacteremia.  Patient's peripherally inserted central catheter was removed here.  Blood cultures obtained here on 4/19/2018 positive for methicillin-sensitive Staphylococcus aureus.  Infectious disease service consulted and recommended treatment with intravenous ceftaroline and daptomycin.  In addition, as patient failed to clear her bacteremia, infectious disease service recommended removal of her tunneled dialysis catheter which was done following dialysis on 4/27/2018 with plan to place new tunneled catheter on 4/30/2018 to allow for a line holiday.    5/1: Today she complains of some left arm and shoulder pain, but is eager to leave the hospital.  No acute events noted overnight.  5/2: Tele reviewed and noted HR into upper 30s overnight.  No acute events noted.  Seen in dialysis.  Eager to leave the hospital.  5/3:  In good spirits.  No complaints.  HR stable in upper 40s to mid 50s.  Eager to leave hospital.  Waiting on insurance for SNF approval.  5/4:  Seen in HD.  In good spirits and without complaint.  HR stable in same range.  Eager to get out of hospital.  Still waiting on " insurance for SNF approval.    Interval History: No acute events.  Happy and in good spirits.  Seen in dialysis.  No cp or sob.    Review of Systems   Constitutional: Negative for activity change, chills, diaphoresis and fatigue.   HENT: Negative for congestion, drooling and hearing loss.    Eyes: Negative for discharge.   Respiratory: Negative for apnea, chest tightness, shortness of breath and wheezing.    Cardiovascular: Negative for palpitations and leg swelling.   Gastrointestinal: Negative for abdominal distention, abdominal pain, constipation and diarrhea.   Musculoskeletal: Negative for arthralgias and gait problem.   Skin: Negative for rash.   Neurological: Negative for seizures, light-headedness and numbness.   Hematological: Negative for adenopathy.   Psychiatric/Behavioral: Negative for agitation and behavioral problems.     Objective:     Vital Signs (Most Recent):  Temp: 97.8 °F (36.6 °C) (05/04/18 1130)  Pulse: (!) 51 (05/04/18 1200)  Resp: 18 (05/04/18 1130)  BP: (!) 126/45 (05/04/18 1130)  SpO2: 97 % (05/04/18 0358) Vital Signs (24h Range):  Temp:  [96.5 °F (35.8 °C)-98 °F (36.7 °C)] 97.8 °F (36.6 °C)  Pulse:  [40-57] 51  Resp:  [16-18] 18  SpO2:  [96 %-99 %] 97 %  BP: (109-137)/(41-74) 126/45     Weight: 119.5 kg (263 lb 7.2 oz) (per RN documentation)  Body mass index is 43.84 kg/m².    Intake/Output Summary (Last 24 hours) at 05/04/18 1402  Last data filed at 05/04/18 1125   Gross per 24 hour   Intake              860 ml   Output             1500 ml   Net             -640 ml      Physical Exam   Constitutional: She is oriented to person, place, and time. She appears well-developed and well-nourished.   HENT:   Head: Normocephalic and atraumatic.   Eyes: EOM are normal. Pupils are equal, round, and reactive to light.   Neck: Normal range of motion. Neck supple.   Cardiovascular: Normal rate, regular rhythm and normal heart sounds.    Pulmonary/Chest: Effort normal and breath sounds normal. No  respiratory distress.   Abdominal: Soft. Bowel sounds are normal. She exhibits no distension. There is no tenderness.   Musculoskeletal: Normal range of motion. She exhibits no edema.   Neurological: She is oriented to person, place, and time. No cranial nerve deficit. Coordination normal.   Skin: Skin is warm and dry.   Psychiatric: She has a normal mood and affect. Her behavior is normal.   Vitals reviewed.      Significant Labs:   BMP:   Recent Labs  Lab 05/04/18  0532   GLU 90   *   K 3.9      CO2 22*   BUN 13   CREATININE 3.3*   CALCIUM 8.5*   MG 1.9     CBC:   Recent Labs  Lab 05/04/18  0532   WBC 5.13   HGB 9.8*   HCT 32.1*        CMP:   Recent Labs  Lab 05/04/18  0532   *   K 3.9      CO2 22*   GLU 90   BUN 13   CREATININE 3.3*   CALCIUM 8.5*   ANIONGAP 9   EGFRNONAA 14*       Significant Imaging: I have reviewed and interpreted all pertinent imaging results/findings within the past 24 hours.    Assessment/Plan:      * Bacteremia due to methicillin resistant Staphylococcus aureus    Initially diagnosed during prior hospitalization.  Transesophageal echocardiogram negative for vegetation.  Patient with large thrombus burden thought to be likely source of her Staphylococcus bacteremia.  Per Dr. Sherron Wise's note on 2/28/2018, plan was for 8 weeks of intravenous antibiotics from last date of negative blood culture (2/22/2018) which callled for treatment until 4/19/2018.  It appears antibiotics were discontinued on 4/4/2018 so possible treatment failure due to inadequate treatment course or treatment failure due to inadequate source control.  Patient also had tunneled right internal jugular exchanged on 4/24/2018.  Despite exchanging internal jugular line and restarting antibiotics again with intravenous ceftaroline and daptomycin, patient failed to clear bacteremia and had HD catheter removed on 4/27/2018, and subsequently replaced after line holiday.  Most recent  repeat cultures negative.  Tunneled dialysis line placed and has been used.  Plan for iv antibiotics to be completed at her home SNF in [place].  Will continue IV dapto and ceftaroline x 6 weeks total with stop date June 6.  Will need CB, CMP, CPK weekly and faxed to ID clinic at [phone].  Will need ID followup here or in [place] in 4-6 weeks.        Hyponatremia      Noted and mildly improved.  Continue HD per nephrology.  Repeat labs per nephrology..        Urinary retention    Continue straight catheterization as needed.  Ultrasound of the kidneys did not reveal evidence of hydronephrosis. Continue with alpha-blocker therapy.        Debility    Patient able to sit up at the side of the bed and stand.  Rather debilitated.  Continue with PT/OT.          Slow transit constipation    Improved with bowel regimen. Continue current management.          Anemia due to chronic kidney disease    Improved status post blood transfusion on 4/20/2018 and hemoglobin remains stable.        DVT of deep femoral vein, left    Ultrasound on 2/23/2018 showed persistent nonocclusive thrombus in the left common femoral and popliteal veins and possible new thrombus in the right deep femoral vein.  Continue anticoagulation with apixaban.  Dose adjusted 4/24/2018 per nephrology's recommendation.        Type 2 diabetes mellitus with chronic kidney disease on chronic dialysis, without long-term current use of insulin    -Well controlled.  -Continue diabetic diet with sliding scale insulin.         End stage renal disease    -Patient follows with Dr. Axel Sharpe with [facility] in [place], [place] for outpatient dialysis on Mondays, Wednesdays, and Fridays.   -Nephrology consulted here to continue with renal replacement therapy.  -Seen in HD today.        Chronic atrial fibrillation    -Continues to be bradycardic but mildly improved into upper 40s and mid 50s.  Still no symptoms.  -Coreg held.  Continue with lowered dose of  jaideno (100 qd).  -Dr. Gustafson on board.  Recent echo reviewed.  -Continue tele while in house.  -Will need f/u with Dr. Gustafson on 5/30/18 for holter monitor.          VTE Risk Mitigation         Ordered     heparin (porcine) injection  As needed (PRN)      04/30/18 1219     apixaban tablet 5 mg  2 times daily      04/24/18 0934     heparin (porcine) injection 5,000 Units  As needed (PRN)      04/18/18 0753              Gildardo Nuñez MD  Department of Hospital Medicine   [facility]

## 2018-05-04 NOTE — ASSESSMENT & PLAN NOTE
-Patient follows with Dr. Axel Sharpe with [facility] in [place], [place] for outpatient dialysis on Mondays, Wednesdays, and Fridays.   -Nephrology consulted here to continue with renal replacement therapy.  -Seen in HD today.

## 2018-05-04 NOTE — ASSESSMENT & PLAN NOTE
-Continues to be bradycardic but mildly improved into upper 40s and mid 50s.  Still no symptoms.  -Coreg held.  Continue with lowered dose of amio (100 qd).  -Dr. Gustafson on board.  Recent echo reviewed.  -Continue tele while in house.  -Will need f/u with Dr. Gustafson on 5/30/18 for holter monitor.

## 2018-05-04 NOTE — PLAN OF CARE
-------------------------------------------------------------------------------  ATTN: TEAM RAMY PLANNING      PLAN : IN REHAB      [facility] NURSING / REHAB IN [place]   PENDING [facility] - PER VIVIAN SHE CALLED THIS [facility] AWARE OF NEED AUTH - PENDING      GABRIEL PARK ON CASE ALSO # [id number]   Josephine Garcia RN  Case management # [phone] (FAX) [phone]     05/04/18 0023   Discharge Assessment   Assessment Type Discharge Planning Reassessment

## 2018-05-04 NOTE — PROGRESS NOTES
ATTN: TEAM RAMY PLANNING   she was transferred to a NH facility in [place], [place]  Primary Care Provider: [facility]     PLAN : SNF       [facility]  INSURANCE WILL NOT PAY BECAUSE SHE IS BYPASSING SNF FACILTY IN [facility] ?     HER INSURANCE WILL NOT PAY FOR TRANSPORT TO SNF IN [facility]        COST TRANSPORT STRETCHER  [facility] - PICK WITH HR [facility] NURSING / REHAB IN [place]   ACCEPTED AUTH [facility] -[facility] WILL PROVIDE RN NURSE REPORT       PER PRIMARY RN PT BED BOUND ON DIALYSIS DAYS      SPOKE WITH P THERAPY   RECOMMENDATION FOR [facility]ET[facility] AMBULANCE - SINCE PATIENT HAS SEVERE WEAKNESS ON DIALYSIS DAYS AND DID NOT PROGRESS TO SIT TO STAND SAFELY.       CALLED [facility]SE [facility] - PERLA ADMIN ..ON CALL D RYANN - JASMEET BAEZ - APPROVAL STRETCHER - R/T NO OTHER SAFE WAY TO GET PT BACK TO [place] .          GABRIEL PARK ON CASE ALSO # [id number]   Josephine Garcia RN  Case management # [phone] (FAX) [phone]      Revision History

## 2018-05-05 NOTE — ASSESSMENT & PLAN NOTE
Initially diagnosed during prior hospitalization.  Transesophageal echocardiogram negative for vegetation.  Patient with large thrombus burden thought to be likely source of her Staphylococcus bacteremia.  Per Dr. Sherron Wise's note on 2/28/2018, plan was for 8 weeks of intravenous antibiotics from last date of negative blood culture (2/22/2018) which callled for treatment until 4/19/2018.  It appears antibiotics were discontinued on 4/4/2018 so possible treatment failure due to inadequate treatment course or treatment failure due to inadequate source control.  Patient also had tunneled right internal jugular exchanged on 4/24/2018.  Despite exchanging internal jugular line and restarting antibiotics again with intravenous ceftaroline and daptomycin, patient failed to clear bacteremia and had HD catheter removed on 4/27/2018, and subsequently replaced after line holiday.  Most recent repeat cultures negative.  Tunneled dialysis line placed and has been used.  Plan for iv antibiotics to be completed at her home SNF in [place].  Will continue IV dapto and ceftaroline x 6 weeks total with stop date June 6.  Will need CB, CMP, CPK weekly and faxed to ID clinic at [phone].  Will need ID followup here or in [place] in 4-6 weeks.

## 2018-05-05 NOTE — ASSESSMENT & PLAN NOTE
-Patient follows with Dr. Axel Sharpe with [facility] in [place], [place] for outpatient dialysis on Mondays, Wednesdays, and Fridays.   -Nephrology consulted here to continue with renal replacement therapy.  -Seen in HD today.

## 2018-05-05 NOTE — PT/OT/SLP DISCHARGE
Occupational Therapy Discharge Summary    Yumiko Proctor  MRN: [id number]   Principal Problem: MRSA bacteremia      Patient Discharged from acute Occupational Therapy on 4/4/18.  Please refer to prior OT note dated 4/3/18 for functional status.    Assessment:      Patient appropriate for care in another setting.    Objective:     GOALS:    Occupational Therapy Goals        Problem: Occupational Therapy Goal    Goal Priority Disciplines Outcome Interventions   Occupational Therapy Goal     OT, PT/OT Ongoing (interventions implemented as appropriate)    Description:  Goals to be met by 5/21/18  1. Min A G/H (2 tasks) sitting EOB without using UE for support on bed or tray table (MET 4/28/18)  REVISED GOAL: Completed G/H (3 tasks) sitting EOB without VC or assist to perform task  2. Mod A UBD seated EOB (MET 04/26/18)  REVISED Min assist UBD at EOB  3. Max A bed-BSC transfer  4.Maximize UE and axial muscle strength                         Reasons for Discontinuation of Therapy Services  Transfer to alternate level of care.      Plan:     Patient Discharged to: Skilled Nursing Facility    PATTY Whitten  5/5/2018

## 2018-05-05 NOTE — PT/OT/SLP DISCHARGE
Physical Therapy Discharge Summary    Name: Yumiko Proctor  MRN: [id number]   Principal Problem: MRSA bacteremia     Patient Discharged from acute Physical Therapy on 18.  Please refer to prior PT noted date on 5/3/18 for functional status.     Assessment:     Goals partially met.    Objective:     GOALS:    Physical Therapy Goals        Problem: Physical Therapy Goal    Goal Priority Disciplines Outcome Goal Variances Interventions   Physical Therapy Goal     PT/OT, PT Ongoing (interventions implemented as appropriate)     Description:  Goals to be met by: 18    Patient will increase functional independence with mobility by performin. Supine to sit with min A. MET 18  2. Sit to supine with min A.   3. Rolling R and L with min A.   4. Sitting at edge of bed >20 minutes with SBA.   5. Sit < stand with RW and min A   6. SPT from EOB <> bedside chair with RW and Min A   7. Pt will tolerate sitting at EOB for all meals with set up assist  MET 18                    Reasons for Discontinuation of Therapy Services  Transfer to alternate level of care.      Plan:     Patient Discharged to: Skilled Nursing Facility.    Yudi Collier, PT  2018

## 2018-05-05 NOTE — DISCHARGE SUMMARY
"[facility] Hospital Medicine  Discharge Summary      Patient Name: Yumiko Proctor  MRN: [id number]  Admission Date: 4/18/2018  Hospital Length of Stay: 16 days  Discharge Date and Time: 5/4/2018  7:22 PM  Attending Physician: No att. providers found   Discharging Provider: Gildardo Nuñez MD  Primary Care Provider: [facility]      HPI:   Per Nat Woodward, "Ms Hoa Proctor is a 68 y.o. female, with PMH of HTN, NIDDM-2, CHF (EF 35%), ESRD on dialysis ([facility], Dr. Prather), A. Fib, LLE DVT, cardiomyopathy, and recent treatment in this facility for MRSA bacteremia from her vascular access site, who returns 2/2 need for ID consultation. In the past week the patient developed a fever and was diagnosed with RUL PNA, and started on Levaquin 3/week after dialysis. Associated symptoms include non-productive cough, fevers, and SOB with wheeze. She was started on vancymycin and zosyn on 4/15/18. Blood cultures taken on 4/16/18 were the second set, first after antibiotics, that grew staph. Other associated symptoms include b/l hip pain, and constipation.  She denies fever, chills, sweats, chest pain, SOB, N/V, diarrhea.     During the patient's previous stay at this facility she had blood cultures positive for MRSA on 2/8/18, and she was started on Daptomycin. On 2/10/18 a second set of blood cultures were positive for MRSA. At the time she had her left Jaspreet catheter removed and a right IJ tunneled dialysis cath was placed on 2/12/18. Repeat cultures on 2/13/18 were negative for MRSA. She was positive in 1/2 bottles on 2/16/18 and both blood cultures bottles for MRSA on 2/16/18. At that time she was started on vancomycin, and Daptomycin was stopped 2/2 myositis. On 2/22/18 blood cultures were negative. A RANDA on 2/26/18 was negative for vegetations, but the patient did have a LLE DVT discovered 2/20/18, and Apixaban was started. After CPK levels were normal, she was " "started back on Daptomycin and Ceftaroline. She was to continue this treatment until 4/4/18, and she was transferred to a NH facility in [place], [place]. She states she was transferred from the Nursing facility to [facility] on 4/15/18."    Procedure(s) (LRB):  PERMCATH INSERTION-TUNNELED CVC (N/A)      Hospital Course:   Ms. Yumiko Proctor is a 68 year-old woman with a history hypertension, diabetes mellitus type II on insulin, chronic systolic heart failure with ejection fraction of 35 percent, and end-stage renal disease on hemodialysis via tunneled dialysis catheter exited her right chest wall who returned from a nursing facility in [place] where she was undergoing treatment for presumed endovascular infection with Methicillin-resistant Staphylococcus aureus infection via a peripherally inserted central catheter who returns with recurrence of Staphylococcus aureus bacteremia.  Patient's peripherally inserted central catheter was removed here.  Blood cultures obtained here on 4/19/2018 positive for methicillin-sensitive Staphylococcus aureus.  Infectious disease service consulted and recommended treatment with intravenous ceftaroline and daptomycin.  In addition, as patient failed to clear her bacteremia, infectious disease service recommended removal of her tunneled dialysis catheter which was done following dialysis on 4/27/2018 with plan to place new tunneled catheter on 4/30/2018 to allow for a line holiday.    5/1: Today she complains of some left arm and shoulder pain, but is eager to leave the hospital.  No acute events noted overnight.  5/2: Tele reviewed and noted HR into upper 30s overnight.  No acute events noted.  Seen in dialysis.  Eager to leave the hospital.  5/3:  In good spirits.  No complaints.  HR stable in upper 40s to mid 50s.  Eager to leave hospital.  Waiting on insurance for SNF approval.  5/4:  Seen in HD.  In good spirits and without complaint.  HR stable in same range.  Eager to get out of " hospital.  Approved for discharge and will be transferred to SNF in [place] today.     Consults:   Consults         Status Ordering Provider     Inpatient consult to Cardiology  Once     Provider:  Rikki Gustafson MD    Completed CARLIE IBANEZ     Inpatient consult to Cardiology  Once     Provider:  Rikki Gustafson MD    Completed AMILCAR LOGAN     Inpatient consult to Infectious Diseases  Once     Provider:  Shahid Bhakta MD    Completed YANDY SILVESTRE     Inpatient consult to Interventional Radiology  Once     Provider:  Aditya Francis MD    Completed CARLIE IBANEZ     Inpatient consult to Nephrology-[facility]  Once     Provider:  Ynes Rodas MD    Completed YANDY SILVESTRE     Inpatient consult to Social Work/Case Management  Once     Provider:  (Not yet assigned)    Completed CARLIE IBANEZ          * MRSA bacteremia    Initially diagnosed during prior hospitalization.  Transesophageal echocardiogram negative for vegetation.  Patient with large thrombus burden thought to be likely source of her Staphylococcus bacteremia.  Per Dr. Sherron Wise's note on 2/28/2018, plan was for 8 weeks of intravenous antibiotics from last date of negative blood culture (2/22/2018) which callled for treatment until 4/19/2018.  It appears antibiotics were discontinued on 4/4/2018 so possible treatment failure due to inadequate treatment course or treatment failure due to inadequate source control.  Patient also had tunneled right internal jugular exchanged on 4/24/2018.  Despite exchanging internal jugular line and restarting antibiotics again with intravenous ceftaroline and daptomycin, patient failed to clear bacteremia and had HD catheter removed on 4/27/2018, and subsequently replaced after line holiday.  Most recent repeat cultures negative.  Tunneled dialysis line placed and has been used.  Plan for iv antibiotics to be completed at her home SNF in [place].  Will continue  IV dapto and ceftaroline x 6 weeks total with stop date June 6.  Will need CB, CMP, CPK weekly and faxed to ID clinic at [phone].  Will need ID followup here or in [place] in 4-6 weeks.        Bradycardia              Hyponatremia      Noted and mildly improved.  Continue HD per nephrology.  Repeat labs per nephrology..        Debility    Patient able to sit up at the side of the bed and stand.  Rather debilitated.  Continue with PT/OT.          Slow transit constipation    Improved with bowel regimen. Continue current management.          Anemia due to chronic kidney disease    Improved status post blood transfusion on 4/20/2018 and hemoglobin remains stable.        DVT of deep femoral vein, left    Ultrasound on 2/23/2018 showed persistent nonocclusive thrombus in the left common femoral and popliteal veins and possible new thrombus in the right deep femoral vein.  Continue anticoagulation with apixaban.  Dose adjusted 4/24/2018 per nephrology's recommendation.        Type 2 diabetes mellitus with chronic kidney disease on chronic dialysis, without long-term current use of insulin    -Well controlled.  -Continue diabetic diet with sliding scale insulin.         End stage renal disease    -Patient follows with Dr. Axel Sharpe with [facility] in [place], [place] for outpatient dialysis on Mondays, Wednesdays, and Fridays.   -Nephrology consulted here to continue with renal replacement therapy.  -Seen in HD today.        Chronic atrial fibrillation    -Continues to be bradycardic but mildly improved into upper 40s and mid 50s.  Still no symptoms.  -Coreg held.  Continue with lowered dose of amio (100 qd).  -Dr. Gustafson on board.  Recent echo reviewed.  -Continue tele while in house.  -Will need f/u with Dr. Gustafson on 5/30/18 for holter monitor.          Final Active Diagnoses:    Diagnosis Date Noted POA    PRINCIPAL PROBLEM:  MRSA bacteremia [R78.81] 02/07/2018 Yes    Bradycardia [R00.1]   Unknown    Hyponatremia [E87.1] 05/01/2018 Unknown    Urinary retention [R33.9] 04/27/2018 Yes    Debility [R53.81] 04/24/2018 Yes    Slow transit constipation [K59.01] 04/20/2018 Yes    Anemia due to chronic kidney disease [N18.9, D63.1] 04/19/2018 Yes    DVT of deep femoral vein, left [I82.412] 02/21/2018 Yes    Chronic atrial fibrillation [I48.2] 02/08/2018 Yes    End stage renal disease [N18.6] 02/08/2018 Yes    Type 2 diabetes mellitus with chronic kidney disease on chronic dialysis, without long-term current use of insulin [E11.22, N18.6, Z99.2] 02/08/2018 Not Applicable      Problems Resolved During this Admission:    Diagnosis Date Noted Date Resolved POA       Discharged Condition: fair    Disposition: Skilled Nursing Facility    Follow Up:  Follow-up Information     [facility] [place] [facility].    Specialties:  Nursing Home Agency, Rehabilitation  Contact information:  [address] [place] [address]  [phone]             Rikki Gustafson MD On 5/30/2018.    Specialty:  Cardiology  Why:  For holter monitor of heart  Contact information:  [address]  [facility] [id number] [phone]             Infectious Disease doctor In 1 month.    Why:  Infectious disease doctor.           [facility] In 2 weeks.    Specialties:  Nursing Home Agency, Rehabilitation  Contact information:  [address] [place] [address]  [phone]                 Patient Instructions:     Diet diabetic     Diet Cardiac     Diet renal     Diet diabetic     Diet Cardiac     Diet renal     Vital signs per facility protocol     Intake and output per facility protocol     Skin assessment every shift      Notify Physician   Order Specific Question Answer Comments   Temperature (F) greater than 101    Systolic Blood Pressure SBP greater than or equal to 160    Systolic Blood Pressure SBP less than or equal to 90    Diastolic Blood Pressure DBP greater than or  equal to 110    Diastolic Blood Pressure DBP less than or equal to 60    Pulse greater than or equal to 120    Pulse less than or equal to 50    Respirations Rate RR greater than or equal to 30    Respirations Rate RR less than or equal to 6    Urine output less than 60 over 2 hours   SPO2% less than 90      Ambulate with assistance     Vital signs per facility protocol     Intake and output per facility protocol     Skin assessment every shift      Notify Physician   Order Specific Question Answer Comments   Temperature (F) greater than 101    Systolic Blood Pressure SBP greater than or equal to 160    Systolic Blood Pressure SBP less than or equal to 90    Diastolic Blood Pressure DBP greater than or equal to 110    Diastolic Blood Pressure DBP less than or equal to 60    Pulse greater than or equal to 120    Pulse less than or equal to 50    Respirations Rate RR greater than or equal to 30    Respirations Rate RR less than or equal to 6    Urine output less than 60 over 2 hours   SPO2% less than 90      Activity as tolerated     Vital signs per facility protocol     Intake and output per facility protocol     Skin assessment every shift      Notify Physician   Order Specific Question Answer Comments   Temperature (F) greater than 101    Systolic Blood Pressure SBP greater than or equal to 160    Systolic Blood Pressure SBP less than or equal to 90    Diastolic Blood Pressure DBP greater than or equal to 110    Diastolic Blood Pressure DBP less than or equal to 60    Pulse greater than or equal to 120    Pulse less than or equal to 50    Respirations Rate RR greater than or equal to 30    Respirations Rate RR less than or equal to 6    Urine output less than 60 over 2 hours   SPO2% less than 90      Ambulate with assistance     Place sequential compression device     Place ANGELI hose     OT evaluate and treat   Order Comments: Treat according to established therapy treatment plan.     PT evaluate and treat   Order  Comments: Treat according to established therapy treatment plan.     Pulse Oximetry     Pulse Oximetry     Pulse Oximetry         Significant Diagnostic Studies: Labs:   BMP:   Recent Labs  Lab 05/04/18  0532   GLU 90   *   K 3.9      CO2 22*   BUN 13   CREATININE 3.3*   CALCIUM 8.5*   MG 1.9   , CMP   Recent Labs  Lab 05/04/18  0532   *   K 3.9      CO2 22*   GLU 90   BUN 13   CREATININE 3.3*   CALCIUM 8.5*   ANIONGAP 9   ESTGFRAFRICA 16*   EGFRNONAA 14*   , CBC   Recent Labs  Lab 05/04/18  0532   WBC 5.13   HGB 9.8*   HCT 32.1*       and A1C:   Recent Labs  Lab 02/08/18  0029 04/18/18  0428   HGBA1C 6.5* 4.2       Pending Diagnostic Studies:     None         Medications:  Reconciled Home Medications:      Medication List      START taking these medications    acetaminophen 325 MG tablet  Commonly known as:  TYLENOL  Take 2 tablets (650 mg total) by mouth every 4 (four) hours as needed.     dextrose 5 % SolP 50 mL with ceftaroline fosamil 400 mg SolR 200 mg  Inject 200 mg into the vein every 12 (twelve) hours.  Replaces:  dextrose 5 % SolP 50 mL with ceftaroline fosamil 600 mg SolR 200 mg     famotidine 20 MG tablet  Commonly known as:  PEPCID  Take 1 tablet (20 mg total) by mouth once daily.     gabapentin 100 MG capsule  Commonly known as:  NEURONTIN  Take 1 capsule (100 mg total) by mouth 3 (three) times daily.     glucose 4 GM chewable tablet  Take 4 tablets (16 g total) by mouth as needed.     insulin aspart U-100 100 unit/mL Inpn pen  Commonly known as:  NovoLOG  Inject 0-5 Units into the skin before meals and at bedtime as needed (Hyperglycemia).     oxyCODONE 5 MG immediate release tablet  Commonly known as:  ROXICODONE  Take 1 tablet (5 mg total) by mouth every 6 (six) hours as needed for Pain.     polyethylene glycol 17 gram Pwpk  Commonly known as:  GLYCOLAX  Take 17 g by mouth once daily.     senna-docusate 8.6-50 mg 8.6-50 mg per tablet  Commonly known as:   PERICOLACE  Take 1 tablet by mouth daily as needed for Constipation.     sodium chloride 0.9% SolP 50 mL with DAPTOmycin 500 mg SolR 710 mg  Inject 710 mg into the vein 3 (three) times a week. Inject 710 mg into vein every Monday, Wednesday and Friday after Dialysis  Replaces:  sodium chloride 0.9% SolP 50 mL with DAPTOmycin 500 mg SolR 865 mg     tamsulosin 0.4 mg Cp24  Commonly known as:  FLOMAX  Take 1 capsule (0.4 mg total) by mouth once daily.        CHANGE how you take these medications    amiodarone 100 MG Tab  Commonly known as:  PACERONE  Take 1 tablet (100 mg total) by mouth once daily.  What changed:  how much to take     apixaban 5 mg Tab  Take 1 tablet (5 mg total) by mouth 2 (two) times daily.  What changed:  · medication strength  · how much to take     * oxyCODONE-acetaminophen 5-325 mg per tablet  Commonly known as:  PERCOCET  Take 1 tablet by mouth every 4 (four) hours as needed.  What changed:  You were already taking a medication with the same name, and this prescription was added. Make sure you understand how and when to take each.     * oxyCODONE-acetaminophen 5-325 mg per tablet  Commonly known as:  PERCOCET  Take 1 tablet by mouth every 4 (four) hours as needed for Pain.  What changed:  reasons to take this        * This list has 2 medication(s) that are the same as other medications prescribed for you. Read the directions carefully, and ask your doctor or other care provider to review them with you.            CONTINUE taking these medications    calcitRIOL 0.25 MCG Cap  Commonly known as:  ROCALTROL  Take 1 capsule (0.25 mcg total) by mouth once daily.     carvedilol 25 MG tablet  Commonly known as:  COREG  Take 25 mg by mouth 2 (two) times daily.     docusate sodium 100 MG capsule  Commonly known as:  COLACE  Take 1 capsule (100 mg total) by mouth 2 (two) times daily.     epoetin davion 20,000 unit/mL injection  Commonly known as:  PROCRIT  Inject 1 mL (20,000 Units total) into the skin  every Mon, Wed, Fri.     ferric citrate 210 mg iron Tab  Take 210 mg by mouth 3 (three) times daily.     furosemide 80 MG tablet  Commonly known as:  LASIX  Take 1 tablet (80 mg total) by mouth 2 (two) times daily.     hydrALAZINE 100 MG tablet  Commonly known as:  APRESOLINE  Take 100 mg by mouth 3 (three) times daily.     ISOSORBIDE MONONITRATE ORAL  Take 30 mg by mouth Daily.     sevelamer carbonate 800 mg Tab  Commonly known as:  RENVELA  Take 1 tablet (800 mg total) by mouth 3 (three) times daily with meals.     SITagliptin 25 MG Tab  Commonly known as:  JANUVIA  Take 1 tablet (25 mg total) by mouth once daily.     vitamin D 1000 units Tab  Take 2 tablets (2,000 Units total) by mouth once daily.     vitamin renal formula (B-complex-vitamin c-folic acid) 1 mg Cap  Commonly known as:  NEPHROCAP  Take 1 capsule by mouth once daily.        STOP taking these medications    dextrose 5 % SolP 50 mL with ceftaroline fosamil 600 mg SolR 200 mg  Replaced by:  dextrose 5 % SolP 50 mL with ceftaroline fosamil 400 mg SolR 200 mg     sodium chloride 0.9% SolP 50 mL with DAPTOmycin 500 mg SolR 865 mg  Replaced by:  sodium chloride 0.9% SolP 50 mL with DAPTOmycin 500 mg SolR 710 mg        ASK your doctor about these medications    pneumoc 13-myron conj-dip cr(PF) 0.5 mL Syrg  Inject 0.5 mLs into the muscle once.  Ask about: Should I take this medication?            Indwelling Lines/Drains at time of discharge:   Lines/Drains/Airways     Peripherally Inserted Central Catheter Line                 PICC Double Lumen 04/27/18 1200 left cephalic 7 days          Central Venous Catheter Line                 Hemodialysis Catheter 04/30/18 1211 right internal jugular 4 days                Time spent on the discharge of patient: 45 minutes  Patient was seen and examined on the date of discharge and determined to be suitable for discharge.         Gildardo Nuñez MD  Department of Hospital Medicine  [facility]

## 2018-05-06 LAB
BACTERIA BLD CULT: NORMAL
BACTERIA BLD CULT: NORMAL

## 2018-05-07 ENCOUNTER — PATIENT OUTREACH (OUTPATIENT)
Dept: ADMINISTRATIVE | Facility: CLINIC | Age: 68
End: 2018-05-07

## 2018-05-07 PROBLEM — R00.1 BRADYCARDIA: Status: ACTIVE | Noted: 2018-05-07

## 2019-01-31 NOTE — PLAN OF CARE
Problem: Patient Care Overview  Goal: Plan of Care Review  Recommendations     Recommendation/Intervention:   1. Cont w/ current diet order; if meal intake drops to <50%, order Novasource Renal TID    2. Enc adequate meal intake daily  Goals: Maintain meal intake >/=75%  Nutrition Goal Status: goal met       Yes

## 2019-10-22 PROBLEM — I95.9 HYPOTENSION: Status: ACTIVE | Noted: 2019-10-22

## 2019-10-23 PROBLEM — E66.01 MORBID OBESITY: Status: ACTIVE | Noted: 2019-10-23

## 2019-10-26 PROBLEM — R78.81 MRSA BACTEREMIA: Status: RESOLVED | Noted: 2018-02-07 | Resolved: 2019-10-26

## 2019-10-26 PROBLEM — B95.62 MRSA BACTEREMIA: Status: RESOLVED | Noted: 2018-02-07 | Resolved: 2019-10-26

## 2020-06-12 NOTE — PLAN OF CARE
Problem: Patient Care Overview  Goal: Plan of Care Review  Outcome: Ongoing (interventions implemented as appropriate)  Patient on room air in no distress saturations 97%.       Statement Selected

## 2023-07-20 NOTE — PT/OT/SLP PROGRESS
Occupational Therapy  Not seen Note    Patient Name:  Yumiko Proctor   MRN:  [id number]    Patient not seen today secondary to Dialysis.in the morning and this afternoon she was to fatigued to participate in OT treatment. Will follow-up on her next schedule therapy day..    PATTY Whitten  5/4/2018   Calcipotriene Pregnancy And Lactation Text: The use of this medication during pregnancy or lactation is not recommended as there is insufficient data.

## 2025-02-13 NOTE — HPI
[facility]   PRE-ADMISSION TESTING GENERAL INSTRUCTIONS  PAT Phone Number: [phone]      GENERAL INSTRUCTIONS:    [x] Antibacterial Soap Shower Night before AND the Morning of procedure.    [x] Do not wear makeup, lotions, powders, deodorant the morning of surgery.  [x] No solid food after midnight. You may have SIPS of clear liquids up until 2 hours before your arrival time to the hospital.   [x] You may brush your teeth, gargle, but do not swallow water.   [x] No tobacco products, illegal drugs, or alcohol within 24 hours of your surgery.  [x] Jewelry or valuables should not be brought to the hospital. All body and/or tongue piercing's must be removed prior to arriving to hospital. No contact lens or hair pins.   [x] Arrange transportation with a responsible adult  to and from the hospital. Arrange for someone to be with you for the remainder of the day and for 24 hours after your procedure due to having had anesthesia.          -Who will be your  for transportation? Marybel, wife        -Who will be staying with you for 24 hrs after your procedure? wife  [x] Bring insurance card and photo ID.  [x] Bring copy of living will or healthcare power of  papers to be placed in your electronic record.       PARKING INSTRUCTIONS:     [x] ARRIVAL DATE & TIME: 2/19  7 am  [x] Times are subject to change. We will contact you the business day before surgery if that were to occur.  [x] Enter into the [facility] Entrance. Two adults may accompany you. Masks are not required.  [x] Parking Lot \"I\" is where you will park. It is located on the corner of [address] and [facility] [address] [address]. The entrance is on [address] [address] [address].   Only one vehicle - per patient, is permitted in parking lot.   Upon entering the parking lot, a voucher ticket will print.    EDUCATION INSTRUCTIONS:             [x] Pain: Post-op pain is normal and to be expected. You will be asked to rate your pain from  The patient is a 68 yo female with known MRSA bacteremia who was transferred here for ID and Neurosurgery services.  She has a history of ESRD, Afib, cardiomyopathy, and DM.  She has been hospitalized with bacteremia from a vascular access which was taken out and replaced today with a lt femoral vas cath.  Blood cultures remained positive. She had a RANDA which was normal, MRI of thoracic and lumbar spine showed a abnormal foci in T( that was probably a hemangioma or metastasis.   0-10.    MEDICATION INSTRUCTIONS:    [x] Bring a complete list of your medications, please write the last time you took the medicine, give this list to the nurse in Pre-Op.  [x] Take ONLY the following medications the morning of surgery: omeprazole (Prilosec)  [x] Stop all herbal supplements and vitamins 5 days before surgery. Stop NSAIDS 7 days before surgery.     Last day to take vitamin D 2/3    [x] Follow physician instructions regarding any blood thinners you may be taking.    WHAT TO EXPECT:    [x] The day of surgery you will be greeted and checked in by the [facility] . In addition, you will be registered in the [facility] by a Patient Access Representative. Please bring your photo ID and insurance card. A nurse will greet you in accordance to the time you are needed in the pre-op area to prepare you for surgery. Please do not be discouraged if you are not greeted in the order you arrive as there are many variables that are involved in patient preparation. Your patience is greatly appreciated as you wait for your nurse.   [x] Delays may occur with surgery and staff will make a sincere effort to keep you informed of delays. If any delays occur with your procedure, we apologize ahead of time for your inconvenience as we recognize the value of your time.   Above emailed to patient at address provided by patient.

## (undated) DEVICE — ADHESIVE DERMABOND ADVANCED

## (undated) DEVICE — SYR B-D DISP CONTROL 10CC100/C

## (undated) DEVICE — SUT MCRYL PLUS 4-0 PS2 27IN

## (undated) DEVICE — GLOVE BIOGEL SKINSENSE PI 6.5

## (undated) DEVICE — NDL HYPO REG 25G X 1 1/2

## (undated) DEVICE — NDL 18GA X1 1/2 REG BEVEL

## (undated) DEVICE — SUT VICRYL 3-0 27 SH

## (undated) DEVICE — SYR 10CC LUER LOCK

## (undated) DEVICE — SOL 9P NACL IRR PIC IL

## (undated) DEVICE — DRESSING ANTIMICROBIAL 1 INCH

## (undated) DEVICE — DRESSING GAUZE 6PLY 4X4

## (undated) DEVICE — KIT PROBE COVER WITH GEL

## (undated) DEVICE — DRESSING TRANS 4X4 TEGADERM

## (undated) DEVICE — SYR 50CC LL

## (undated) DEVICE — BLADE SURG STAINLESS STEEL #11

## (undated) DEVICE — GLOVE BIOGEL SKINSENSE PI 6.0

## (undated) DEVICE — DRESSING TEGADERM IV 3.5 X 4.5

## (undated) DEVICE — GAUZE SPONGE 4X4 12PLY

## (undated) DEVICE — ELECTRODE REM PLYHSV RETURN 9

## (undated) DEVICE — GLOVE BIOGEL SKINSENSE PI 8.0

## (undated) DEVICE — GOWN SMART IMP BREATHABLE XXLG

## (undated) DEVICE — DRAPE THYROID WITH ARMBOARD

## (undated) DEVICE — APPLICATOR CHLORAPREP ORN 26ML

## (undated) DEVICE — SEE MEDLINE ITEM 156930

## (undated) DEVICE — SEE MEDLINE ITEM 152622